# Patient Record
Sex: FEMALE | Race: WHITE | Employment: OTHER | URBAN - METROPOLITAN AREA
[De-identification: names, ages, dates, MRNs, and addresses within clinical notes are randomized per-mention and may not be internally consistent; named-entity substitution may affect disease eponyms.]

---

## 2018-03-19 ENCOUNTER — OFFICE VISIT (OUTPATIENT)
Dept: LAB | Facility: SCHOOL | Age: 63
End: 2018-03-19
Payer: COMMERCIAL

## 2018-03-19 VITALS
RESPIRATION RATE: 16 BRPM | BODY MASS INDEX: 41.4 KG/M2 | DIASTOLIC BLOOD PRESSURE: 72 MMHG | HEIGHT: 66 IN | SYSTOLIC BLOOD PRESSURE: 138 MMHG | OXYGEN SATURATION: 96 % | TEMPERATURE: 97.4 F | WEIGHT: 257.6 LBS | HEART RATE: 54 BPM

## 2018-03-19 DIAGNOSIS — E66.01 MORBID OBESITY (H): ICD-10-CM

## 2018-03-19 DIAGNOSIS — Z00.00 WELLNESS EXAMINATION: Primary | ICD-10-CM

## 2018-03-19 DIAGNOSIS — I10 BENIGN ESSENTIAL HYPERTENSION: ICD-10-CM

## 2018-03-19 DIAGNOSIS — G47.33 OSA (OBSTRUCTIVE SLEEP APNEA): ICD-10-CM

## 2018-03-19 DIAGNOSIS — Z72.0 TOBACCO ABUSE DISORDER: ICD-10-CM

## 2018-03-19 PROCEDURE — 99203 OFFICE O/P NEW LOW 30 MIN: CPT | Performed by: NURSE PRACTITIONER

## 2018-03-19 RX ORDER — GABAPENTIN 600 MG/1
TABLET ORAL
COMMUNITY
Start: 2018-03-13

## 2018-03-19 RX ORDER — LISINOPRIL AND HYDROCHLOROTHIAZIDE 20; 25 MG/1; MG/1
1 TABLET ORAL
COMMUNITY
Start: 2017-08-16 | End: 2021-05-26

## 2018-03-19 RX ORDER — HYDROCODONE BITARTRATE AND ACETAMINOPHEN 5; 325 MG/1; MG/1
1 TABLET ORAL
COMMUNITY
Start: 2018-03-13 | End: 2019-09-06

## 2018-03-19 RX ORDER — MULTIVITAMIN
2 CAPSULE ORAL DAILY
Status: ON HOLD | COMMUNITY
End: 2021-05-26

## 2018-03-19 NOTE — PROGRESS NOTES
"  SUBJECTIVE:  CC: Corina Martinez is an 63 year old woman who presents for Wellness Check at [facility].     Review of Healthy Lifestyle:    Do you get at least three servings of calcium containing foods daily (dairy, green leafy vegetables, etc.)? yes     Do you have a high-fiber diet? yes     Amount of exercise or daily activities, outside of work: not much- broke her hip this year    Do you wear sunscreen on a regular basis? No     Are you taking your medications regularly Yes     Have you had an eye exam in the past two years? yes    Do you see a dentist twice per year? yes    Do you have sleep apnea, excessive snoring or excessive daytime drowsiness? Sleep apnea    Do you use tobacco in any form? yes       OBJECTIVE:    Vitals: /72 (BP Location: Right arm, Patient Position: Sitting, Cuff Size: Adult Large)  Pulse 54  Temp 97.4  F (36.3  C) (Tympanic)  Resp 16  Ht 5' 5.5\" (1.664 m)  Wt 257 lb 9.6 oz (116.8 kg)  SpO2 96%  BMI 42.21 kg/m2  BMI= Body mass index is 42.21 kg/(m^2).    HEARING: :  Testing not done; patient declined    VISION: patient declined    Medication Reconciliation: complete    ASSESSMENT/PLAN: Patient just had a physical last week and discussed health issues with primary provider at [facility] and had fasting labs. Offered smoking cessation materials.     COUNSELING:      reports that she has been smoking Cigarettes.  She has never used smokeless tobacco.  Tobacco Cessation Action Plan: Information offered: Patient not interested at this time  Estimated body mass index is 42.21 kg/(m^2) as calculated from the following:    Height as of this encounter: 5' 5.5\" (1.664 m).    Weight as of this encounter: 257 lb 9.6 oz (116.8 kg).   Weight management plan: Patient was referred to their PCP to discuss a diet and exercise plan.    Counseling Resources  [place]'s MyPlate  https://www.quitplan.com/    SUZE Haywood CNP    [employer] PROVID[employer]  [facility]LE " [facility]

## 2018-03-19 NOTE — PATIENT INSTRUCTIONS
"Corina Martinez has completed a Wellness Check at the [facility] on 3/19/2018.      ____________________________________________  [employer] PROVIDER                                                                               Wellness Visit Recommendations:      See your regular primary care health provider every year in order to help stay healthy.    Review health changes.     Review your medicines if your doctor has prescribed any.    Talk to your provider about how often to have your cholesterol checked.    If you are at risk for diabetes, you should have a diabetes test (fasting glucose).    Shots: Get a flu shot each year. Get a tetanus shot every 10 years.     Review with your primary care provider other immunizations that you may need based on your age and/or medical/surgical history    Nutrition:     Eat at least 5 servings of fruits and vegetables each day.    Eat whole-grain bread, whole-wheat pasta and brown rice instead of white grains and rice.    Preventive Care to be reviewed by your primary care provider:    Females:        Cervical Cancer Screening                          Breast Cancer Screening                          Colon Cancer Screening  Males:             Prostrate Cancer Screening                          Colon Cancer Screening      Lifestyle:    Exercise at least 150 minutes a week (30 minutes a day, 5 days of the week). This will help you control your weight and help prevent disease or manage disease.    Limit alcohol to one drink per day or less depending on your past medical history.    No smoking.     Wear sunscreen to prevent skin cancer.    See your dentist every six months for an exam and cleaning.    Today's Vital Signs:  /72 (BP Location: Right arm, Patient Position: Sitting, Cuff Size: Adult Large)  Pulse 54  Temp 97.4  F (36.3  C) (Tympanic)  Resp 16  Ht 5' 5.5\" (1.664 m)  Wt 257 lb 9.6 oz (116.8 kg)  SpO2 96%  BMI 42.21 " kg/m2

## 2018-03-19 NOTE — MR AVS SNAPSHOT
After Visit Summary   3/19/2018    Corina Martinez    MRN: [id number]           Patient Information     Date Of Birth          1955        Visit Information        Provider Department      3/19/2018 9:00 AM Provider, [facility]        Care Instructions                    Corina Martinez has completed a Wellness Check at the [facility] on 3/19/2018.      ____________________________________________  [facility] PROVIDER                                                                               Wellness Visit Recommendations:      See your regular primary care health provider every year in order to help stay healthy.    Review health changes.     Review your medicines if your doctor has prescribed any.    Talk to your provider about how often to have your cholesterol checked.    If you are at risk for diabetes, you should have a diabetes test (fasting glucose).    Shots: Get a flu shot each year. Get a tetanus shot every 10 years.     Review with your primary care provider other immunizations that you may need based on your age and/or medical/surgical history    Nutrition:     Eat at least 5 servings of fruits and vegetables each day.    Eat whole-grain bread, whole-wheat pasta and brown rice instead of white grains and rice.    Preventive Care to be reviewed by your primary care provider:    Females:        Cervical Cancer Screening                          Breast Cancer Screening                          Colon Cancer Screening  Males:             Prostrate Cancer Screening                          Colon Cancer Screening      Lifestyle:    Exercise at least 150 minutes a week (30 minutes a day, 5 days of the week). This will help you control your weight and help prevent disease or manage disease.    Limit alcohol to one drink per day or less depending on your past medical history.    No smoking.     Wear sunscreen to prevent skin  "cancer.    See your dentist every six months for an exam and cleaning.    Today's Vital Signs:  /72 (BP Location: Right arm, Patient Position: Sitting, Cuff Size: Adult Large)  Pulse 54  Temp 97.4  F (36.3  C) (Tympanic)  Resp 16  Ht 5' 5.5\" (1.664 m)  Wt 257 lb 9.6 oz (116.8 kg)  SpO2 96%  BMI 42.21 kg/m2          Follow-ups after your visit        Who to contact     If you have questions or need follow up information about today's clinic visit or your schedule please contact [facility] directly at [phone].  Normal or non-critical lab and imaging results will be communicated to you by MyChart, letter or phone within 4 business days after the clinic has received the results. If you do not hear from us within 7 days, please contact the clinic through [employer] or phone. If you have a critical or abnormal lab result, we will notify you by phone as soon as possible.  Submit refill requests through [employer] or call your pharmacy and they will forward the refill request to us. Please allow 3 business days for your refill to be completed.          Additional Information About Your Visit        [employer] Information     [employer] lets you send messages to your doctor, view your test results, renew your prescriptions, schedule appointments and more. To sign up, go to [email or web address] . Click on \"Log in\" on the left side of the screen, which will take you to the Welcome page. Then click on \"Sign up Now\" on the right side of the page.     You will be asked to enter the access code listed below, as well as some personal information. Please follow the directions to create your username and password.     Your access code is: [email or web address]  Expires: 2018  9:05 AM     Your access code will  in 90 days. If you need help or a new code, please call your [facility] or [phone].        Care EveryWhere ID     This is your Care EveryWhere ID. This could be used by other " "organizations to access your [facility] medical records  [id number]        Your Vitals Were     Pulse Temperature Respirations Height Pulse Oximetry BMI (Body Mass Index)    54 97.4  F (36.3  C) (Tympanic) 16 5' 5.5\" (1.664 m) 96% 42.21 kg/m2       Blood Pressure from Last 3 Encounters:   03/19/18 138/72   03/14/11 130/72   03/01/11 128/72    Weight from Last 3 Encounters:   03/19/18 257 lb 9.6 oz (116.8 kg)   03/14/11 260 lb 1.6 oz (118 kg)   03/01/11 258 lb (117 kg)              Today, you had the following     No orders found for display         Today's Medication Changes          These changes are accurate as of 3/19/18  9:05 AM.  If you have any questions, ask your nurse or doctor.               Stop taking these medicines if you haven't already. Please contact your care team if you have questions.     lisinopril 10 MG tablet   Commonly known as:  PRINIVIL/ZESTRIL   Stopped by:  [place], [facility]                    Primary Care Provider Office Phone # Fax #    Fiona Wylie PA-C [phone]       [facility] [address] [place] [address]        Equal Access to Services     ROBB ANDERSON : Juan coyneo Sojanieali, waaxda luqadaha, qaybta kaalmada adeegyada, vi edwards. So [facility] [phone].    ATENCIÓN: Si habla español, tiene a kilpatrick disposición servicios gratuitos de asistencia lingüística. [place] al [phone].    We comply with applicable federal civil rights laws and [place] laws. We do not discriminate on the basis of race, color, national origin, age, disability, sex, sexual orientation, or gender identity.            Thank you!     Thank you for choosing [place] [address]  for your care. Our goal is always to provide you with excellent care. Hearing back from our patients is one way we can continue to improve our services. Please take a few minutes to complete the written survey that you may receive in " the mail after your visit with us. Thank you!             Your Updated Medication List - Protect others around you: Learn how to safely use, store and throw away your medicines at www.disposemymeds.org.          This list is accurate as of 3/19/18  9:05 AM.  Always use your most recent med list.                   Brand Name Dispense Instructions for use Diagnosis    citalopram 40 MG tablet    celeXA    90 tablet    Take 1 tablet by mouth daily.        cyclobenzaprine 10 MG tablet    FLEXERIL    30 tablet    Take 1 tablet by mouth 3 times daily as needed for muscle spasms.        fluticasone 50 MCG/ACT spray    FLONASE    1 Package    2 sprays by Both Nostrils route daily.        gabapentin 600 MG tablet    NEURONTIN     Take 600 mg orally in the morning, 600 mg in the afternoon and 1200 mg in the evening.        HYDROcodone-acetaminophen 5-325 MG per tablet    NORCO     Take 1 tablet by mouth        lisinopril-hydrochlorothiazide 20-25 MG per tablet    PRINZIDE/ZESTORETIC     Take 1 tablet by mouth        MULTIvitamin  S Caps      Take 2 capsules by mouth        omeprazole 20 MG CR capsule    priLOSEC     Take 20 mg by mouth        oxyCODONE IR 5 MG tablet    ROXICODONE    30 tablet    Take  by mouth. 1 tablet every 6 hours as needed        ursodiol 500 MG Tabs      Take  by mouth. 1 tablet three times daily

## 2018-03-19 NOTE — NURSING NOTE
"Chief Complaint   Patient presents with     Wellness Visit       Initial /72 (BP Location: Right arm, Patient Position: Sitting, Cuff Size: Adult Large)  Pulse 54  Temp 97.4  F (36.3  C) (Tympanic)  Resp 16  Ht 5' 5.5\" (1.664 m)  Wt 257 lb 9.6 oz (116.8 kg)  SpO2 96%  BMI 42.21 kg/m2 Estimated body mass index is 42.21 kg/(m^2) as calculated from the following:    Height as of this encounter: 5' 5.5\" (1.664 m).    Weight as of this encounter: 257 lb 9.6 oz (116.8 kg).  Medication Reconciliation: complete  "

## 2019-05-31 ENCOUNTER — RECORDS - HEALTHEAST (OUTPATIENT)
Dept: ADMINISTRATIVE | Facility: OTHER | Age: 64
End: 2019-05-31

## 2019-06-14 ENCOUNTER — AMBULATORY - HEALTHEAST (OUTPATIENT)
Dept: VASCULAR SURGERY | Facility: CLINIC | Age: 64
End: 2019-06-14

## 2019-06-14 DIAGNOSIS — M25.561 PAIN IN RIGHT KNEE: ICD-10-CM

## 2019-07-05 ENCOUNTER — AMBULATORY - HEALTHEAST (OUTPATIENT)
Dept: VASCULAR SURGERY | Facility: CLINIC | Age: 64
End: 2019-07-05

## 2019-07-08 ENCOUNTER — OFFICE VISIT - HEALTHEAST (OUTPATIENT)
Dept: VASCULAR SURGERY | Facility: CLINIC | Age: 64
End: 2019-07-08

## 2019-07-08 ENCOUNTER — COMMUNICATION - HEALTHEAST (OUTPATIENT)
Dept: TELEHEALTH | Facility: CLINIC | Age: 64
End: 2019-07-08

## 2019-07-08 DIAGNOSIS — E66.01 MORBID OBESITY (H): ICD-10-CM

## 2019-07-08 DIAGNOSIS — I87.2 VENOUS INSUFFICIENCY OF BOTH LOWER EXTREMITIES: ICD-10-CM

## 2019-07-08 DIAGNOSIS — Z96.651 STATUS POST RIGHT KNEE REPLACEMENT: ICD-10-CM

## 2019-07-08 DIAGNOSIS — I87.303 VENOUS HYPERTENSION OF BOTH LOWER EXTREMITIES: ICD-10-CM

## 2019-07-08 DIAGNOSIS — T81.31XA DEHISCENCE OF OPERATIVE WOUND, INITIAL ENCOUNTER: ICD-10-CM

## 2019-07-08 DIAGNOSIS — Z72.0 TOBACCO ABUSE DISORDER: ICD-10-CM

## 2019-07-08 ASSESSMENT — MIFFLIN-ST. JEOR: SCORE: 1668.06

## 2019-07-11 ENCOUNTER — COMMUNICATION - HEALTHEAST (OUTPATIENT)
Dept: VASCULAR SURGERY | Facility: CLINIC | Age: 64
End: 2019-07-11

## 2019-07-12 ENCOUNTER — AMBULATORY - HEALTHEAST (OUTPATIENT)
Dept: VASCULAR SURGERY | Facility: CLINIC | Age: 64
End: 2019-07-12

## 2019-07-12 DIAGNOSIS — L97.309 ULCER OF ANKLE (H): ICD-10-CM

## 2019-07-12 DIAGNOSIS — T81.31XA DEHISCENCE OF OPERATIVE WOUND, INITIAL ENCOUNTER: ICD-10-CM

## 2019-07-29 ENCOUNTER — OFFICE VISIT - HEALTHEAST (OUTPATIENT)
Dept: VASCULAR SURGERY | Facility: CLINIC | Age: 64
End: 2019-07-29

## 2019-07-29 DIAGNOSIS — L97.812 SKIN ULCER OF RIGHT KNEE WITH FAT LAYER EXPOSED (H): ICD-10-CM

## 2019-07-29 DIAGNOSIS — Z72.0 TOBACCO ABUSE DISORDER: ICD-10-CM

## 2019-07-29 DIAGNOSIS — T81.31XA DEHISCENCE OF OPERATIVE WOUND, INITIAL ENCOUNTER: ICD-10-CM

## 2019-07-29 DIAGNOSIS — E66.01 MORBID OBESITY (H): ICD-10-CM

## 2019-07-29 DIAGNOSIS — Z96.651 STATUS POST RIGHT KNEE REPLACEMENT: ICD-10-CM

## 2019-09-06 ENCOUNTER — OFFICE VISIT (OUTPATIENT)
Dept: LAB | Facility: SCHOOL | Age: 64
End: 2019-09-06
Payer: COMMERCIAL

## 2019-09-06 VITALS
OXYGEN SATURATION: 97 % | RESPIRATION RATE: 14 BRPM | DIASTOLIC BLOOD PRESSURE: 78 MMHG | BODY MASS INDEX: 39.37 KG/M2 | HEART RATE: 87 BPM | TEMPERATURE: 97.8 F | SYSTOLIC BLOOD PRESSURE: 122 MMHG | WEIGHT: 245 LBS | HEIGHT: 66 IN

## 2019-09-06 DIAGNOSIS — Z00.00 ENCOUNTER FOR WELLNESS EXAMINATION: Primary | ICD-10-CM

## 2019-09-06 PROCEDURE — 99213 OFFICE O/P EST LOW 20 MIN: CPT

## 2019-09-06 RX ORDER — MULTIVITAMIN WITH IRON
250 TABLET ORAL AT BEDTIME
COMMUNITY
Start: 2019-05-01 | End: 2021-06-24

## 2019-09-06 RX ORDER — HYDROMORPHONE HYDROCHLORIDE 2 MG/1
2 TABLET ORAL
COMMUNITY
End: 2021-05-26

## 2019-09-06 RX ORDER — HYDROXYZINE PAMOATE 25 MG/1
25 CAPSULE ORAL
Status: ON HOLD | COMMUNITY
Start: 2019-03-18 | End: 2021-05-26

## 2019-09-06 RX ORDER — LIDOCAINE HYDROCHLORIDE 20 MG/ML
JELLY TOPICAL
COMMUNITY
Start: 2019-07-08 | End: 2019-10-16

## 2019-09-06 RX ORDER — NAPROXEN 500 MG/1
500 TABLET ORAL
COMMUNITY
Start: 2019-05-28 | End: 2021-05-26

## 2019-09-06 ASSESSMENT — MIFFLIN-ST. JEOR: SCORE: 1670.12

## 2019-09-06 NOTE — PATIENT INSTRUCTIONS
"                Corina Martinez has completed a Wellness Check at the [facility] on 9/6/2019.      ____________________________________________  [employer] PROVIDER                                                                               Wellness Visit Recommendations:      See your regular primary care health provider every year in order to help stay healthy.    Review health changes.     Review your medicines if your doctor has prescribed any.    Talk to your provider about how often to have your cholesterol checked.    If you are at risk for diabetes, you should have a diabetes test (fasting glucose).    Shots: Get a flu shot each year. Get a tetanus shot every 10 years.     Review with your primary care provider other immunizations that you may need based on your age and/or medical/surgical history    Nutrition:     Eat at least 5 servings of fruits and vegetables each day.    Eat whole-grain bread, whole-wheat pasta and brown rice instead of white grains and rice.    Preventive Care to be reviewed by your primary care provider:    Females:        Cervical Cancer Screening                          Breast Cancer Screening                          Colon Cancer Screening  Males:             Prostrate Cancer Screening                          Colon Cancer Screening      Lifestyle:    Exercise at least 150 minutes a week (30 minutes a day, 5 days of the week). This will help you control your weight and help prevent disease or manage disease.    Limit alcohol to one drink per day or less depending on your past medical history.    No smoking.     Wear sunscreen to prevent skin cancer.    See your dentist every six months for an exam and cleaning.    Today's Vital Signs:  /78   Pulse 87   Temp 97.8  F (36.6  C) (Tympanic)   Resp 14   Ht 1.664 m (5' 5.5\")   Wt 111.1 kg (245 lb)   SpO2 97%   BMI 40.15 kg/m         Patient Education     Help for Smokers  What Are Your Reasons for " "Quitting?  Should I think about quitting smoking?  When it comes to lasting life change, readiness is everything. This may be the perfect time to quit using tobacco. If you have concerns about your health, this is your chance to reflect on your habits and make healthy changes.  Perhaps you have tried quitting in the past. It may help to think of quitting as a process that you take one day at a time. Every attempt brings you closer to success. And each time you try, you learn more about what works for you.  What are my reasons for quitting?  The first step in quitting is to decide why quitting is important to you. Here is one good reason:  \"Scientists have identified more than 7,000 chemicals and chemical compounds in tobacco smoke. At least 70 of them are known specifically to cause cancer.\"  --2014 Surgeon General Report  Consider health risks:    Smoking is the leading cause of heart disease, lung disease and stroke.    For the first time, women are as likely to die as men from many diseases caused by smoking.    Smoking can harm a person's health before birth and throughout his or her life.    Smoking causes cancer of the lungs, kidney, stomach, pancreas, cervix and bone marrow.    Smoking leads to impotence (loss of sexual function).    Smoking causes cataracts (clouding of the lens in the eye).    Smoking can cause you to lose teeth.    Smoking can lead to osteoporosis (brittle bone disease). This means a greater risk of broken bones. And if you smoke, your bones will heal more slowly.    Smoking leads to more infections. If you are healing from surgery, your incision has a greater chance of getting infected, which delays healing.    Smoking leads to more trips to the hospital--and longer stays.    Smokers are 30 to 40 percent more likely to develop type 2 diabetes than non-smokers.    About 3 out of 4 teen smokers become adult smokers, even if they plan to quit in a few years.    Secondhand smoke exposure is " "now known to cause strokes in nonsmokers.    More than 33,000 nonsmokers die every year in the [place] from coronary artery disease caused by exposure to secondhand smoke.  Consider personal reasons:    Smoking costs too much money.    My clothes stink.    I miss out on activities with my family because I am away smoking.    My breathing test showed I have the beginnings of emphysema.    A loved one  of cancer.    Smoking seems to control my life.  What other reasons do you have for quitting?  __________________________________________  __________________________________________  Does the way I talk to myself affect my ability to quit smoking?  If you tell yourself you can't quit, it will be very hard to quit. If you tell yourself you are a non-smoker, you will live up to that name.  Quitting happens one day at a time. When you feel a strong urge to smoke, think about your larger goal: to have a free and healthy life.  If I change my behaviors, am I more likely to succeed?  To go to the Super Bowl, a football team must have an action plan. Team members must prepare for the guzman to win. They have to have more than one play. And if they don't make it to the big game, they don't give up--they simply make a new plan for next time.  Like getting to the Super Bowl, quitting smoking calls for more than just willpower. Nicotine is a powerful, addictive drug. Withdrawal symptoms are much like those from cocaine or heroin. To fight them, you need many \"plays\" throughout the day.  Most people do not succeed the first time they try to quit smoking. It may take several tries, and you may need several action plans.  Start your day with a plan. Change how and where you do things. If you always smoke in a certain chair in the living room, don't go there. If you always have a cigarette with a drink, avoid drinking for a while. Other steps you might take:    Instead of smoking in the morning, brush your teeth when you " "first get up, then eat a healthy breakfast.    Instead of smoking in the car, stash some low-fat treats in the glove box. Or buy special music for the ride.    Instead of smoking after meals, clear the table and go for a walk.    Instead of smoking when you're under stress, do some deep-breathing exercises.  When you wake up the next day, start your action plan again. See yourself as a winner. Being free of nicotine puts you in control of your body and health. Don't take your eyes off that goal, even when the going gets rough.  What can I do about nicotine withdrawal?  Nicotine addiction is not about willpower or strength of character. It's about brain chemistry. Nicotine provides a \"kick,\" causing the brain to release chemicals into the body. These chemicals give you a feeling of pleasure. Depending on the dose, they can also make you feel calm. People smoke to keep high levels of these chemicals in the body. The pleasure they feel makes them want to keep smoking.  In the first 48 hours after quitting, nicotine withdrawal can be intense. You may feel irritable and have strong cigarette cravings. You might have a hard time going to sleep or concentrating while awake. You may want to eat more than usual.  The good news is, these symptoms peak within a few days. They should subside within a few weeks.  Do smoking aids help?  Yes. Using a smoking aid--such as a nicotine patch or a medicine like Chantix--can increase your chances for success. Ask your doctor which aid is best for you:    Nicotine patches    Nicotine gum or lozenges    Nicotine nasal spray or a nicotine inhaler (a plastic filter that you puff almost like a cigarette), prescribed by a doctor    Varenicline (Chantix) or bupropion (Zyban, Wellbutrin), prescribed by a doctor  Nicotine replacement helps with the physical addiction. If you combine this with a quit-smoking program, you can double your chances for success.  What is my action plan?    Before you " quit, make two lists: reasons you want to quit and reasons you don't. When your first list is twice as long as the second, you will be more likely to succeed.    Pick a quit date. Perhaps it was the day you came to the hospital or clinic.    Tell friends and family.    Stock up on carrots, sugar-free mints, cinnamon sticks and other items to keep your mouth busy.    Keep your hands busy with drawing, knitting, playing an instrument or other hobbies.    Find support. Will you use nicotine replacement? Attend a class? Join [employer]? Talk with friends who have quit?    Get rid of all cigarettes, lighters, ashtrays and other smoking items.    Keep active. Exercise will release pleasure chemicals in your brain, just like smoking did. Try walking, biking, gardening and other activities.    Drink lots of water.    Reduce or avoid alcohol.    Breathe deeply. When you smoked, you breathed the smoke deep into your lungs. Now picture your lungs filling with fresh, clean air.    Reward yourself with the money you saved by quitting. Go to a movie, take a vacation, buy a car.  Today, there are more former smokers than current smokers. Each year, over half of all daily smokers try to quit.  How quickly will my health improve?  Your body has an amazing way of healing itself over time, if you let it. Here are just a few of the long-term rewards of quitting. But don't forget--all these benefits will end if you light another cigarette.  The moment you stop smoking  The air around you is no longer harmful to children or other adults.  After 20 minutes  Your blood pressure and heart rate drop to normal.  Your hands and feet warm to a normal temperature.  After 8 hours  The carbon monoxide level in your blood drops to normal.  The oxygen level in your blood rises to normal.  After 24 hours  Your chance of heart attack is lower.  After 48 hours  Your sense of smell and taste improve.  After 2 weeks to 3 months  Your blood flow  improves.  It's easier for you to walk.  Your lungs work up to 30 percent better than before.  You catch colds and the flu less often.  After 1 to 9 months  You have less coughing and shortness of breath.  You have fewer sinus problems.  You have more energy.  You feel better about yourself because you've done what you set out to do.  After 1 year  You cut your risk of heart disease in half.  You lower your risk of cancer and lung disease.  You have fewer sick days and health problems.  You reduce the cost of your auto, home and life insurance.  After 10 years  Normal cells replace your pre-cancer cells.  You greatly lower your risk of mouth, larynx, lung and bladder cancer.  Your risk of artery disease is the same as if you had never smoked.  You--and anyone who was exposed to your second-hand smoke--will have a longer life expectancy.  For more information  [place] (6-862-408-PLAN), the [place] Tobacco Quit Line, provides free professional counseling over the phone.  www.quitplan.com  For informational purposes only. Not to replace the advice of your health care provider.  Copyright   2004 [employer]. All rights reserved. Clinically reviewed by Ilene Art. [employer] 259096 - REV 08/18.  For informational purposes only. Not to replace the advice of your health care provider.  Copyright   2018 [employer]. All rights reserved.

## 2019-09-06 NOTE — PROGRESS NOTES
"  SUBJECTIVE:  CC: Corina Martinez is an 64 year old woman who presents for Wellness Check at [facility] [id number].     Review of Healthy Lifestyle:    Do you get at least three servings of calcium containing foods daily (dairy, green leafy vegetables, etc.)? yes     Do you have a high-fiber diet? no     Amount of exercise or daily activities, outside of work: 0 day(s) per week    Do you wear sunscreen on a regular basis? Yes      Are you taking your medications regularly No    Have you had an eye exam in the past two years? yes    Do you see a dentist twice per year? yes    Do you have sleep apnea, excessive snoring or excessive daytime drowsiness? yes    Do you use tobacco in any form? yes       OBJECTIVE:    Vitals: /78   Pulse 87   Temp 97.8  F (36.6  C) (Tympanic)   Resp 14   Ht 1.664 m (5' 5.5\")   Wt 111.1 kg (245 lb)   SpO2 97%   BMI 40.15 kg/m    BMI= Body mass index is 40.15 kg/m .    HEARING: Right Ear:        500Hz:  RESPONSE- on Level: tone not heard   1000 Hz: RESPONSE- on Level: tone not heard   2000 Hz: RESPONSE- on Level: 40 db   4000 Hz: RESPONSE- on Level: 40 db    Left Ear:       500Hz:  RESPONSE- on Level: tone not heard   1000 Hz: RESPONSE- on Level: tone not heard   2000 Hz: RESPONSE- on Level: 40 db   4000 Hz: RESPONSE- on Level: 40 db    VISION: Patient sees an eye dr in [place], [place]   Corrective lenses?  Yes    Medication Reconciliation: complete    ASSESSMENT/PLAN:  (Z00.00) Encounter for wellness examination  (primary encounter diagnosis)  Comment:    Plan: Discussed hearing and follow up with PCP.    Discussed preventative.    COUNSELING:      reports that she has been smoking cigarettes.  She has never used smokeless tobacco.  Tobacco Cessation Action Plan: Self help information given to patient  Estimated body mass index is 40.15 kg/m  as calculated from the following:    Height as of this encounter: 1.664 m (5' 5.5\").    Weight as of this " encounter: 111.1 kg (245 lb).   Weight management plan: Patient was referred to their PCP to discuss a diet and exercise plan.    Counseling Resources  [employer]  [email or web address]    [place] [facility] PROVIDER  [facility]

## 2019-10-16 ENCOUNTER — OFFICE VISIT (OUTPATIENT)
Dept: LAB | Facility: SCHOOL | Age: 64
End: 2019-10-16
Payer: COMMERCIAL

## 2019-10-16 VITALS
HEART RATE: 76 BPM | RESPIRATION RATE: 16 BRPM | HEIGHT: 66 IN | WEIGHT: 248 LBS | OXYGEN SATURATION: 94 % | TEMPERATURE: 98 F | BODY MASS INDEX: 39.86 KG/M2 | DIASTOLIC BLOOD PRESSURE: 64 MMHG | SYSTOLIC BLOOD PRESSURE: 114 MMHG

## 2019-10-16 DIAGNOSIS — R05.9 COUGH: ICD-10-CM

## 2019-10-16 DIAGNOSIS — J01.90 ACUTE SINUSITIS WITH SYMPTOMS > 10 DAYS: Primary | ICD-10-CM

## 2019-10-16 DIAGNOSIS — Z23 NEED FOR PROPHYLACTIC VACCINATION AND INOCULATION AGAINST INFLUENZA: ICD-10-CM

## 2019-10-16 PROCEDURE — 99213 OFFICE O/P EST LOW 20 MIN: CPT

## 2019-10-16 RX ORDER — ALBUTEROL SULFATE 90 UG/1
2 AEROSOL, METERED RESPIRATORY (INHALATION) EVERY 4 HOURS PRN
Qty: 8 G | Refills: 0 | Status: SHIPPED | OUTPATIENT
Start: 2019-10-16 | End: 2019-10-16

## 2019-10-16 RX ORDER — HYDROXYZINE HYDROCHLORIDE 25 MG/1
25 TABLET, FILM COATED ORAL AT BEDTIME
COMMUNITY
Start: 2019-07-28

## 2019-10-16 RX ORDER — ALBUTEROL SULFATE 90 UG/1
2 AEROSOL, METERED RESPIRATORY (INHALATION) EVERY 4 HOURS PRN
Qty: 8 G | Refills: 0 | Status: ON HOLD | OUTPATIENT
Start: 2019-10-16 | End: 2021-05-26

## 2019-10-16 ASSESSMENT — MIFFLIN-ST. JEOR: SCORE: 1695.64

## 2019-10-16 NOTE — PROGRESS NOTES
Corina Martinez is a 64 year old female who presents to clinic today for the following health issues:    HPI   ENT Symptoms             Symptoms: cc Present Absent Comment   Fever/Chills  x  Sweats and chills   Fatigue  x  Due to insomnia with pain and congestion   Muscle Aches  x  Increased in the last three weeks in neck from coughing   Eye Irritation  x  Fluid comes out with blowing nose   Sneezing  x     Nasal Angel/Drg  x  Post nasal, green thick drainage all day long   Sinus Pressure/Pain  x  Frontal and maxillary sinus presssure   Loss of smell  x     Dental pain  x  Upper jaw   Sore Throat  x  Intermittent depending on how much she is coughing   Swollen Glands  x     Ear Pain/Fullness  x  Both ears intermittently   Cough  x  All the time day and night   Wheeze  x  All the time   Chest Pain   x    Shortness of breath  x  With exertion   Rash   x    Other  x  Diarrhea x1 yesterday from med's per patient     Symptom duration:  3 weeks    Symptom severity:  moderate    Treatments tried:  nyquil, dayquil, mucinex and flonase   Contacts:        Hx of chronic sinus infections.  She states that she has been seen by ENT in the past for work-up.  Patient is a smoker and has no COPD diagnosis or hx of inhaler use.    Patient Active Problem List   Diagnosis     SHAILESH (obstructive sleep apnea)     Morbid obesity (H)     Tobacco abuse disorder     Benign essential hypertension     Midline low back pain without sciatica     Neck pain     Biliary cirrhosis (H)     History reviewed. No pertinent surgical history.    Social History     Tobacco Use     Smoking status: Current Some Day Smoker     Types: Cigarettes     Smokeless tobacco: Never Used     Tobacco comment: pt has been working hard on quiting   Substance Use Topics     Alcohol use: Yes     Comment: social     Family History   Problem Relation Age of Onset     Cancer Father      Lymphoma Father      Depression Mother      Respiratory Mother      Emphysema  Mother          Current Outpatient Medications   Medication Sig Dispense Refill     albuterol (PROAIR HFA) 108 (90 Base) MCG/ACT inhaler Inhale 2 puffs into the lungs every 4 hours as needed for shortness of breath / dyspnea or wheezing 8 g 0     amoxicillin-clavulanate (AUGMENTIN) 875-125 MG tablet Take 1 tablet by mouth 2 times daily for 7 days 14 tablet 0     fluticasone (FLONASE) 50 MCG/ACT nasal spray 2 sprays by Both Nostrils route daily. 1 Package 12     gabapentin (NEURONTIN) 600 MG tablet Take 600 mg orally in the morning, 600 mg in the afternoon and 1200 mg in the evening.       HYDROmorphone (DILAUDID) 2 MG tablet Take 2 mg by mouth       hydrOXYzine (VISTARIL) 25 MG capsule Take 25 mg by mouth       lisinopril-hydrochlorothiazide (PRINZIDE/ZESTORETIC) 20-25 MG per tablet Take 1 tablet by mouth       magnesium 250 MG tablet Take 250 mg by mouth       Multiple Vitamin (MULTIVITAMIN  S) CAPS Take 2 capsules by mouth       naproxen (NAPROSYN) 500 MG tablet Take 500 mg by mouth       omeprazole (PRILOSEC) 20 MG CR capsule Take 20 mg by mouth       Ursodiol 500 MG TABS Take  by mouth. 1 tablet three times daily       vitamin (B COMPLEX) tablet Take 1 tablet by mouth       hydrOXYzine (ATARAX) 25 MG tablet Take 25 mg by mouth At Bedtime       Allergies   Allergen Reactions     Acetaminophen Other (See Comments)     Codeine Sulfate Nausea       Reviewed and updated as needed this visit by Provider  Tobacco  Allergies  Meds  Problems  Med Hx  Surg Hx  Fam Hx         Review of Systems   ROS COMP: CONSTITUTIONAL:POSITIVE  for chills, fatigue and sweats  INTEGUMENTARY/SKIN: NEGATIVE for worrisome rashes, moles or lesions  ENT/MOUTH: POSITIVE for ear pain bilateral, nasal congestion, postnasal drainage, sinus pressure, sneezing, sore throat, swollen glands and tooth pain  RESP:POSITIVE for cough-productive, dyspnea on exertion, sputum dark green and thick and wheezing  CV: NEGATIVE for chest pain, palpitations  "or peripheral edema  PSYCHIATRIC: NEGATIVE for changes in mood or affect  ROS otherwise negative      Objective    /64   Pulse 76   Temp 98  F (36.7  C) (Tympanic)   Resp 16   Ht 1.683 m (5' 6.25\")   Wt 112.5 kg (248 lb)   SpO2 94%   BMI 39.73 kg/m    Body mass index is 39.73 kg/m .  Physical Exam   GENERAL: healthy, alert and no distress  HENT: normal cephalic/atraumatic, left ear: vascular redness over TM with no effusion, nose and mouth without ulcers or lesions, oropharynx clear and oral mucous membranes moist  NECK: no adenopathy and no asymmetry, masses, or scars  RESP: decreased breath sounds throughout; otherwise clear with no wheezing on exam today.  CV: regular rate and rhythm, normal S1 S2, no S3 or S4, no murmur, click or rub, no peripheral edema and peripheral pulses strong  PSYCH: mentation appears normal, affect normal/bright    Diagnostic Test Results:  Labs reviewed in Epic        Assessment & Plan     1. Acute sinusitis with symptoms > 10 days  With symptoms that are persistent with some worsening over the last 3 weeks, I will treat with Augmentin twice daily for 7 days.  Information provided on home symptom management.  Recommended use of Mucinex or Robitussin for symptom management.  Follow-up in 1 week if any persistent symptoms or sooner if any worsening symptoms despite treatment.  - amoxicillin-clavulanate (AUGMENTIN) 875-125 MG tablet; Take 1 tablet by mouth 2 times daily for 7 days  Dispense: 14 tablet; Refill: 0    2. Cough  Treating with albuterol for cough and shortness of breath due to upper airway restriction from URI and smoking.  Follow-up in clinic if any worsening breathing symptoms.  - albuterol (PROAIR HFA) 108 (90 Base) MCG/ACT inhaler; Inhale 2 puffs into the lungs every 4 hours as needed for shortness of breath / dyspnea or wheezing  Dispense: 8 g; Refill: 0    3. Need for prophylactic vaccination and inoculation against influenza  - INFLUENZA QUAD, RECOMBINANT, " P-FREE (RIV4) (FLUBLOCK) [78914]  - Vaccine Administration, Initial [92698]     Tobacco Cessation:   reports that she has been smoking cigarettes. She has never used smokeless tobacco.  Tobacco Cessation Action Plan: Information offered: Patient not interested at this time    See Patient Instructions    Return in about 1 week (around 10/23/2019), or if symptoms worsen or fail to improve.    Tracy Saxena NP on 10/16/2019 at 11:15 AM  [facility]

## 2019-10-16 NOTE — PATIENT INSTRUCTIONS
1.  Take antibiotic as directed.  2.  Use albuterol inhaler as directed as needed for shortness of breath, cough and wheezing.  3.  Information provided below on home symptom management.  I recommend mucinex or robitussin for symptoms.  4.  Follow-up in clinic with your primary care provider if any persistent or worsening symptoms.    Patient Education     Sinusitis (Antibiotic Treatment)    The sinuses are air-filled spaces within the bones of the face. They connect to the inside of the nose. Sinusitis is an inflammation of the tissue that lines the sinuses. Sinusitis can occur during a cold. It can also happen due to allergies to pollens and other particles in the air. Sinusitis can cause symptoms of sinus congestion and a feeling of fullness. A sinus infection causes fever, headache, and facial pain. There is often green or yellow fluid draining from the nose or into the back of the throat (post-nasal drip). You have been given antibiotics to treat this condition.  Home care    Take the full course of antibiotics as instructed. Do not stop taking them, even when you feel better.    Drink plenty of water, hot tea, and other liquids. This may help thin nasal mucus. It also may help your sinuses drain fluids.    Heat may help soothe painful areas of your face. Use a towel soaked in hot water. Or,  the shower and direct the warm spray onto your face. Using a vaporizer along with a menthol rub at night may also help soothe symptoms.     An expectorant with guaifenesin may help thin nasal mucus and help your sinuses drain fluids.    You can use an over-the-counter decongestant, unless a similar medicine was prescribed to you. Nasal sprays work the fastest. Use one that contains phenylephrine or oxymetazoline. First blow your nose gently. Then use the spray. Do not use these medicines more often than directed on the label. If you do, your symptoms may get worse. You may also take pills that contain  pseudoephedrine. Don t use products that combine multiple medicines. This is because side effects may be increased. Read labels. You can also ask the pharmacist for help. (People with high blood pressure should not use decongestants. They can raise blood pressure.)    Over-the-counter antihistamines may help if allergies contributed to your sinusitis.      Do not use nasal rinses or irrigation during an acute sinus infection, unless your healthcare provider tells you to. Rinsing may spread the infection to other areas in your sinuses.    Use acetaminophen or ibuprofen to control pain, unless another pain medicine was prescribed to you. If you have chronic liver or kidney disease or ever had a stomach ulcer, talk with your healthcare provider before using these medicines. (Aspirin should never be taken by anyone under age 18 who is ill with a fever. It may cause severe liver damage.)    Don't smoke. This can make symptoms worse.  Follow-up care  Follow up with your healthcare provider or our staff if you are not better in 1 week.  When to seek medical advice  Call your healthcare provider if any of these occur:    Facial pain or headache that gets worse    Stiff neck    Unusual drowsiness or confusion    Swelling of your forehead or eyelids    Vision problems, such as blurred or double vision    Fever of 100.4 F (38 C) or higher, or as directed by your healthcare provider    Seizure    Breathing problems    Symptoms don't go away in 10 days  Prevention  Here are steps you can take to help prevent an infection:    Keep good hand washing habits.    Don t have close contact with people who have sore throats, colds, or other upper respiratory infections.    Don t smoke, and stay away from secondhand smoke.    Stay up to date with of your vaccines.  Date Last Reviewed: 11/1/2017 2000-2018 [employer]. [address] [place] [address], [place] [address]. All rights reserved. This information is not intended as a  substitute for professional medical care. Always follow your healthcare professional's instructions.

## 2020-08-20 ENCOUNTER — HOSPITAL ENCOUNTER (OUTPATIENT)
Dept: PHYSICAL THERAPY | Facility: CLINIC | Age: 65
Setting detail: THERAPIES SERIES
End: 2020-08-20
Attending: PHYSICIAN ASSISTANT
Payer: COMMERCIAL

## 2020-08-20 PROCEDURE — 97162 PT EVAL MOD COMPLEX 30 MIN: CPT | Mod: GP | Performed by: PHYSICAL THERAPIST

## 2020-08-20 PROCEDURE — 97110 THERAPEUTIC EXERCISES: CPT | Mod: GP | Performed by: PHYSICAL THERAPIST

## 2020-08-20 NOTE — PROGRESS NOTES
08/20/20 1000   General Information   Type of Visit Initial OP Ortho PT Evaluation   Start of Care Date 08/20/20   Referring Physician Sven Grande    Patient/Family Goals Statement Keep shoulder mobile,m so it doesn't freeze up.    Orders Evaluate and Treat   Orders Comment ROM, Strengthening.    Date of Order 08/03/20   Certification Required? Yes  ([facility] [employer]. )   Medical Diagnosis Pre L RC Repair, Spring 2021   Surgical/Medical history reviewed   (High BP, Menopause, R Wrist Fx, OA, TKA R, MARIYA L. )   Precautions/Limitations no known precautions/limitations   Special Instructions Using naproxen for pain.    General Information Comments [place] and plans to have surgery when back. Reverse total shoulder.    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 Had intense pain right away. Called MD, had X'Rays and MRI. Was sent to Ortho. Discussed having a Reverse Total Shoulder.  She wants to go south this fall, and will return in the spring.  Plan to have it done  next spring of 2021.     Impairments A. Pain;B. Decreased WB tolerance;D. Decreased ROM;E. Decreased flexibility;F. Decreased strength and endurance;J. Burning;K. Numbness;L. Tingling   Functional Limitations perform activities of daily living;perform required work activities   Symptom Location Pain in R shoulder and down lateral arm/posterior shoulder.  N?T in L Hand mostly when sleeping.    How/Where did it occur With a fall   Onset date of current episode/exacerbation 07/17/20   Chronicity New   Pain rating (0-10 point scale)   (3-10/10)   Pain quality A. Sharp;B. Dull;C. Aching;D. Burning;E. Shooting;F. Stabbing   Frequency of pain/symptoms A. Constant   Pain/symptoms are: Worse  during the night   Pain/symptoms exacerbated by D. Carrying;C. Lifting;E. Rest;G. Certain positions;H. Overhead reach;K. Home tasks   Pain/symptoms eased by D. Nothing;E. Changing positions   Progression of symptoms since onset: Unchanged   Current / Previous Interventions   Diagnostic Tests: MRI   MRI Results Results   MRI results Completely torn RC.    Prior Level of Function   Functional Level Prior Comment Needs help w/ Bra.    Current Level of Function   Current Community Support Family/friend caregiver   Patient role/employment history F. Retired  (Worked as a . )   Living environment [facility]   Fall Risk Screen   Fall screen completed by PT   Have you fallen 2 or more times in the past year? No   Have you fallen and had an injury in the past year? Yes   Timed Up and Go score (seconds) 10   Is patient a fall risk? No   Abuse Screen (yes response referral indicated)   Feels Unsafe at Home or Work/School no   Feels Threatened by Someone no   Does Anyone Try to Keep You From Having Contact with Others or Doing Things Outside Your Home? no   Physical Signs of Abuse Present no   System Outcome Measures   Outcome Measures   (SPADI  87.69)   Functional Scales   Functional Scales OPTIMAL   Optimal (1=able to do without difficulty, 2=able to do with little difficulty, 3=able to do with moderate difficulty, 4=able to do with much difficulty, 5=unable to do, 9=NA) Activity 1;Activity 2;Activity 3   Activity 1 comment Washing hair and back 7/10    Activity 2 comment Getting dressed, pullover shirt 9/10    Activity 3 comment Putting on pants 7/10    Shoulder Objective Findings   Observation No acute distress    Integumentary  Very light bruising that is resolving.     Posture Head forward.    Cervical Screen (ROM, quadrant) WNL's and no problems in shoulder.    Shoulder ROM Comment Standing Flex L 63, R 135; Abd L 60, R 162; Er L 43, R 66; IR L L1, R T9. Pain w/ Flex and Abd on the L.    Scapulothoracic  Rhythm Decreased on L.    Shoulder Special Tests Comments MMT: L Flex 4+ w/ slight Pain, Abd at 0 and 90 degrees 4- w/ Pain; ER 4 w/Pain.    Palpation Not assessed today.    Planned Therapy Interventions   Planned Therapy Interventions Comment ROM, Strengthening, Function.  PTRX# [id number]   Planned Modality Interventions   Planned Modality Interventions Comments US if needed for pain control and promote healing around RC Tear.    Clinical Impression   Criteria for Skilled Therapeutic Interventions Met yes, treatment indicated   PT Diagnosis Decreased function of L shoulder d/t RC Tear. Presurgical intervention.    Influenced by the following impairments Pain, N/T, Decreased ROM, Strength.    Functional limitations due to impairments HH duties, Driving, Sleep, ADL's more difficult.    Clinical Presentation Evolving/Changing   Clinical Presentation Rationale SPADI, Hx of Recent fall, High BP, Previous surgeries for jts. MMT, AROM.    Clinical Decision Making (Complexity) Moderate complexity   Therapy Frequency 1 time/week   Predicted Duration of Therapy Intervention (days/wks) 6 weeks, for 3 visits.    Risk & Benefits of therapy have been explained Yes   Patient, Family & other staff in agreement with plan of care Yes   Clinical Impression Comments Decreased function of L shoulder d/t RC Tear. Presurgical intervention.    Education Assessment   Preferred Learning Style Listening;Demonstration;Pictures/video   Barriers to Learning No barriers   ORTHO GOALS   PT Ortho Eval Goals 1;2;3;4   Ortho Goal 1   Goal Identifier 1   Goal Description STG: Pt will be alin to wash her hair 5/10 on functional scale.    Target Date 09/23/20   Ortho Goal 2   Goal Identifier 2   Goal Description STG: Pt will be able to put on a pullover shirt 5/10 on functional scale.    Target Date 09/23/20   Ortho Goal 3   Goal Identifier 3   Goal Description STG: Pt will be able to put on pants 5/10 on functional scale.    Target Date 09/23/20    Ortho Goal 4   Goal Identifier 4   Goal Description STG: Independent w/ HEP to improve ROM and prepare for surgery.    Target Date 09/30/20   Total Evaluation Time   PT Renaeal, Moderate Complexity Minutes (55303) 25   Therapy Certification   Certification date from 08/20/20   Certification date to 09/30/20   Medical Diagnosis Pre L RC Repair, Spring 2021   Radha Duarte PT, MTC (#[id number])  [facility]           [phone]  Fax          [phone]  Appt #      [phone]

## 2020-08-20 NOTE — PROGRESS NOTES
[facility]    OUTPATIENT PHYSICAL THERAPY ORTHOPEDIC EVALUATION  PLAN OF TREATMENT FOR OUTPATIENT REHABILITATION  (COMPLETE FOR INITIAL CLAIMS ONLY)  Patient's Last Name, First Name, M.I.  YOB: 1955  Corina Martinez    Provider s Name:  [facility]   Medical Record No.  [id number]   Start of Care Date:  08/20/20   Onset Date:  07/17/20   Type:     _X__PT   ___OT   ___SLP Medical Diagnosis:  Pre L RC Repair, Spring 2021     PT Diagnosis:  Decreased function of L shoulder d/t RC Tear. Presurgical intervention.    Visits from SOC:  1      _________________________________________________________________________________  Plan of Treatment/Functional Goals:     ROM, Strengthening, Function.  PTRX# [id number]     US if needed for pain control and promote healing around RC Tear.   Goals  Goal Identifier: 1  Goal Description: STG: Pt will be alin to wash her hair 5/10 on functional scale.   Target Date: 09/23/20    Goal Identifier: 2  Goal Description: STG: Pt will be able to put on a pullover shirt 5/10 on functional scale.   Target Date: 09/23/20    Goal Identifier: 3  Goal Description: STG: Pt will be able to put on pants 5/10 on functional scale.   Target Date: 09/23/20    Goal Identifier: 4  Goal Description: STG: Independent w/ HEP to improve ROM and prepare for surgery.   Target Date: 09/30/20       Therapy Frequency:  1 time/week  Predicted Duration of Therapy Intervention:  6 weeks, for 3 visits.     Radha Duarte, PT                 I CERTIFY THE NEED FOR THESE SERVICES FURNISHED UNDER        THIS PLAN OF TREATMENT AND WHILE UNDER MY CARE .       Physician Signature               Date    X_____________________________________________________           Certification Date From:  08/20/20   Certification Date To:  09/30/20    Referring Provider:  Sven Grande     Initial Assessment        See Epic Evaluation Start of Care Date: 08/20/20

## 2021-01-21 NOTE — PROGRESS NOTES
Outpatient Physical Therapy Discharge Note     Patient: Corina Martinez  : 1955    Beginning/End Dates of Reporting Period:  20 to 20 when initially seen. Discharge written on 2021.  Total # of Rx sessions: 1    Referring Provider: Sven Grande Diagnosis: Pre L RC Repair, Spring 2021      Client Self Report: Pain in R shoulder and down lateral arm/posterior shoulder.  N?T in L Hand mostly when sleeping. Pain Scale: 3-10/10     Objective Measurements:  Objective Measure: SPADI    Details: 87.69     Objective Measure: AROM in standing  Details: Standing Flex L 63, R 135; Abd L 60, R 162; Er L 43, R 66; IR L L1, R T9. Pain w/ Flex and Abd on the L.     Objective Measure: MMT  Details: MMT: L Flex 4+ w/ slight Pain, Abd at 0 and 90 degrees 4- w/ Pain; ER 4 w/Pain.      Goals:  Goal Identifier 1   Goal Description STG: Pt will be alin to wash her hair 5/10 on functional scale.    Target Date 20   Date Met      Progress:     Goal Identifier 2   Goal Description STG: Pt will be able to put on a pullover shirt 5/10 on functional scale.    Target Date 20   Date Met      Progress:     Goal Identifier 3   Goal Description STG: Pt will be able to put on pants 5/10 on functional scale.    Target Date 20   Date Met      Progress:     Goal Identifier 4   Goal Description STG: Independent w/ HEP to improve ROM and prepare for surgery.    Target Date 20   Date Met      Progress:     Progress Toward Goals:   Not assessed this period.  Seen 1x only.     Plan:  Discharge from therapy.    Discharge:    Reason for Discharge: Patient has failed to schedule further appointments.    Equipment Issued:      Discharge Plan: Patient to continue home program.  Pt to follow up w/MD as appropriate.   Radha Duarte, PT, MTC (#[id number])  [facility]           [phone]  Fax          [phone]  Appt #      [phone]

## 2021-05-17 ENCOUNTER — APPOINTMENT (OUTPATIENT)
Dept: ULTRASOUND IMAGING | Facility: OTHER | Age: 66
End: 2021-05-17
Attending: FAMILY MEDICINE
Payer: MEDICARE

## 2021-05-17 ENCOUNTER — HOSPITAL ENCOUNTER (EMERGENCY)
Facility: OTHER | Age: 66
Discharge: ANOTHER HOSPITAL | End: 2021-05-17
Attending: FAMILY MEDICINE | Admitting: FAMILY MEDICINE
Payer: MEDICARE

## 2021-05-17 ENCOUNTER — APPOINTMENT (OUTPATIENT)
Dept: CT IMAGING | Facility: OTHER | Age: 66
End: 2021-05-17
Attending: FAMILY MEDICINE
Payer: MEDICARE

## 2021-05-17 VITALS
BODY MASS INDEX: 37.31 KG/M2 | HEART RATE: 126 BPM | SYSTOLIC BLOOD PRESSURE: 110 MMHG | RESPIRATION RATE: 28 BRPM | OXYGEN SATURATION: 95 % | DIASTOLIC BLOOD PRESSURE: 92 MMHG | TEMPERATURE: 98 F | HEIGHT: 66 IN | WEIGHT: 232.14 LBS

## 2021-05-17 DIAGNOSIS — I48.91 ATRIAL FIBRILLATION WITH RVR (H): ICD-10-CM

## 2021-05-17 LAB
ALBUMIN SERPL-MCNC: 3.9 G/DL (ref 3.5–5.7)
ALP SERPL-CCNC: 222 U/L (ref 34–104)
ALT SERPL W P-5'-P-CCNC: 87 U/L (ref 7–52)
ANION GAP SERPL CALCULATED.3IONS-SCNC: 11 MMOL/L (ref 3–14)
AST SERPL W P-5'-P-CCNC: 68 U/L (ref 13–39)
BASOPHILS # BLD AUTO: 0 10E9/L (ref 0–0.2)
BASOPHILS NFR BLD AUTO: 0.3 %
BILIRUB SERPL-MCNC: 1.3 MG/DL (ref 0.3–1)
BUN SERPL-MCNC: 14 MG/DL (ref 7–25)
CALCIUM SERPL-MCNC: 9.1 MG/DL (ref 8.6–10.3)
CHLORIDE SERPL-SCNC: 100 MMOL/L (ref 98–107)
CO2 SERPL-SCNC: 23 MMOL/L (ref 21–31)
CREAT SERPL-MCNC: 0.69 MG/DL (ref 0.6–1.2)
DIFFERENTIAL METHOD BLD: ABNORMAL
EOSINOPHIL # BLD AUTO: 0 10E9/L (ref 0–0.7)
EOSINOPHIL NFR BLD AUTO: 0.1 %
ERYTHROCYTE [DISTWIDTH] IN BLOOD BY AUTOMATED COUNT: 15.9 % (ref 10–15)
GFR SERPL CREATININE-BSD FRML MDRD: 85 ML/MIN/{1.73_M2}
GLUCOSE SERPL-MCNC: 124 MG/DL (ref 70–105)
HCT VFR BLD AUTO: 38.9 % (ref 35–47)
HGB BLD-MCNC: 12.8 G/DL (ref 11.7–15.7)
IMM GRANULOCYTES # BLD: 0.1 10E9/L (ref 0–0.4)
IMM GRANULOCYTES NFR BLD: 0.5 %
LABORATORY COMMENT REPORT: NORMAL
LACTATE BLD-SCNC: 2 MMOL/L (ref 0.7–2)
LYMPHOCYTES # BLD AUTO: 1.5 10E9/L (ref 0.8–5.3)
LYMPHOCYTES NFR BLD AUTO: 10.9 %
MCH RBC QN AUTO: 28.6 PG (ref 26.5–33)
MCHC RBC AUTO-ENTMCNC: 32.9 G/DL (ref 31.5–36.5)
MCV RBC AUTO: 87 FL (ref 78–100)
MONOCYTES # BLD AUTO: 1.2 10E9/L (ref 0–1.3)
MONOCYTES NFR BLD AUTO: 8.5 %
NEUTROPHILS # BLD AUTO: 11.1 10E9/L (ref 1.6–8.3)
NEUTROPHILS NFR BLD AUTO: 79.7 %
PLATELET # BLD AUTO: 290 10E9/L (ref 150–450)
POTASSIUM SERPL-SCNC: 4.2 MMOL/L (ref 3.5–5.1)
PROT SERPL-MCNC: 7 G/DL (ref 6.4–8.9)
RBC # BLD AUTO: 4.48 10E12/L (ref 3.8–5.2)
SARS-COV-2 RNA RESP QL NAA+PROBE: NEGATIVE
SODIUM SERPL-SCNC: 134 MMOL/L (ref 134–144)
SPECIMEN SOURCE: NORMAL
TROPONIN I SERPL-MCNC: 16.5 PG/ML
TROPONIN I SERPL-MCNC: 7.9 PG/ML
TSH SERPL DL<=0.05 MIU/L-ACNC: 2.44 IU/ML (ref 0.34–5.6)
WBC # BLD AUTO: 13.9 10E9/L (ref 4–11)

## 2021-05-17 PROCEDURE — 250N000013 HC RX MED GY IP 250 OP 250 PS 637: Mod: GY | Performed by: FAMILY MEDICINE

## 2021-05-17 PROCEDURE — 96365 THER/PROPH/DIAG IV INF INIT: CPT | Mod: XU | Performed by: FAMILY MEDICINE

## 2021-05-17 PROCEDURE — 93005 ELECTROCARDIOGRAM TRACING: CPT | Performed by: FAMILY MEDICINE

## 2021-05-17 PROCEDURE — U0005 INFEC AGEN DETEC AMPLI PROBE: HCPCS | Performed by: FAMILY MEDICINE

## 2021-05-17 PROCEDURE — 36415 COLL VENOUS BLD VENIPUNCTURE: CPT | Performed by: FAMILY MEDICINE

## 2021-05-17 PROCEDURE — 80053 COMPREHEN METABOLIC PANEL: CPT | Performed by: FAMILY MEDICINE

## 2021-05-17 PROCEDURE — 84484 ASSAY OF TROPONIN QUANT: CPT | Performed by: FAMILY MEDICINE

## 2021-05-17 PROCEDURE — 99285 EMERGENCY DEPT VISIT HI MDM: CPT | Mod: 25 | Performed by: FAMILY MEDICINE

## 2021-05-17 PROCEDURE — 250N000011 HC RX IP 250 OP 636: Performed by: FAMILY MEDICINE

## 2021-05-17 PROCEDURE — 76705 ECHO EXAM OF ABDOMEN: CPT

## 2021-05-17 PROCEDURE — 96366 THER/PROPH/DIAG IV INF ADDON: CPT | Performed by: FAMILY MEDICINE

## 2021-05-17 PROCEDURE — 93010 ELECTROCARDIOGRAM REPORT: CPT | Performed by: INTERNAL MEDICINE

## 2021-05-17 PROCEDURE — 94640 AIRWAY INHALATION TREATMENT: CPT

## 2021-05-17 PROCEDURE — 71275 CT ANGIOGRAPHY CHEST: CPT

## 2021-05-17 PROCEDURE — 85025 COMPLETE CBC W/AUTO DIFF WBC: CPT | Performed by: FAMILY MEDICINE

## 2021-05-17 PROCEDURE — C9803 HOPD COVID-19 SPEC COLLECT: HCPCS | Performed by: FAMILY MEDICINE

## 2021-05-17 PROCEDURE — 84443 ASSAY THYROID STIM HORMONE: CPT | Performed by: FAMILY MEDICINE

## 2021-05-17 PROCEDURE — 99285 EMERGENCY DEPT VISIT HI MDM: CPT | Performed by: FAMILY MEDICINE

## 2021-05-17 PROCEDURE — 258N000003 HC RX IP 258 OP 636: Performed by: FAMILY MEDICINE

## 2021-05-17 PROCEDURE — 83605 ASSAY OF LACTIC ACID: CPT | Performed by: FAMILY MEDICINE

## 2021-05-17 PROCEDURE — 96368 THER/DIAG CONCURRENT INF: CPT | Performed by: FAMILY MEDICINE

## 2021-05-17 PROCEDURE — U0003 INFECTIOUS AGENT DETECTION BY NUCLEIC ACID (DNA OR RNA); SEVERE ACUTE RESPIRATORY SYNDROME CORONAVIRUS 2 (SARS-COV-2) (CORONAVIRUS DISEASE [COVID-19]), AMPLIFIED PROBE TECHNIQUE, MAKING USE OF HIGH THROUGHPUT TECHNOLOGIES AS DESCRIBED BY CMS-2020-01-R: HCPCS | Performed by: FAMILY MEDICINE

## 2021-05-17 PROCEDURE — 96375 TX/PRO/DX INJ NEW DRUG ADDON: CPT | Mod: XU | Performed by: FAMILY MEDICINE

## 2021-05-17 RX ORDER — ALBUTEROL SULFATE 90 UG/1
6 AEROSOL, METERED RESPIRATORY (INHALATION) ONCE
Status: COMPLETED | OUTPATIENT
Start: 2021-05-17 | End: 2021-05-17

## 2021-05-17 RX ORDER — HEPARIN SODIUM 5000 [USP'U]/.5ML
6000 INJECTION, SOLUTION INTRAVENOUS; SUBCUTANEOUS ONCE
Status: COMPLETED | OUTPATIENT
Start: 2021-05-17 | End: 2021-05-17

## 2021-05-17 RX ORDER — IOPAMIDOL 755 MG/ML
91 INJECTION, SOLUTION INTRAVASCULAR ONCE
Status: COMPLETED | OUTPATIENT
Start: 2021-05-17 | End: 2021-05-17

## 2021-05-17 RX ORDER — HEPARIN SODIUM 10000 [USP'U]/100ML
0-5000 INJECTION, SOLUTION INTRAVENOUS CONTINUOUS
Status: DISCONTINUED | OUTPATIENT
Start: 2021-05-17 | End: 2021-05-17 | Stop reason: HOSPADM

## 2021-05-17 RX ORDER — SODIUM CHLORIDE 9 MG/ML
INJECTION, SOLUTION INTRAVENOUS CONTINUOUS
Status: DISCONTINUED | OUTPATIENT
Start: 2021-05-17 | End: 2021-05-17 | Stop reason: HOSPADM

## 2021-05-17 RX ADMIN — IOPAMIDOL 91 ML: 755 INJECTION, SOLUTION INTRAVENOUS at 15:02

## 2021-05-17 RX ADMIN — HEPARIN SODIUM 1260 UNITS/HR: 10000 INJECTION, SOLUTION INTRAVENOUS at 18:24

## 2021-05-17 RX ADMIN — HEPARIN SODIUM 6000 UNITS: 5000 INJECTION, SOLUTION INTRAVENOUS; SUBCUTANEOUS at 18:21

## 2021-05-17 RX ADMIN — SODIUM CHLORIDE 1 MG/MIN: 0.9 INJECTION, SOLUTION INTRAVENOUS at 16:51

## 2021-05-17 RX ADMIN — SODIUM CHLORIDE: 9 INJECTION, SOLUTION INTRAVENOUS at 16:04

## 2021-05-17 RX ADMIN — ALBUTEROL SULFATE 6 PUFF: 90 AEROSOL, METERED RESPIRATORY (INHALATION) at 14:20

## 2021-05-17 RX ADMIN — SODIUM CHLORIDE 1000 ML: 9 INJECTION, SOLUTION INTRAVENOUS at 14:27

## 2021-05-17 ASSESSMENT — ENCOUNTER SYMPTOMS
ABDOMINAL DISTENTION: 0
PALPITATIONS: 0
CHILLS: 0
LIGHT-HEADEDNESS: 1
SHORTNESS OF BREATH: 1
FEVER: 0
DYSURIA: 0
CHEST TIGHTNESS: 1
HEADACHES: 0

## 2021-05-17 ASSESSMENT — MIFFLIN-ST. JEOR: SCORE: 1609.75

## 2021-05-17 NOTE — ED PROVIDER NOTES
History     Chief Complaint   Patient presents with     Shortness of Breath     HPI  Corina Martinez is a 66 year old female with history of obesity, tobacco abuse, hypertension, obstructive sleep apnea, primary biliary cirrhosis who presents with Chest tightness and SOB for 5-6 days.      Allergies:  Allergies   Allergen Reactions     Acetaminophen Other (See Comments)     Codeine Sulfate Nausea       Problem List:    Patient Active Problem List    Diagnosis Date Noted     Morbid obesity (H) 03/19/2018     Priority: Medium     Tobacco abuse disorder 03/19/2018     Priority: Medium     Benign essential hypertension 03/19/2018     Priority: Medium     SHAILESH (obstructive sleep apnea) 03/14/2011     Priority: Medium     Mild (PSG 3/8/2011: AHI 10.5, kushal desat 83%)       Midline low back pain without sciatica 09/05/2007     Priority: Medium     Neck pain 09/05/2007     Priority: Medium     Biliary cirrhosis (H) 09/05/2007     Priority: Medium     Overview:   Followed by Dr. Martini  LFTs q 6 mos  DEXA q 2 years  Followed by Dr. Martini  LFTs q 6 mos  DEXA q 2 years          Past Medical History:    No past medical history on file.    Past Surgical History:    No past surgical history on file.    Family History:    Family History   Problem Relation Age of Onset     Cancer Father      Lymphoma Father      Depression Mother      Respiratory Mother      Emphysema Mother        Social History:  Marital Status:   [2]  Social History     Tobacco Use     Smoking status: Current Some Day Smoker     Types: Cigarettes     Smokeless tobacco: Never Used     Tobacco comment: pt has been working hard on quiting   Substance Use Topics     Alcohol use: Yes     Comment: social     Drug use: No        Medications:    albuterol (PROAIR HFA) 108 (90 Base) MCG/ACT inhaler  amoxicillin-clavulanate (AUGMENTIN) 875-125 MG tablet  fluticasone (FLONASE) 50 MCG/ACT nasal spray  gabapentin (NEURONTIN) 600 MG tablet  HYDROmorphone (DILAUDID) 2  "MG tablet  hydrOXYzine (ATARAX) 25 MG tablet  hydrOXYzine (VISTARIL) 25 MG capsule  lisinopril-hydrochlorothiazide (PRINZIDE/ZESTORETIC) 20-25 MG per tablet  magnesium 250 MG tablet  Multiple Vitamin (MULTIVITAMIN  S) CAPS  naproxen (NAPROSYN) 500 MG tablet  omeprazole (PRILOSEC) 20 MG CR capsule  Ursodiol 500 MG TABS  vitamin (B COMPLEX) tablet          Review of Systems   Constitutional: Negative for chills and fever.   HENT: Negative for congestion.    Respiratory: Positive for chest tightness and shortness of breath.    Cardiovascular: Positive for chest pain. Negative for palpitations and leg swelling.   Gastrointestinal: Negative for abdominal distention.   Genitourinary: Negative for dysuria.   Neurological: Positive for light-headedness. Negative for headaches.       Physical Exam   BP: (!) 128/101  Pulse: 144  Temp: 98.1  F (36.7  C)  Resp: 24  Height: 167.6 cm (5' 6\")  Weight: 105.3 kg (232 lb 2.3 oz)  SpO2: (!) 88 %      Physical Exam  Vitals signs and nursing note reviewed.   Neck:      Musculoskeletal: Normal range of motion.   Cardiovascular:      Rate and Rhythm: Tachycardia present.   Pulmonary:      Effort: Pulmonary effort is normal.      Breath sounds: No wheezing or rhonchi.         ED Course     ED Course as of May 17 1758   Mon May 17, 2021   1740 We are on divert in our ICU, I called [facility] they are on divert, I called [place] on divert, I called [facility] they are also on divert.    I called [facility] I got no answer at their 1 call connect.        Procedures  Rapid heart rate, rate in the 130s 140s.  Sinus tach versus A. fib.          Results for orders placed or performed during the hospital encounter of 05/17/21 (from the past 24 hour(s))   Symptomatic SARS-CoV-2 COVID-19 Virus (Coronavirus) by PCR    Specimen: Nasopharyngeal   Result Value Ref Range    SARS-CoV-2 Virus Specimen Source Nasopharyngeal     SARS-CoV-2 PCR Result NEGATIVE     SARS-CoV-2 PCR Comment       Testing was " performed using the Xpert Xpress SARS-CoV-2 Assay on the Cepheid Gene-Xpert   Instrument Systems. Additional information about this Emergency Use Authorization (EUA)   assay can be found via the Lab Guide.     CBC with platelets differential   Result Value Ref Range    WBC 13.9 (H) 4.0 - 11.0 10e9/L    RBC Count 4.48 3.8 - 5.2 10e12/L    Hemoglobin 12.8 11.7 - 15.7 g/dL    Hematocrit 38.9 35.0 - 47.0 %    MCV 87 78 - 100 fl    MCH 28.6 26.5 - 33.0 pg    MCHC 32.9 31.5 - 36.5 g/dL    RDW 15.9 (H) 10.0 - 15.0 %    Platelet Count 290 150 - 450 10e9/L    Diff Method Automated Method     % Neutrophils 79.7 %    % Lymphocytes 10.9 %    % Monocytes 8.5 %    % Eosinophils 0.1 %    % Basophils 0.3 %    % Immature Granulocytes 0.5 %    Absolute Neutrophil 11.1 (H) 1.6 - 8.3 10e9/L    Absolute Lymphocytes 1.5 0.8 - 5.3 10e9/L    Absolute Monocytes 1.2 0.0 - 1.3 10e9/L    Absolute Eosinophils 0.0 0.0 - 0.7 10e9/L    Absolute Basophils 0.0 0.0 - 0.2 10e9/L    Abs Immature Granulocytes 0.1 0 - 0.4 10e9/L   Comprehensive metabolic panel   Result Value Ref Range    Sodium 134 134 - 144 mmol/L    Potassium 4.2 3.5 - 5.1 mmol/L    Chloride 100 98 - 107 mmol/L    Carbon Dioxide 23 21 - 31 mmol/L    Anion Gap 11 3 - 14 mmol/L    Glucose 124 (H) 70 - 105 mg/dL    Urea Nitrogen 14 7 - 25 mg/dL    Creatinine 0.69 0.60 - 1.20 mg/dL    GFR Estimate 85 >60 mL/min/[1.73_m2]    GFR Estimate If Black >90 >60 mL/min/[1.73_m2]    Calcium 9.1 8.6 - 10.3 mg/dL    Bilirubin Total 1.3 (H) 0.3 - 1.0 mg/dL    Albumin 3.9 3.5 - 5.7 g/dL    Protein Total 7.0 6.4 - 8.9 g/dL    Alkaline Phosphatase 222 (H) 34 - 104 U/L    ALT 87 (H) 7 - 52 U/L    AST 68 (H) 13 - 39 U/L   Troponin GH   Result Value Ref Range    Troponin 16.5 <34.0 pg/mL   Thyrotropin GH   Result Value Ref Range    Thyrotropin 2.44 0.34 - 5.60 IU/mL   Lactic acid whole blood STAT   Result Value Ref Range    Lactic Acid 2.0 0.7 - 2.0 mmol/L   CT Chest Pulmonary Embolism w Contrast     Narrative    CT CHEST PULMONARY EMBOLISM W CONTRAST  5/17/2021 3:07 PM    CLINICAL HISTORY: Female, age 66 years,  PE suspected, high prob;    Comparison:  None    TECHNIQUE:  CT was performed of the chest  with IV contrast.   Sagittal, coronal, axial and MIP reconstructions were reviewed.     FINDINGS:  Chest CT:    CTA: Good quality examination demonstrates no evidence of pulmonary  embolus. Thoracic aorta is intact.    Lungs demonstrate mosaic attenuation with patchy areas of air trapping  and groundglass opacification. There is been interstitial thickening  are also seen throughout both lungs.    There are number of normal-sized and enlarged lymph nodes throughout  the mediastinum and hilar regions.    Esophagus is normal. Thyroid gland is also unremarkable.    Small effusion is seen in the right hemithorax. Visualized portions of  the upper abdomen are unremarkable.    Bony structures demonstrate no acute abnormality. Moderate  degenerative change throughout the thoracic spine.    6.0 x 2.2 cm intramuscular lipoma in the right subscapularis muscle.      Impression    IMPRESSION:   Good quality CTA demonstrates no evidence of acute vascular  abnormality.     Mediastinal and hilar lymphadenopathy with areas of air trapping and  interstitial nodularity suggesting the possibility of sarcoidosis.    Small right-sided effusion with areas of atelectasis in the dependent  portions of both lungs.    CINDY JACKSON MD   Troponin GH (now)   Result Value Ref Range    Troponin 7.9 <34.0 pg/mL   US Abdomen Limited    Narrative    PROCEDURES: US ABDOMEN LIMITED    HISTORY: Female, age 66 years, pain elevated liver enzymes    TECHNIQUE: Ultrasound of the upper abdomen was performed.    COMPARISON: None.     FINDINGS:    LIVER: Diffusely echogenic.    GALLBLADDER: Surgically absent.    Common bile duct diameter: 7.8 mm.    ABDOMINAL AORTA AND IVC: The visualized portions of the abdominal  aorta are  unremarkable.    PANCREAS:The visualized portions of the pancreas are normal.    RIGHT KIDNEY: The right kidney is normal. The right kidney measures  11.2 centimeters in length.    OTHER: There is no free fluid in the upper abdomen.      Impression    IMPRESSION:   No acute abnormality.    Surgical absence of the gallbladder. There is mild dilatation of the  biliary tree which is an expected finding.    Body habitus and difficulty in holding breath limit evaluation.    CINDY JACKSON MD       Medications   0.9% sodium chloride BOLUS (1,000 mLs Intravenous New Bag 5/17/21 1427)     Followed by   sodium chloride 0.9% infusion ( Intravenous New Bag 5/17/21 1604)   sodium chloride (PF) 0.9% PF flush 3 mL (has no administration in time range)   amiodarone (NEXTERONE) 900 mg in sodium chloride 0.9 % 500 mL infusion (1 mg/min Intravenous New Bag 5/17/21 1651)   amiodarone (NEXTERONE) 900 mg in sodium chloride 0.9 % 500 mL infusion (has no administration in time range)   heparin loading dose for LOW INTENSITY TREATMENT* Give BEFORE starting heparin infusion (has no administration in time range)   heparin 100 unit/mL in D5W ANTICOAGULANT infusion (has no administration in time range)   albuterol (PROAIR HFA/PROVENTIL HFA/VENTOLIN HFA) 108 (90 Base) MCG/ACT inhaler 6 puff (6 puffs Inhalation Given 5/17/21 1420)   iopamidol (ISOVUE-370) solution 91 mL (91 mLs Intravenous Given 5/17/21 1502)       Assessments & Plan (with Medical Decision Making)     I have reviewed the nursing notes.    I have reviewed the findings, diagnosis, plan and need for follow up with the patient.  Patient's heart rate was not amenable to fluid boluses since I had hoped it might be initially so then attention was directed towards a rate control strategy.  A rhythm control strategy was not used to her being presumably symptomatic for the last for 5 days unknowingly.  Her blood pressures were soft here in the ED so I started with an amiodarone infusion  without the bolus.  5:44 PM--on hold with Hitesh Garcia  Discussed with Dr. Juárez at [facility] who graciously excepted the patient in transfer.  We did discuss heparinization for anticoagulation.  That was ordered per protocol.  Patient hemodynamically stable for ground transport.  Patient verbalized understanding plan is in agreement.  New Prescriptions    No medications on file       Final diagnoses:   Atrial fibrillation with RVR (H)       5/17/2021   [facility]  05/17/21 5042

## 2021-05-17 NOTE — ED NOTES
Patient got slightly lightheaded when transferring from bed to bed. BP remains low and in AFIB. Able to turn off oxygen and maintaining 92-93%.

## 2021-05-17 NOTE — ED TRIAGE NOTES
"ED Nursing Triage Note (General)   ________________________________    Corina Martinez is a 66 year old Female that presents to triage private car  With history of  Increased SOB over past week and left anterior chest pain that is reproducible with deep breathing, reported by patient     /78   Pulse 135   Temp 98.1  F (36.7  C) (Tympanic)   Resp 17   Ht 1.676 m (5' 6\")   Wt 105.3 kg (232 lb 2.3 oz)   SpO2 92%   BMI 37.47 kg/m  t  Patient appears alert  and oriented, in moderate distress., and cooperative behavior.    GCS Total = 15  Airway: intact  Breathing noted as Adnormal  Circulation Normal  Skin:  Normal  Action taken:  Triage to critical care immediately      PRE HOSPITAL PRIOR LIVING SITUATION Spouse  "

## 2021-05-18 LAB — INTERPRETATION ECG - MUSE: NORMAL
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
## 2021-05-24 ENCOUNTER — TELEPHONE (OUTPATIENT)
Dept: FAMILY MEDICINE | Facility: OTHER | Age: 66
End: 2021-05-24

## 2021-05-24 ENCOUNTER — ANTICOAGULATION THERAPY VISIT (OUTPATIENT)
Dept: ANTICOAGULATION | Facility: OTHER | Age: 66
DRG: 310 | End: 2021-05-24
Payer: MEDICARE

## 2021-05-24 DIAGNOSIS — I48.91 ATRIAL FIBRILLATION, UNSPECIFIED TYPE (H): Primary | ICD-10-CM

## 2021-05-24 DIAGNOSIS — Z79.01 ANTICOAGULATION MONITORING, INR RANGE 2-3: ICD-10-CM

## 2021-05-24 LAB — INR POINT OF CARE: 1.6 (ref 0.86–1.14)

## 2021-05-24 PROCEDURE — 36416 COLLJ CAPILLARY BLOOD SPEC: CPT | Mod: ZL

## 2021-05-24 NOTE — TELEPHONE ENCOUNTER
Patient has appt to establish care with you on 5/27/21. She was discharged from [facility] on 5/22/21 for DX new Afib with RVR. Patient was started on Lovenox and warfarin. INR goal 2.0-3.0. protime clinic will need orders to manage patients INR. Orders attached please review and sign. Zunilda Jimenez RN    5/24/2021  9:49 AM

## 2021-05-24 NOTE — PROGRESS NOTES
ANTICOAGULATION INITIAL CLINIC VISIT    Patient Name:  Corina Martinez  Date:  2021  Referred by: Dr Kent  Contact Type:  Face to Face    SUBJECTIVE:  Coumadin education was completed today.  Topics covered include:  -Introduction to coumadin  -Proper Administration  -INR Testing  -Sign/Symptoms of Bleeding  -Signs/Symptoms of Clot Formation or Stroke  -Dietary Intake of Vitamin K  -Drug Interactions  -Anticoagulation Identification (bracelet, necklace or wallet card)  -Future Surgery  -Effects of Alcohol, Tobacco, and Exercise on Coumadin    Coumadin Education Booklet and Coumadin Identification Wallet Card were given to the patient.    Patient Findings     Positives:  Change in medications (continues with Lovenox for bridging), Hospital admission (d/c from [facility] on 21 dx new afib)        Clinical Outcomes     Negatives:  Major bleeding event, Thromboembolic event, Anticoagulation-related hospital admission, Anticoagulation-related ED visit, Anticoagulation-related fatality          OBJECTIVE    Recent labs: (last 7 days)     21   INR 1.6*       ASSESSMENT / PLAN  INR assessment SUB    Recheck INR In: 3 DAYS    INR Location Clinic      Anticoagulation Summary  As of 2021    INR goal:  2.0-3.0   TTR:  --   INR used for dosin.6 (2021)   Warfarin maintenance plan:  7.5 mg (5 mg x 1.5) every Mon, Tue, Wed; 5 mg (5 mg x 1) all other days   Full warfarin instructions:  7.5 mg every Mon, Tue, Wed; 5 mg all other days   Weekly warfarin total:  42.5 mg   Plan last modified:  Zunilda Jimenez RN (2021)   Next INR check:  2021   Target end date:  Indefinite    Indications    Atrial fibrillation (H) [I48.91]  Anticoagulation monitoring  INR range 2-3 [Z79.01]  Atrial fibrillation  unspecified type (H) [I48.91]             Anticoagulation Episode Summary     INR check location:      Preferred lab:      Send INR reminders to:  [facility]    Comments:         Anticoagulation Care Providers     Provider Role Specialty Phone number    Lisa Dixon DO Referring Family Medicine [phone]            See the Encounter Report to view Anticoagulation Flowsheet and Dosing Calendar (Go to Encounters tab in chart review, and find the Anticoagulation Therapy Visit)        Zunilda Jimenez RN

## 2021-05-26 ENCOUNTER — APPOINTMENT (OUTPATIENT)
Dept: GENERAL RADIOLOGY | Facility: OTHER | Age: 66
DRG: 310 | End: 2021-05-26
Attending: FAMILY MEDICINE
Payer: MEDICARE

## 2021-05-26 ENCOUNTER — HOSPITAL ENCOUNTER (INPATIENT)
Facility: OTHER | Age: 66
LOS: 3 days | Discharge: HOME | DRG: 310 | End: 2021-05-29
Attending: FAMILY MEDICINE | Admitting: FAMILY MEDICINE
Payer: MEDICARE

## 2021-05-26 DIAGNOSIS — K74.5 BILIARY CIRRHOSIS (H): ICD-10-CM

## 2021-05-26 DIAGNOSIS — Z79.01 ANTICOAGULATION MONITORING, INR RANGE 2-3: ICD-10-CM

## 2021-05-26 DIAGNOSIS — I10 BENIGN ESSENTIAL HYPERTENSION: ICD-10-CM

## 2021-05-26 DIAGNOSIS — I48.91 ATRIAL FIBRILLATION WITH RVR (H): Primary | ICD-10-CM

## 2021-05-26 DIAGNOSIS — F17.210 CIGARETTE SMOKER: ICD-10-CM

## 2021-05-26 DIAGNOSIS — I48.0 PAROXYSMAL ATRIAL FIBRILLATION (H): ICD-10-CM

## 2021-05-26 DIAGNOSIS — Z79.51 LONG TERM CURRENT USE OF INHALED STEROID: ICD-10-CM

## 2021-05-26 LAB
ALBUMIN SERPL-MCNC: 3.7 G/DL (ref 3.5–5.7)
ALP SERPL-CCNC: 207 U/L (ref 34–104)
ALT SERPL W P-5'-P-CCNC: 31 U/L (ref 7–52)
ANION GAP SERPL CALCULATED.3IONS-SCNC: 11 MMOL/L (ref 3–14)
AST SERPL W P-5'-P-CCNC: 22 U/L (ref 13–39)
BASOPHILS # BLD AUTO: 0 10E9/L (ref 0–0.2)
BASOPHILS NFR BLD AUTO: 0.3 %
BILIRUB SERPL-MCNC: 0.6 MG/DL (ref 0.3–1)
BUN SERPL-MCNC: 15 MG/DL (ref 7–25)
CALCIUM SERPL-MCNC: 8.8 MG/DL (ref 8.6–10.3)
CHLORIDE SERPL-SCNC: 102 MMOL/L (ref 98–107)
CO2 SERPL-SCNC: 25 MMOL/L (ref 21–31)
CREAT SERPL-MCNC: 0.81 MG/DL (ref 0.6–1.2)
DIFFERENTIAL METHOD BLD: ABNORMAL
EOSINOPHIL # BLD AUTO: 0.1 10E9/L (ref 0–0.7)
EOSINOPHIL NFR BLD AUTO: 1.1 %
ERYTHROCYTE [DISTWIDTH] IN BLOOD BY AUTOMATED COUNT: 15.8 % (ref 10–15)
GFR SERPL CREATININE-BSD FRML MDRD: 71 ML/MIN/{1.73_M2}
GLUCOSE SERPL-MCNC: 121 MG/DL (ref 70–105)
HCT VFR BLD AUTO: 38.1 % (ref 35–47)
HGB BLD-MCNC: 12.6 G/DL (ref 11.7–15.7)
IMM GRANULOCYTES # BLD: 0.1 10E9/L (ref 0–0.4)
IMM GRANULOCYTES NFR BLD: 0.5 %
INR PPP: 1.85 (ref 0.86–1.14)
INTERPRETATION ECG - MUSE: NORMAL
LABORATORY COMMENT REPORT: NORMAL
LACTATE BLD-SCNC: 1.5 MMOL/L (ref 0.7–2)
LACTATE BLD-SCNC: 1.5 MMOL/L (ref 0.7–2)
LYMPHOCYTES # BLD AUTO: 1.5 10E9/L (ref 0.8–5.3)
LYMPHOCYTES NFR BLD AUTO: 11.9 %
MAGNESIUM SERPL-MCNC: 1.8 MG/DL (ref 1.9–2.7)
MCH RBC QN AUTO: 28.6 PG (ref 26.5–33)
MCHC RBC AUTO-ENTMCNC: 33.1 G/DL (ref 31.5–36.5)
MCV RBC AUTO: 86 FL (ref 78–100)
MONOCYTES # BLD AUTO: 1 10E9/L (ref 0–1.3)
MONOCYTES NFR BLD AUTO: 8 %
NEUTROPHILS # BLD AUTO: 9.5 10E9/L (ref 1.6–8.3)
NEUTROPHILS NFR BLD AUTO: 78.2 %
NT-PROBNP SERPL-MCNC: 366 PG/ML (ref 0–100)
PLATELET # BLD AUTO: 357 10E9/L (ref 150–450)
POTASSIUM SERPL-SCNC: 4.4 MMOL/L (ref 3.5–5.1)
POTASSIUM SERPL-SCNC: 4.4 MMOL/L (ref 3.5–5.1)
PROT SERPL-MCNC: 6.8 G/DL (ref 6.4–8.9)
RBC # BLD AUTO: 4.41 10E12/L (ref 3.8–5.2)
SARS-COV-2 RNA RESP QL NAA+PROBE: NEGATIVE
SODIUM SERPL-SCNC: 138 MMOL/L (ref 134–144)
SPECIMEN SOURCE: NORMAL
TROPONIN I SERPL-MCNC: 9.1 PG/ML
TROPONIN I SERPL-MCNC: 9.2 PG/ML
TROPONIN I SERPL-MCNC: 9.4 PG/ML
WBC # BLD AUTO: 12.1 10E9/L (ref 4–11)

## 2021-05-26 PROCEDURE — 250N000009 HC RX 250: Performed by: FAMILY MEDICINE

## 2021-05-26 PROCEDURE — 99222 1ST HOSP IP/OBS MODERATE 55: CPT | Performed by: FAMILY MEDICINE

## 2021-05-26 PROCEDURE — 36415 COLL VENOUS BLD VENIPUNCTURE: CPT | Performed by: FAMILY MEDICINE

## 2021-05-26 PROCEDURE — 84484 ASSAY OF TROPONIN QUANT: CPT | Performed by: FAMILY MEDICINE

## 2021-05-26 PROCEDURE — 83605 ASSAY OF LACTIC ACID: CPT | Performed by: FAMILY MEDICINE

## 2021-05-26 PROCEDURE — 96365 THER/PROPH/DIAG IV INF INIT: CPT | Performed by: FAMILY MEDICINE

## 2021-05-26 PROCEDURE — 96376 TX/PRO/DX INJ SAME DRUG ADON: CPT | Performed by: FAMILY MEDICINE

## 2021-05-26 PROCEDURE — 80053 COMPREHEN METABOLIC PANEL: CPT | Performed by: FAMILY MEDICINE

## 2021-05-26 PROCEDURE — 250N000011 HC RX IP 250 OP 636: Performed by: FAMILY MEDICINE

## 2021-05-26 PROCEDURE — 99285 EMERGENCY DEPT VISIT HI MDM: CPT | Performed by: FAMILY MEDICINE

## 2021-05-26 PROCEDURE — 200N000001 HC R&B ICU

## 2021-05-26 PROCEDURE — 93005 ELECTROCARDIOGRAM TRACING: CPT | Performed by: FAMILY MEDICINE

## 2021-05-26 PROCEDURE — 96375 TX/PRO/DX INJ NEW DRUG ADDON: CPT | Performed by: FAMILY MEDICINE

## 2021-05-26 PROCEDURE — 99285 EMERGENCY DEPT VISIT HI MDM: CPT | Mod: 25 | Performed by: FAMILY MEDICINE

## 2021-05-26 PROCEDURE — 250N000013 HC RX MED GY IP 250 OP 250 PS 637: Performed by: FAMILY MEDICINE

## 2021-05-26 PROCEDURE — 96366 THER/PROPH/DIAG IV INF ADDON: CPT | Performed by: FAMILY MEDICINE

## 2021-05-26 PROCEDURE — 83880 ASSAY OF NATRIURETIC PEPTIDE: CPT | Performed by: FAMILY MEDICINE

## 2021-05-26 PROCEDURE — 85610 PROTHROMBIN TIME: CPT | Performed by: FAMILY MEDICINE

## 2021-05-26 PROCEDURE — 93010 ELECTROCARDIOGRAM REPORT: CPT | Performed by: INTERNAL MEDICINE

## 2021-05-26 PROCEDURE — 71045 X-RAY EXAM CHEST 1 VIEW: CPT

## 2021-05-26 PROCEDURE — C9803 HOPD COVID-19 SPEC COLLECT: HCPCS | Performed by: FAMILY MEDICINE

## 2021-05-26 PROCEDURE — 258N000003 HC RX IP 258 OP 636: Performed by: FAMILY MEDICINE

## 2021-05-26 PROCEDURE — 85025 COMPLETE CBC W/AUTO DIFF WBC: CPT | Performed by: FAMILY MEDICINE

## 2021-05-26 PROCEDURE — U0003 INFECTIOUS AGENT DETECTION BY NUCLEIC ACID (DNA OR RNA); SEVERE ACUTE RESPIRATORY SYNDROME CORONAVIRUS 2 (SARS-COV-2) (CORONAVIRUS DISEASE [COVID-19]), AMPLIFIED PROBE TECHNIQUE, MAKING USE OF HIGH THROUGHPUT TECHNOLOGIES AS DESCRIBED BY CMS-2020-01-R: HCPCS | Performed by: FAMILY MEDICINE

## 2021-05-26 PROCEDURE — 83735 ASSAY OF MAGNESIUM: CPT | Performed by: FAMILY MEDICINE

## 2021-05-26 PROCEDURE — U0005 INFEC AGEN DETEC AMPLI PROBE: HCPCS | Performed by: FAMILY MEDICINE

## 2021-05-26 PROCEDURE — 84132 ASSAY OF SERUM POTASSIUM: CPT | Performed by: FAMILY MEDICINE

## 2021-05-26 RX ORDER — METOPROLOL TARTRATE 1 MG/ML
2.5 INJECTION, SOLUTION INTRAVENOUS ONCE
Status: COMPLETED | OUTPATIENT
Start: 2021-05-26 | End: 2021-05-26

## 2021-05-26 RX ORDER — WARFARIN SODIUM 5 MG/1
5 TABLET ORAL
Status: COMPLETED | OUTPATIENT
Start: 2021-05-26 | End: 2021-05-26

## 2021-05-26 RX ORDER — METOPROLOL TARTRATE 50 MG
50 TABLET ORAL ONCE
Status: COMPLETED | OUTPATIENT
Start: 2021-05-26 | End: 2021-05-26

## 2021-05-26 RX ORDER — FLUTICASONE PROPIONATE 50 MCG
2 SPRAY, SUSPENSION (ML) NASAL DAILY
Status: DISCONTINUED | OUTPATIENT
Start: 2021-05-26 | End: 2021-05-26 | Stop reason: CLARIF

## 2021-05-26 RX ORDER — ALBUTEROL SULFATE 0.83 MG/ML
3 SOLUTION RESPIRATORY (INHALATION) EVERY 4 HOURS PRN
Status: DISCONTINUED | OUTPATIENT
Start: 2021-05-26 | End: 2021-05-29 | Stop reason: HOSPADM

## 2021-05-26 RX ORDER — GABAPENTIN 600 MG/1
600 TABLET ORAL 2 TIMES DAILY
Status: DISCONTINUED | OUTPATIENT
Start: 2021-05-26 | End: 2021-05-29 | Stop reason: HOSPADM

## 2021-05-26 RX ORDER — SODIUM CHLORIDE 9 MG/ML
INJECTION, SOLUTION INTRAVENOUS CONTINUOUS
Status: DISCONTINUED | OUTPATIENT
Start: 2021-05-26 | End: 2021-05-29 | Stop reason: HOSPADM

## 2021-05-26 RX ORDER — DILTIAZEM HYDROCHLORIDE 5 MG/ML
20 INJECTION INTRAVENOUS ONCE
Status: DISCONTINUED | OUTPATIENT
Start: 2021-05-26 | End: 2021-05-26

## 2021-05-26 RX ORDER — GABAPENTIN 600 MG/1
1200 TABLET ORAL AT BEDTIME
Status: DISCONTINUED | OUTPATIENT
Start: 2021-05-26 | End: 2021-05-29 | Stop reason: HOSPADM

## 2021-05-26 RX ORDER — HYDROMORPHONE HYDROCHLORIDE 1 MG/ML
0.2 INJECTION, SOLUTION INTRAMUSCULAR; INTRAVENOUS; SUBCUTANEOUS ONCE
Status: COMPLETED | OUTPATIENT
Start: 2021-05-26 | End: 2021-05-26

## 2021-05-26 RX ORDER — AMIODARONE HYDROCHLORIDE 200 MG/1
200 TABLET ORAL
Status: ON HOLD | COMMUNITY
Start: 2021-05-23 | End: 2021-09-22

## 2021-05-26 RX ORDER — DIPHENHYDRAMINE HCL 25 MG
50 CAPSULE ORAL AT BEDTIME
Status: DISCONTINUED | OUTPATIENT
Start: 2021-05-26 | End: 2021-05-29 | Stop reason: HOSPADM

## 2021-05-26 RX ORDER — METOPROLOL TARTRATE 1 MG/ML
5 INJECTION, SOLUTION INTRAVENOUS ONCE
Status: COMPLETED | OUTPATIENT
Start: 2021-05-26 | End: 2021-05-26

## 2021-05-26 RX ORDER — ONDANSETRON 2 MG/ML
4 INJECTION INTRAMUSCULAR; INTRAVENOUS EVERY 6 HOURS PRN
Status: DISCONTINUED | OUTPATIENT
Start: 2021-05-26 | End: 2021-05-29 | Stop reason: HOSPADM

## 2021-05-26 RX ORDER — SODIUM CHLORIDE 9 MG/ML
INJECTION, SOLUTION INTRAVENOUS CONTINUOUS
Status: DISCONTINUED | OUTPATIENT
Start: 2021-05-26 | End: 2021-05-26

## 2021-05-26 RX ORDER — CHOLECALCIFEROL (VITAMIN D3) 50 MCG
1 TABLET ORAL EVERY MORNING
COMMUNITY

## 2021-05-26 RX ORDER — ONDANSETRON 4 MG/1
4 TABLET, ORALLY DISINTEGRATING ORAL EVERY 6 HOURS PRN
Status: DISCONTINUED | OUTPATIENT
Start: 2021-05-26 | End: 2021-05-29 | Stop reason: HOSPADM

## 2021-05-26 RX ORDER — DULOXETIN HYDROCHLORIDE 30 MG/1
30 CAPSULE, DELAYED RELEASE ORAL AT BEDTIME
Status: DISCONTINUED | OUTPATIENT
Start: 2021-05-26 | End: 2021-05-29 | Stop reason: HOSPADM

## 2021-05-26 RX ORDER — MAGNESIUM 30 MG
30 TABLET ORAL DAILY
Status: DISCONTINUED | OUTPATIENT
Start: 2021-05-26 | End: 2021-05-26 | Stop reason: CLARIF

## 2021-05-26 RX ORDER — PHENOL 1.4 %
10 AEROSOL, SPRAY (ML) MUCOUS MEMBRANE AT BEDTIME
COMMUNITY

## 2021-05-26 RX ORDER — AMIODARONE HYDROCHLORIDE 200 MG/1
200 TABLET ORAL DAILY
Status: DISCONTINUED | OUTPATIENT
Start: 2021-05-26 | End: 2021-05-29 | Stop reason: HOSPADM

## 2021-05-26 RX ORDER — WARFARIN SODIUM 5 MG/1
TABLET ORAL
COMMUNITY
Start: 2021-05-22 | End: 2021-06-30

## 2021-05-26 RX ORDER — PANTOPRAZOLE SODIUM 40 MG/1
40 TABLET, DELAYED RELEASE ORAL
Status: DISCONTINUED | OUTPATIENT
Start: 2021-05-27 | End: 2021-05-29 | Stop reason: HOSPADM

## 2021-05-26 RX ORDER — DILTIAZEM HCL IN NACL,ISO-OSM 125 MG/125
5-15 PLASTIC BAG, INJECTION (ML) INTRAVENOUS CONTINUOUS
Status: ACTIVE | OUTPATIENT
Start: 2021-05-26 | End: 2021-05-27

## 2021-05-26 RX ORDER — HYDROMORPHONE HYDROCHLORIDE 1 MG/ML
0.5 INJECTION, SOLUTION INTRAMUSCULAR; INTRAVENOUS; SUBCUTANEOUS ONCE
Status: COMPLETED | OUTPATIENT
Start: 2021-05-26 | End: 2021-05-26

## 2021-05-26 RX ORDER — LIDOCAINE 40 MG/G
CREAM TOPICAL
Status: DISCONTINUED | OUTPATIENT
Start: 2021-05-26 | End: 2021-05-29 | Stop reason: HOSPADM

## 2021-05-26 RX ORDER — DILTIAZEM HCL IN NACL,ISO-OSM 125 MG/125
5-15 PLASTIC BAG, INJECTION (ML) INTRAVENOUS CONTINUOUS
Status: DISCONTINUED | OUTPATIENT
Start: 2021-05-26 | End: 2021-05-26

## 2021-05-26 RX ORDER — URSODIOL 500 MG/1
500 TABLET, FILM COATED ORAL 3 TIMES DAILY
Status: DISCONTINUED | OUTPATIENT
Start: 2021-05-26 | End: 2021-05-26 | Stop reason: CLARIF

## 2021-05-26 RX ORDER — METOPROLOL TARTRATE 25 MG/1
50 TABLET, FILM COATED ORAL 2 TIMES DAILY
COMMUNITY
Start: 2021-05-22 | End: 2021-07-30

## 2021-05-26 RX ORDER — DIPHENHYDRAMINE HCL 25 MG
50 TABLET ORAL AT BEDTIME
COMMUNITY

## 2021-05-26 RX ORDER — DULOXETIN HYDROCHLORIDE 30 MG/1
30 CAPSULE, DELAYED RELEASE ORAL AT BEDTIME
COMMUNITY
Start: 2021-05-18

## 2021-05-26 RX ORDER — MAGNESIUM OXIDE 400 MG/1
400 TABLET ORAL DAILY
Status: DISCONTINUED | OUTPATIENT
Start: 2021-05-27 | End: 2021-05-29 | Stop reason: HOSPADM

## 2021-05-26 RX ORDER — FUROSEMIDE 10 MG/ML
20 INJECTION INTRAMUSCULAR; INTRAVENOUS ONCE
Status: COMPLETED | OUTPATIENT
Start: 2021-05-26 | End: 2021-05-26

## 2021-05-26 RX ORDER — PHENOL 1.4 %
1 AEROSOL, SPRAY (ML) MUCOUS MEMBRANE EVERY MORNING
COMMUNITY

## 2021-05-26 RX ADMIN — METOPROLOL TARTRATE 50 MG: 50 TABLET, FILM COATED ORAL at 08:43

## 2021-05-26 RX ADMIN — FUROSEMIDE 20 MG: 10 INJECTION, SOLUTION INTRAMUSCULAR; INTRAVENOUS at 10:04

## 2021-05-26 RX ADMIN — METOPROLOL TARTRATE 5 MG: 5 INJECTION INTRAVENOUS at 07:49

## 2021-05-26 RX ADMIN — WARFARIN SODIUM 5 MG: 5 TABLET ORAL at 18:40

## 2021-05-26 RX ADMIN — HYDROMORPHONE HYDROCHLORIDE 0.2 MG: 1 INJECTION, SOLUTION INTRAMUSCULAR; INTRAVENOUS; SUBCUTANEOUS at 10:31

## 2021-05-26 RX ADMIN — METOPROLOL TARTRATE 2.5 MG: 5 INJECTION INTRAVENOUS at 06:39

## 2021-05-26 RX ADMIN — Medication 5 MG/HR: at 10:31

## 2021-05-26 RX ADMIN — METOPROLOL TARTRATE 5 MG: 5 INJECTION INTRAVENOUS at 08:35

## 2021-05-26 RX ADMIN — GABAPENTIN 1200 MG: 600 TABLET, FILM COATED ORAL at 21:31

## 2021-05-26 RX ADMIN — METOPROLOL TARTRATE 2.5 MG: 5 INJECTION INTRAVENOUS at 06:11

## 2021-05-26 RX ADMIN — Medication 200 MG: at 15:27

## 2021-05-26 RX ADMIN — AMIODARONE HYDROCHLORIDE 200 MG: 200 TABLET ORAL at 15:27

## 2021-05-26 RX ADMIN — DIPHENHYDRAMINE HYDROCHLORIDE 50 MG: 25 CAPSULE ORAL at 21:31

## 2021-05-26 RX ADMIN — GABAPENTIN 600 MG: 600 TABLET, FILM COATED ORAL at 15:28

## 2021-05-26 RX ADMIN — ENOXAPARIN SODIUM 100 MG: 100 INJECTION SUBCUTANEOUS at 15:27

## 2021-05-26 RX ADMIN — DULOXETINE HYDROCHLORIDE 30 MG: 30 CAPSULE, DELAYED RELEASE ORAL at 21:31

## 2021-05-26 RX ADMIN — SODIUM CHLORIDE: 9 INJECTION, SOLUTION INTRAVENOUS at 06:09

## 2021-05-26 RX ADMIN — HYDROMORPHONE HYDROCHLORIDE 0.5 MG: 1 INJECTION, SOLUTION INTRAMUSCULAR; INTRAVENOUS; SUBCUTANEOUS at 06:44

## 2021-05-26 RX ADMIN — SODIUM CHLORIDE: 9 INJECTION, SOLUTION INTRAVENOUS at 18:39

## 2021-05-26 ASSESSMENT — ENCOUNTER SYMPTOMS
PALPITATIONS: 1
DIAPHORESIS: 1
SHORTNESS OF BREATH: 0
ABDOMINAL PAIN: 0
NAUSEA: 0
ACTIVITY CHANGE: 0
FEVER: 0
CHEST TIGHTNESS: 1

## 2021-05-26 ASSESSMENT — ACTIVITIES OF DAILY LIVING (ADL)
ADLS_ACUITY_SCORE: 15
ADLS_ACUITY_SCORE: 15

## 2021-05-26 ASSESSMENT — MIFFLIN-ST. JEOR
SCORE: 1631.32
SCORE: 1602.75
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
## 2021-05-26 NOTE — PROGRESS NOTES
Pt admitted to ICU room 914. VSS see VS flow sheet. Pt A & O x 4. Pt currently has Diltiazem IV @ 5 mg/hr  and NS infusing @ 125 ml/hr on arrival.  Pt on NC 2 liters. A-Fib. HR 90  Pt oriented to room and POC.  Pt ambulated from ER stretcher to BSC then to ICU bed. Pt tolerated well.  ICU monitoring connected. Call light in reach ,and  bed in low locked position. Pt denies pain and or need for further information at this time.  BeatriceRN

## 2021-05-26 NOTE — PLAN OF CARE
Problem: Adult Inpatient Plan of Care  Goal: Readiness for Transition of Care  Outcome: No Change  Flowsheets (Taken 5/26/2021 1329)  Anticipated Changes Related to Illness: none  Transportation Anticipated: (SIGNIFICANT OTHER) family or friend will provide  Concerns to be Addressed: denies needs/concerns at this time  Barriers to Discharge: Pt denies needs at this time .  Intervention: Mutually Develop Transition Plan  Recent Flowsheet Documentation  Taken 5/26/2021 1329 by Atiya Gtz RN  Anticipated Changes Related to Illness: none  Transportation Anticipated: (SIGNIFICANT OTHER) family or friend will provide  Concerns to be Addressed: denies needs/concerns at this time  Taken 5/26/2021 1322 by Atiya Gtz RN  Equipment Currently Used at Home: none     Problem: Adult Inpatient Plan of Care  Goal: Plan of Care Review  Outcome: Improving  Flowsheets (Taken 5/26/2021 1329)  Plan of Care Reviewed With: patient  Outcome Summary: Pt verbalized understanding of currnt POC as discused .  Progress: improving  Goal: Patient-Specific Goal (Individualized)  Outcome: Improving  Flowsheets (Taken 5/26/2021 1329)  Individualized Care Needs: Corina will  be able to ambulate freely  Anxieties, Fears or Concerns: Corina will verbalize decreased anxiety and or concerns  Patient-Specific Goals (Include Timeframe): Pt will verbalized no chest pain or shortness of breath present  Goal: Absence of Hospital-Acquired Illness or Injury  Outcome: Improving  Intervention: Prevent Skin Injury  Recent Flowsheet Documentation  Taken 5/26/2021 1300 by Atiya Gtz RN  Body Position: position changed independently  Intervention: Prevent and Manage VTE (Venous Thromboembolism) Risk  Recent Flowsheet Documentation  Taken 5/26/2021 1329 by Atiya Gtz RN  VTE Prevention/Management:   ambulation promoted   anticoagulant therapy maintained   bleeding risk assessed   bleeding precautions maintained   AROM (active range  of motion) performed  Goal: Optimal Comfort and Wellbeing  Outcome: Improving     Problem: Dysrhythmia  Goal: Normalized Cardiac Rhythm  Outcome: Improving  Intervention: Monitor and Manage Cardiac Rhythm Effect  Flowsheets (Taken 5/26/2021 1324)  Dysrhythmia Management: (medication) other (see comments)  VTE Prevention/Management:   ambulation promoted   anticoagulant therapy maintained   bleeding risk assessed   bleeding precautions maintained   AROM (active range of motion) performed  Stabilization Measures:   fluid resuscitation initiated   legs elevated   respiratory support increased

## 2021-05-26 NOTE — PROGRESS NOTES
Reviewing chart in preparation of Pt arrival to ICU as per orders. Report received from NANCY Pinon

## 2021-05-26 NOTE — H&P
[facility]    History and Physical - Hospitalist Service       Date of Admission:  5/26/2021    Assessment & Plan   Corina Martinez is a 66 year old female admitted on 5/26/2021. She presented to the emergency department with chest pain and was found to be in atrial fibrillation with RVR.  She was initially given Lopressor IV with improvement in her rate and resolution of her pain.  She did continue to have refractory rapid ventricular response but only to the 100-110 range.    She will be admitted to the ICU on diltiazem drip for rate control.    She is initially thought to have slight fluid overload and her BNP is minimally elevated.  She is already given a dose of Lasix.  We will hold off on IV fluid and monitor.  Repeat Lasix if necessary    Principal Problem:    Atrial fibrillation with RVR (H)    Assessment: Initially with rates in the 140s.  Currently on diltiazem drip 5 mg/h and rate normal with resolution of possible anginal equivalents.    Plan: Continue diltiazem drip today, convert to oral tomorrow.  Active Problems:    SHAILESH (obstructive sleep apnea)    Assessment: Chronic.    Plan: Home CPAP    Morbid obesity (H)    Assessment: Chronic.    Plan: Suggest long-term caloric reduction    Tobacco abuse disorder    Assessment: Chronic, trying to quit    Plan: Declines tobacco cessation and nicotine replacement at this time    Anticoagulation monitoring, INR range 2-3    Assessment: Coumadin minimally subtherapeutic.    Plan: Pharmacy to manage INR.       Diet: Combination Diet Low Saturated Fat Na <2400mg Diet, No Caffeine Diet    DVT Prophylaxis: Enoxaparin (Lovenox) SQ and Warfarin  Iniguez Catheter: not present  Code Status: Full Code           Disposition Plan   Expected discharge: 1-2d, recommended to prior living arrangement once ventricular rate controlled adequately on oral medications.  Entered: Abisai Sabillon MD 05/26/2021, 3:02 PM     The patient's care was discussed with the  Patient.    Abisai Sabillon MD  [facility]  Contact information available via [facility] Paging/Directory      ______________________________________________________________________    Chief Complaint   Chest pain, A. fib with RVR    History is obtained from the patient    History of Present Illness   Corina Martinez is a 66 year old female who woke up this morning with pain in her left shoulder.  As she got up and tried to get going she noticed that the pain continually increased and became severe.  It radiated to the left side of her jaw and the base of her tongue.  She tells me that she did not feel short of breath however like she did during her last episode of atrial fibrillation with RVR.  She did not notice any palpitations.    Patient presented to the emergency department was found to be in atrial fibrillation with RVR.  She was given Lopressor IV and her rate came down to around 100 and her symptoms resolved.    Patient was recently hospitalized at [facility] on 5/17 and discharged on 5/22.  During hospitalization she did undergo cardioversion x2 with return of normal sinus rhythm but did not maintain that rhythm for very long.  He was started on amiodarone at that time and discharged with a heart rate around 100.  She was also started on Coumadin as well as Lovenox for thromboembolic prophylaxis.    Patient denies any fevers or chills.  She has chronic cough but no worsening and no sputum production.  She has not had any abdominal pain.  She has been eating and drinking normally.  No dysuria, polyuria or other urinary symptoms.  No change in bowel habits.  She has not had any numbness or weakness of the arms or legs or other symptoms concerning for stroke.    Review of Systems    CONSTITUTIONAL: NEGATIVE for fever, chills, change in weight  INTEGUMENTARY/SKIN: NEGATIVE for worrisome rashes, moles or lesions  EYES: NEGATIVE for vision changes or irritation  ENT/MOUTH: NEGATIVE for  ear, mouth and throat problems  RESP: NEGATIVE for significant cough or SOB  CV: See HPI  GI: NEGATIVE for nausea, abdominal pain, heartburn, or change in bowel habits  : NEGATIVE for frequency, dysuria, or hematuria  MUSCULOSKELETAL: NEGATIVE for significant arthralgias or myalgia  NEURO: NEGATIVE for weakness, dizziness or paresthesias  ENDOCRINE: NEGATIVE for temperature intolerance, skin/hair changes  HEME: NEGATIVE for bleeding problems  PSYCHIATRIC: NEGATIVE for changes in mood or affect    Past Medical History    Biliary cirrhosis  Chronic low back pain  Obstructive sleep apnea  Hypertension  Atrial fibrillation        Past Surgical History   Appendectomy 1966  Tonsillectomy 1976  Lumbar fusion 1981  Tubal ligation 1989  Carpal tunnel release 1995  Liver biopsy 2004  Laparoscopic cholecystectomy 2005  Left hip replacement 2009  Right knee replacement January 2019      Social History   I have reviewed this patient's social history and updated it with pertinent information if needed.  Social History     Tobacco Use     Smoking status: Current Some Day Smoker     Types: Cigarettes     Smokeless tobacco: Never Used     Tobacco comment: pt has been working hard on quiting   Substance Use Topics     Alcohol use: Yes     Comment: social     Drug use: No       Family History   I have reviewed this patient's family history and updated it with pertinent information if needed.  Family History   Problem Relation Age of Onset     Cancer Father      Lymphoma Father      Depression Mother      Respiratory Mother      Emphysema Mother        Prior to Admission Medications   Prior to Admission Medications   Prescriptions Last Dose Informant Patient Reported? Taking?   DULoxetine (CYMBALTA) 30 MG capsule 5/25/2021 at HS  Yes Yes   Sig: Take 30 mg by mouth At Bedtime   Melatonin 10 MG TABS tablet 5/25/2021 at HS  Yes Yes   Sig: Take 10 mg by mouth At Bedtime   Multiple Vitamins-Minerals (MULTIVITAMIN ADULTS 50+) TABS  5/25/2021 at AM  Yes Yes   Sig: Take 1 tablet by mouth every morning   Ursodiol 500 MG TABS More than a month at NONE SINCE JANUARY - CANNOT AFFORD  Yes No   Sig: Take 500 mg by mouth 3 times daily    amiodarone (PACERONE) 200 MG tablet 5/25/2021 at am  Yes Yes   Sig: Take 200 mg by mouth every morning    diclofenac (VOLTAREN) 1 % topical gel Past Week at Unknown time  Yes Yes   Sig: Apply 2 g topically 2 times daily as needed   diphenhydrAMINE (BENADRYL) 25 MG tablet 5/25/2021 at HS  Yes Yes   Sig: Take 50 mg by mouth At Bedtime   enoxaparin ANTICOAGULANT (LOVENOX) 100 MG/ML syringe 5/25/2021 at 1830  No Yes   Sig: Inject 1 mL (100 mg) Subcutaneous 2 times daily   fluticasone (FLONASE) 50 MCG/ACT nasal spray Past Week at Unknown time  Yes Yes   Sig: Spray 2 sprays into both nostrils daily as needed for allergies    gabapentin (NEURONTIN) 600 MG tablet 5/25/2021 at Unknown time  Yes Yes   Sig: Take 600 mg orally in the morning, 600 mg in the afternoon and 1200 mg in the evening.   hydrOXYzine (ATARAX) 25 MG tablet 5/25/2021 at hs  Yes Yes   Sig: Take 25 mg by mouth At Bedtime   magnesium 250 MG tablet 5/25/2021 at HS  Yes Yes   Sig: Take 250 mg by mouth At Bedtime    metoprolol tartrate (LOPRESSOR) 25 MG tablet 5/25/2021 at HS  Yes Yes   Sig: Take 25 mg by mouth 2 times daily    omeprazole (PRILOSEC) 20 MG CR capsule 5/25/2021 at AM  Yes Yes   Sig: Take 20 mg by mouth daily    vitamin D3 (CHOLECALCIFEROL) 50 mcg (2000 units) tablet 5/25/2021 at AM  Yes Yes   Sig: Take 1 tablet by mouth every morning   warfarin ANTICOAGULANT (COUMADIN) 5 MG tablet 5/25/2021 at 7.5 MG PM  Yes Yes   Sig: Take as directed      Facility-Administered Medications: None     Allergies   Allergies   Allergen Reactions     Codeine Sulfate Nausea     Acetaminophen Other (See Comments)     Was told to avoid due to primary biliary cirrhosis       Physical Exam   Vital Signs: Temp: 96.7  F (35.9  C) Temp src: Temporal BP: 105/86 Pulse: 68   Resp:  20 SpO2: 96 % O2 Device: Nasal cannula Oxygen Delivery: 2 LPM  Weight: 230 lbs 9.62 oz    Constitutional: awake, alert, cooperative, no apparent distress, and appears stated age  Eyes: Lids and lashes normal, pupils equal, round and reactive to light, extra ocular muscles intact, sclera clear, conjunctiva normal  ENT: Normocephalic, without obvious abnormality, atraumatic, sinuses nontender on palpation, external ears without lesions, oral pharynx with moist mucous membranes  Hematologic / Lymphatic: No lymphadenopathy  Respiratory: Clear to auscultation bilaterally  Cardiovascular: Regular rate and irregular rhythm.  No murmurs rubs or gallops.  No JVD.  1+ bilateral lower extremity pedal edema  GI: Abdomen soft, nontender.  Bowel sounds heard in all quadrants.  No organomegaly or masses.  Skin: No abnormal bruising or rashes.  Musculoskeletal: No synovitis.  Muscle strength age and body habitus appropriateas well as equal and even.   Neurologic: Awake, alert, oriented to name, place and time.  Cranial nerves II-XII are grossly intact.  Motor is 5 out of 5 bilaterally.  Cerebellar function intact.  Sensory is intact.    Neuropsychiatric: General: normal, calm and normal eye contact  Orientation: oriented to self, place, time and situation  Memory and insight: memory for past and recent events intact and thought process normal    Data   Data reviewed today: I reviewed all medications, new labs and imaging results over the last 24 hours. I personally reviewed no images or EKG's today.    Recent Labs   Lab 05/26/21  1354 05/26/21  1015 05/26/21  0615 05/24/21   WBC  --   --  12.1*  --    HGB  --   --  12.6  --    MCV  --   --  86  --    PLT  --   --  357  --    INR 1.85*  --   --  1.6*   NA  --   --  138  --    POTASSIUM  --  4.4 4.4  --    CHLORIDE  --   --  102  --    CO2  --   --  25  --    BUN  --   --  15  --    CR  --   --  0.81  --    ANIONGAP  --   --  11  --    LOC  --   --  8.8  --    GLC  --   --  121*  --     ALBUMIN  --   --  3.7  --    PROTTOTAL  --   --  6.8  --    BILITOTAL  --   --  0.6  --    ALKPHOS  --   --  207*  --    ALT  --   --  31  --    AST  --   --  22  --      Recent Results (from the past 24 hour(s))   XR Chest Port 1 View    Narrative    PROCEDURE: XR CHEST PORT 1 VIEW 5/26/2021 6:35 AM    HISTORY: palpitations    COMPARISONS: None.    TECHNIQUE: Single view.    FINDINGS: Heart is enlarged. No confluent infiltrate or pleural  effusion is seen although there is some mild interstitial prominence  at the lung bases.         Impression    IMPRESSION: Cardiomegaly with mild interstitial prominence.    LARA CHAU MD

## 2021-05-26 NOTE — PHARMACY-ANTICOAGULATION SERVICE
Pharmacy Consult- Initial Coumadin Assessment    Corina Martinez is a 66 year old female admitted on 5/26/2021 with Atrial fibrillation with RVR    Primary Indication(s) for Anticoagulation: Atrial fibrillation with RVR    Goal INR: 2-3    FYI, patient is followed by the Anticoagulation/Protime Clinic at: [facility]    Patient Active Problem List   Diagnosis     SHAILESH (obstructive sleep apnea)     Morbid obesity (H)     Tobacco abuse disorder     Benign essential hypertension     Midline low back pain without sciatica     Neck pain     Biliary cirrhosis (H)     Atrial fibrillation (H)     Anticoagulation monitoring, INR range 2-3     Atrial fibrillation, unspecified type (H)       Patient previously anticoagulated on Coumadin at a dose of 42.5 mg/week; dosed as: 7.5 mg every Monday, Tuesday, Wednesday, 5 mg rest of week    Factors that may increase patient's sensitivity to warfarin (Coumadin) include: amiodarone, omeprazole    Factors that may decrease patient's sensitivity to warfarin (Coumadin) include: NA    Recent Labs   Lab Test 05/26/21  1354 05/26/21  0615 05/24/21 05/17/21  1420   HGB  --  12.6  --  12.8   INR 1.85*  --  1.6*  --    PLT  --  357  --  290      Lab Results   Component Value Date    ALBUMIN 3.7 05/26/2021       Plan: Give 5 mg warfarin today. Recheck INR in AM    Thank You for the Consult. Will continue to follow.    Yoli Nunez [facility] ....................  5/26/2021   2:34 PM

## 2021-05-26 NOTE — ED TRIAGE NOTES
"ED Nursing Triage Note (General)   ________________________________    Corina Martinez is a 66 year old Female that presents to triage private car. Jaw pain, shoulder blade pain back pain, chest pain since 0200 this am that \"kind of woke her up\". Pt was recently in the hospital may 17-22nd for afib, was transferred to [place]. she has been short of breath, has a cough. Pain 8/10 described as pressure; when she lays down it feels like someone is laying on here. Ache in back. reported by patient   Significant symptoms had onset 0200   /84   Pulse 125   Temp 98.2  F (36.8  C) (Tympanic)   Resp 24   Ht 1.676 m (5' 6\")   Wt 107.5 kg (236 lb 14.4 oz)   SpO2 93%   BMI 38.24 kg/m  t  Patient appears alert  and oriented, in mild distress., and cooperative, pleasant and calm behavior.  Anxiety: observed  GCS Total = 15  Airway: intact  Breathing noted as increased respiratory rate. SOB. Lungs crackles in bases  Circulation Abnormal - edema LE+2   Skin:  Normal  Action taken:  Triage to critical care immediately      PRE HOSPITAL PRIOR LIVING SITUATION home  "

## 2021-05-26 NOTE — ED PROVIDER NOTES
History     Chief Complaint   Patient presents with     Chest Pain     Back Pain     HPI  Corina Martinez is a 66 year old female with a history of obesity, tobacco, hypertension, SHAILESH and primary biliary cirrhosis who presents the emergency room with recurrence of her atrial fibrillation with RVR.  Patient was seen here on 5/17 with started on amiodarone and transferred to [facility] for cardiology evaluation.  According to the patient patient as she arrived there and was anticoagulated and then cardioverted x2 with only minimal improvement to a normal sinus rhythm.  They were able to maintain her heart rate below 100 however and they decided to send her home still in A. fib.  Today she noted that she was having chest pain shortness of breath and was worried that her A. fib was worsening.    Allergies:  Allergies   Allergen Reactions     Acetaminophen Other (See Comments)     Codeine Sulfate Nausea       Problem List:    Patient Active Problem List    Diagnosis Date Noted     Atrial fibrillation (H) 05/24/2021     Priority: Medium     Anticoagulation monitoring, INR range 2-3 05/24/2021     Priority: Medium     Atrial fibrillation, unspecified type (H) 05/24/2021     Priority: Medium     Morbid obesity (H) 03/19/2018     Priority: Medium     Tobacco abuse disorder 03/19/2018     Priority: Medium     Benign essential hypertension 03/19/2018     Priority: Medium     SHAILESH (obstructive sleep apnea) 03/14/2011     Priority: Medium     Mild (PSG 3/8/2011: AHI 10.5, kushal desat 83%)       Midline low back pain without sciatica 09/05/2007     Priority: Medium     Neck pain 09/05/2007     Priority: Medium     Biliary cirrhosis (H) 09/05/2007     Priority: Medium     Overview:   Followed by Dr. Martini  LFTs q 6 mos  DEXA q 2 years  Followed by Dr. Martini  LFTs q 6 mos  DEXA q 2 years          Past Medical History:    No past medical history on file.    Past Surgical History:    No past surgical history on file.    Family  "History:    Family History   Problem Relation Age of Onset     Cancer Father      Lymphoma Father      Depression Mother      Respiratory Mother      Emphysema Mother        Social History:  Marital Status:   [2]  Social History     Tobacco Use     Smoking status: Current Some Day Smoker     Types: Cigarettes     Smokeless tobacco: Never Used     Tobacco comment: pt has been working hard on quiting   Substance Use Topics     Alcohol use: Yes     Comment: social     Drug use: No        Medications:    amiodarone (PACERONE) 200 MG tablet  enoxaparin ANTICOAGULANT (LOVENOX) 100 MG/ML syringe  fluticasone (FLONASE) 50 MCG/ACT nasal spray  gabapentin (NEURONTIN) 600 MG tablet  hydrOXYzine (ATARAX) 25 MG tablet  magnesium 250 MG tablet  metoprolol tartrate (LOPRESSOR) 25 MG tablet  Multiple Vitamin (MULTIVITAMIN  S) CAPS  vitamin (B COMPLEX) tablet  warfarin ANTICOAGULANT (COUMADIN) 5 MG tablet  albuterol (PROAIR HFA) 108 (90 Base) MCG/ACT inhaler  hydrOXYzine (VISTARIL) 25 MG capsule  omeprazole (PRILOSEC) 20 MG CR capsule  Ursodiol 500 MG TABS          Review of Systems   Constitutional: Positive for diaphoresis. Negative for activity change and fever.   Respiratory: Positive for chest tightness. Negative for shortness of breath.    Cardiovascular: Positive for chest pain and palpitations.   Gastrointestinal: Negative for abdominal pain and nausea.   All other systems reviewed and are negative.      Physical Exam   BP: 110/84  Pulse: 125  Temp: 98.2  F (36.8  C)  Resp: 24  Height: 167.6 cm (5' 6\")  Weight: 107.5 kg (236 lb 14.4 oz)  SpO2: 93 %      Physical Exam  Vitals signs and nursing note reviewed.   Constitutional:       Appearance: She is obese.      Comments: Appears very anxious.   HENT:      Head: Normocephalic and atraumatic.   Neck:      Musculoskeletal: Normal range of motion and neck supple.   Cardiovascular:      Rate and Rhythm: Tachycardia present. Rhythm irregular.      Heart sounds: Normal heart " sounds.   Pulmonary:      Effort: Pulmonary effort is normal.      Breath sounds: Decreased breath sounds present. No wheezing, rhonchi or rales.   Musculoskeletal: Normal range of motion.   Skin:     General: Skin is warm and dry.      Capillary Refill: Capillary refill takes less than 2 seconds.   Neurological:      Mental Status: She is alert.       ED Course   Patient seen and examined.  Labs and chest x-ray ordered.  Metoprolol 2.5 mg IV ordered along with Dilaudid 0.5 mg for her pain.  Patient signed out to Dr. Pugh at change of shift.     Procedures    Results for orders placed or performed during the hospital encounter of 05/26/21 (from the past 24 hour(s))   EKG 12 lead   Result Value Ref Range    Interpretation ECG Click View Image link to view waveform and result    Lactic acid whole blood STAT   Result Value Ref Range    Lactic Acid 1.5 0.7 - 2.0 mmol/L   CBC with platelets differential   Result Value Ref Range    WBC 12.1 (H) 4.0 - 11.0 10e9/L    RBC Count 4.41 3.8 - 5.2 10e12/L    Hemoglobin 12.6 11.7 - 15.7 g/dL    Hematocrit 38.1 35.0 - 47.0 %    MCV 86 78 - 100 fl    MCH 28.6 26.5 - 33.0 pg    MCHC 33.1 31.5 - 36.5 g/dL    RDW 15.8 (H) 10.0 - 15.0 %    Platelet Count 357 150 - 450 10e9/L    Diff Method Automated Method     % Neutrophils 78.2 %    % Lymphocytes 11.9 %    % Monocytes 8.0 %    % Eosinophils 1.1 %    % Basophils 0.3 %    % Immature Granulocytes 0.5 %    Absolute Neutrophil 9.5 (H) 1.6 - 8.3 10e9/L    Absolute Lymphocytes 1.5 0.8 - 5.3 10e9/L    Absolute Monocytes 1.0 0.0 - 1.3 10e9/L    Absolute Eosinophils 0.1 0.0 - 0.7 10e9/L    Absolute Basophils 0.0 0.0 - 0.2 10e9/L    Abs Immature Granulocytes 0.1 0 - 0.4 10e9/L   Comprehensive metabolic panel   Result Value Ref Range    Sodium 138 134 - 144 mmol/L    Potassium 4.4 3.5 - 5.1 mmol/L    Chloride 102 98 - 107 mmol/L    Carbon Dioxide 25 21 - 31 mmol/L    Anion Gap 11 3 - 14 mmol/L    Glucose 121 (H) 70 - 105 mg/dL    Urea Nitrogen  15 7 - 25 mg/dL    Creatinine 0.81 0.60 - 1.20 mg/dL    GFR Estimate 71 >60 mL/min/[1.73_m2]    GFR Estimate If Black 86 >60 mL/min/[1.73_m2]    Calcium 8.8 8.6 - 10.3 mg/dL    Bilirubin Total 0.6 0.3 - 1.0 mg/dL    Albumin 3.7 3.5 - 5.7 g/dL    Protein Total 6.8 6.4 - 8.9 g/dL    Alkaline Phosphatase 207 (H) 34 - 104 U/L    ALT 31 7 - 52 U/L    AST 22 13 - 39 U/L   NT pro BNP   Result Value Ref Range    N-Terminal Pro BNP Inpatient 366 (H) 0 - 100 pg/mL   Troponin GH (now)   Result Value Ref Range    Troponin 9.1 <34.0 pg/mL   XR Chest Port 1 View    Narrative    PROCEDURE: XR CHEST PORT 1 VIEW 5/26/2021 6:35 AM    HISTORY: palpitations    COMPARISONS: None.    TECHNIQUE: Single view.    FINDINGS: Heart is enlarged. No confluent infiltrate or pleural  effusion is seen although there is some mild interstitial prominence  at the lung bases.         Impression    IMPRESSION: Cardiomegaly with mild interstitial prominence.    LARA CHAU MD       Medications   sodium chloride 0.9% infusion ( Intravenous New Bag 5/26/21 0609)   metoprolol (LOPRESSOR) injection 2.5 mg (2.5 mg Intravenous Given 5/26/21 0611)   HYDROmorphone (PF) (DILAUDID) injection 0.5 mg (0.5 mg Intravenous Given 5/26/21 0644)   metoprolol (LOPRESSOR) injection 2.5 mg (2.5 mg Intravenous Given 5/26/21 0639)   metoprolol (LOPRESSOR) injection 5 mg (5 mg Intravenous Given 5/26/21 0749)   metoprolol tartrate (LOPRESSOR) tablet 50 mg (50 mg Oral Given 5/26/21 0843)   metoprolol (LOPRESSOR) injection 5 mg (5 mg Intravenous Given 5/26/21 0835)       Assessments & Plan (with Medical Decision Making)     I have reviewed the nursing notes.    I have reviewed the findings, diagnosis, plan and need for follow up with the patient.    Pradip Marks MD  05/26/21 0876      CONTINUATION OF CARE    Patient doing better here course of her ED stay.  Upon serial assessments she reports her pain is resolved.  Her cardiac enzymes are negative.  Chest x-ray shows  cardiomegaly and possible some pulmonary edema with a slightly elevated BNP.  Lasix 20 mg IV x1 ordered in the ED as well.  Rate certainly is improved with boluses of Lopressor here in the ED however continues to be high enough that I think a discharge strategy will not be successful.  Moreover I discussed with the patient and she feels uncomfortable going home at this time.  We will start her on a diltiazem drip and admit to the ICU for improved rate control.  She does have a slight oxygen requirement which may be due to the Dilaudid she received here in the emergency department or may be related to pulmonary edema.  She may have underlying CHF however she did have a normal ejection fraction on recent echocardiogram in [place].  Those imaging notes were reviewed.  Patient did convert while she was in [place] but it was not sustained thus I do not think a rhythm control strategy is going to be adequate for her.  She is on amiodarone and Toprol here after recent hospitalization in [place].  Patient also has mild transaminitis consistent with her diagnosis of primary biliary cirrhosis.  She is status post cholecystectomy.  Patient verbalized understanding plan is in agreement she left the ED in improving condition.  New Prescriptions    No medications on file       Final diagnoses:   Paroxysmal atrial fibrillation (H)       5/26/2021   [facility]   Dangelo Ervin MD  05/26/21 1012       Dangelo Ervin MD  05/26/21 1251

## 2021-05-26 NOTE — PHARMACY-ADMISSION MEDICATION HISTORY
Pharmacy -- Admission Medication Reconciliation    Prior to admission (PTA) medications were reviewed and the patient's PTA medication list was updated.    Sources Consulted: patient, sure scripts, chart review    The reliability of this Medication Reconciliation is: Reliability: Reliable    The following significant changes were made:  Amiodarone directions ADDED  Fluticasone CHANGED to PRN  Vitamin D ADDED  Melatonin ADDED  Duloxetine ADDED  Magnesium directions ADDED - patient takes OTC for leg cramps  Albuterol REMOVED - does not have/use  Diclofenac cream ADDED - patient has started using this since being taken off naproxen - states it is somewhat helpful for her pain  Diphenhydramine ADDED - takes 2 every hs  B12 ADDED    Note: Patient confirms she has stopped taking lisinopril/hctz and naproxen. (These were discontinued at 5/22 discharge from [facility])  Note: patient cannot afford her ursodiol (over $150/month after $400 deductible) - patient states she has not taken this since January  Note: Patient started on warfarin for her a fib and has taken 5 mg on 5/22 and 5/23 and 7.5 mg on 5/24 and 5/25 since discharge.    In addition, the patient's allergies were reviewed with the patient and updated as follows:   Allergies: Codeine sulfate and Acetaminophen    The pharmacist has reviewed with the patient that all personal medications should be removed from the building or locked in the belongings safe.  Patient shall only take medications ordered by the physician and administered by the nursing staff.       Medication barriers identified: lives in a [facility]; travels   Medication adherence concerns: seems knowledgeable on her meds, including new medications from last hospitalization   Understanding of emergency medications: n/a    Mariama Leiva [facility], 5/26/2021,  2:52 PM

## 2021-05-27 LAB
ANION GAP SERPL CALCULATED.3IONS-SCNC: 9 MMOL/L (ref 3–14)
BUN SERPL-MCNC: 15 MG/DL (ref 7–25)
CALCIUM SERPL-MCNC: 8.3 MG/DL (ref 8.6–10.3)
CHLORIDE SERPL-SCNC: 104 MMOL/L (ref 98–107)
CO2 SERPL-SCNC: 26 MMOL/L (ref 21–31)
CREAT SERPL-MCNC: 0.76 MG/DL (ref 0.6–1.2)
ERYTHROCYTE [DISTWIDTH] IN BLOOD BY AUTOMATED COUNT: 15.7 % (ref 10–15)
GFR SERPL CREATININE-BSD FRML MDRD: 76 ML/MIN/{1.73_M2}
GLUCOSE SERPL-MCNC: 92 MG/DL (ref 70–105)
HCT VFR BLD AUTO: 34.7 % (ref 35–47)
HGB BLD-MCNC: 11.1 G/DL (ref 11.7–15.7)
INR PPP: 2.2 (ref 0.86–1.14)
MAGNESIUM SERPL-MCNC: 1.8 MG/DL (ref 1.9–2.7)
MCH RBC QN AUTO: 28 PG (ref 26.5–33)
MCHC RBC AUTO-ENTMCNC: 32 G/DL (ref 31.5–36.5)
MCV RBC AUTO: 88 FL (ref 78–100)
PLATELET # BLD AUTO: 317 10E9/L (ref 150–450)
POTASSIUM SERPL-SCNC: 4 MMOL/L (ref 3.5–5.1)
RBC # BLD AUTO: 3.96 10E12/L (ref 3.8–5.2)
SODIUM SERPL-SCNC: 139 MMOL/L (ref 134–144)
TROPONIN I SERPL-MCNC: 8 PG/ML
TROPONIN I SERPL-MCNC: 9.5 PG/ML
WBC # BLD AUTO: 10.3 10E9/L (ref 4–11)

## 2021-05-27 PROCEDURE — 36415 COLL VENOUS BLD VENIPUNCTURE: CPT | Performed by: INTERNAL MEDICINE

## 2021-05-27 PROCEDURE — 250N000013 HC RX MED GY IP 250 OP 250 PS 637: Performed by: INTERNAL MEDICINE

## 2021-05-27 PROCEDURE — 85610 PROTHROMBIN TIME: CPT | Performed by: FAMILY MEDICINE

## 2021-05-27 PROCEDURE — 36415 COLL VENOUS BLD VENIPUNCTURE: CPT | Performed by: FAMILY MEDICINE

## 2021-05-27 PROCEDURE — 99232 SBSQ HOSP IP/OBS MODERATE 35: CPT | Performed by: FAMILY MEDICINE

## 2021-05-27 PROCEDURE — 85027 COMPLETE CBC AUTOMATED: CPT | Performed by: FAMILY MEDICINE

## 2021-05-27 PROCEDURE — 93010 ELECTROCARDIOGRAM REPORT: CPT | Performed by: INTERNAL MEDICINE

## 2021-05-27 PROCEDURE — 250N000011 HC RX IP 250 OP 636: Performed by: FAMILY MEDICINE

## 2021-05-27 PROCEDURE — 83735 ASSAY OF MAGNESIUM: CPT | Performed by: FAMILY MEDICINE

## 2021-05-27 PROCEDURE — 84484 ASSAY OF TROPONIN QUANT: CPT | Performed by: FAMILY MEDICINE

## 2021-05-27 PROCEDURE — 250N000013 HC RX MED GY IP 250 OP 250 PS 637: Performed by: FAMILY MEDICINE

## 2021-05-27 PROCEDURE — 200N000001 HC R&B ICU

## 2021-05-27 PROCEDURE — 84484 ASSAY OF TROPONIN QUANT: CPT | Performed by: INTERNAL MEDICINE

## 2021-05-27 PROCEDURE — 80048 BASIC METABOLIC PNL TOTAL CA: CPT | Performed by: FAMILY MEDICINE

## 2021-05-27 RX ORDER — WARFARIN SODIUM 2 MG/1
4 TABLET ORAL
Status: COMPLETED | OUTPATIENT
Start: 2021-05-27 | End: 2021-05-27

## 2021-05-27 RX ORDER — DILTIAZEM HYDROCHLORIDE 120 MG/1
120 CAPSULE, COATED, EXTENDED RELEASE ORAL DAILY
Status: DISCONTINUED | OUTPATIENT
Start: 2021-05-27 | End: 2021-05-28

## 2021-05-27 RX ORDER — DILTIAZEM HYDROCHLORIDE 30 MG/1
30 TABLET, FILM COATED ORAL ONCE
Status: COMPLETED | OUTPATIENT
Start: 2021-05-27 | End: 2021-05-27

## 2021-05-27 RX ORDER — DILTIAZEM HYDROCHLORIDE 30 MG/1
30 TABLET, FILM COATED ORAL EVERY 6 HOURS SCHEDULED
Status: DISCONTINUED | OUTPATIENT
Start: 2021-05-27 | End: 2021-05-28

## 2021-05-27 RX ADMIN — DILTIAZEM HYDROCHLORIDE 30 MG: 30 TABLET, FILM COATED ORAL at 13:58

## 2021-05-27 RX ADMIN — WARFARIN SODIUM 4 MG: 2 TABLET ORAL at 17:57

## 2021-05-27 RX ADMIN — GABAPENTIN 600 MG: 600 TABLET, FILM COATED ORAL at 06:15

## 2021-05-27 RX ADMIN — PANTOPRAZOLE SODIUM 40 MG: 40 TABLET, DELAYED RELEASE ORAL at 07:53

## 2021-05-27 RX ADMIN — MAGNESIUM OXIDE TAB 400 MG (241.3 MG ELEMENTAL MG) 400 MG: 400 (241.3 MG) TAB at 09:12

## 2021-05-27 RX ADMIN — DULOXETINE HYDROCHLORIDE 30 MG: 30 CAPSULE, DELAYED RELEASE ORAL at 21:26

## 2021-05-27 RX ADMIN — GABAPENTIN 600 MG: 600 TABLET, FILM COATED ORAL at 13:58

## 2021-05-27 RX ADMIN — AMIODARONE HYDROCHLORIDE 200 MG: 200 TABLET ORAL at 09:12

## 2021-05-27 RX ADMIN — GABAPENTIN 1200 MG: 600 TABLET, FILM COATED ORAL at 21:27

## 2021-05-27 RX ADMIN — DIPHENHYDRAMINE HYDROCHLORIDE 50 MG: 25 CAPSULE ORAL at 21:26

## 2021-05-27 RX ADMIN — DILTIAZEM HYDROCHLORIDE 120 MG: 120 CAPSULE, COATED, EXTENDED RELEASE ORAL at 09:12

## 2021-05-27 RX ADMIN — ENOXAPARIN SODIUM 100 MG: 100 INJECTION SUBCUTANEOUS at 07:53

## 2021-05-27 RX ADMIN — DILTIAZEM HYDROCHLORIDE 30 MG: 30 TABLET, FILM COATED ORAL at 19:46

## 2021-05-27 ASSESSMENT — ACTIVITIES OF DAILY LIVING (ADL)
ADLS_ACUITY_SCORE: 18
ADLS_ACUITY_SCORE: 18
ADLS_ACUITY_SCORE: 15
ADLS_ACUITY_SCORE: 16
ADLS_ACUITY_SCORE: 15
ADLS_ACUITY_SCORE: 15

## 2021-05-27 ASSESSMENT — MIFFLIN-ST. JEOR: SCORE: 1607.75

## 2021-05-27 NOTE — PROGRESS NOTES
SAFETY CHECKLIST  ID Bands and Risk clasps correct and in place (DNR, Fall risk, Allergy, Latex, Limb):  Yes  All Lines Reconciled and labeled correctly: Yes  Whiteboard updated:Yes  Environmental interventions (bed/chair alarm on, call light, side rails, restraints, sitter....): Yes     Omar London RN on 5/26/2021 at 8:24 PM

## 2021-05-27 NOTE — PROGRESS NOTES
Sitting up in bed eating lunch.  Minimal position changes causes increase heart rates in the 130's.  Does not sustain but will settle in the 110's-120's vs 80's-90's when on drip overnight.  Will notify MD. Caputo to monitor.

## 2021-05-27 NOTE — PLAN OF CARE
"VSS, denies pain, continued on diltiazem drip throughout shift at 5mg/hr, lowest BP - 86/63 at 0300 - resolved without intervention, up to bedside commode with SBA, on 3LPM O2 via nasal cannula - sats in mid-upper 90s, crackles in lung bases, this AM patient states, \"I can breathe better than yesterday\". Omar London RN on 5/27/2021 at 4:29 AM    "

## 2021-05-27 NOTE — PHARMACY - DISCHARGE MEDICATION RECONCILIATION AND EDUCATION
Pharmacy Consult- Warfarin Follow-Up    Corina Martinez is a 66 year old female admitted on 5/26/2021 with Atrial fibrillation with RVR (H)    Primary Indication(s) for Anticoagulation: a fib     Goal INR: 2 to 3    FYI, patient is followed by the Anticoagulation/Protime Clinic at: [facility]    Patient Active Problem List   Diagnosis     SHAILESH (obstructive sleep apnea)     Morbid obesity (H)     Tobacco abuse disorder     Benign essential hypertension     Midline low back pain without sciatica     Neck pain     Biliary cirrhosis (H)     Atrial fibrillation (H)     Anticoagulation monitoring, INR range 2-3     Atrial fibrillation with RVR (H)       New factors that may increase patient's sensitivity to warfarin (Coumadin) include: amiodarone    New factors that may decrease patient's sensitivity to warfarin (Coumadin) include: none noted    Dietary Intake: 100 %    Recent Labs   Lab Test 05/27/21  0517 05/26/21  1354 05/26/21  0615 05/24/21 05/17/21  1420   HGB 11.1*  --  12.6  --  12.8   INR 2.20* 1.85*  --  1.6*  --      --  357  --  290        Recent Dosing:  Anticoagulation Dose History     Recent Dosing and Labs Latest Ref Rng & Units 5/24/2021 5/26/2021 5/27/2021    Warfarin 5 mg - - 5 mg -    INR 0.86 - 1.14 - 1.85(H) 2.20(H)    INR 0.86 - 1.14 1.6(A) - -          Plan: Give warfarin 4 mg po today x1. INR in AM.    Thank You for the Consult. Will continue to follow.    Umm Echeverria [facility] ....................  5/27/2021   2:50 PM

## 2021-05-27 NOTE — PROGRESS NOTES
Resting in bed.  States feels better than yesterday.  Occasional productive cough.  HR controled on drip.  Will transition to oral when ordered.  No c/o, requests.  Cont to monitor.

## 2021-05-28 LAB
INR PPP: 2.15 (ref 0.86–1.14)
LACTATE BLD-SCNC: 0.8 MMOL/L (ref 0.7–2)
MAGNESIUM SERPL-MCNC: 1.7 MG/DL (ref 1.9–2.7)
POTASSIUM SERPL-SCNC: 4 MMOL/L (ref 3.5–5.1)

## 2021-05-28 PROCEDURE — 250N000013 HC RX MED GY IP 250 OP 250 PS 637: Performed by: FAMILY MEDICINE

## 2021-05-28 PROCEDURE — 36415 COLL VENOUS BLD VENIPUNCTURE: CPT | Performed by: FAMILY MEDICINE

## 2021-05-28 PROCEDURE — 200N000001 HC R&B ICU

## 2021-05-28 PROCEDURE — 83735 ASSAY OF MAGNESIUM: CPT | Performed by: FAMILY MEDICINE

## 2021-05-28 PROCEDURE — 84132 ASSAY OF SERUM POTASSIUM: CPT | Performed by: FAMILY MEDICINE

## 2021-05-28 PROCEDURE — 250N000013 HC RX MED GY IP 250 OP 250 PS 637: Performed by: INTERNAL MEDICINE

## 2021-05-28 PROCEDURE — 85610 PROTHROMBIN TIME: CPT | Performed by: FAMILY MEDICINE

## 2021-05-28 PROCEDURE — 83605 ASSAY OF LACTIC ACID: CPT | Performed by: FAMILY MEDICINE

## 2021-05-28 PROCEDURE — 99232 SBSQ HOSP IP/OBS MODERATE 35: CPT | Performed by: FAMILY MEDICINE

## 2021-05-28 RX ORDER — DILTIAZEM HYDROCHLORIDE 120 MG/1
120 CAPSULE, COATED, EXTENDED RELEASE ORAL ONCE
Status: COMPLETED | OUTPATIENT
Start: 2021-05-28 | End: 2021-05-28

## 2021-05-28 RX ORDER — WARFARIN SODIUM 5 MG/1
5 TABLET ORAL
Status: COMPLETED | OUTPATIENT
Start: 2021-05-28 | End: 2021-05-28

## 2021-05-28 RX ORDER — DILTIAZEM HYDROCHLORIDE 120 MG/1
240 CAPSULE, COATED, EXTENDED RELEASE ORAL DAILY
Status: DISCONTINUED | OUTPATIENT
Start: 2021-05-29 | End: 2021-05-29 | Stop reason: HOSPADM

## 2021-05-28 RX ADMIN — PANTOPRAZOLE SODIUM 40 MG: 40 TABLET, DELAYED RELEASE ORAL at 07:20

## 2021-05-28 RX ADMIN — DILTIAZEM HYDROCHLORIDE 120 MG: 120 CAPSULE, COATED, EXTENDED RELEASE ORAL at 08:05

## 2021-05-28 RX ADMIN — AMIODARONE HYDROCHLORIDE 200 MG: 200 TABLET ORAL at 09:35

## 2021-05-28 RX ADMIN — GABAPENTIN 600 MG: 600 TABLET, FILM COATED ORAL at 13:41

## 2021-05-28 RX ADMIN — MAGNESIUM OXIDE TAB 400 MG (241.3 MG ELEMENTAL MG) 400 MG: 400 (241.3 MG) TAB at 09:35

## 2021-05-28 RX ADMIN — GABAPENTIN 600 MG: 600 TABLET, FILM COATED ORAL at 07:20

## 2021-05-28 RX ADMIN — DIPHENHYDRAMINE HYDROCHLORIDE 50 MG: 25 CAPSULE ORAL at 22:12

## 2021-05-28 RX ADMIN — DILTIAZEM HYDROCHLORIDE 30 MG: 30 TABLET, FILM COATED ORAL at 02:54

## 2021-05-28 RX ADMIN — DILTIAZEM HYDROCHLORIDE 120 MG: 120 CAPSULE, COATED, EXTENDED RELEASE ORAL at 13:40

## 2021-05-28 RX ADMIN — WARFARIN SODIUM 5 MG: 5 TABLET ORAL at 17:38

## 2021-05-28 RX ADMIN — DULOXETINE HYDROCHLORIDE 30 MG: 30 CAPSULE, DELAYED RELEASE ORAL at 22:14

## 2021-05-28 RX ADMIN — DILTIAZEM HYDROCHLORIDE 30 MG: 30 TABLET, FILM COATED ORAL at 08:05

## 2021-05-28 RX ADMIN — GABAPENTIN 1200 MG: 600 TABLET, FILM COATED ORAL at 22:13

## 2021-05-28 ASSESSMENT — ACTIVITIES OF DAILY LIVING (ADL)
ADLS_ACUITY_SCORE: 16

## 2021-05-28 ASSESSMENT — MIFFLIN-ST. JEOR: SCORE: 1597.75

## 2021-05-28 NOTE — ASSESSMENT & PLAN NOTE
Therapeutic on Coumadin, Lovenox has been stopped  At discharge continue coumadin at 5 mg daily  OP coumadin clinic early next week

## 2021-05-28 NOTE — PHARMACY-ANTICOAGULATION SERVICE
Pharmacy Consult- Warfarin Follow-Up    Corina Martinez is a 66 year old female admitted on 5/26/2021 with Atrial fibrillation with RVR (H)    Primary Indication(s) for Anticoagulation:  A fib    Goal INR: 2 to 3    FYI, patient is followed by the Anticoagulation/Protime Clinic at: [facility]    Patient Active Problem List   Diagnosis     SHAILESH (obstructive sleep apnea)     Morbid obesity (H)     Tobacco abuse disorder     Benign essential hypertension     Midline low back pain without sciatica     Neck pain     Biliary cirrhosis (H)     Atrial fibrillation (H)     Anticoagulation monitoring, INR range 2-3     Atrial fibrillation with RVR (H)       New factors that may increase patient's sensitivity to warfarin (Coumadin) include: amidaron    New factors that may decrease patient's sensitivity to warfarin (Coumadin) include: stopped therapeutic enoxaparin    Dietary Intake: 50 to 100 %    Recent Labs   Lab Test 05/28/21  0410 05/27/21  0517 05/26/21  1354 05/26/21  0615 05/24/21 05/17/21  1420   HGB  --  11.1*  --  12.6  --  12.8   INR 2.15* 2.20* 1.85*  --  1.6*  --    PLT  --  317  --  357  --  290      Lab Results   Component Value Date    ALBUMIN 3.7 05/26/2021       Recent Dosing:    Anticoagulation Dose History     Recent Dosing and Labs Latest Ref Rng & Units 5/24/2021 5/26/2021 5/27/2021 5/28/2021    Warfarin 2 mg - - - 4 mg -    Warfarin 5 mg - - 5 mg - -    INR 0.86 - 1.14 - 1.85(H) 2.20(H) 2.15(H)    INR 0.86 - 1.14 1.6(A) - - -          Plan: Give warfarin 5 mg po today x1. INR in AM.    Thank You for the Consult. Will continue to follow.    Umm Echeverria [facility] ....................  5/28/2021   9:26 AM

## 2021-05-28 NOTE — ASSESSMENT & PLAN NOTE
Your blood pressure is now, previously on lisinopril which is being held  At discharge hold lisinopril

## 2021-05-28 NOTE — ASSESSMENT & PLAN NOTE
Admitted 2 days ago with recurrence of atrial fibrillation with RVR  Relatively new diagnosis, treated with cardioversion at outside hospital x2, started on amiodarone  In hospital here has been rate controlled with IV diltiazem, switch to oral, continues on amiodarone  Pulse controlled now with oral diltiazem with addition of metoprolol  Will discharge home on this combo  Continue warfarin  Plan is to follow-up with cardiology, may need cardioversion  Monitor pulse and blood pressures at home, follow-up if low pressures and or higher pulse with increased symptoms

## 2021-05-28 NOTE — PROGRESS NOTES
[facility]    Medicine Progress Note - Hospitalist Service       Date of Admission:  5/26/2021  Assessment & Plan        66-year-old female admitted 2 days ago with atrial fibrillation with RVR.  Currently on amiodarone and is been transitioned to oral diltiazem.  Continues in atrial fibrillation but rate control is better although still rebounds with activity.  Asymptomatic otherwise, denying chest pain, shortness of breath.  Plan today is to continue current formulations, hopefully can transition to longer acting diltiazem.  Encouraged to be up in the room to see how she tolerates, possibly home tomorrow  Atrial fibrillation with RVR (H)  Admitted 2 days ago with recurrence of atrial fibrillation with RVR  Relatively new diagnosis, treated with cardioversion at outside hospital x2, started on amiodarone  In hospital here has been rate controlled with IV diltiazem, switch to oral, continues on amiodarone  Continues in atrial fibrillation with heart rates at rest controlled but with any activity will rebound  Continue current treatment with diltiazem, hopefully can transition to longer acting formulation over the next 24 hours  Recheck electrolytes, replace as necessary  Continue Coumadin for stroke prophylaxis    Benign essential hypertension  Your blood pressure is now, previously on lisinopril which is being held  Continue to monitor    SHAILESH (obstructive sleep apnea)  Controlled, using home CPAP  No change    Anticoagulation monitoring, INR range 2-3  Therapeutic on Coumadin, Lovenox has been stopped    Morbid obesity (H)  Contributing factor, no signs of diabetes    Tobacco abuse disorder  Attempting to stop  Encourage           Diet: Combination Diet Low Saturated Fat Na <2400mg Diet, No Caffeine Diet    DVT Prophylaxis: Warfarin  Iniguez Catheter: not present  Code Status: Full Code           Disposition Plan   Expected discharge: Tomorrow, recommended to prior living arrangement once Rate  controlled.  Entered: Kishan Mathews MD 05/28/2021, 8:37 AM       The patient's care was discussed with the Bedside Nurse and Patient.    Kishan Mathews MD  Hospitalist Service  [facility]  Contact information available via [facility] Paging/Directory    ______________________________________________________________________    Interval History   Feeling better, denies chest pain, shortness of breath.  Comfortable with resting, mildly short of air with activity.  Denies nausea or vomiting, appetites been good.    Data reviewed today: I reviewed all medications, new labs and imaging results over the last 24 hours. I personally reviewed no images or EKG's today.    Physical Exam   Vital Signs: Temp: 97.7  F (36.5  C) Temp src: Temporal BP: (!) 86/59 Pulse: 107   Resp: 15 SpO2: 95 % O2 Device: Nasal cannula Oxygen Delivery: 2 LPM  Weight: 229 lbs 7.98 oz  Constitutional: awake, alert, cooperative, no apparent distress, and appears stated age  Respiratory: No increased work of breathing, good air exchange, clear to auscultation bilaterally, no crackles or wheezing  Cardiovascular: regular rate and rhythm  GI: Soft, nontender, nondistended    Data   Recent Labs   Lab 05/28/21  0410 05/27/21  0517 05/26/21  1354 05/26/21  1015 05/26/21  0615   WBC  --  10.3  --   --  12.1*   HGB  --  11.1*  --   --  12.6   MCV  --  88  --   --  86   PLT  --  317  --   --  357   INR 2.15* 2.20* 1.85*  --   --    NA  --  139  --   --  138   POTASSIUM 4.0 4.0  --  4.4 4.4   CHLORIDE  --  104  --   --  102   CO2  --  26  --   --  25   BUN  --  15  --   --  15   CR  --  0.76  --   --  0.81   ANIONGAP  --  9  --   --  11   LOC  --  8.3*  --   --  8.8   GLC  --  92  --   --  121*   ALBUMIN  --   --   --   --  3.7   PROTTOTAL  --   --   --   --  6.8   BILITOTAL  --   --   --   --  0.6   ALKPHOS  --   --   --   --  207*   ALT  --   --   --   --  31   AST  --   --   --   --  22

## 2021-05-28 NOTE — PROGRESS NOTES
Writer called Tele ICU for patient's complaints of right sided jaw pain, pain to her neck and her right shoulder.  The pain to her right shoulder is chronic.  The pain to her jaw and neck is new.  She has pain with palpation to lower mandible portion of the jaw and to the right shoulder.  She also has complaints of shortness of breath.  Pain is not reproducible with exertion or alleviated with rest.  EKG completed and troponin drawn.

## 2021-05-29 VITALS
BODY MASS INDEX: 36.88 KG/M2 | HEART RATE: 113 BPM | WEIGHT: 229.5 LBS | HEIGHT: 66 IN | DIASTOLIC BLOOD PRESSURE: 70 MMHG | RESPIRATION RATE: 14 BRPM | OXYGEN SATURATION: 91 % | TEMPERATURE: 97.2 F | SYSTOLIC BLOOD PRESSURE: 87 MMHG

## 2021-05-29 LAB
INR PPP: 1.99 (ref 0.86–1.14)
LACTATE BLD-SCNC: 1 MMOL/L (ref 0.7–2)
POTASSIUM SERPL-SCNC: 3.9 MMOL/L (ref 3.5–5.1)

## 2021-05-29 PROCEDURE — 85610 PROTHROMBIN TIME: CPT | Performed by: FAMILY MEDICINE

## 2021-05-29 PROCEDURE — 84132 ASSAY OF SERUM POTASSIUM: CPT | Performed by: FAMILY MEDICINE

## 2021-05-29 PROCEDURE — 250N000013 HC RX MED GY IP 250 OP 250 PS 637: Performed by: FAMILY MEDICINE

## 2021-05-29 PROCEDURE — 36415 COLL VENOUS BLD VENIPUNCTURE: CPT | Performed by: FAMILY MEDICINE

## 2021-05-29 PROCEDURE — 99239 HOSP IP/OBS DSCHRG MGMT >30: CPT | Performed by: FAMILY MEDICINE

## 2021-05-29 PROCEDURE — 250N000011 HC RX IP 250 OP 636: Performed by: FAMILY MEDICINE

## 2021-05-29 PROCEDURE — 83605 ASSAY OF LACTIC ACID: CPT | Performed by: FAMILY MEDICINE

## 2021-05-29 RX ORDER — FUROSEMIDE 10 MG/ML
20 INJECTION INTRAMUSCULAR; INTRAVENOUS ONCE
Status: COMPLETED | OUTPATIENT
Start: 2021-05-29 | End: 2021-05-29

## 2021-05-29 RX ORDER — DILTIAZEM HYDROCHLORIDE 240 MG/1
240 CAPSULE, COATED, EXTENDED RELEASE ORAL DAILY
Qty: 30 CAPSULE | Refills: 0 | Status: SHIPPED | OUTPATIENT
Start: 2021-05-30 | End: 2021-07-01

## 2021-05-29 RX ORDER — METOPROLOL TARTRATE 25 MG/1
25 TABLET, FILM COATED ORAL 2 TIMES DAILY
Status: DISCONTINUED | OUTPATIENT
Start: 2021-05-29 | End: 2021-05-29 | Stop reason: HOSPADM

## 2021-05-29 RX ORDER — WARFARIN SODIUM 5 MG/1
5 TABLET ORAL
Status: DISCONTINUED | OUTPATIENT
Start: 2021-05-29 | End: 2021-05-29 | Stop reason: HOSPADM

## 2021-05-29 RX ADMIN — PANTOPRAZOLE SODIUM 40 MG: 40 TABLET, DELAYED RELEASE ORAL at 07:23

## 2021-05-29 RX ADMIN — FUROSEMIDE 20 MG: 10 INJECTION, SOLUTION INTRAMUSCULAR; INTRAVENOUS at 09:22

## 2021-05-29 RX ADMIN — GABAPENTIN 600 MG: 600 TABLET, FILM COATED ORAL at 06:33

## 2021-05-29 RX ADMIN — AMIODARONE HYDROCHLORIDE 200 MG: 200 TABLET ORAL at 09:22

## 2021-05-29 RX ADMIN — DILTIAZEM HYDROCHLORIDE 240 MG: 120 CAPSULE, COATED, EXTENDED RELEASE ORAL at 09:22

## 2021-05-29 RX ADMIN — METOPROLOL TARTRATE 25 MG: 25 TABLET, FILM COATED ORAL at 09:22

## 2021-05-29 RX ADMIN — MAGNESIUM OXIDE TAB 400 MG (241.3 MG ELEMENTAL MG) 400 MG: 400 (241.3 MG) TAB at 09:23

## 2021-05-29 ASSESSMENT — ACTIVITIES OF DAILY LIVING (ADL)
ADLS_ACUITY_SCORE: 15

## 2021-05-29 NOTE — PLAN OF CARE
Problem: Adult Inpatient Plan of Care  Goal: Plan of Care Review  Outcome: Improving  Flowsheets  Taken 5/29/2021 0429  Plan of Care Reviewed With: patient  Progress: improving  Taken 5/26/2021 1329  Outcome Summary: Pt verbalized understanding of currnt POC as discused .  Goal: Patient-Specific Goal (Individualized)  Outcome: Improving  Flowsheets (Taken 5/26/2021 1329)  Individualized Care Needs: Corina will  be able to ambulate freely  Anxieties, Fears or Concerns: Corina will verbalize decreased anxiety and or concerns  Patient-Specific Goals (Include Timeframe): Pt will verbalized no chest pain or shortness of breath present  Goal: Absence of Hospital-Acquired Illness or Injury  Outcome: Improving  Intervention: Identify and Manage Fall Risk  Recent Flowsheet Documentation  Taken 5/29/2021 0100 by Atiya Gtz RN  Safety Promotion/Fall Prevention:   clutter free environment maintained   fall prevention program maintained   lighting adjusted   nonskid shoes/slippers when out of bed   patient and family education   room near nurse's station   room organization consistent   safety round/check completed   treat reversible contributory factors   treat underlying cause  Intervention: Prevent Skin Injury  Recent Flowsheet Documentation  Taken 5/29/2021 0400 by Atiya Gtz RN  Body Position: position changed independently  Taken 5/29/2021 0345 by Atiya Gtz RN  Body Position: position changed independently  Taken 5/29/2021 0106 by Atiya Gtz RN  Body Position: position changed independently  Taken 5/29/2021 0100 by Atiya Gtz RN  Body Position: position changed independently  Taken 5/29/2021 0000 by Atiya Gtz RN  Body Position: position changed independently  Taken 5/28/2021 2330 by Atiya Gtz RN  Body Position: position changed independently  Intervention: Prevent and Manage VTE (Venous Thromboembolism) Risk  Recent Flowsheet Documentation  Taken 5/29/2021  0100 by Atiya Gtz RN  VTE Prevention/Management:   ambulation promoted   anticoagulant therapy maintained   fluids promoted   AROM (active range of motion) performed  Intervention: Prevent Infection  Recent Flowsheet Documentation  Taken 5/29/2021 0100 by Atiya Gtz RN  Infection Prevention:   hand hygiene promoted   personal protective equipment utilized   rest/sleep promoted   single patient room provided  Goal: Optimal Comfort and Wellbeing  Outcome: Improving  Goal: Readiness for Transition of Care  Outcome: Improving  Flowsheets (Taken 5/26/2021 1329)  Anticipated Changes Related to Illness: none  Transportation Anticipated: (SIGNIFICANT OTHER) family or friend will provide  Concerns to be Addressed: denies needs/concerns at this time  Barriers to Discharge: Pt denies needs at this time .     Problem: Dysrhythmia  Goal: Normalized Cardiac Rhythm  Outcome: Improving  Intervention: Monitor and Manage Cardiac Rhythm Effect  Recent Flowsheet Documentation  Taken 5/29/2021 0100 by Atiya Gtz RN  VTE Prevention/Management:   ambulation promoted   anticoagulant therapy maintained   fluids promoted   AROM (active range of motion) performed

## 2021-05-29 NOTE — DISCHARGE SUMMARY
[facility]  Hospitalist Discharge Summary      Date of Admission:  5/26/2021  Date of Discharge:  5/29/2021  Discharging Provider: Kishan Mathews MD      Discharge Diagnoses   Principal Problem:    Atrial fibrillation with RVR (H)  Active Problems:    SHAILESH (obstructive sleep apnea)    Benign essential hypertension    Morbid obesity (H)    Tobacco abuse disorder    Anticoagulation monitoring, INR range 2-3        Follow-ups Needed After Discharge   Follow-up Appointments     Follow-up and recommended labs and tests       Follow up with primary care provider, Kika Tariq, within 7 days   for hospital follow- up.  The following labs/tests are recommended: INR   clinic early next week.             Unresulted Labs Ordered in the Past 30 Days of this Admission     No orders found from 4/26/2021 to 5/27/2021.      These results will be followed up by Kika Tariq    Discharge Disposition   Discharged to home  Condition at discharge: Satisfactory      Hospital Course   Atrial fibrillation with RVR (H)  Admitted 2 days ago with recurrence of atrial fibrillation with RVR  Relatively new diagnosis, treated with cardioversion at outside hospital x2, started on amiodarone  In hospital here has been rate controlled with IV diltiazem, switch to oral, continues on amiodarone  Pulse controlled now with oral diltiazem with addition of metoprolol  Will discharge home on this combo  Continue warfarin  Plan is to follow-up with cardiology, may need cardioversion  Monitor pulse and blood pressures at home, follow-up if low pressures and or higher pulse with increased symptoms    Benign essential hypertension  Your blood pressure is now, previously on lisinopril which is being held  At discharge hold lisinopril    SHAILESH (obstructive sleep apnea)  Controlled, using home CPAP  No change    Anticoagulation monitoring, INR range 2-3  Therapeutic on Coumadin, Lovenox has been stopped  At discharge continue  coumadin at 5 mg daily  OP coumadin clinic early next week    Morbid obesity (H)  Contributing factor, no signs of diabetes    Tobacco abuse disorder  Attempting to stop  Encourage        Consultations This Hospital Stay   PHARMACY TO DOSE WARFARIN    Code Status   Full Code    Time Spent on this Encounter   I, Kishan Mathews MD, personally saw the patient today and spent greater than 30 minutes discharging this patient.       Kishan Mathews MD  [facility]  [address]  [place] [address]  Phone: [phone]  Fax: [phone]  ______________________________________________________________________    Physical Exam   Vital Signs: Temp: 97.2  F (36.2  C) Temp src: Temporal BP: (!) 87/70 Pulse: 113   Resp: 14 SpO2: 91 % O2 Device: Nasal cannula Oxygen Delivery: 2 LPM  Weight: 229 lbs 7.98 oz  Constitutional: awake, alert, cooperative, no apparent distress, and appears stated age  Respiratory: No increased work of breathing, good air exchange, clear to auscultation bilaterally, no crackles or wheezing  Cardiovascular: irregularly irregular rhythm  GI: normal bowel sounds, soft and non-distended       Primary Care Physician   Kika Tariq    Discharge Orders      Reason for your hospital stay    Presenting with high heart rates secondary to atrial fibrillation associated with some chest pain     Follow-up and recommended labs and tests     Follow up with primary care provider, Kika Tariq, within 7 days for hospital follow- up.  The following labs/tests are recommended: INR clinic early next week.     Activity    Your activity upon discharge: activity as tolerated     Full Code     Diet    Follow this diet upon discharge: Orders Placed This Encounter      Combination Diet Low Saturated Fat Na <2400mg Diet, No Caffeine Diet       Significant Results and Procedures   Most Recent 3 CBC's:  Recent Labs   Lab Test 05/27/21  0517 05/26/21  0615 05/17/21  1420    WBC 10.3 12.1* 13.9*   HGB 11.1* 12.6 12.8   MCV 88 86 87    357 290     Most Recent 3 BMP's:  Recent Labs   Lab Test 05/29/21 0405 05/28/21 0410 05/27/21  0517 05/26/21  0615 05/26/21  0615 05/17/21  1420   NA  --   --  139  --  138 134   POTASSIUM 3.9 4.0 4.0   < > 4.4 4.2   CHLORIDE  --   --  104  --  102 100   CO2  --   --  26  --  25 23   BUN  --   --  15  --  15 14   CR  --   --  0.76  --  0.81 0.69   ANIONGAP  --   --  9  --  11 11   LOC  --   --  8.3*  --  8.8 9.1   GLC  --   --  92  --  121* 124*    < > = values in this interval not displayed.     Most Recent 3 INR's:  Recent Labs   Lab Test 05/29/21 0405 05/28/21 0410 05/27/21 0517   INR 1.99* 2.15* 2.20*     Most Recent 3 Troponin's:No lab results found.,   Results for orders placed or performed during the hospital encounter of 05/26/21   XR Chest Port 1 View    Narrative    PROCEDURE: XR CHEST PORT 1 VIEW 5/26/2021 6:35 AM    HISTORY: palpitations    COMPARISONS: None.    TECHNIQUE: Single view.    FINDINGS: Heart is enlarged. No confluent infiltrate or pleural  effusion is seen although there is some mild interstitial prominence  at the lung bases.         Impression    IMPRESSION: Cardiomegaly with mild interstitial prominence.    LARA CHAU MD       Discharge Medications   Current Discharge Medication List      START taking these medications    Details   diltiazem ER COATED BEADS (CARDIZEM CD/CARTIA XT) 240 MG 24 hr capsule Take 1 capsule (240 mg) by mouth daily  Qty: 30 capsule, Refills: 0    Associated Diagnoses: Atrial fibrillation with RVR (H)         CONTINUE these medications which have NOT CHANGED    Details   amiodarone (PACERONE) 200 MG tablet Take 200 mg by mouth every morning       diclofenac (VOLTAREN) 1 % topical gel Apply 2 g topically 2 times daily as needed      diphenhydrAMINE (BENADRYL) 25 MG tablet Take 50 mg by mouth At Bedtime      DULoxetine (CYMBALTA) 30 MG capsule Take 30 mg by mouth At Bedtime       enoxaparin ANTICOAGULANT (LOVENOX) 100 MG/ML syringe Inject 1 mL (100 mg) Subcutaneous 2 times daily  Qty: 4 mL, Refills: 3    Associated Diagnoses: Atrial fibrillation, unspecified type (H); Anticoagulation monitoring, INR range 2-3      fluticasone (FLONASE) 50 MCG/ACT nasal spray Spray 2 sprays into both nostrils daily as needed for allergies   Qty: 1 Package, Refills: 12      gabapentin (NEURONTIN) 600 MG tablet Take 600 mg orally in the morning, 600 mg in the afternoon and 1200 mg in the evening.      hydrOXYzine (ATARAX) 25 MG tablet Take 25 mg by mouth At Bedtime      magnesium 250 MG tablet Take 250 mg by mouth At Bedtime       Melatonin 10 MG TABS tablet Take 10 mg by mouth At Bedtime      metoprolol tartrate (LOPRESSOR) 25 MG tablet Take 25 mg by mouth 2 times daily       Multiple Vitamins-Minerals (MULTIVITAMIN ADULTS 50+) TABS Take 1 tablet by mouth every morning      omeprazole (PRILOSEC) 20 MG CR capsule Take 20 mg by mouth daily       vitamin B-12 (CYANOCOBALAMIN) 1000 MCG tablet Take 1,000 mcg by mouth daily      vitamin D3 (CHOLECALCIFEROL) 50 mcg (2000 units) tablet Take 1 tablet by mouth every morning      warfarin ANTICOAGULANT (COUMADIN) 5 MG tablet Take as directed, take 5 mg daily on 5/29, 5/30, 5/31 and recheck INR on 6/1.      Ursodiol 500 MG TABS Take 500 mg by mouth 3 times daily          STOP taking these medications       albuterol (PROAIR HFA) 108 (90 Base) MCG/ACT inhaler Comments:   Reason for Stopping:             Allergies   Allergies   Allergen Reactions     Codeine Sulfate Nausea     Acetaminophen Other (See Comments)     Was told to avoid due to primary biliary cirrhosis

## 2021-05-29 NOTE — PHARMACY-ANTICOAGULATION SERVICE
Pharmacy Consult- Coumadin Follow-Up    Corina Martinez is a 66 year old female admitted on 5/26/2021 with Atrial fibrillation with RVR (H)    Primary Indication(s) for Anticoagulation: A fib    Goal INR: 2.5 (2-3)    FYI, patient is followed by the Anticoagulation/Protime Clinic at: [facility]    Patient Active Problem List   Diagnosis     SHAILESH (obstructive sleep apnea)     Morbid obesity (H)     Tobacco abuse disorder     Benign essential hypertension     Midline low back pain without sciatica     Neck pain     Biliary cirrhosis (H)     Atrial fibrillation (H)     Anticoagulation monitoring, INR range 2-3     Atrial fibrillation with RVR (H)       New factors that may increase patient's sensitivity to warfarin (Coumadin) include: amiodarone    New factors that may decrease patient's sensitivity to warfarin (Coumadin) include: stopped therapeutic lovenox    Dietary Intake: %    Recent Labs   Lab Test 05/29/21  0405 05/28/21  0410 05/27/21  0517 05/26/21  1354 05/26/21  0615 05/17/21  1420 05/17/21  1420   HGB  --   --  11.1*  --  12.6  --  12.8   INR 1.99* 2.15* 2.20* 1.85*  --    < >  --    PLT  --   --  317  --  357  --  290    < > = values in this interval not displayed.        Recent Dosing:    Date INR Dose Given   05/26 1.85 5 mg   05/27 2.2 4 mg   05/28 2.15 5 mg       Plan: INR just about therapeutic, will give warfarin 5 mg x 1. INR with am labs.    Thank You for the Consult. Will continue to follow.    Taylor Rivera RPH ....................  5/29/2021   8:41 AM

## 2021-05-29 NOTE — PROGRESS NOTES
[facility]    Medicine Progress Note - Hospitalist Service       Date of Admission:  5/26/2021  Assessment & Plan       Atrial fibrillation with RVR (H)  Admitted 2 days ago with recurrence of atrial fibrillation with RVR  Relatively new diagnosis, treated with cardioversion at outside hospital x2, started on amiodarone  In hospital here has been rate controlled with IV diltiazem, switch to oral, continues on amiodarone  Continues in atrial fibrillation with heart rates at rest controlled but with any activity will rebound  Rate control is not ideal, currently on oral diltiazem  Clinically seems to be mildly fluid overloaded with crackles in lungs and requiring supplemental oxygen  We will add low-dose metoprolol, IV dose of Lasix ordered  Continue to monitor, patient is pushing to go home later today, reevaluate later    Benign essential hypertension  Your blood pressure is now, previously on lisinopril which is being held  Continue to monitor    SHAILESH (obstructive sleep apnea)  Controlled, using home CPAP  No change    Anticoagulation monitoring, INR range 2-3  Therapeutic on Coumadin, Lovenox has been stopped    Morbid obesity (H)  Contributing factor, no signs of diabetes    Tobacco abuse disorder  Attempting to stop  Encourage           Diet: Combination Diet Low Saturated Fat Na <2400mg Diet, No Caffeine Diet    DVT Prophylaxis: Warfarin  Iniguez Catheter: not present  Code Status: Full Code           Disposition Plan   Expected discharge: Today, recommended to prior living arrangement once Better rate control, not requiring oxygen.  Entered: Kishan Mathews MD 05/29/2021, 9:00 AM       The patient's care was discussed with the Bedside Nurse and Patient.    Kishan Mathews MD  Hospitalist Service  [facility]  Contact information available via [facility] Paging/Directory    ______________________________________________________________________    Interval History    Feeling better, states she is able to get up and move around the room without feeling short of air.  Telemetry is showing heart rates bouncing into the 120s.  Requiring some oxygen at 1 to 2 L on room air.  She denies feeling short of air.  Denies cough.    Data reviewed today: I reviewed all medications, new labs and imaging results over the last 24 hours. I personally reviewed no images or EKG's today.    Physical Exam   Vital Signs: Temp: 97.2  F (36.2  C) Temp src: Temporal BP: (!) 87/70 Pulse: 113   Resp: 14 SpO2: 91 % O2 Device: Nasal cannula Oxygen Delivery: 2 LPM  Weight: 229 lbs 7.98 oz  Constitutional: awake, alert, cooperative, no apparent distress, and appears stated age  Respiratory: Crackles at both bases  Cardiovascular: regularly irregular rhythm, trace edema in the legs    Data   Recent Labs   Lab 05/29/21  0405 05/28/21  0410 05/27/21  0517 05/26/21  0615 05/26/21  0615   WBC  --   --  10.3  --  12.1*   HGB  --   --  11.1*  --  12.6   MCV  --   --  88  --  86   PLT  --   --  317  --  357   INR 1.99* 2.15* 2.20*   < >  --    NA  --   --  139  --  138   POTASSIUM 3.9 4.0 4.0   < > 4.4   CHLORIDE  --   --  104  --  102   CO2  --   --  26  --  25   BUN  --   --  15  --  15   CR  --   --  0.76  --  0.81   ANIONGAP  --   --  9  --  11   LOC  --   --  8.3*  --  8.8   GLC  --   --  92  --  121*   ALBUMIN  --   --   --   --  3.7   PROTTOTAL  --   --   --   --  6.8   BILITOTAL  --   --   --   --  0.6   ALKPHOS  --   --   --   --  207*   ALT  --   --   --   --  31   AST  --   --   --   --  22    < > = values in this interval not displayed.

## 2021-05-29 NOTE — PROGRESS NOTES
Pt rested quietly throughout the night. Pt denied pain.  Pt remain A-Fib HR 90's  To 120. BP appropriate with some SBP  Readings decreased but usually due to Pt position when sleeping.  Pt remains on 2 liters NC 2 liters spo2 92%.  Call light in reach bed in low locked position and ICU monitoring continues as per orders. NANCY Barron

## 2021-05-29 NOTE — PHARMACY - DISCHARGE MEDICATION RECONCILIATION AND EDUCATION
[facility]  Part of [address] [address]    May 29, 2021    Dear Pharmacist,    Your customer, Corina Martinez, born on 1955, was recently discharged from [facility].  We have updated her medication list and want to alert you to the following:       Review of your medicines      START taking      Dose / Directions   diltiazem ER COATED BEADS 240 MG 24 hr capsule  Commonly known as: CARDIZEM CD/CARTIA XT  Used for: Atrial fibrillation with RVR (H)      Dose: 240 mg  Start taking on: May 30, 2021  Take 1 capsule (240 mg) by mouth daily  Quantity: 30 capsule  Refills: 0        CONTINUE these medicines which have NOT CHANGED      Dose / Directions   amiodarone 200 MG tablet  Commonly known as: PACERONE      Dose: 200 mg  Take 200 mg by mouth every morning  Refills: 0     diclofenac 1 % topical gel  Commonly known as: VOLTAREN      Dose: 2 g  Apply 2 g topically 2 times daily as needed  Refills: 0     diphenhydrAMINE 25 MG tablet  Commonly known as: BENADRYL      Dose: 50 mg  Take 50 mg by mouth At Bedtime  Refills: 0     DULoxetine 30 MG capsule  Commonly known as: CYMBALTA      Dose: 30 mg  Take 30 mg by mouth At Bedtime  Refills: 0     fluticasone 50 MCG/ACT nasal spray  Commonly known as: FLONASE      Dose: 2 spray  Spray 2 sprays into both nostrils daily as needed for allergies  Quantity: 1 Package  Refills: 12     gabapentin 600 MG tablet  Commonly known as: NEURONTIN      Take 600 mg orally in the morning, 600 mg in the afternoon and 1200 mg in the evening.  Refills: 0     hydrOXYzine 25 MG tablet  Commonly known as: ATARAX      Dose: 25 mg  Take 25 mg by mouth At Bedtime  Refills: 0     magnesium 250 MG tablet      Dose: 250 mg  Take 250 mg by mouth At Bedtime  Refills: 0     Melatonin 10 MG Tabs tablet      Dose: 10 mg  Take 10 mg by mouth At Bedtime  Refills: 0     metoprolol tartrate 25 MG tablet  Commonly known as:  LOPRESSOR      Dose: 25 mg  Take 25 mg by mouth 2 times daily  Refills: 0     Multivitamin Adults 50+ Tabs      Dose: 1 tablet  Take 1 tablet by mouth every morning  Refills: 0     omeprazole 20 MG DR capsule  Commonly known as: priLOSEC      Dose: 20 mg  Take 20 mg by mouth daily  Refills: 0     ursodiol 500 MG tablet  Commonly known as: ACTIGALL      Dose: 500 mg  Take 500 mg by mouth 3 times daily  Refills: 0     vitamin B-12 1000 MCG tablet  Commonly known as: CYANOCOBALAMIN      Dose: 1,000 mcg  Take 1,000 mcg by mouth daily  Refills: 0     vitamin D3 50 mcg (2000 units) tablet  Commonly known as: CHOLECALCIFEROL      Dose: 1 tablet  Take 1 tablet by mouth every morning  Refills: 0     warfarin ANTICOAGULANT 5 MG tablet  Commonly known as: COUMADIN  Indication: Atrial Fibrillation      Take as directed. If you are unsure how to take this medication, talk to your nurse or doctor.  Original instructions: Take as directed, take 5 mg daily on 5/29, 5/30, 5/31 and recheck INR on 6/1.  Refills: 0        STOP taking    enoxaparin ANTICOAGULANT 100 MG/ML syringe  Commonly known as: LOVENOX              Where to get your medicines      These medications were sent to [facility] [address] [place] [id number]    Phone: [phone]     diltiazem ER COATED BEADS 240 MG 24 hr capsule         We also reviewed Corina Martinez's allergy list and updated it as needed:  Allergies: Codeine sulfate and Acetaminophen    Thank you for continuing to care for Corina Martinez.  We look forward to working together with you in the future.    Sincerely,  Taylor Rivera, [facility]

## 2021-05-29 NOTE — PHARMACY - DISCHARGE MEDICATION RECONCILIATION AND EDUCATION
Pharmacy:  Discharge Counseling and Medication Reconciliation    Corina JOHN Juan  [address] [place] [address]  [phone] (home)   66 year old female  PCP: Kika Tariq    Allergies: Codeine sulfate and Acetaminophen    Discharge Counseling:    Pharmacist met with patient (and/or family) today to review the medication portion of the After Visit Summary (with an emphasis on NEW medications) and to address patient's questions/concerns.    Summary of Education: met with patient and discussed administration, duration and side effects of new diltiazem.  Recommended checking and monitoring blood pressure at home. Patient was told to STOP lovenox at home.  Patient was told to take warfarin 5 mg daily for the next three nights and have her INR checked on Tuesday 6/1.  Patient wondering why her warfarin had been decreased, explained amiodarone and warfarin interaction, no one had talked to her yet about this. All questions answered.       Materials Provided:  MedCounselor sheets printed from Clinical Pharmacology on: diltiazem    Discharge Medication Reconciliation:    It has been determined that the patient has an adequate supply of medications available or which can be obtained from the patient's preferred pharmacy, which HE/SHE has confirmed as: [facility] [An updated medication list will be faxed to the patient's pharmacy.]    Thank you for the consult.    Taylor Rivera RPH........May 29, 2021 12:59 PM

## 2021-05-29 NOTE — PLAN OF CARE
Problem: Adult Inpatient Plan of Care  Goal: Absence of Hospital-Acquired Illness or Injury  Intervention: Identify and Manage Fall Risk  Recent Flowsheet Documentation  Taken 5/29/2021 1304 by Bijal Moore RN  Safety Promotion/Fall Prevention: activity supervised  Taken 5/29/2021 0700 by Bijal Moore RN  Safety Promotion/Fall Prevention: activity supervised  Intervention: Prevent Skin Injury  Recent Flowsheet Documentation  Taken 5/29/2021 1304 by Bijal Moore RN  Body Position: position changed independently  Taken 5/29/2021 0700 by Bijal Moore RN  Body Position: position changed independently  Intervention: Prevent and Manage VTE (Venous Thromboembolism) Risk  Recent Flowsheet Documentation  Taken 5/29/2021 1304 by Bijal Moore RN  VTE Prevention/Management: anticoagulant therapy maintained  Taken 5/29/2021 0700 by Bijal Moore RN  VTE Prevention/Management: anticoagulant therapy maintained  Intervention: Prevent Infection  Recent Flowsheet Documentation  Taken 5/29/2021 1304 by Bijal Moore RN  Infection Prevention:   environmental surveillance performed   hand hygiene promoted  Taken 5/29/2021 0700 by Bijal Moore RN  Infection Prevention:   environmental surveillance performed   hand hygiene promoted     Problem: Dysrhythmia  Goal: Normalized Cardiac Rhythm  Intervention: Monitor and Manage Cardiac Rhythm Effect  Recent Flowsheet Documentation  Taken 5/29/2021 1304 by Bijal Moore RN  VTE Prevention/Management: anticoagulant therapy maintained  Taken 5/29/2021 0700 by Bijal Moore RN  VTE Prevention/Management: anticoagulant therapy maintained

## 2021-05-31 ENCOUNTER — RECORDS - HEALTHEAST (OUTPATIENT)
Dept: ADMINISTRATIVE | Facility: CLINIC | Age: 66
End: 2021-05-31

## 2021-06-01 ENCOUNTER — PATIENT OUTREACH (OUTPATIENT)
Dept: CARE COORDINATION | Facility: CLINIC | Age: 66
End: 2021-06-01

## 2021-06-01 ENCOUNTER — RECORDS - HEALTHEAST (OUTPATIENT)
Dept: ADMINISTRATIVE | Facility: CLINIC | Age: 66
End: 2021-06-01

## 2021-06-01 NOTE — PROGRESS NOTES
Clinic Care Coordination     Brief chart review due to recent hospitalization, patient has PCP elsewhere, no TCM Call required.     VENU Helm on 6/1/2021 at 8:15 AM

## 2021-06-07 LAB — INTERPRETATION ECG - MUSE: NORMAL

## 2021-06-08 ENCOUNTER — APPOINTMENT (OUTPATIENT)
Dept: CT IMAGING | Facility: CLINIC | Age: 66
End: 2021-06-08
Attending: EMERGENCY MEDICINE
Payer: MEDICARE

## 2021-06-08 ENCOUNTER — HOSPITAL ENCOUNTER (EMERGENCY)
Facility: CLINIC | Age: 66
Discharge: OTHER INSTITUTION | End: 2021-06-08
Attending: EMERGENCY MEDICINE | Admitting: EMERGENCY MEDICINE
Payer: MEDICARE

## 2021-06-08 ENCOUNTER — TRANSFERRED RECORDS (OUTPATIENT)
Dept: HEALTH INFORMATION MANAGEMENT | Facility: OTHER | Age: 66
End: 2021-06-08

## 2021-06-08 VITALS
RESPIRATION RATE: 26 BRPM | SYSTOLIC BLOOD PRESSURE: 134 MMHG | HEART RATE: 112 BPM | WEIGHT: 229 LBS | TEMPERATURE: 96.4 F | DIASTOLIC BLOOD PRESSURE: 99 MMHG | OXYGEN SATURATION: 97 % | HEIGHT: 66 IN | BODY MASS INDEX: 36.8 KG/M2

## 2021-06-08 DIAGNOSIS — I31.39 PERICARDIAL EFFUSION: ICD-10-CM

## 2021-06-08 DIAGNOSIS — J96.01 ACUTE RESPIRATORY FAILURE WITH HYPOXIA (H): ICD-10-CM

## 2021-06-08 LAB
ALBUMIN SERPL-MCNC: 3.3 G/DL (ref 3.4–5)
ALP SERPL-CCNC: 293 U/L (ref 40–150)
ALT SERPL W P-5'-P-CCNC: 32 U/L (ref 0–50)
ANION GAP SERPL CALCULATED.3IONS-SCNC: 6 MMOL/L (ref 3–14)
AST SERPL W P-5'-P-CCNC: 26 U/L (ref 0–45)
BASE EXCESS BLDV CALC-SCNC: 0.8 MMOL/L
BASOPHILS # BLD AUTO: 0.1 10E9/L (ref 0–0.2)
BASOPHILS NFR BLD AUTO: 0.5 %
BILIRUB SERPL-MCNC: 0.5 MG/DL (ref 0.2–1.3)
BUN SERPL-MCNC: 13 MG/DL (ref 7–30)
CALCIUM SERPL-MCNC: 8.6 MG/DL (ref 8.5–10.1)
CHLORIDE SERPL-SCNC: 105 MMOL/L (ref 94–109)
CO2 SERPL-SCNC: 26 MMOL/L (ref 20–32)
CREAT SERPL-MCNC: 0.88 MG/DL (ref 0.52–1.04)
DIFFERENTIAL METHOD BLD: ABNORMAL
EOSINOPHIL # BLD AUTO: 0.2 10E9/L (ref 0–0.7)
EOSINOPHIL NFR BLD AUTO: 1.4 %
ERYTHROCYTE [DISTWIDTH] IN BLOOD BY AUTOMATED COUNT: 14.8 % (ref 10–15)
GFR SERPL CREATININE-BSD FRML MDRD: 69 ML/MIN/{1.73_M2}
GLUCOSE SERPL-MCNC: 110 MG/DL (ref 70–99)
HCO3 BLDV-SCNC: 25 MMOL/L (ref 21–28)
HCT VFR BLD AUTO: 39.9 % (ref 35–47)
HGB BLD-MCNC: 12.9 G/DL (ref 11.7–15.7)
IMM GRANULOCYTES # BLD: 0.1 10E9/L (ref 0–0.4)
IMM GRANULOCYTES NFR BLD: 0.5 %
INR PPP: 3.65 (ref 0.86–1.14)
LABORATORY COMMENT REPORT: NORMAL
LYMPHOCYTES # BLD AUTO: 2.4 10E9/L (ref 0.8–5.3)
LYMPHOCYTES NFR BLD AUTO: 18.3 %
MCH RBC QN AUTO: 27 PG (ref 26.5–33)
MCHC RBC AUTO-ENTMCNC: 32.3 G/DL (ref 31.5–36.5)
MCV RBC AUTO: 84 FL (ref 78–100)
MONOCYTES # BLD AUTO: 1.2 10E9/L (ref 0–1.3)
MONOCYTES NFR BLD AUTO: 9 %
NEUTROPHILS # BLD AUTO: 9.2 10E9/L (ref 1.6–8.3)
NEUTROPHILS NFR BLD AUTO: 70.3 %
NRBC # BLD AUTO: 0 10*3/UL
NRBC BLD AUTO-RTO: 0 /100
NT-PROBNP SERPL-MCNC: 641 PG/ML (ref 0–900)
O2/TOTAL GAS SETTING VFR VENT: ABNORMAL %
PCO2 BLDV: 37 MM HG (ref 40–50)
PH BLDV: 7.43 PH (ref 7.32–7.43)
PLATELET # BLD AUTO: 591 10E9/L (ref 150–450)
PO2 BLDV: 32 MM HG (ref 25–47)
POTASSIUM SERPL-SCNC: 4 MMOL/L (ref 3.4–5.3)
PROT SERPL-MCNC: 7.7 G/DL (ref 6.8–8.8)
RBC # BLD AUTO: 4.77 10E12/L (ref 3.8–5.2)
SARS-COV-2 RNA RESP QL NAA+PROBE: NEGATIVE
SODIUM SERPL-SCNC: 137 MMOL/L (ref 133–144)
SPECIMEN SOURCE: NORMAL
TROPONIN I SERPL-MCNC: <0.015 UG/L (ref 0–0.04)
WBC # BLD AUTO: 13 10E9/L (ref 4–11)

## 2021-06-08 PROCEDURE — 93010 ELECTROCARDIOGRAM REPORT: CPT | Performed by: EMERGENCY MEDICINE

## 2021-06-08 PROCEDURE — 93005 ELECTROCARDIOGRAM TRACING: CPT | Performed by: EMERGENCY MEDICINE

## 2021-06-08 PROCEDURE — 93308 TTE F-UP OR LMTD: CPT | Performed by: EMERGENCY MEDICINE

## 2021-06-08 PROCEDURE — 96375 TX/PRO/DX INJ NEW DRUG ADDON: CPT | Performed by: EMERGENCY MEDICINE

## 2021-06-08 PROCEDURE — 82803 BLOOD GASES ANY COMBINATION: CPT | Performed by: FAMILY MEDICINE

## 2021-06-08 PROCEDURE — 85025 COMPLETE CBC W/AUTO DIFF WBC: CPT | Performed by: FAMILY MEDICINE

## 2021-06-08 PROCEDURE — 83880 ASSAY OF NATRIURETIC PEPTIDE: CPT | Performed by: EMERGENCY MEDICINE

## 2021-06-08 PROCEDURE — C9803 HOPD COVID-19 SPEC COLLECT: HCPCS | Performed by: EMERGENCY MEDICINE

## 2021-06-08 PROCEDURE — 85610 PROTHROMBIN TIME: CPT | Performed by: FAMILY MEDICINE

## 2021-06-08 PROCEDURE — 96374 THER/PROPH/DIAG INJ IV PUSH: CPT | Mod: 59 | Performed by: EMERGENCY MEDICINE

## 2021-06-08 PROCEDURE — 99285 EMERGENCY DEPT VISIT HI MDM: CPT | Mod: 25 | Performed by: EMERGENCY MEDICINE

## 2021-06-08 PROCEDURE — 84484 ASSAY OF TROPONIN QUANT: CPT | Performed by: FAMILY MEDICINE

## 2021-06-08 PROCEDURE — 71275 CT ANGIOGRAPHY CHEST: CPT

## 2021-06-08 PROCEDURE — 80053 COMPREHEN METABOLIC PANEL: CPT | Performed by: FAMILY MEDICINE

## 2021-06-08 PROCEDURE — 250N000009 HC RX 250: Performed by: EMERGENCY MEDICINE

## 2021-06-08 PROCEDURE — 250N000011 HC RX IP 250 OP 636: Performed by: EMERGENCY MEDICINE

## 2021-06-08 PROCEDURE — 87635 SARS-COV-2 COVID-19 AMP PRB: CPT | Performed by: EMERGENCY MEDICINE

## 2021-06-08 RX ORDER — FUROSEMIDE 10 MG/ML
60 INJECTION INTRAMUSCULAR; INTRAVENOUS ONCE
Status: COMPLETED | OUTPATIENT
Start: 2021-06-08 | End: 2021-06-08

## 2021-06-08 RX ORDER — GABAPENTIN 600 MG/1
1200 TABLET ORAL EVERY EVENING
COMMUNITY
Start: 2021-05-05 | End: 2021-08-24

## 2021-06-08 RX ORDER — ACETAMINOPHEN 500 MG
1000 TABLET ORAL
COMMUNITY
End: 2021-06-23

## 2021-06-08 RX ORDER — IOPAMIDOL 755 MG/ML
91 INJECTION, SOLUTION INTRAVASCULAR ONCE
Status: COMPLETED | OUTPATIENT
Start: 2021-06-08 | End: 2021-06-08

## 2021-06-08 RX ORDER — FUROSEMIDE 20 MG
1 TABLET ORAL 2 TIMES DAILY
COMMUNITY
Start: 2021-06-03 | End: 2021-06-25 | Stop reason: ALTCHOICE

## 2021-06-08 RX ORDER — METOPROLOL TARTRATE 1 MG/ML
5 INJECTION, SOLUTION INTRAVENOUS EVERY 5 MIN PRN
Status: DISCONTINUED | OUTPATIENT
Start: 2021-06-08 | End: 2021-06-09 | Stop reason: HOSPADM

## 2021-06-08 RX ADMIN — FUROSEMIDE 60 MG: 10 INJECTION, SOLUTION INTRAVENOUS at 16:02

## 2021-06-08 RX ADMIN — IOPAMIDOL 91 ML: 755 INJECTION, SOLUTION INTRAVENOUS at 17:02

## 2021-06-08 RX ADMIN — METOPROLOL TARTRATE 5 MG: 5 INJECTION, SOLUTION INTRAVENOUS at 15:32

## 2021-06-08 RX ADMIN — SODIUM CHLORIDE 100 ML: 9 INJECTION, SOLUTION INTRAVENOUS at 17:02

## 2021-06-08 ASSESSMENT — MIFFLIN-ST. JEOR: SCORE: 1595.49

## 2021-06-08 NOTE — ED NOTES
Pt arrives tachypnic and states was getting her INR drawn when she was advised to come down to ED for eval. Patient states she is in and out of a-fib and was started on cardizem during her last hospitalization. Patient does have slight cough although denies any n/v/d  No fever  No bodyaches

## 2021-06-08 NOTE — ED PROVIDER NOTES
History     Chief Complaint   Patient presents with     Palpitations     pt was at coumadin clinic and HR up to 130's.   pt was told to come to ER for check.  Hx of a-fib.      HPI  Corina Martinez is a 66 year old female who presents secondary to shortness of air and tachycardia.  She was in Coumadin clinic having her INR checked and was found to have a heart rate in the 130 range.  She has known history of paroxysmal atrial fibrillation, although she does not necessarily feel when she has palpitations.  She has had cardioversion attempted twice, with short duration transient conversion.  She reports compliance with meds including warfarin, metoprolol.  She denies recent febrile illness.  She describes a minimally productive cough.  She is sleeping with 2-3 pillows and describes orthopnea.  She describes her shortness of air beginning about a month ago, no preceding febrile illness, although reports chronic upper respiratory congestion and rhinorrhea.  No vomiting no diarrhea.  No abdominal pain.  She is vaccinated for Covid.    Allergies:  Allergies   Allergen Reactions     Codeine Sulfate Nausea and Vomiting     Acetaminophen Other (See Comments)     Was told to avoid due to primary biliary cirrhosis       Problem List:    Patient Active Problem List    Diagnosis Date Noted     Atrial fibrillation (H) 05/24/2021     Priority: Medium     Anticoagulation monitoring, INR range 2-3 05/24/2021     Priority: Medium     Atrial fibrillation with RVR (H) 05/24/2021     Priority: Medium     Morbid obesity (H) 03/19/2018     Priority: Medium     Tobacco abuse disorder 03/19/2018     Priority: Medium     Benign essential hypertension 03/19/2018     Priority: Medium     SHAILESH (obstructive sleep apnea) 03/14/2011     Priority: Medium     Mild (PSG 3/8/2011: AHI 10.5, kushal desat 83%)       Midline low back pain without sciatica 09/05/2007     Priority: Medium     Neck pain 09/05/2007     Priority: Medium     Biliary cirrhosis  "(H) 09/05/2007     Priority: Medium     Overview:   Followed by Dr. Martini  LFTs q 6 mos  DEXA q 2 years  Followed by Dr. Martini  LFTs q 6 mos  DEXA q 2 years          Past Medical History:    No past medical history on file.    Past Surgical History:    No past surgical history on file.    Family History:    Family History   Problem Relation Age of Onset     Cancer Father      Lymphoma Father      Depression Mother      Respiratory Mother      Emphysema Mother        Social History:  Marital Status:   [2]  Social History     Tobacco Use     Smoking status: Current Some Day Smoker     Types: Cigarettes     Smokeless tobacco: Never Used     Tobacco comment: pt has been working hard on quiting   Substance Use Topics     Alcohol use: Yes     Comment: social     Drug use: No        Medications:    acetaminophen (TYLENOL) 500 MG tablet  amiodarone (PACERONE) 200 MG tablet  diclofenac (VOLTAREN) 1 % topical gel  diltiazem ER COATED BEADS (CARDIZEM CD/CARTIA XT) 240 MG 24 hr capsule  diphenhydrAMINE (BENADRYL) 25 MG tablet  DULoxetine (CYMBALTA) 30 MG capsule  fluticasone (FLONASE) 50 MCG/ACT nasal spray  furosemide (LASIX) 20 MG tablet  gabapentin (NEURONTIN) 600 MG tablet  gabapentin (NEURONTIN) 600 MG tablet  hydrOXYzine (ATARAX) 25 MG tablet  magnesium 250 MG tablet  Melatonin 10 MG TABS tablet  metoprolol tartrate (LOPRESSOR) 25 MG tablet  Multiple Vitamins-Minerals (MULTIVITAMIN ADULTS 50+) TABS  omeprazole (PRILOSEC) 20 MG CR capsule  vitamin B complex with vitamin C (VITAMIN  B COMPLEX) tablet  vitamin D3 (CHOLECALCIFEROL) 50 mcg (2000 units) tablet  warfarin ANTICOAGULANT (COUMADIN) 5 MG tablet  Ursodiol 500 MG TABS          Review of Systems  All other systems reviewed and are negative.    Physical Exam   BP: 138/84  Pulse: 108  Temp: 96.4  F (35.8  C)  Resp: 18  Height: 167.6 cm (5' 6\")  Weight: 103.9 kg (229 lb)  SpO2: 93 %      Physical Exam  Nontoxic appearing no respiratory distress alert and oriented " ×3  Head atraumatic normocephalic  No cervical adenopathy no thyromegaly  Neck supple full active painless range of motion  Lungs bilateral rales at the bases  Heart tachycardic irregularly irregular no murmur  Abdomen soft nontender bowel sounds positive   Strength and sensation grossly intact throughout the extremities, gait and station normal  Speech is fluent, good eye contact, thought processes are rational  Lower extremities with mild bilateral pretibial edema  Pedal pulses intact    ED Course        Procedures  EKG time 1511, symptoms short of air, atrial fibrillation rate 119, diffuse nonspecific T wave changes, diffuse low voltage, no acute ST or T wave changes, read by Dr. Juan Manzano       Critical Care time:  none            Results for orders placed or performed during the hospital encounter of 06/08/21 (from the past 24 hour(s))   INR   Result Value Ref Range    INR 3.65 (H) 0.86 - 1.14   Comprehensive metabolic panel   Result Value Ref Range    Sodium 137 133 - 144 mmol/L    Potassium 4.0 3.4 - 5.3 mmol/L    Chloride 105 94 - 109 mmol/L    Carbon Dioxide 26 20 - 32 mmol/L    Anion Gap 6 3 - 14 mmol/L    Glucose 110 (H) 70 - 99 mg/dL    Urea Nitrogen 13 7 - 30 mg/dL    Creatinine 0.88 0.52 - 1.04 mg/dL    GFR Estimate 69 >60 mL/min/[1.73_m2]    GFR Estimate If Black 80 >60 mL/min/[1.73_m2]    Calcium 8.6 8.5 - 10.1 mg/dL    Bilirubin Total 0.5 0.2 - 1.3 mg/dL    Albumin 3.3 (L) 3.4 - 5.0 g/dL    Protein Total 7.7 6.8 - 8.8 g/dL    Alkaline Phosphatase 293 (H) 40 - 150 U/L    ALT 32 0 - 50 U/L    AST 26 0 - 45 U/L   Troponin I   Result Value Ref Range    Troponin I ES <0.015 0.000 - 0.045 ug/L   CBC with platelets differential   Result Value Ref Range    WBC 13.0 (H) 4.0 - 11.0 10e9/L    RBC Count 4.77 3.8 - 5.2 10e12/L    Hemoglobin 12.9 11.7 - 15.7 g/dL    Hematocrit 39.9 35.0 - 47.0 %    MCV 84 78 - 100 fl    MCH 27.0 26.5 - 33.0 pg    MCHC 32.3 31.5 - 36.5 g/dL    RDW 14.8 10.0 - 15.0 %     Platelet Count 591 (H) 150 - 450 10e9/L    Diff Method Automated Method     % Neutrophils 70.3 %    % Lymphocytes 18.3 %    % Monocytes 9.0 %    % Eosinophils 1.4 %    % Basophils 0.5 %    % Immature Granulocytes 0.5 %    Nucleated RBCs 0 0 /100    Absolute Neutrophil 9.2 (H) 1.6 - 8.3 10e9/L    Absolute Lymphocytes 2.4 0.8 - 5.3 10e9/L    Absolute Monocytes 1.2 0.0 - 1.3 10e9/L    Absolute Eosinophils 0.2 0.0 - 0.7 10e9/L    Absolute Basophils 0.1 0.0 - 0.2 10e9/L    Abs Immature Granulocytes 0.1 0 - 0.4 10e9/L    Absolute Nucleated RBC 0.0    Nt probnp inpatient   Result Value Ref Range    N-Terminal Pro BNP Inpatient 641 0 - 900 pg/mL   Blood gas venous   Result Value Ref Range    Ph Venous 7.43 7.32 - 7.43 pH    PCO2 Venous 37 (L) 40 - 50 mm Hg    PO2 Venous 32 25 - 47 mm Hg    Bicarbonate Venous 25 21 - 28 mmol/L    Base Excess Venous 0.8 mmol/L    FIO2 2L    Symptomatic SARS-CoV-2 COVID-19 Virus (Coronavirus) by PCR    Specimen: Nasopharyngeal   Result Value Ref Range    SARS-CoV-2 Virus Specimen Source Nasopharyngeal     SARS-CoV-2 PCR Result NEGATIVE     SARS-CoV-2 PCR Comment (Note)    CT Chest Pulmonary Embolism w Contrast    Narrative    EXAM: CT CHEST PULMONARY EMBOLISM W CONTRAST  LOCATION: [facility]  DATE/TIME: 6/8/2021 5:00 PM    INDICATION: Tachypnea  COMPARISON: 05/17/2021  TECHNIQUE: CT chest pulmonary angiogram during arterial phase injection of IV contrast. Multiplanar reformats and MIP reconstructions were performed. Dose reduction techniques were used.   CONTRAST: 91 ml [id number]    FINDINGS:  ANGIOGRAM CHEST: No pulmonary embolism. Thoracic aorta is negative for aneurysm.    LUNGS AND PLEURA: Mosaic attenuation can be seen with air trapping. Lingular left basilar atelectasis. Small pleural effusions have increased.    MEDIASTINUM/AXILLAE: New large pericardial effusion.    CORONARY ARTERY CALCIFICATION: Moderate.    UPPER ABDOMEN: Cholecystectomy. Trace  ascites.    MUSCULOSKELETAL: Normal.      Impression    IMPRESSION:  1.  No pulmonary embolism.    2.  New large pericardial effusion. Small pleural effusions have increased.       Medications   metoprolol (LOPRESSOR) injection 5 mg (5 mg Intravenous Given 6/8/21 1532)   furosemide (LASIX) injection 60 mg (60 mg Intravenous Given 6/8/21 1602)   iopamidol (ISOVUE-370) solution 91 mL (91 mLs Intravenous Given 6/8/21 1702)   sodium chloride 0.9 % bag 500mL for CT scan flush use (100 mLs As instructed Given 6/8/21 1702)       Assessments & Plan (with Medical Decision Making)  66-year-old female presents with dyspnea on exertion hypoxic respiratory failure, history of paroxysmal atrial fibrillation chronic anticoagulation.  Was found to have heart rate in the 130 range in Coumadin clinic sent here for further evaluation.  Approximately 1 month worth of progressive shortness of air/dyspnea on exertion/orthopnea.  Work-up significant for white count of 13,000, platelets up as well at 591.  INR supratherapeutic 3.65.  Patient initially treated for heart failure with 60 mg furosemide IV and diuresed well.  CT scan as above shows large pleural pericardial effusion, bedside ultrasound shows same.  Pericardial effusion likely etiology for dyspnea on exertion.  Patient requires transfer to Center with interventional radiology/cardiology available.  Multiple different hospitals were contacted in the [facility], there are no beds capable of taking care of this patient in the [facility] at this time.  I ultimately spoke with Dr. Carias emergency medicine physician at [facility] in [place] who accepts patient in transfer.     I have reviewed the nursing notes.    I have reviewed the findings, diagnosis, plan and need for follow up with the patient.       New Prescriptions    No medications on file       Final diagnoses:   Acute respiratory failure with hypoxia (H)   Pericardial effusion       6/8/2021   [facility]  [place] [facility]     Juan Manzano MD  06/08/21 1946

## 2021-06-09 ENCOUNTER — TRANSFERRED RECORDS (OUTPATIENT)
Dept: HEALTH INFORMATION MANAGEMENT | Facility: CLINIC | Age: 66
End: 2021-06-09

## 2021-06-09 LAB — EJECTION FRACTION: NORMAL %

## 2021-06-09 NOTE — ED NOTES
Information faxed to [facility] [phone].   Patient updated and awaiting ambulance transfer

## 2021-06-09 NOTE — ED NOTES
Pt has been up x3 to restroom independently - did not measure - patient updated regarding need for accurate I & O - will continue to monitor closely

## 2021-06-15 ENCOUNTER — ANTICOAGULATION THERAPY VISIT (OUTPATIENT)
Dept: ANTICOAGULATION | Facility: OTHER | Age: 66
End: 2021-06-15
Payer: MEDICARE

## 2021-06-15 DIAGNOSIS — Z79.01 ANTICOAGULATION MONITORING, INR RANGE 2-3: ICD-10-CM

## 2021-06-15 DIAGNOSIS — I48.91 ATRIAL FIBRILLATION, UNSPECIFIED TYPE (H): ICD-10-CM

## 2021-06-15 DIAGNOSIS — I48.91 ATRIAL FIBRILLATION WITH RVR (H): ICD-10-CM

## 2021-06-15 LAB — INR POINT OF CARE: 2 (ref 0.86–1.14)

## 2021-06-15 PROCEDURE — 36416 COLLJ CAPILLARY BLOOD SPEC: CPT | Mod: ZL

## 2021-06-15 NOTE — PROGRESS NOTES
ANTICOAGULATION FOLLOW-UP CLINIC VISIT    Patient Name:  Corina Martinez  Date:  6/15/2021  Contact Type:  Face to Face    SUBJECTIVE:  Patient Findings     Positives:  Change in health (Now is using oxygen ), Hospital admission (-21 and -21 for A-fib, Pericardial effusion, Acute respiratory failure.  )        Clinical Outcomes     Negatives:  Major bleeding event, Thromboembolic event, Anticoagulation-related hospital admission, Anticoagulation-related ED visit, Anticoagulation-related fatality           OBJECTIVE    Recent labs: (last 7 days)     06/15/21   INR 2.0*       ASSESSMENT / PLAN  INR assessment THER    Recheck INR In: 3 DAYS    INR Location Clinic      Anticoagulation Summary  As of 6/15/2021    INR goal:  2.0-3.0   TTR:  60.6 % (6 d)   INR used for dosin.0 (6/15/2021)   Warfarin maintenance plan:  2.5 mg (5 mg x 0.5) every day   Full warfarin instructions:  2.5 mg every day   Weekly warfarin total:  17.5 mg   Plan last modified:  Janeth Patel RN (6/15/2021)   Next INR check:  2021   Target end date:  Indefinite    Indications    Atrial fibrillation (H) [I48.91]  Anticoagulation monitoring  INR range 2-3 [Z79.01]  Atrial fibrillation with RVR (H) [I48.91]             Anticoagulation Episode Summary     INR check location:      Preferred lab:      Send INR reminders to:  [facility]    Comments:        Anticoagulation Care Providers     Provider Role Specialty Phone number    Eduardomelonicola DO Lisa Referring Family Medicine [phone]            See the Encounter Report to view Anticoagulation Flowsheet and Dosing Calendar (Go to Encounters tab in chart review, and find the Anticoagulation Therapy Visit)        Janeth Patel RN

## 2021-06-18 ENCOUNTER — ANTICOAGULATION THERAPY VISIT (OUTPATIENT)
Dept: ANTICOAGULATION | Facility: OTHER | Age: 66
End: 2021-06-18
Attending: FAMILY MEDICINE
Payer: MEDICARE

## 2021-06-18 DIAGNOSIS — I48.91 ATRIAL FIBRILLATION, UNSPECIFIED TYPE (H): ICD-10-CM

## 2021-06-18 DIAGNOSIS — Z79.01 ANTICOAGULATION MONITORING, INR RANGE 2-3: ICD-10-CM

## 2021-06-18 DIAGNOSIS — I48.91 ATRIAL FIBRILLATION WITH RVR (H): ICD-10-CM

## 2021-06-18 LAB — INR POINT OF CARE: 1.6 (ref 0.86–1.14)

## 2021-06-18 PROCEDURE — 36416 COLLJ CAPILLARY BLOOD SPEC: CPT | Mod: ZL

## 2021-06-18 NOTE — PROGRESS NOTES
ANTICOAGULATION FOLLOW-UP CLINIC VISIT    Patient Name:  Corina Martinez  Date:  2021  Contact Type:  Face to Face    SUBJECTIVE:  Patient Findings     Comments:  New start        Clinical Outcomes     Negatives:  Major bleeding event, Thromboembolic event, Anticoagulation-related hospital admission, Anticoagulation-related ED visit, Anticoagulation-related fatality    Comments:  New start           OBJECTIVE    Recent labs: (last 7 days)     21   INR 1.6*       ASSESSMENT / PLAN  INR assessment SUB    Recheck INR In: 5 DAYS    INR Location Clinic      Anticoagulation Summary  As of 2021    INR goal:  2.0-3.0   TTR:  41.2 % (1.3 wk)   INR used for dosin.6 (2021)   Warfarin maintenance plan:  5 mg (5 mg x 1) every Mon, Fri; 2.5 mg (5 mg x 0.5) all other days   Full warfarin instructions:  : 2.5 mg; Otherwise 5 mg every Mon, Fri; 2.5 mg all other days   Weekly warfarin total:  22.5 mg   Plan last modified:  Zunilda Jimenez RN (2021)   Next INR check:  2021   Target end date:  Indefinite    Indications    Atrial fibrillation (H) [I48.91]  Anticoagulation monitoring  INR range 2-3 [Z79.01]  Atrial fibrillation with RVR (H) [I48.91]             Anticoagulation Episode Summary     INR check location:      Preferred lab:      Send INR reminders to:  [facility]    Comments:        Anticoagulation Care Providers     Provider Role Specialty Phone number    Kasandra Dixonn  [facility] [phone]            See the Encounter Report to view Anticoagulation Flowsheet and Dosing Calendar (Go to Encounters tab in chart review, and find the Anticoagulation Therapy Visit)        Zunilda Jimenez, RN

## 2021-06-23 ENCOUNTER — ANTICOAGULATION THERAPY VISIT (OUTPATIENT)
Dept: ANTICOAGULATION | Facility: OTHER | Age: 66
End: 2021-06-23
Attending: FAMILY MEDICINE
Payer: COMMERCIAL

## 2021-06-23 ENCOUNTER — OFFICE VISIT (OUTPATIENT)
Dept: CARDIOLOGY | Facility: OTHER | Age: 66
End: 2021-06-23
Attending: NURSE PRACTITIONER
Payer: MEDICARE

## 2021-06-23 ENCOUNTER — TELEPHONE (OUTPATIENT)
Dept: CARDIOLOGY | Facility: OTHER | Age: 66
End: 2021-06-23

## 2021-06-23 ENCOUNTER — HOSPITAL ENCOUNTER (OUTPATIENT)
Dept: GENERAL RADIOLOGY | Facility: OTHER | Age: 66
End: 2021-06-23
Attending: NURSE PRACTITIONER
Payer: MEDICARE

## 2021-06-23 VITALS
SYSTOLIC BLOOD PRESSURE: 118 MMHG | RESPIRATION RATE: 24 BRPM | OXYGEN SATURATION: 94 % | HEART RATE: 107 BPM | WEIGHT: 229.8 LBS | TEMPERATURE: 98.6 F | DIASTOLIC BLOOD PRESSURE: 60 MMHG | BODY MASS INDEX: 36.93 KG/M2 | HEIGHT: 66 IN

## 2021-06-23 DIAGNOSIS — I48.91 NEW ONSET ATRIAL FIBRILLATION (H): Primary | ICD-10-CM

## 2021-06-23 DIAGNOSIS — Z92.89 HISTORY OF CARDIOVERSION: ICD-10-CM

## 2021-06-23 DIAGNOSIS — I48.91 ATRIAL FIBRILLATION WITH RVR (H): ICD-10-CM

## 2021-06-23 DIAGNOSIS — I10 ESSENTIAL HYPERTENSION: ICD-10-CM

## 2021-06-23 DIAGNOSIS — I31.39 PERICARDIAL EFFUSION: ICD-10-CM

## 2021-06-23 DIAGNOSIS — Z72.0 TOBACCO ABUSE DISORDER: ICD-10-CM

## 2021-06-23 DIAGNOSIS — J90 PLEURAL EFFUSION: ICD-10-CM

## 2021-06-23 DIAGNOSIS — I48.91 ATRIAL FIBRILLATION, UNSPECIFIED TYPE (H): ICD-10-CM

## 2021-06-23 DIAGNOSIS — I34.2 NONRHEUMATIC MITRAL VALVE STENOSIS: ICD-10-CM

## 2021-06-23 DIAGNOSIS — R06.09 DOE (DYSPNEA ON EXERTION): ICD-10-CM

## 2021-06-23 DIAGNOSIS — Z79.01 ANTICOAGULATED ON COUMADIN: ICD-10-CM

## 2021-06-23 DIAGNOSIS — Z79.01 ANTICOAGULATION MONITORING, INR RANGE 2-3: ICD-10-CM

## 2021-06-23 DIAGNOSIS — K74.5 BILIARY CIRRHOSIS (H): ICD-10-CM

## 2021-06-23 DIAGNOSIS — G47.33 OSA (OBSTRUCTIVE SLEEP APNEA): ICD-10-CM

## 2021-06-23 LAB
ALBUMIN SERPL-MCNC: 3.1 G/DL (ref 3.5–5.7)
ALP SERPL-CCNC: 212 U/L (ref 34–104)
ALT SERPL W P-5'-P-CCNC: 30 U/L (ref 7–52)
ANION GAP SERPL CALCULATED.3IONS-SCNC: 9 MMOL/L (ref 3–14)
AST SERPL W P-5'-P-CCNC: 32 U/L (ref 13–39)
BILIRUB SERPL-MCNC: 0.4 MG/DL (ref 0.3–1)
BUN SERPL-MCNC: 12 MG/DL (ref 7–25)
CALCIUM SERPL-MCNC: 8.4 MG/DL (ref 8.6–10.3)
CHLORIDE SERPL-SCNC: 99 MMOL/L (ref 98–107)
CO2 SERPL-SCNC: 31 MMOL/L (ref 21–31)
CREAT SERPL-MCNC: 0.73 MG/DL (ref 0.6–1.2)
CRP SERPL-MCNC: 117 MG/L
ERYTHROCYTE [DISTWIDTH] IN BLOOD BY AUTOMATED COUNT: 15.6 % (ref 10–15)
ERYTHROCYTE [SEDIMENTATION RATE] IN BLOOD BY WESTERGREN METHOD: 57 MM/H (ref 1–15)
GFR SERPL CREATININE-BSD FRML MDRD: 80 ML/MIN/{1.73_M2}
GLUCOSE SERPL-MCNC: 116 MG/DL (ref 70–105)
HCT VFR BLD AUTO: 38.8 % (ref 35–47)
HGB BLD-MCNC: 12.2 G/DL (ref 11.7–15.7)
INR POINT OF CARE: 2.6 (ref 0.86–1.14)
MAGNESIUM SERPL-MCNC: 1.6 MG/DL (ref 1.9–2.7)
MCH RBC QN AUTO: 26.3 PG (ref 26.5–33)
MCHC RBC AUTO-ENTMCNC: 31.4 G/DL (ref 31.5–36.5)
MCV RBC AUTO: 84 FL (ref 78–100)
NT-PROBNP SERPL-MCNC: 338 PG/ML (ref 0–100)
PLATELET # BLD AUTO: 379 10E9/L (ref 150–450)
POTASSIUM SERPL-SCNC: 3.7 MMOL/L (ref 3.5–5.1)
PROT SERPL-MCNC: 6.3 G/DL (ref 6.4–8.9)
RBC # BLD AUTO: 4.63 10E12/L (ref 3.8–5.2)
SODIUM SERPL-SCNC: 139 MMOL/L (ref 134–144)
T4 FREE SERPL-MCNC: 1 NG/DL (ref 0.6–1.6)
TROPONIN I SERPL-MCNC: 4 PG/ML
TSH SERPL DL<=0.05 MIU/L-ACNC: 6.4 IU/ML (ref 0.34–5.6)
WBC # BLD AUTO: 9.7 10E9/L (ref 4–11)

## 2021-06-23 PROCEDURE — 86140 C-REACTIVE PROTEIN: CPT | Mod: ZL | Performed by: NURSE PRACTITIONER

## 2021-06-23 PROCEDURE — 36416 COLLJ CAPILLARY BLOOD SPEC: CPT | Mod: ZL

## 2021-06-23 PROCEDURE — 80053 COMPREHEN METABOLIC PANEL: CPT | Mod: ZL | Performed by: NURSE PRACTITIONER

## 2021-06-23 PROCEDURE — 93010 ELECTROCARDIOGRAM REPORT: CPT | Performed by: INTERNAL MEDICINE

## 2021-06-23 PROCEDURE — 84439 ASSAY OF FREE THYROXINE: CPT | Mod: ZL | Performed by: NURSE PRACTITIONER

## 2021-06-23 PROCEDURE — 84484 ASSAY OF TROPONIN QUANT: CPT | Mod: ZL | Performed by: NURSE PRACTITIONER

## 2021-06-23 PROCEDURE — 93005 ELECTROCARDIOGRAM TRACING: CPT | Performed by: NURSE PRACTITIONER

## 2021-06-23 PROCEDURE — 99205 OFFICE O/P NEW HI 60 MIN: CPT | Performed by: NURSE PRACTITIONER

## 2021-06-23 PROCEDURE — 85652 RBC SED RATE AUTOMATED: CPT | Mod: ZL | Performed by: NURSE PRACTITIONER

## 2021-06-23 PROCEDURE — 36415 COLL VENOUS BLD VENIPUNCTURE: CPT | Mod: ZL | Performed by: NURSE PRACTITIONER

## 2021-06-23 PROCEDURE — 83735 ASSAY OF MAGNESIUM: CPT | Mod: ZL | Performed by: NURSE PRACTITIONER

## 2021-06-23 PROCEDURE — 83880 ASSAY OF NATRIURETIC PEPTIDE: CPT | Mod: ZL | Performed by: NURSE PRACTITIONER

## 2021-06-23 PROCEDURE — G0463 HOSPITAL OUTPT CLINIC VISIT: HCPCS | Mod: 25

## 2021-06-23 PROCEDURE — G0463 HOSPITAL OUTPT CLINIC VISIT: HCPCS

## 2021-06-23 PROCEDURE — 71046 X-RAY EXAM CHEST 2 VIEWS: CPT

## 2021-06-23 PROCEDURE — 99417 PROLNG OP E/M EACH 15 MIN: CPT | Performed by: NURSE PRACTITIONER

## 2021-06-23 PROCEDURE — 84443 ASSAY THYROID STIM HORMONE: CPT | Mod: ZL | Performed by: NURSE PRACTITIONER

## 2021-06-23 PROCEDURE — 85027 COMPLETE CBC AUTOMATED: CPT | Mod: ZL | Performed by: NURSE PRACTITIONER

## 2021-06-23 ASSESSMENT — MIFFLIN-ST. JEOR: SCORE: 1599.12

## 2021-06-23 NOTE — PROGRESS NOTES
ANTICOAGULATION FOLLOW-UP CLINIC VISIT    Patient Name:  Corina Martinez  Date:  2021  Contact Type:  Face to Face    SUBJECTIVE:  Patient Findings     Comments:  Seeing Bijal Torres NP today        Clinical Outcomes     Negatives:  Major bleeding event, Thromboembolic event, Anticoagulation-related hospital admission, Anticoagulation-related ED visit, Anticoagulation-related fatality    Comments:  Seeing Bijal Torres NP today           OBJECTIVE    Recent labs: (last 7 days)     21   INR 2.6*       ASSESSMENT / PLAN  INR assessment THER    Recheck INR In: 5 DAYS    INR Location Clinic      Anticoagulation Summary  As of 2021    INR goal:  2.0-3.0   TTR:  47.7 % (2 wk)   INR used for dosin.6 (2021)   Warfarin maintenance plan:  5 mg (5 mg x 1) every Mon, Fri; 2.5 mg (5 mg x 0.5) all other days   Full warfarin instructions:  5 mg every Mon, Fri; 2.5 mg all other days   Weekly warfarin total:  22.5 mg   No change documented:  Zunilda Jimenez RN   Plan last modified:  Zunilda Jimenez RN (2021)   Next INR check:  2021   Target end date:  Indefinite    Indications    Atrial fibrillation (H) [I48.91]  Anticoagulation monitoring  INR range 2-3 [Z79.01]  Atrial fibrillation with RVR (H) [I48.91]             Anticoagulation Episode Summary     INR check location:      Preferred lab:      Send INR reminders to:  [facility]    Comments:        Anticoagulation Care Providers     Provider Role Specialty Phone number    Lisa Dixon DO [facility] [phone]            See the Encounter Report to view Anticoagulation Flowsheet and Dosing Calendar (Go to Encounters tab in chart review, and find the Anticoagulation Therapy Visit)        Zunilda Jimenez RN

## 2021-06-23 NOTE — TELEPHONE ENCOUNTER
Called and spoke to the radiology scheduler to notify her that once SUZE Najera CNP places the orders for a chest x-ray, this will be done on Wednesday 7/30/21 when she comes to see the cardiology nurse.  Also when SUZE Najera CNP places the Echocardiogram order, this should be scheduled for either Friday, Monday or Tuesday and can be placed in an emergent add-on slot per Brittany.  Ila Toro RN......June 23, 2021...4:17 PM
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
## 2021-06-23 NOTE — PATIENT INSTRUCTIONS
You were seen by  SUZE Najera CNP      1. Stop your lasix.     2. Start the following med:  Potassium chloride 20 mEq, take one by mouth twice daily    3. Start the following med:  demadex 40mg once daily in the morning for 5 days.  Then decrease to 20mg once daily in the morning    4. Echocardiogram has been ordered. You will be called to schedule this.  You will receive instructions for testing at that time.  You will be contacted with results.    5. A chest x-ray has been ordered to be done next Wednesday when you see the Cardiology Nurse.    6. Laboratory blood work has been ordered for next Wednesday when you see the Cardiology Nurse.    You will follow up with [facility] in 1 week with the Cardiology Nurse for labs, chest x-ray and vitals, sooner if needed.     Please call the cardiology office with problems, questions, or concerns at [phone].    If you experience chest pain, chest pressure, chest tightness, shortness of breath, fainting, lightheadedness, nausea, vomiting, or other concerning symptoms, please report to the Emergency Department or call 911. These symptoms may be emergent, and best treated in the Emergency Department.     Cardiology Nurses  NANCY Wheeler, NANCY Briceño, AVERY Jaimes LPN  [facility] ([facility])  [phone]

## 2021-06-24 ENCOUNTER — TELEPHONE (OUTPATIENT)
Dept: CARDIOLOGY | Facility: OTHER | Age: 66
End: 2021-06-24

## 2021-06-24 DIAGNOSIS — R60.0 BILATERAL LOWER EXTREMITY EDEMA: ICD-10-CM

## 2021-06-24 DIAGNOSIS — I48.91 NEW ONSET ATRIAL FIBRILLATION (H): ICD-10-CM

## 2021-06-24 DIAGNOSIS — I10 ESSENTIAL HYPERTENSION: ICD-10-CM

## 2021-06-24 DIAGNOSIS — I31.39 PERICARDIAL EFFUSION: ICD-10-CM

## 2021-06-24 DIAGNOSIS — R06.09 DOE (DYSPNEA ON EXERTION): Primary | ICD-10-CM

## 2021-06-24 LAB — INTERPRETATION ECG - MUSE: NORMAL

## 2021-06-24 RX ORDER — MULTIVITAMIN WITH IRON
250 TABLET ORAL 2 TIMES DAILY
COMMUNITY
Start: 2021-06-24

## 2021-06-24 NOTE — PROGRESS NOTES
"Per note from SUZE Najera CNP today: \"Please notify patient of lab results.  No elevation in troponin.  Inflammatory markers are elevated with her pericardial effusion.  Thyroid function stable based on free T4.  Magnesium is low at 1.6.  Stable renal function, liver function and electrolytes.  Hemoglobin stable and no elevation of white count.  Please have patient increase her current magnesium supplement to twice daily dosing. Echocardiogram to be scheduled.   Thanks,   SUZE Barbosa CNP\"    Med list updated.  Ila Toro RN......June 24, 2021...4:48 PM   "

## 2021-06-24 NOTE — TELEPHONE ENCOUNTER
States she had an appt yesterday and there were prescriptions that were supposed to be sent to [facility] pharmacy. They never received those.

## 2021-06-24 NOTE — TELEPHONE ENCOUNTER
"Patient never got her new meds that she was supposed to start.  AVS from yesterday states the following:    \"2. Start the following med:  Potassium chloride 20 mEq, take one by mouth twice daily     3. Start the following med:  demadex 40mg once daily in the morning for 5 days.  Then decrease to 20mg once daily in the morning\"    To SUZE Najera CNP to address.  Ila Toro RN......June 24, 2021...4:39 PM     "

## 2021-06-25 ENCOUNTER — HOSPITAL ENCOUNTER (OUTPATIENT)
Dept: CARDIOLOGY | Facility: OTHER | Age: 66
Discharge: HOME | End: 2021-06-25
Attending: NURSE PRACTITIONER | Admitting: NURSE PRACTITIONER
Payer: MEDICARE

## 2021-06-25 DIAGNOSIS — I48.91 NEW ONSET ATRIAL FIBRILLATION (H): ICD-10-CM

## 2021-06-25 DIAGNOSIS — I31.39 PERICARDIAL EFFUSION: ICD-10-CM

## 2021-06-25 DIAGNOSIS — I34.2 NONRHEUMATIC MITRAL VALVE STENOSIS: ICD-10-CM

## 2021-06-25 PROCEDURE — 93306 TTE W/DOPPLER COMPLETE: CPT | Mod: 26 | Performed by: INTERNAL MEDICINE

## 2021-06-25 PROCEDURE — 93306 TTE W/DOPPLER COMPLETE: CPT

## 2021-06-25 RX ORDER — POTASSIUM CHLORIDE 1500 MG/1
20 TABLET, EXTENDED RELEASE ORAL 2 TIMES DAILY
Qty: 180 TABLET | Refills: 1 | Status: SHIPPED | OUTPATIENT
Start: 2021-06-25 | End: 2021-07-01

## 2021-06-25 RX ORDER — TORSEMIDE 20 MG/1
40 TABLET ORAL DAILY
Qty: 100 TABLET | Refills: 1 | Status: SHIPPED | OUTPATIENT
Start: 2021-06-25 | End: 2021-07-15

## 2021-06-25 NOTE — TELEPHONE ENCOUNTER
No PCP and Bijal Torres out of clinic. Spoke with Summer, CHF coordinator and she requested writer contact Pt to let her know that this will be addressed today.     Called and spoke to Patient after verifying last name and date of birth. Pt states she is scheduled for an echocardiogram at 3:30 PM, and is requesting it be addressed before then, so she only has to make 1 trip to town.     FYI to Sridhar.    Atiya Mario RN .............. 6/25/2021  9:53 AM

## 2021-06-25 NOTE — TELEPHONE ENCOUNTER
Left message on machine to call back.  Summer Hobson RN on 6/25/2021 at 10:50 AM    Medication sent to pharmacy,Left message on machine to call back.  Summer Hobson RN on 6/25/2021 at 10:37 AM

## 2021-06-25 NOTE — TELEPHONE ENCOUNTER
Patient is aware medications sent to [facility].  Summer Hobson RN on 6/25/2021 at 11:39 AM

## 2021-06-25 NOTE — TELEPHONE ENCOUNTER
Medications were placed for SUZE Najera CNP to sign and send to pharmacy talked with SUZE Najera CNP via telephone this AM she is aware.  Will call patient when medications are sent.  Summer Hobson RN on 6/25/2021 at 10:03 AM

## 2021-06-30 ENCOUNTER — HOSPITAL ENCOUNTER (OUTPATIENT)
Dept: GENERAL RADIOLOGY | Facility: OTHER | Age: 66
End: 2021-06-30
Attending: NURSE PRACTITIONER
Payer: MEDICARE

## 2021-06-30 ENCOUNTER — ANTICOAGULATION THERAPY VISIT (OUTPATIENT)
Dept: ANTICOAGULATION | Facility: OTHER | Age: 66
End: 2021-06-30
Attending: NURSE PRACTITIONER
Payer: MEDICARE

## 2021-06-30 ENCOUNTER — ALLIED HEALTH/NURSE VISIT (OUTPATIENT)
Dept: CARDIOLOGY | Facility: OTHER | Age: 66
End: 2021-06-30
Attending: NURSE PRACTITIONER
Payer: MEDICARE

## 2021-06-30 VITALS
HEART RATE: 64 BPM | TEMPERATURE: 96.6 F | BODY MASS INDEX: 34.99 KG/M2 | OXYGEN SATURATION: 100 % | WEIGHT: 216.8 LBS | SYSTOLIC BLOOD PRESSURE: 106 MMHG | DIASTOLIC BLOOD PRESSURE: 64 MMHG | RESPIRATION RATE: 16 BRPM

## 2021-06-30 DIAGNOSIS — I31.39 PERICARDIAL EFFUSION: ICD-10-CM

## 2021-06-30 DIAGNOSIS — I48.91 ATRIAL FIBRILLATION WITH RVR (H): ICD-10-CM

## 2021-06-30 DIAGNOSIS — I10 BENIGN ESSENTIAL HYPERTENSION: ICD-10-CM

## 2021-06-30 DIAGNOSIS — I48.91 ATRIAL FIBRILLATION WITH RVR (H): Primary | ICD-10-CM

## 2021-06-30 DIAGNOSIS — Z79.01 ANTICOAGULATION MONITORING, INR RANGE 2-3: ICD-10-CM

## 2021-06-30 DIAGNOSIS — R06.09 DYSPNEA ON EXERTION: ICD-10-CM

## 2021-06-30 DIAGNOSIS — G47.33 OSA (OBSTRUCTIVE SLEEP APNEA): ICD-10-CM

## 2021-06-30 DIAGNOSIS — I48.91 ATRIAL FIBRILLATION, UNSPECIFIED TYPE (H): Primary | ICD-10-CM

## 2021-06-30 LAB
ANION GAP SERPL CALCULATED.3IONS-SCNC: 11 MMOL/L (ref 3–14)
BUN SERPL-MCNC: 14 MG/DL (ref 7–25)
CALCIUM SERPL-MCNC: 9.4 MG/DL (ref 8.6–10.3)
CHLORIDE SERPL-SCNC: 95 MMOL/L (ref 98–107)
CO2 SERPL-SCNC: 36 MMOL/L (ref 21–31)
CREAT SERPL-MCNC: 0.94 MG/DL (ref 0.6–1.2)
GFR SERPL CREATININE-BSD FRML MDRD: 60 ML/MIN/{1.73_M2}
GLUCOSE SERPL-MCNC: 108 MG/DL (ref 70–105)
INR POINT OF CARE: 2 (ref 0.86–1.14)
INTERPRETATION ECG - MUSE: NORMAL
NT-PROBNP SERPL-MCNC: 336 PG/ML (ref 0–100)
POTASSIUM SERPL-SCNC: 4 MMOL/L (ref 3.5–5.1)
SODIUM SERPL-SCNC: 142 MMOL/L (ref 134–144)

## 2021-06-30 PROCEDURE — 80048 BASIC METABOLIC PNL TOTAL CA: CPT | Mod: ZL | Performed by: NURSE PRACTITIONER

## 2021-06-30 PROCEDURE — 36415 COLL VENOUS BLD VENIPUNCTURE: CPT | Mod: ZL | Performed by: NURSE PRACTITIONER

## 2021-06-30 PROCEDURE — 93005 ELECTROCARDIOGRAM TRACING: CPT

## 2021-06-30 PROCEDURE — 71046 X-RAY EXAM CHEST 2 VIEWS: CPT

## 2021-06-30 PROCEDURE — 93010 ELECTROCARDIOGRAM REPORT: CPT | Performed by: INTERNAL MEDICINE

## 2021-06-30 PROCEDURE — 36416 COLLJ CAPILLARY BLOOD SPEC: CPT | Mod: ZL

## 2021-06-30 PROCEDURE — G0463 HOSPITAL OUTPT CLINIC VISIT: HCPCS

## 2021-06-30 PROCEDURE — 83880 ASSAY OF NATRIURETIC PEPTIDE: CPT | Mod: ZL | Performed by: NURSE PRACTITIONER

## 2021-06-30 RX ORDER — WARFARIN SODIUM 5 MG/1
TABLET ORAL
Qty: 90 TABLET | Refills: 0 | Status: ON HOLD | OUTPATIENT
Start: 2021-06-30 | End: 2021-09-22

## 2021-06-30 ASSESSMENT — PAIN SCALES - GENERAL: PAINLEVEL: MODERATE PAIN (4)

## 2021-06-30 NOTE — PROGRESS NOTES
ANTICOAGULATION FOLLOW-UP CLINIC VISIT    Patient Name:  Corina Martinez  Date:  2021  Contact Type:  Face to Face    SUBJECTIVE:  Patient Findings     Positives:  Change in medications (started on torsemide)        Clinical Outcomes     Negatives:  Major bleeding event, Thromboembolic event, Anticoagulation-related hospital admission, Anticoagulation-related ED visit, Anticoagulation-related fatality           OBJECTIVE    Recent labs: (last 7 days)     21   INR 2.0*       ASSESSMENT / PLAN  INR assessment THER    Recheck INR In: 1 WEEK    INR Location Clinic      Anticoagulation Summary  As of 2021    INR goal:  2.0-3.0   TTR:  64.9 % (3 wk)   INR used for dosin.0 (2021)   Warfarin maintenance plan:  5 mg (5 mg x 1) every Mon, Wed, Fri; 2.5 mg (5 mg x 0.5) all other days   Full warfarin instructions:  5 mg every Mon, Wed, Fri; 2.5 mg all other days   Weekly warfarin total:  25 mg   Plan last modified:  Zunilda Jimenez RN (2021)   Next INR check:  2021   Priority:  High   Target end date:  Indefinite    Indications    Atrial fibrillation (H) [I48.91]  Anticoagulation monitoring  INR range 2-3 [Z79.01]  Atrial fibrillation with RVR (H) [I48.91]             Anticoagulation Episode Summary     INR check location:      Preferred lab:      Send INR reminders to:  [facility]    Comments:        Anticoagulation Care Providers     Provider Role Specialty Phone number    Zhao DixonDO [facility] [phone]            See the Encounter Report to view Anticoagulation Flowsheet and Dosing Calendar (Go to Encounters tab in chart review, and find the Anticoagulation Therapy Visit)        Zunilda Jimenez, RN

## 2021-06-30 NOTE — NURSING NOTE
Labs, EKG, chest x-ray and vitals done.  SUZE Najera CNP to address follow up instructions.  Patient also requesting echocardiogram results.  Ila Toro RN......June 30, 2021...12:18 PM

## 2021-07-01 ENCOUNTER — TELEPHONE (OUTPATIENT)
Dept: CARDIOLOGY | Facility: OTHER | Age: 66
End: 2021-07-01

## 2021-07-01 DIAGNOSIS — I48.91 ATRIAL FIBRILLATION WITH RVR (H): ICD-10-CM

## 2021-07-01 RX ORDER — DILTIAZEM HYDROCHLORIDE 240 MG/1
240 CAPSULE, COATED, EXTENDED RELEASE ORAL DAILY
Qty: 90 CAPSULE | Refills: 1 | Status: ON HOLD | OUTPATIENT
Start: 2021-07-01 | End: 2021-09-22

## 2021-07-01 NOTE — TELEPHONE ENCOUNTER
Please notify patient that I have sent in a new order for her diltiazem. Labs and ECG from nurse only visit this week are stable. Chest XR without interval changes. Her echocardiogram reveals normal heart function. Moderate to severe mitral stenosis and mild aortic stenosis. Large pericardial effusion unchanged without chamber compromise. I am currently waiting to hear back from Dr. Acuna to review her mitral valve stenosis (possible surgical) and furthter treatment of pericardial effusion.  Thanks,   SUZE Barbosa CNP

## 2021-07-01 NOTE — PROGRESS NOTES
Patient was recently adjusted to Demadex 40 mg daily for 5 days with reduction to 20 mg daily maintenance.  She does report improved dyspnea today, overall feeling better.  Repeat echocardiogram on 6/25/2021 revealed preserved LVEF of 55 to 60%.  Mitral stenosis secondary to MAC with a mean gradient of 10.7 mmHg, mild aortic stenosis, large pericardial effusion without chamber collapse or tamponade. Labs and ECG stable. Continue on metoprolol and diltiazem as previous, continue on Demadex 20 mg in the morning.  She will remain on oral anticoagulation with newly identified atrial fibrillation.  SUZE Barbosa CNP

## 2021-07-01 NOTE — TELEPHONE ENCOUNTER
Patient states that she is going to run out of her diltiazem on Saturday.  To SUZE Najera CNP to address if patient should continue on this medication since her visit with the Cardiology Nurse yesterday.  Patient's pharmacy is [facility] Tor[facility] RN......July 1, 2021...11:46 AM

## 2021-07-02 ENCOUNTER — TELEPHONE (OUTPATIENT)
Dept: CARDIOLOGY | Facility: OTHER | Age: 66
End: 2021-07-02

## 2021-07-02 NOTE — TELEPHONE ENCOUNTER
Patient notified of results from echocardiogram, stable heart function. Moderate to severe mitral stenosis based on gradient. Large pericardial effusion without chamber compression or hemodynamic compromise.   Patient admits that she is feeling much better, breathing has improved and lower extremity edema has improved significantly. She will take Demadex 40 mg daily next three days and return to 20 mg daily on Tuesday. No chest pain or pressure, no palpitations, no lightheadedness or syncope. No hypotension.   Return for nurse only next Wednesday with lab, BMP.   Reviewed TTE with CT surgery at [facility], planning for further evaluation of mitral valve with likely need for MVR with mitral stenosis secondary to MAC. Plan for heart cath, ANTONIO and likely pericardiocentesis at [facility].  Patient understands need to return to ED for evaluation over the holiday weekend if decline in current state or worsening symptoms. She is agreeable to this.   SUZE Barbosa CNP

## 2021-07-06 ENCOUNTER — TELEPHONE (OUTPATIENT)
Dept: CARDIOLOGY | Facility: OTHER | Age: 66
End: 2021-07-06

## 2021-07-06 NOTE — TELEPHONE ENCOUNTER
Corina will see Cardiology Nurse tomorrow at 2:15 after she sees INR.  Appointment scheduled.  Ila Toro RN......July 6, 2021...12:19 PM

## 2021-07-06 NOTE — TELEPHONE ENCOUNTER
Per SUZE Najera CNP she discussed with patient in regards to phone notes from 7/1 and 7/2.  Patient just needs to be set up for a nurse only appointment tomorrow for BMP and vitals.  Patient has an INR appointment at 2 so she can see the cardiology nurse at 2:15.  Ila Toro RN......July 6, 2021...9:19 AM

## 2021-07-07 ENCOUNTER — TELEPHONE (OUTPATIENT)
Dept: CARDIOLOGY | Facility: OTHER | Age: 66
End: 2021-07-07

## 2021-07-07 ENCOUNTER — ALLIED HEALTH/NURSE VISIT (OUTPATIENT)
Dept: CARDIOLOGY | Facility: OTHER | Age: 66
End: 2021-07-07
Attending: INTERNAL MEDICINE
Payer: MEDICARE

## 2021-07-07 ENCOUNTER — ANTICOAGULATION THERAPY VISIT (OUTPATIENT)
Dept: ANTICOAGULATION | Facility: OTHER | Age: 66
End: 2021-07-07
Attending: FAMILY MEDICINE
Payer: MEDICARE

## 2021-07-07 VITALS
WEIGHT: 211.6 LBS | HEIGHT: 67 IN | DIASTOLIC BLOOD PRESSURE: 76 MMHG | BODY MASS INDEX: 33.21 KG/M2 | HEART RATE: 96 BPM | OXYGEN SATURATION: 95 % | RESPIRATION RATE: 24 BRPM | SYSTOLIC BLOOD PRESSURE: 122 MMHG

## 2021-07-07 DIAGNOSIS — I48.91 ATRIAL FIBRILLATION, UNSPECIFIED TYPE (H): ICD-10-CM

## 2021-07-07 DIAGNOSIS — Z79.01 ANTICOAGULATION MONITORING, INR RANGE 2-3: ICD-10-CM

## 2021-07-07 DIAGNOSIS — I48.91 ATRIAL FIBRILLATION WITH RVR (H): Primary | ICD-10-CM

## 2021-07-07 DIAGNOSIS — I48.91 ATRIAL FIBRILLATION WITH RVR (H): ICD-10-CM

## 2021-07-07 LAB
ANION GAP SERPL CALCULATED.3IONS-SCNC: 7 MMOL/L (ref 3–14)
BUN SERPL-MCNC: 14 MG/DL (ref 7–25)
CALCIUM SERPL-MCNC: 9.6 MG/DL (ref 8.6–10.3)
CHLORIDE SERPL-SCNC: 95 MMOL/L (ref 98–107)
CO2 SERPL-SCNC: 35 MMOL/L (ref 21–31)
CREAT SERPL-MCNC: 1.03 MG/DL (ref 0.6–1.2)
GFR SERPL CREATININE-BSD FRML MDRD: 54 ML/MIN/{1.73_M2}
GLUCOSE SERPL-MCNC: 121 MG/DL (ref 70–105)
INR POINT OF CARE: 2.9 (ref 0.86–1.14)
POTASSIUM SERPL-SCNC: 4.5 MMOL/L (ref 3.5–5.1)
SODIUM SERPL-SCNC: 137 MMOL/L (ref 134–144)

## 2021-07-07 PROCEDURE — 80048 BASIC METABOLIC PNL TOTAL CA: CPT | Mod: ZL | Performed by: NURSE PRACTITIONER

## 2021-07-07 PROCEDURE — G0463 HOSPITAL OUTPT CLINIC VISIT: HCPCS

## 2021-07-07 PROCEDURE — 36415 COLL VENOUS BLD VENIPUNCTURE: CPT | Mod: ZL | Performed by: NURSE PRACTITIONER

## 2021-07-07 PROCEDURE — 36416 COLLJ CAPILLARY BLOOD SPEC: CPT | Mod: ZL

## 2021-07-07 ASSESSMENT — MIFFLIN-ST. JEOR: SCORE: 1524.5

## 2021-07-07 ASSESSMENT — PAIN SCALES - GENERAL: PAINLEVEL: MILD PAIN (2)

## 2021-07-07 NOTE — PROGRESS NOTES
Left message to call back to let patient know about SUZE Najera CNP's note.  SUZE Najera CNP would like patient to see Dr. Acuna via telemed within a week.  The soonest available is a 2:00 appointment with a 1:30 check in.  Ila Toro RN......July 7, 2021...4:02 PM

## 2021-07-07 NOTE — PROGRESS NOTES
ANTICOAGULATION FOLLOW-UP CLINIC VISIT    Patient Name:  Corina Martinez  Date:  2021  Contact Type:  Face to Face    SUBJECTIVE:  Patient Findings         Clinical Outcomes     Negatives:  Major bleeding event, Thromboembolic event, Anticoagulation-related hospital admission, Anticoagulation-related ED visit, Anticoagulation-related fatality           OBJECTIVE    Recent labs: (last 7 days)     21   INR 2.9*       ASSESSMENT / PLAN  INR assessment THER    Recheck INR In: 1 WEEK    INR Location Clinic      Anticoagulation Summary  As of 2021    INR goal:  2.0-3.0   TTR:  73.5 % (4 wk)   INR used for dosin.9 (2021)   Warfarin maintenance plan:  5 mg (5 mg x 1) every Mon, Wed, Fri; 2.5 mg (5 mg x 0.5) all other days   Full warfarin instructions:  5 mg every Mon, Wed, Fri; 2.5 mg all other days   Weekly warfarin total:  25 mg   No change documented:  Zunilda Jimenez RN   Plan last modified:  Zunilda Jimenez RN (2021)   Next INR check:  2021   Priority:  High   Target end date:  Indefinite    Indications    Atrial fibrillation (H) [I48.91]  Anticoagulation monitoring  INR range 2-3 [Z79.01]  Atrial fibrillation with RVR (H) [I48.91]             Anticoagulation Episode Summary     INR check location:      Preferred lab:      Send INR reminders to:  [facility]    Comments:        Anticoagulation Care Providers     Provider Role Specialty Phone number    Bijal Torres APRN CNP Responsible Cardiovascular Disease [phone]            See the Encounter Report to view Anticoagulation Flowsheet and Dosing Calendar (Go to Encounters tab in chart review, and find the Anticoagulation Therapy Visit)        Zunilda Jimenez RN

## 2021-07-07 NOTE — PROGRESS NOTES
Continue Demadex back down to 20 mg at this time. Labs stable and vitals stable. I am working on getting arranged ANTONIO, heart cath and likely pericardiocentesis at [facility]. Can we get her in for telemedicine consult with Dr. Acuna in the very near future to consult on possible MVR with mitral stenosis.  Thanks,   SUZE Barbosa CNP

## 2021-07-08 NOTE — TELEPHONE ENCOUNTER
"Writer advised to share the following information with the patient per Ila Toro RN with patient.    \"SUZE Najera CNP would like patient to see Dr. Acuna via telemed within a week.  The soonest available is a 2:00 appointment with a 1:30 check in on 7/15/21.  Ila Toro RN......July 7, 2021...4:02 PM \"      \"Continue Demadex back down to 20 mg at this time. Labs stable and vitals stable. I am working on getting arranged ANTONIO, heart cath and likely pericardiocentesis at [facility]. Can we get her in for telemedicine consult with Dr. Acuna in the very near future to consult on possible MVR with mitral stenosis.  Thanks,   SUZE Barbosa CNP\"      After verification of name and date of birth, patient notified of results per provider. Patient verbalizes understanding and has no further questions or concerns. Patient reported the appointment time works for her. Verbalized time and had no further questions at this time. Faith Rosenthal RN  ....................  7/8/2021   8:27 AM        "

## 2021-07-15 ENCOUNTER — ALLIED HEALTH/NURSE VISIT (OUTPATIENT)
Dept: CARDIOLOGY | Facility: OTHER | Age: 66
End: 2021-07-15
Attending: THORACIC SURGERY (CARDIOTHORACIC VASCULAR SURGERY)
Payer: MEDICARE

## 2021-07-15 ENCOUNTER — ANTICOAGULATION THERAPY VISIT (OUTPATIENT)
Dept: ANTICOAGULATION | Facility: OTHER | Age: 66
End: 2021-07-15
Attending: FAMILY MEDICINE
Payer: MEDICARE

## 2021-07-15 ENCOUNTER — OFFICE VISIT (OUTPATIENT)
Dept: CARDIOLOGY | Facility: CLINIC | Age: 66
End: 2021-07-15
Attending: INTERNAL MEDICINE
Payer: MEDICARE

## 2021-07-15 VITALS
SYSTOLIC BLOOD PRESSURE: 94 MMHG | RESPIRATION RATE: 18 BRPM | HEART RATE: 110 BPM | BODY MASS INDEX: 33.01 KG/M2 | TEMPERATURE: 96.6 F | DIASTOLIC BLOOD PRESSURE: 62 MMHG | TEMPERATURE: 96.6 F | BODY MASS INDEX: 33.01 KG/M2 | OXYGEN SATURATION: 98 % | OXYGEN SATURATION: 98 % | RESPIRATION RATE: 18 BRPM | DIASTOLIC BLOOD PRESSURE: 62 MMHG | SYSTOLIC BLOOD PRESSURE: 94 MMHG | HEART RATE: 110 BPM | WEIGHT: 207.6 LBS | WEIGHT: 207.6 LBS

## 2021-07-15 DIAGNOSIS — I48.91 ATRIAL FIBRILLATION WITH RVR (H): ICD-10-CM

## 2021-07-15 DIAGNOSIS — I34.0 MITRAL VALVE INSUFFICIENCY: Primary | ICD-10-CM

## 2021-07-15 DIAGNOSIS — Z79.01 LONG TERM CURRENT USE OF ANTICOAGULANT THERAPY: ICD-10-CM

## 2021-07-15 DIAGNOSIS — I50.9 CHF (CONGESTIVE HEART FAILURE) (H): ICD-10-CM

## 2021-07-15 DIAGNOSIS — I51.89 OTHER ILL-DEFINED HEART DISEASES: ICD-10-CM

## 2021-07-15 DIAGNOSIS — I34.2 NONRHEUMATIC MITRAL VALVE STENOSIS: Primary | ICD-10-CM

## 2021-07-15 DIAGNOSIS — I48.91 ATRIAL FIBRILLATION (H): Primary | ICD-10-CM

## 2021-07-15 DIAGNOSIS — I34.81 MITRAL VALVE ANNULAR CALCIFICATION: ICD-10-CM

## 2021-07-15 DIAGNOSIS — I31.39 PERICARDIAL EFFUSION: ICD-10-CM

## 2021-07-15 DIAGNOSIS — Z79.01 ANTICOAGULATION MONITORING, INR RANGE 2-3: ICD-10-CM

## 2021-07-15 LAB
ANION GAP SERPL CALCULATED.3IONS-SCNC: 8 MMOL/L (ref 3–14)
BUN SERPL-MCNC: 13 MG/DL (ref 7–25)
CALCIUM SERPL-MCNC: 9.4 MG/DL (ref 8.6–10.3)
CHLORIDE BLD-SCNC: 96 MMOL/L (ref 98–107)
CO2 SERPL-SCNC: 33 MMOL/L (ref 21–31)
CREAT SERPL-MCNC: 0.83 MG/DL (ref 0.6–1.2)
GFR SERPL CREATININE-BSD FRML MDRD: 74 ML/MIN/1.73M2
GLUCOSE BLD-MCNC: 104 MG/DL (ref 70–105)
HOLD SPECIMEN: NORMAL
HOLD SPECIMEN: NORMAL
INR POINT OF CARE: 2.8 (ref 0.9–1.1)
POTASSIUM BLD-SCNC: 3.9 MMOL/L (ref 3.5–5.1)
SODIUM SERPL-SCNC: 137 MMOL/L (ref 134–144)

## 2021-07-15 PROCEDURE — 99204 OFFICE O/P NEW MOD 45 MIN: CPT | Mod: TEL | Performed by: THORACIC SURGERY (CARDIOTHORACIC VASCULAR SURGERY)

## 2021-07-15 PROCEDURE — 85610 PROTHROMBIN TIME: CPT | Mod: ZL,QW

## 2021-07-15 PROCEDURE — 80048 BASIC METABOLIC PNL TOTAL CA: CPT | Mod: ZL,GT

## 2021-07-15 PROCEDURE — 83880 ASSAY OF NATRIURETIC PEPTIDE: CPT | Mod: ZL,GT

## 2021-07-15 PROCEDURE — 83735 ASSAY OF MAGNESIUM: CPT | Mod: ZL,GT

## 2021-07-15 PROCEDURE — 36416 COLLJ CAPILLARY BLOOD SPEC: CPT | Mod: ZL

## 2021-07-15 PROCEDURE — 36415 COLL VENOUS BLD VENIPUNCTURE: CPT | Mod: ZL,GT

## 2021-07-15 PROCEDURE — G0463 HOSPITAL OUTPT CLINIC VISIT: HCPCS | Mod: GT

## 2021-07-15 PROCEDURE — 85027 COMPLETE CBC AUTOMATED: CPT | Mod: ZL,GT

## 2021-07-15 RX ORDER — TORSEMIDE 10 MG/1
10 TABLET ORAL DAILY
Qty: 90 TABLET | Refills: 0 | Status: ON HOLD | OUTPATIENT
Start: 2021-07-15 | End: 2021-09-22

## 2021-07-15 RX ORDER — POTASSIUM CHLORIDE 1500 MG/1
20 TABLET, EXTENDED RELEASE ORAL
Status: CANCELLED | OUTPATIENT
Start: 2021-07-15

## 2021-07-15 RX ORDER — LIDOCAINE 40 MG/G
CREAM TOPICAL
Status: CANCELLED | OUTPATIENT
Start: 2021-07-15

## 2021-07-15 RX ORDER — POTASSIUM CHLORIDE 1500 MG/1
40 TABLET, EXTENDED RELEASE ORAL
Status: CANCELLED | OUTPATIENT
Start: 2021-07-15

## 2021-07-15 RX ORDER — SODIUM CHLORIDE 9 MG/ML
INJECTION, SOLUTION INTRAVENOUS CONTINUOUS
Status: CANCELLED | OUTPATIENT
Start: 2021-07-15

## 2021-07-15 ASSESSMENT — PAIN SCALES - GENERAL
PAINLEVEL: NO PAIN (0)
PAINLEVEL: NO PAIN (0)

## 2021-07-15 NOTE — NURSING NOTE
"Chief Complaint   Patient presents with     Telemedicine consult     mitral valve stenosis, pericardial effusion       Initial LMP  (LMP Unknown)  Estimated body mass index is 33.01 kg/m  as calculated from the following:    Height as of 7/7/21: 1.689 m (5' 6.5\").    Weight as of an earlier encounter on 7/15/21: 94.2 kg (207 lb 9.6 oz).  Medication Reconciliation: complete    Ila Toro RN  "

## 2021-07-15 NOTE — LETTER
7/15/2021      RE: Corina Martinez  [address] [place] [address]       Dear Colleague,    Thank you for the opportunity to participate in the care of your patient, Corina Martinez, at the [facility]. Please see a copy of my visit note below.    [facility] CARDIOTHORACIC SURGERY CONSULT  Patient Name: Corina Martinez  Medical Record Number: [id number]  YOB: 1955  Room Number: Room/bed info not found  Referring Physician: Bijal Torres NP    CC: Severe mitral stenosis    History of Present Illness: Corina Martinez is a 66 year old female with history of HTN, obesity, SHAILESH with CPAP, primary biliary cirrhosis, cholecystectomy, cervicalgia, chronic back pain, osteoarthritis.  She presents with new onset afib with RVR and moderate-severe mitral stenosis.  Her course recently has been complicated with multiple office visits and hospital visits for Afib / heart failure symptoms.  Her afib is better controlled with medical therapy.  She states her symptoms are better as well.  Her workup has identified moderate pericardial effusion and worsening mitral stenosis.    Assessment and Plan:  Corina Martinez is a 66 year old female with severe mitral stenosis  1) Will refer to Dr. Wing Real - re HF evaluation / medical optimization  2) Check ANTONIO, coronary angiogram and pericardiocentesis  3) Will reassess after HF visit and diagnostics are complete   4) Please call with questions or concerns.     Thank you for the opportunity to participate in the care of this patient.    Sven Acuna MD  Cardiothoracic Surgery  [phone]    Past Medical History:  No past medical history on file.    Past Surgical History:  Past Surgical History:   Procedure Laterality Date     APPENDECTOMY       KNEE DEBRIDEMENT  05/07/2019     LAPAROSCOPIC CHOLECYSTECTOMY       LUMBAR FUSION       OTHER SURGICAL HISTORY      hip replacement     RELEASE  CARPAL TUNNEL       TONSILLECTOMY       TOTAL KNEE ARTHROPLASTY Right 01/30/2019     TUBAL LIGATION          Family History:   Family History   Problem Relation Age of Onset     Cancer Father      Lymphoma Father      Depression Mother      Respiratory Mother      Emphysema Mother        Social History:  Social History     Socioeconomic History     Marital status:      Spouse name: Not on file     Number of children: Not on file     Years of education: Not on file     Highest education level: Not on file   Occupational History     Not on file   Tobacco Use     Smoking status: Current Some Day Smoker     Types: Cigarettes     Smokeless tobacco: Never Used     Tobacco comment: pt has been working hard on quiting   Substance and Sexual Activity     Alcohol use: Yes     Comment: social     Drug use: No     Sexual activity: Not on file   Other Topics Concern     Parent/sibling w/ CABG, MI or angioplasty before 65F 55M? Not Asked   Social History Narrative     Not on file     Social Determinants of Health     Financial Resource Strain:      Difficulty of Paying Living Expenses:    Food Insecurity:      Worried About Running Out of Food in the Last Year:      Ran Out of Food in the Last Year:    Transportation Needs:      Lack of Transportation (Medical):      Lack of Transportation (Non-Medical):    Physical Activity:      Days of Exercise per Week:      Minutes of Exercise per Session:    Stress:      Feeling of Stress :    Social Connections:      Frequency of Communication with Friends and Family:      Frequency of Social Gatherings with Friends and Family:      Attends Synagogue Services:      Active Member of Clubs or Organizations:      Attends Club or Organization Meetings:      Marital Status:    Intimate Partner Violence:      Fear of Current or Ex-Partner:      Emotionally Abused:      Physically Abused:      Sexually Abused:        Allergies:   Allergies   Allergen Reactions     Codeine Sulfate Nausea  and Vomiting     Acetaminophen Other (See Comments)     Was told to avoid due to primary biliary cirrhosis       Medications:  Current Outpatient Medications   Medication     amiodarone (PACERONE) 200 MG tablet     diclofenac (VOLTAREN) 1 % topical gel     diltiazem ER COATED BEADS (CARDIZEM CD/CARTIA XT) 240 MG 24 hr capsule     diphenhydrAMINE (BENADRYL) 25 MG tablet     DULoxetine (CYMBALTA) 30 MG capsule     fluticasone (FLONASE) 50 MCG/ACT nasal spray     gabapentin (NEURONTIN) 600 MG tablet     gabapentin (NEURONTIN) 600 MG tablet     hydrOXYzine (ATARAX) 25 MG tablet     magnesium 250 MG tablet     Melatonin 10 MG TABS tablet     metoprolol tartrate (LOPRESSOR) 25 MG tablet     Multiple Vitamins-Minerals (MULTIVITAMIN ADULTS 50+) TABS     omeprazole (PRILOSEC) 20 MG CR capsule     Ursodiol 500 MG TABS     vitamin B complex with vitamin C (VITAMIN  B COMPLEX) tablet     vitamin D3 (CHOLECALCIFEROL) 50 mcg (2000 units) tablet     warfarin ANTICOAGULANT (COUMADIN) 5 MG tablet     torsemide (DEMADEX) 10 MG tablet     No current facility-administered medications for this visit.       Review of Systems:   A 10 point ROS was performed and is negative other than HPI.    Physical Exam:       Gen: NAD, resting comfortably in chair   Lungs: CTAB, non-labored breathing   CV: regular rhythm, normal rate   Abd: Soft, not tender, not distended   Ext: Motor, sensation, pulses intact   Neuro: AOx3    Labs:  Lab Results   Component Value Date    WBC 10.5 07/15/2021    WBC 9.7 06/23/2021     Lab Results   Component Value Date    RBC 5.25 07/15/2021    RBC 4.63 06/23/2021     Lab Results   Component Value Date    HGB 13.6 07/15/2021    HGB 12.2 06/23/2021     Lab Results   Component Value Date    HCT 43.8 07/15/2021    HCT 38.8 06/23/2021     No components found for: MCT  Lab Results   Component Value Date    MCV 83 07/15/2021    MCV 84 06/23/2021     Lab Results   Component Value Date    MCH 25.9 07/15/2021    MCH 26.3  06/23/2021     Lab Results   Component Value Date    MCHC 31.1 07/15/2021    MCHC 31.4 06/23/2021     Lab Results   Component Value Date    RDW 17.2 07/15/2021    RDW 15.6 06/23/2021     Lab Results   Component Value Date     07/15/2021     06/23/2021     Last Comprehensive Metabolic Panel:  Sodium   Date Value Ref Range Status   07/15/2021 137 134 - 144 mmol/L Final   07/07/2021 137 134 - 144 mmol/L Final     Potassium   Date Value Ref Range Status   07/15/2021 3.9 3.5 - 5.1 mmol/L Final   07/07/2021 4.5 3.5 - 5.1 mmol/L Final     Chloride   Date Value Ref Range Status   07/15/2021 96 (L) 98 - 107 mmol/L Final   07/07/2021 95 (L) 98 - 107 mmol/L Final     Carbon Dioxide   Date Value Ref Range Status   07/07/2021 35 (H) 21 - 31 mmol/L Final     Carbon Dioxide (CO2)   Date Value Ref Range Status   07/15/2021 33 (H) 21 - 31 mmol/L Final     Anion Gap   Date Value Ref Range Status   07/15/2021 8 3 - 14 mmol/L Final   07/07/2021 7 3 - 14 mmol/L Final     Glucose   Date Value Ref Range Status   07/15/2021 104 70 - 105 mg/dL Final   07/07/2021 121 (H) 70 - 105 mg/dL Final     Urea Nitrogen   Date Value Ref Range Status   07/15/2021 13 7 - 25 mg/dL Final   07/07/2021 14 7 - 25 mg/dL Final     Creatinine   Date Value Ref Range Status   07/15/2021 0.83 0.60 - 1.20 mg/dL Final   07/07/2021 1.03 0.60 - 1.20 mg/dL Final     GFR Estimate   Date Value Ref Range Status   07/15/2021 74 >60 mL/min/1.73m2 Final     Comment:     As of July 11, 2021, eGFR is calculated by the CKD-EPI creatinine equation, without race adjustment. eGFR can be influenced by muscle mass, exercise, and diet. The reported eGFR is an estimation only and is only applicable if the renal function is stable.   07/07/2021 54 (L) >60 mL/min/[1.73_m2] Final     Calcium   Date Value Ref Range Status   07/15/2021 9.4 8.6 - 10.3 mg/dL Final   07/07/2021 9.6 8.6 - 10.3 mg/dL Final       Imaging:  Transthoracic echocardiogram (6/25/21):  Interpretation  Summary  Global and regional left ventricular function is normal with an EF of 55-60%.  Global right ventricular function is normal.  Mitral stenosis secondary to mitral annular calcification. Mean gradient is  elevated at 10 mmHg however the patient is tachycardic which elevates mean  gradient even further.  Mild aortic stenosis is present.  Large pericardial effsuion posterior to the LV 23 mm, lateral 16 mm, anterior  to RV 4 m). No chamber collapse.  Dilation of the inferior vena cava is present with abnormal respiratory  variation in diameter.  IVC diameter >2.1 cm collapsing <50% with sniff suggests a high RA pressure  estimated at 15 mmHg or greater.     Previous study not available for comparison.     The patient's rhythm is atrial fibrillation.  ______________________________________________________________________________  Left Ventricle  Left ventricular size is normal. Global and regional left ventricular function  is normal with an EF of 55-60%. Relative wall thickness is increased  consistent with concentric remodeling. Diastolic function not assessed due to  significant mitral annular calcification.     Right Ventricle  The right ventricle is normal size. Global right ventricular function is  normal.     Atria  The right atria appears normal. Severe left atrial enlargement is present. A  small patent foramen ovale is present. Lipomatous infiltration of the  interatrial septm is present.     Mitral Valve  Moderate mitral annular calcification is present. The mean mitral valve  gradient is 10.7 mmHg. The etiology of the mitral stenosis is mitral annular  calcification .     Aortic Valve  Trileaflet aortic sclerosis without stenosis. Mild aortic stenosis is present.  The peak AoV pressure gradient is 16.0 mmHg.     Tricuspid Valve  Trace tricuspid insufficiency is present. The right ventricular systolic  pressure is approximated at 33.7 mmHg plus the right atrial pressure.     Pulmonic Valve  The pulmonic  valve is normal.     Vessels  The aorta root is normal. The thoracic aorta is normal. Dilation of the  inferior vena cava is present with abnormal respiratory variation in diameter.  IVC diameter >2.1 cm collapsing <50% with sniff suggests a high RA pressure  estimated at 15 mmHg or greater.     Pericardium  Large pericardial effsuion posterior to the LV 23 mm, lateral 16 mm, anterior  to RV 4 m). No chamber collapse.     Compared to Previous Study  Previous study not available for comparison.     ______________________________________________________________________________  MMode/2D Measurements & Calculations  IVSd: 0.85 cm  LVIDd: 4.0 cm  LVIDs: 2.6 cm  LVPWd: 0.90 cm  FS: 34.2 %  LV mass(C)d: 102.8 grams  LV mass(C)dI: 48.5 grams/m2     asc Aorta Diam: 3.1 cm  LVOT diam: 2.0 cm  LVOT area: 3.1 cm2  LA Volume (BP): 147.0 ml  LA Volume Index (BP): 69.3 ml/m2  RWT: 0.45     Doppler Measurements & Calculations  MV max P.0 mmHg  MV mean PG: 10.7 mmHg  MV V2 VTI: 56.7 cm  MVA(VTI): 1.4 cm2  Ao V2 max: 198.0 cm/sec  Ao max P.0 mmHg  Ao V2 mean: 136.5 cm/sec  Ao mean P.5 mmHg  Ao V2 VTI: 33.9 cm  NANDA(I,D): 2.3 cm2  NANDA(V,D): 2.3 cm2  LV V1 max P.5 mmHg  LV V1 max: 145.7 cm/sec  LV V1 VTI: 24.4 cm     SV(LVOT): 76.8 ml  SI(LVOT): 36.2 ml/m2  TR max alhaji: 289.8 cm/sec  TR max P.7 mmHg  AV Alhaji Ratio (DI): 0.74  NANDA Index (cm2/m2): 1.1     ______________________________________________________________________________  Report approved by: Petra PENALOZA 2021 04:26 PM    Coronary angiogram - pending        Please do not hesitate to contact me if you have any questions/concerns.     Sincerely,     Sven Acuna MD

## 2021-07-15 NOTE — NURSING NOTE
Patient seen today for consultation for mitral valve replacement.     Surgery procedure explained to patient  and all questions and concerns were answered and addressed.     Valve choices were discussed with the patient, mechanical and bioprosthetic. Risks and benefits of both explained.     Risks and benefits of surgery were discussed with patient (and all present) including risk of death, stroke, bleeding, cardiac ischemia, wound infection, renal failure, arrhythmias and possible pacemaker implantation. Patient accepts these risks and is willing to proceed with surgery.     Reviewed pre surgery tests and procedures needed and will call patient to schedule.      Dr Acuna would like patient to have ANTONIO, angiogram and pericardiocentesis and be scheduled to see Dr Real in clinic.    Was able to schedule with Dr Real on August 3rd at 10:30 am, orders entered for ANTONIO, heart cath and pericardiocentesis and will schedule day before or day after appointment with Dr Real.    Pre surgery preparation folder with instructions for surgery preparation and recovery will be mailed to the patient.     Will instructed patient on preparation for upcoming appointment and procedures.     Patient verbalized understanding of all instructions and will call with any questions or concerns.

## 2021-07-15 NOTE — NURSING NOTE
"Chief Complaint   Patient presents with     Telemedicine consult     mitral valve stenosis, pericardial effusion       Initial Pulse 110   Temp (!) 96.6  F (35.9  C) (Temporal)   Resp 18   Wt 94.2 kg (207 lb 9.6 oz)   LMP  (LMP Unknown)   SpO2 98%   BMI 33.01 kg/m   Estimated body mass index is 33.01 kg/m  as calculated from the following:    Height as of 7/7/21: 1.689 m (5' 6.5\").    Weight as of this encounter: 94.2 kg (207 lb 9.6 oz).  Medication Reconciliation: complete    Ila Toro RN  "

## 2021-07-15 NOTE — PATIENT INSTRUCTIONS
You were seen by Dr. Acuna     1. Per SUZE Najera CNP you should decrease your torsemide to the following:  torsemide (DEMADEX) 10 MG tablet 90 tablet 0      Sig - Route: Take 1 tablet (10 mg) by mouth daily       2. Laboratory blood work has been ordered by SUZE Najera CNP.  You will be notified by phone call or AgentPairhart message when the results are available.      3. Start checking your blood pressures twice daily and notify SUZE Najera CNP's office if they are abnormal.    4. You will be called to set up appointments for at the [facility] to meet with the cardiologist Dr. Reese and have procedures discussed during visit done.      Please call the cardiology office with problems, questions, or concerns at [phone].    If you experience chest pain, chest pressure, chest tightness, shortness of breath, fainting, lightheadedness, nausea, vomiting, or other concerning symptoms, please report to the Emergency Department or call 911. These symptoms may be emergent, and best treated in the Emergency Department.     Cardiology Nurses  NANCY Wheeler, NANCY Briceño, LPN  [facility] Jaimes  [facility] Cardiology ([address])  [phone]

## 2021-07-15 NOTE — LETTER
2021    Dear JOEL Arriaga [facility]  CARDIOTHORACIC SURGERY DEPARTMENT    You are scheduled to undergo a heart valve repair or replacement.  Please have your dentist sign form below and fax or e-mail it to the Cardiothoracic Surgery office prior to your surgery date.    PATIENT:  Corina Martinez    :   1955    DATE OF DENTAL VISIT: ________________________                                           ______________________________   was seen by me today for dental clearance prior to heart valve surgery.     ______  No additional treatment is needed prior to surgery.  ______  Further treatment is needed and will be completed prior to surgery date.  ______  Surgery date may need to be rescheduled due to this condition     __________________________________________________________________________    Dr. _________________________________________   Phone _______________________    Clinic name ________________________________________________________________    Thank you,    Dawn Siegel RN  Care Coordinator  Cardiothoracic Surgery  [phone]    PRIMARY FAX NUMBER: [phone]   Cardiothoracic Surgery Division, [facility]: ATTN: Dawn    E-mail: Cardiothoracic Surgery Division, [facility]: [email or web address]

## 2021-07-15 NOTE — PROGRESS NOTES
ANTICOAGULATION FOLLOW-UP CLINIC VISIT    Patient Name:  Corina Martinez  Date:  7/15/2021  Contact Type:  Face to Face    SUBJECTIVE:  Patient Findings         Clinical Outcomes     Negatives:  Major bleeding event, Thromboembolic event, Anticoagulation-related hospital admission, Anticoagulation-related ED visit, Anticoagulation-related fatality           OBJECTIVE    Recent labs: (last 7 days)     07/15/21  0000   INR 2.8*       ASSESSMENT / PLAN  INR assessment THER    Recheck INR In: 2 WEEKS    INR Location Clinic      Anticoagulation Summary  As of 7/15/2021    INR goal:  2.0-3.0   TTR:  79.4 % (1.2 mo)   INR used for dosin.8 (7/15/2021)   Warfarin maintenance plan:  5 mg (5 mg x 1) every Mon, Wed, Fri; 2.5 mg (5 mg x 0.5) all other days   Full warfarin instructions:  5 mg every Mon, Wed, Fri; 2.5 mg all other days   Weekly warfarin total:  25 mg   No change documented:  Janeth Patel RN   Plan last modified:  Zunilda Jimenez RN (2021)   Next INR check:  2021   Priority:  High   Target end date:  Indefinite    Indications    Atrial fibrillation (H) [I48.91]  Anticoagulation monitoring  INR range 2-3 [Z79.01]  Atrial fibrillation with RVR (H) [I48.91]             Anticoagulation Episode Summary     INR check location:      Preferred lab:      Send INR reminders to:  [facility]    Comments:        Anticoagulation Care Providers     Provider Role Specialty Phone number    Bijal Torres, APRN CNP Responsible Cardiovascular Disease [phone]            See the Encounter Report to view Anticoagulation Flowsheet and Dosing Calendar (Go to Encounters tab in chart review, and find the Anticoagulation Therapy Visit)        Janeth Patel RN

## 2021-07-16 DIAGNOSIS — Z11.59 ENCOUNTER FOR SCREENING FOR OTHER VIRAL DISEASES: ICD-10-CM

## 2021-07-16 NOTE — PROGRESS NOTES
Called patient and left a voicemail message to discuss appointments scheduled with Dr Real, the ANTONIO, the angiogram and the pericardiocentesis.  Waiting for return call.     Patient called back and agreed to date and time of visit and procedures scheduled on Aug 3rd and Aug 5th.  Will email patient schedule, map and preparation instructions today.  Gave patient contact information for this nurse and patient verbalized understanding.      Discussed with patient possible surgery date and PAC visit and patient agreed to return to [facility] at later date for rest of pre op and surgery.     Mailed letter with appointments, preparation and maps with hotel information to patient today.

## 2021-07-17 LAB
ERYTHROCYTE [DISTWIDTH] IN BLOOD BY AUTOMATED COUNT: 17.2 % (ref 10–15)
HCT VFR BLD AUTO: 43.8 % (ref 35–47)
HGB BLD-MCNC: 13.6 G/DL (ref 11.7–15.7)
MAGNESIUM SERPL-MCNC: 1.9 MG/DL (ref 1.9–2.7)
MCH RBC QN AUTO: 25.9 PG (ref 26.5–33)
MCHC RBC AUTO-ENTMCNC: 31.1 G/DL (ref 31.5–36.5)
MCV RBC AUTO: 83 FL (ref 78–100)
NT-PROBNP SERPL-MCNC: 374 PG/ML (ref 0–100)
PLATELET # BLD AUTO: 468 10E3/UL (ref 150–450)
RBC # BLD AUTO: 5.25 10E6/UL (ref 3.8–5.2)
WBC # BLD AUTO: 10.5 10E3/UL (ref 4–11)

## 2021-07-19 NOTE — PROGRESS NOTES
[facility] CARDIOTHORACIC SURGERY CONSULT  Patient Name: Corina Martinez  Medical Record Number: [id number]  YOB: 1955  Room Number: Room/bed info not found  Referring Physician: Bijal Torres NP    CC: Severe mitral stenosis    History of Present Illness: Corina Martinez is a 66 year old female with history of HTN, obesity, SHAILESH with CPAP, primary biliary cirrhosis, cholecystectomy, cervicalgia, chronic back pain, osteoarthritis.  She presents with new onset afib with RVR and moderate-severe mitral stenosis.  Her course recently has been complicated with multiple office visits and hospital visits for Afib / heart failure symptoms.  Her afib is better controlled with medical therapy.  She states her symptoms are better as well.  Her workup has identified moderate pericardial effusion and worsening mitral stenosis.    Assessment and Plan:  Corina Martinez is a 66 year old female with severe mitral stenosis  1) Will refer to Dr. Wing Real - re HF evaluation / medical optimization  2) Check ANTONIO, coronary angiogram and pericardiocentesis  3) Will reassess after HF visit and diagnostics are complete   4) Please call with questions or concerns.     Thank you for the opportunity to participate in the care of this patient.    Sven Acuna MD  Cardiothoracic Surgery  [phone]    Past Medical History:  No past medical history on file.    Past Surgical History:  Past Surgical History:   Procedure Laterality Date     APPENDECTOMY       KNEE DEBRIDEMENT  05/07/2019     LAPAROSCOPIC CHOLECYSTECTOMY       LUMBAR FUSION       OTHER SURGICAL HISTORY      hip replacement     RELEASE CARPAL TUNNEL       TONSILLECTOMY       TOTAL KNEE ARTHROPLASTY Right 01/30/2019     TUBAL LIGATION          Family History:   Family History   Problem Relation Age of Onset     Cancer Father      Lymphoma Father      Depression Mother      Respiratory Mother      Emphysema Mother        Social History:  Social History     Socioeconomic  History     Marital status:      Spouse name: Not on file     Number of children: Not on file     Years of education: Not on file     Highest education level: Not on file   Occupational History     Not on file   Tobacco Use     Smoking status: Current Some Day Smoker     Types: Cigarettes     Smokeless tobacco: Never Used     Tobacco comment: pt has been working hard on quiting   Substance and Sexual Activity     Alcohol use: Yes     Comment: social     Drug use: No     Sexual activity: Not on file   Other Topics Concern     Parent/sibling w/ CABG, MI or angioplasty before 65F 55M? Not Asked   Social History Narrative     Not on file     Social Determinants of Health     Financial Resource Strain:      Difficulty of Paying Living Expenses:    Food Insecurity:      Worried About Running Out of Food in the Last Year:      Ran Out of Food in the Last Year:    Transportation Needs:      Lack of Transportation (Medical):      Lack of Transportation (Non-Medical):    Physical Activity:      Days of Exercise per Week:      Minutes of Exercise per Session:    Stress:      Feeling of Stress :    Social Connections:      Frequency of Communication with Friends and Family:      Frequency of Social Gatherings with Friends and Family:      Attends [employer] Services:      Active Member of Clubs or Organizations:      Attends Club or Organization Meetings:      Marital Status:    Intimate Partner Violence:      Fear of Current or Ex-Partner:      Emotionally Abused:      Physically Abused:      Sexually Abused:        Allergies:   Allergies   Allergen Reactions     Codeine Sulfate Nausea and Vomiting     Acetaminophen Other (See Comments)     Was told to avoid due to primary biliary cirrhosis       Medications:  Current Outpatient Medications   Medication     amiodarone (PACERONE) 200 MG tablet     diclofenac (VOLTAREN) 1 % topical gel     diltiazem ER COATED BEADS (CARDIZEM CD/CARTIA XT) 240 MG 24 hr capsule      diphenhydrAMINE (BENADRYL) 25 MG tablet     DULoxetine (CYMBALTA) 30 MG capsule     fluticasone (FLONASE) 50 MCG/ACT nasal spray     gabapentin (NEURONTIN) 600 MG tablet     gabapentin (NEURONTIN) 600 MG tablet     hydrOXYzine (ATARAX) 25 MG tablet     magnesium 250 MG tablet     Melatonin 10 MG TABS tablet     metoprolol tartrate (LOPRESSOR) 25 MG tablet     Multiple Vitamins-Minerals (MULTIVITAMIN ADULTS 50+) TABS     omeprazole (PRILOSEC) 20 MG CR capsule     Ursodiol 500 MG TABS     vitamin B complex with vitamin C (VITAMIN  B COMPLEX) tablet     vitamin D3 (CHOLECALCIFEROL) 50 mcg (2000 units) tablet     warfarin ANTICOAGULANT (COUMADIN) 5 MG tablet     torsemide (DEMADEX) 10 MG tablet     No current facility-administered medications for this visit.       Review of Systems:   A 10 point ROS was performed and is negative other than HPI.    Physical Exam:       Gen: NAD, resting comfortably in chair   Lungs: CTAB, non-labored breathing   CV: regular rhythm, normal rate   Abd: Soft, not tender, not distended   Ext: Motor, sensation, pulses intact   Neuro: AOx3    Labs:  Lab Results   Component Value Date    WBC 10.5 07/15/2021    WBC 9.7 06/23/2021     Lab Results   Component Value Date    RBC 5.25 07/15/2021    RBC 4.63 06/23/2021     Lab Results   Component Value Date    HGB 13.6 07/15/2021    HGB 12.2 06/23/2021     Lab Results   Component Value Date    HCT 43.8 07/15/2021    HCT 38.8 06/23/2021     No components found for: MCT  Lab Results   Component Value Date    MCV 83 07/15/2021    MCV 84 06/23/2021     Lab Results   Component Value Date    MCH 25.9 07/15/2021    MCH 26.3 06/23/2021     Lab Results   Component Value Date    MCHC 31.1 07/15/2021    MCHC 31.4 06/23/2021     Lab Results   Component Value Date    RDW 17.2 07/15/2021    RDW 15.6 06/23/2021     Lab Results   Component Value Date     07/15/2021     06/23/2021     Last Comprehensive Metabolic Panel:  Sodium   Date Value Ref Range  Status   07/15/2021 137 134 - 144 mmol/L Final   07/07/2021 137 134 - 144 mmol/L Final     Potassium   Date Value Ref Range Status   07/15/2021 3.9 3.5 - 5.1 mmol/L Final   07/07/2021 4.5 3.5 - 5.1 mmol/L Final     Chloride   Date Value Ref Range Status   07/15/2021 96 (L) 98 - 107 mmol/L Final   07/07/2021 95 (L) 98 - 107 mmol/L Final     Carbon Dioxide   Date Value Ref Range Status   07/07/2021 35 (H) 21 - 31 mmol/L Final     Carbon Dioxide (CO2)   Date Value Ref Range Status   07/15/2021 33 (H) 21 - 31 mmol/L Final     Anion Gap   Date Value Ref Range Status   07/15/2021 8 3 - 14 mmol/L Final   07/07/2021 7 3 - 14 mmol/L Final     Glucose   Date Value Ref Range Status   07/15/2021 104 70 - 105 mg/dL Final   07/07/2021 121 (H) 70 - 105 mg/dL Final     Urea Nitrogen   Date Value Ref Range Status   07/15/2021 13 7 - 25 mg/dL Final   07/07/2021 14 7 - 25 mg/dL Final     Creatinine   Date Value Ref Range Status   07/15/2021 0.83 0.60 - 1.20 mg/dL Final   07/07/2021 1.03 0.60 - 1.20 mg/dL Final     GFR Estimate   Date Value Ref Range Status   07/15/2021 74 >60 mL/min/1.73m2 Final     Comment:     As of July 11, 2021, eGFR is calculated by the CKD-EPI creatinine equation, without race adjustment. eGFR can be influenced by muscle mass, exercise, and diet. The reported eGFR is an estimation only and is only applicable if the renal function is stable.   07/07/2021 54 (L) >60 mL/min/[1.73_m2] Final     Calcium   Date Value Ref Range Status   07/15/2021 9.4 8.6 - 10.3 mg/dL Final   07/07/2021 9.6 8.6 - 10.3 mg/dL Final       Imaging:  Transthoracic echocardiogram (6/25/21):  Interpretation Summary  Global and regional left ventricular function is normal with an EF of 55-60%.  Global right ventricular function is normal.  Mitral stenosis secondary to mitral annular calcification. Mean gradient is  elevated at 10 mmHg however the patient is tachycardic which elevates mean  gradient even further.  Mild aortic stenosis is  present.  Large pericardial effsuion posterior to the LV 23 mm, lateral 16 mm, anterior  to RV 4 m). No chamber collapse.  Dilation of the inferior vena cava is present with abnormal respiratory  variation in diameter.  IVC diameter >2.1 cm collapsing <50% with sniff suggests a high RA pressure  estimated at 15 mmHg or greater.     Previous study not available for comparison.     The patient's rhythm is atrial fibrillation.  ______________________________________________________________________________  Left Ventricle  Left ventricular size is normal. Global and regional left ventricular function  is normal with an EF of 55-60%. Relative wall thickness is increased  consistent with concentric remodeling. Diastolic function not assessed due to  significant mitral annular calcification.     Right Ventricle  The right ventricle is normal size. Global right ventricular function is  normal.     Atria  The right atria appears normal. Severe left atrial enlargement is present. A  small patent foramen ovale is present. Lipomatous infiltration of the  interatrial septm is present.     Mitral Valve  Moderate mitral annular calcification is present. The mean mitral valve  gradient is 10.7 mmHg. The etiology of the mitral stenosis is mitral annular  calcification .     Aortic Valve  Trileaflet aortic sclerosis without stenosis. Mild aortic stenosis is present.  The peak AoV pressure gradient is 16.0 mmHg.     Tricuspid Valve  Trace tricuspid insufficiency is present. The right ventricular systolic  pressure is approximated at 33.7 mmHg plus the right atrial pressure.     Pulmonic Valve  The pulmonic valve is normal.     Vessels  The aorta root is normal. The thoracic aorta is normal. Dilation of the  inferior vena cava is present with abnormal respiratory variation in diameter.  IVC diameter >2.1 cm collapsing <50% with sniff suggests a high RA pressure  estimated at 15 mmHg or greater.     Pericardium  Large pericardial  effsuion posterior to the LV 23 mm, lateral 16 mm, anterior  to RV 4 m). No chamber collapse.     Compared to Previous Study  Previous study not available for comparison.     ______________________________________________________________________________  MMode/2D Measurements & Calculations  IVSd: 0.85 cm  LVIDd: 4.0 cm  LVIDs: 2.6 cm  LVPWd: 0.90 cm  FS: 34.2 %  LV mass(C)d: 102.8 grams  LV mass(C)dI: 48.5 grams/m2     asc Aorta Diam: 3.1 cm  LVOT diam: 2.0 cm  LVOT area: 3.1 cm2  LA Volume (BP): 147.0 ml  LA Volume Index (BP): 69.3 ml/m2  RWT: 0.45     Doppler Measurements & Calculations  MV max P.0 mmHg  MV mean PG: 10.7 mmHg  MV V2 VTI: 56.7 cm  MVA(VTI): 1.4 cm2  Ao V2 max: 198.0 cm/sec  Ao max P.0 mmHg  Ao V2 mean: 136.5 cm/sec  Ao mean P.5 mmHg  Ao V2 VTI: 33.9 cm  NANDA(I,D): 2.3 cm2  NANDA(V,D): 2.3 cm2  LV V1 max P.5 mmHg  LV V1 max: 145.7 cm/sec  LV V1 VTI: 24.4 cm     SV(LVOT): 76.8 ml  SI(LVOT): 36.2 ml/m2  TR max alhaji: 289.8 cm/sec  TR max P.7 mmHg  AV Alhaji Ratio (DI): 0.74  NANDA Index (cm2/m2): 1.1     ______________________________________________________________________________  Report approved by: Petra PENALOZA 2021 04:26 PM    Coronary angiogram - pending

## 2021-07-20 VITALS — BODY MASS INDEX: 39.37 KG/M2 | HEIGHT: 66 IN | WEIGHT: 245 LBS

## 2021-07-20 NOTE — PROGRESS NOTES
Progress Notes by April Sheth NP at 7/8/2019  2:00 PM     Author: April Sheth NP Service: -- Author Type: Nurse Practitioner    Filed: 7/8/2019  2:50 PM Encounter Date: 7/8/2019 Status: Signed    : April Sheth NP (Nurse Practitioner)       [facility] Consult Note    Date of Service:  7/8/2019    Requesting Provider: Dr. Aguilar, [facility]    Chief Complaint: surgical disruption right knee    History of Present Illness: Corina Martinez is being seen at the [facility] today regarding surgical disruption of the right knee incision. They arrive to the clinic today alone. The patient reports that she had her right knee replacement 1/2019; the incision was noted to be opening 4 weeks post op. She required I&D 14 weeks post op this was done 5/2019. Was currently/previously using telfa; she has been getting this wet in the shower; using bactroban intermittently; has also tried duoderm. Reports pain of 4/10 feels electric; currently using neurontin for pain. Has used nothing as compression in the past, is currently using nothing for compression; she states that she tried tubular compression and this made the pain worse. Denies any fevers, chills, or generalized ill feeling. No recent cultures.  Denies history of cancer. Sleeps in a bed/recliner with legs elevated. Denies history of DVT, cellulitis and vein procedures. Positive history of joint replacement has had right knee replacement and left hip replacement.I personally reviewed outside imaging, lab work, and progress noted through [facility] Everywhere and outside records. She currently works as  and flower delivery. Smokes 1 ppd has smoked for 50 years.         Review of Systems:   Constitutional:  negative  for fever, chills or night sweats  EENTM: positive for glasses;  negative [place]  GI:  negative for nausea/vomiting;  negative for constipation  negative diarrhea;   negative for fecal incontinence negative weight loss  :  negative dysuria, negative incontinence  MS: patient is ambulatory;  does not use assistive devices  Cardiac:  negative   Respiratory:  negative shortness of breath; mild phylicia she does not use cpap  Endocrine:  negative diabetes  Psych:  negative depression/anxiety    Past Medical History:    Past Medical History:   Diagnosis Date   ? Allergic conjunctivitis of both eyes 7/28/2015   ? Allergic rhinitis due to allergen 7/28/2015   ? Arthralgia of left hip 10/1/2015   ? Benign essential hypertension 9/5/2007   ? Biliary cirrhosis (H) 9/5/2007    Followed by Dr. Martini LFTs q 6 mos DEXA q 2 years   ? Dysthymic disorder 6/13/2017   ? Midline low back pain without sciatica 9/5/2007   ? Morbid obesity (H) 3/19/2018   ? Neck pain 9/5/2007   ? PHYLICIA (obstructive sleep apnea) 3/14/2011    Mild (PSG 3/8/2011: AHI 10.5, kushal desat 83%)   ? Routine general medical examination at a [facility] care facility 9/5/2007    DEXA 3/06 normal, due 3/08 colonoscopy 10/07, due 2012 Pap 8-21-08   ? S/P total hip arthroplasty 10/1/2015   ? Tobacco abuse disorder 3/19/2018        Surgical History:   Past Surgical History:   Procedure Laterality Date   ? APPENDECTOMY     ? CARPAL TUNNEL RELEASE     ? hip replacement     ? KNEE DEBRIDEMENT  05/07/2019   ? LAPAROSCOPIC CHOLECYSTECTOMY     ? LUMBAR FUSION     ? TONSILLECTOMY     ? TOTAL KNEE ARTHROPLASTY Right 01/30/2019   ? TUBAL LIGATION          Medications:    Current Outpatient Medications:   ?  clotrimazole (LOTRIMIN) 1 % cream, Apply topically as needed.    , Disp: , Rfl:   ?  docosahexanoic acid/epa (FISH OIL ORAL), Take by mouth., Disp: , Rfl:   ?  fluticasone propionate (FLONASE) 50 mcg/actuation nasal spray, 1 spray into each nostril as needed.    , Disp: , Rfl:   ?  gabapentin (NEURONTIN) 600 MG tablet, TAKE 1 TABLET BY MOUTH IN THE MORNING AND 1 TABLET IN THE AFTERNOON AND 2 TABLETS IN THE EVENING, Disp: , Rfl:   ?  ginkgo biloba  40 mg Tab, Take by mouth., Disp: , Rfl:   ?  HYDROmorphone (DILAUDID) 2 MG tablet, Take 2 mg by mouth once as needed for pain., Disp: , Rfl:   ?  hydrOXYzine pamoate (VISTARIL) 25 MG capsule, Take 25 mg by mouth 4 (four) times a day as needed for itching., Disp: , Rfl:   ?  lisinopril-hydrochlorothiazide (PRINZIDE,ZESTORETIC) 20-25 mg per tablet, Take 1 tablet by mouth., Disp: , Rfl:   ?  magnesium 250 mg Tab, Take 250 mg by mouth., Disp: , Rfl:   ?  multivitamin capsule, Take 2 capsules by mouth., Disp: , Rfl:   ?  mupirocin (BACTROBAN) 2 % ointment, , Disp: , Rfl: 0  ?  naproxen (NAPROSYN) 500 MG tablet, Take 500 mg by mouth., Disp: , Rfl:   ?  omeprazole (PRILOSEC) 20 MG capsule, Take 20 mg by mouth., Disp: , Rfl:   ?  POTASSIUM ORAL, Take by mouth., Disp: , Rfl:   ?  ursodiol (ACTIGALL) 500 MG tablet, Take 500 mg by mouth., Disp: , Rfl:   ?  VITAMIN B COMPLEX ORAL, Take 1 tablet by mouth., Disp: , Rfl:     Current Facility-Administered Medications:   ?  lidocaine 2 % jelly (XYLOCAINE), , Topical, PRN, April Sheth NP, 1 application at 07/08/19 3225    Allergies:   Allergies   Allergen Reactions   ? Acetaminophen Other (See Comments)   ? Codeine Nausea Only and Nausea And Vomiting       Family history:   Family History   Problem Relation Age of Onset   ? Emphysema Mother    ? Depression Mother    ? Lymphoma Father         Social History:   Social History     Socioeconomic History   ? Marital status:      Spouse name: Not on file   ? Number of children: Not on file   ? Years of education: Not on file   ? Highest education level: Not on file   Occupational History   ? Not on file   Social Needs   ? Financial resource strain: Not on file   ? Food insecurity:     Worry: Not on file     Inability: Not on file   ? Transportation needs:     Medical: Not on file     Non-medical: Not on file   Tobacco Use   ? Smoking status: Current Every Day Smoker     Packs/day: 1.00     Years: 50.00     Pack years: 50.00  "  ? Smokeless tobacco: Never Used   Substance and Sexual Activity   ? Alcohol use: Yes     Frequency: 4 or more times a week     Drinks per session: 1 or 2   ? Drug use: Never   ? Sexual activity: Not on file   Lifestyle   ? Physical activity:     Days per week: Not on file     Minutes per session: Not on file   ? Stress: Not on file   Relationships   ? Social connections:     Talks on phone: Not on file     Gets together: Not on file     Attends Rastafarian service: Not on file     Active member of club or organization: Not on file     Attends meetings of clubs or organizations: Not on file     Relationship status: Not on file   ? Intimate partner violence:     Fear of current or ex partner: Not on file     Emotionally abused: Not on file     Physically abused: Not on file     Forced sexual activity: Not on file   Other Topics Concern   ? Not on file   Social History Narrative   ? Not on file        Physical Exam  Vitals: Blood pressure 100/64, pulse 80, temperature 98.2  F (36.8  C), temperature source Oral, resp. rate 16, height 5' 6\" (1.676 m), weight (!) 245 lb (111.1 kg).  General: This is a 64 y.o. female who appears their reported age, not in acute distress  Head: normocephalic, Atraumatic; wearing glasses; non-icteric sclera; no exudate; mild hearing loss   Respiratory: fine rhonchi and inspiratory wheezes appreciated in the LLL and KEVON otherwise Clear throughout all lung fields; unlabored breathing; no cough   Cardiac: Regular, Rate and Rhythm, no murmurs appreciated   Skin: Uniformly warm and dry  Psych: Alert and oriented x4; calm and cooperative throughout exam  Extremities: bilateral legs with +2 pitting edema; tissues feel firm and fibrotic; significant varicosities and spider veins about the legs; nails well trimmed; diminished hair growth on the legs; right anterior knee with several full thickness ulcers which were covered with slough; these did not communicate; no surrounding erythema; strength " testing revealed 4/4 to BLEs.    Sensation: Intact to pinprick and light touch throughout lower extremities bilaterally       Peripheral Vascular: normal dorsalis pedis, posterior tibial pulses to right foot , using a handheld doppler these were strong; easily found and biphasic in nature.  Good capillary refill. No unusual venous distention. Positive for spider veins, telangiectasias, hemosiderin deposition or hyperpigmentation and fibrosis or scarring       Circumferential volume measures:    [facility] Edema 7/8/2019   Right just above MTP 22.5   Right Ankle 25.8   Right Widest Calf 42.5   Left - just above MTP 23.5   Left Ankle 25.2   Left Widest Calf 41.4       Ulceration(s)/Wound(s):     [facility] Wound 07/08/19 right knee superior (Active)   Pre Size Length 0.4 7/8/2019  2:00 PM   Pre Size Width 0.3 7/8/2019  2:00 PM   Pre Size Depth 0.1 7/8/2019  2:00 PM   Pre Total Sq cm 0.12 7/8/2019  2:00 PM       [facility] Wound 07/08/19 right knee middle proximal (below superior) (Active)   Pre Size Length 1.3 7/8/2019  2:00 PM   Pre Size Width 0.8 7/8/2019  2:00 PM   Pre Size Depth 0.1 7/8/2019  2:00 PM   Pre Total Sq cm 1.04 7/8/2019  2:00 PM       [facility] Wound 07/08/19 right knee middle distal (above inferior) (Active)   Pre Size Length 0.5 7/8/2019  2:00 PM   Pre Size Width 0.4 7/8/2019  2:00 PM   Pre Size Depth 0.1 7/8/2019  2:00 PM   Pre Total Sq cm 0.2 7/8/2019  2:00 PM       [facility] Wound 07/08/19 right knee inferior (Active)   Pre Size Length 0.4 7/8/2019  2:00 PM   Pre Size Width 0.2 7/8/2019  2:00 PM   Pre Size Depth 0.1 7/8/2019  2:00 PM   Pre Total Sq cm 0.08 7/8/2019  2:00 PM       Laboratory studies:   No results found for: SEDRATE  Lab Results   Component Value Date    CREATININE 0.73 12/22/2012     No results found for: HGBA1C  Lab Results   Component Value Date    BUN 9 12/22/2012     No results found for: LABPROT, LABALUM, ALBUMIN  No results found for: [id number]          Impression:   1. Tobacco abuse disorder     2.  Morbid obesity (H)     3. Status post right knee replacement     4. Dehiscence of operative wound, initial encounter     5. Venous insufficiency of both lower extremities     6. Venous hypertension of both lower extremities       7/8/19 right anterior knee          Assessment/Plan:  1. Debridement: After discussion of risk factors and verbal consent was obtained 2% Lidocaine HCL jelly was applied, under clean conditions, the right anterior knee ulceration(s) were debrided using currette. Devitalized and nonviable tissue, along with any fibrin and slough, was removed to improve granulation tissue formation, stimulate wound healing, decrease overall bacteria load, disrupt biofilm formation and decrease edge senescence.  Total excisional debridement was 1.44 sq cm from the epidermis/dermis area and into the subcutaneous tissue with a depth of 0.3 cm.   Ulcers were improved afterwards and .  Measures were unchanged after debridement.    3. Treatment: wound treatment will include irrigation and dressings to promote autolytic debridement which will include: medihoney algiante to the wounds; cover with allevyn border    3. Edema. Wound benefit from venous work up she declined today; she would benefit from compression on the legs; she was very resistive to this; will trial her in tubular compression with short stretch from the base of the toes to the mid thigh    4. Offloading: keep direct pressure off the wound    5. Nutrition: focus on protein    6. Tobacco: we spoke about the detrimental effects smoking has on circulation and wound healing; she is not willing to cut back at this time. Time spent in education and counseling was between 3-7 minutes    Patient to return to clinic in 3 week(s) for re-evaluation. They were instructed to call the clinic sooner with any further questions or concerns. Answered all questions.    April Sheth DNP, RN, CNP, [facility]  [facility]  [phone]      This note  was electronically signed by April Sheth

## 2021-07-20 NOTE — PROGRESS NOTES
Progress Notes by April Sheth NP at 7/29/2019  9:40 AM     Author: April Sheth NP Service: -- Author Type: Nurse Practitioner    Filed: 7/29/2019 10:43 AM Encounter Date: 7/29/2019 Status: Signed    : April Sheth NP (Nurse Practitioner)       Follow up Vascular Visit       Date of Service:7/29/2019    Date Last Seen: 7/8/2019; 7/8/2019    Chief Complaint: right knee disruption of surgical incision        Pt returns to the [facility] with regards to their disruption of right knee surgical incision. She arrives alone. Was last seen 4 weeks ago; we started her on medihoney and bordered foam; pt reports that she could not afford the dressings as she has not met her deductible and has to pay oop for these. So we opted to use santyl which was a $50 copay and using medipore + pad; changing daily; this is going well. Using teds, or tubular compression with short stretch for compression. Denies fevers, chills. 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     Allergies: Acetaminophen and Codeine    Medications:   Current Outpatient Medications:   ?  clotrimazole (LOTRIMIN) 1 % cream, Apply topically as needed.    , Disp: , Rfl:   ?  collagenase ointment, Apply Santyl to wound daily; apply thick layer (luciano thickness), Disp: 30 g, Rfl: 3  ?  docosahexanoic acid/epa (FISH OIL ORAL), Take by mouth., Disp: , Rfl:   ?  fluticasone propionate (FLONASE) 50 mcg/actuation nasal spray, 1 spray into each nostril as needed.    , Disp: , Rfl:   ?  gabapentin (NEURONTIN) 600 MG tablet, TAKE 1 TABLET BY MOUTH IN THE MORNING AND 1 TABLET IN THE AFTERNOON AND 2 TABLETS IN THE EVENING, Disp: , Rfl:   ?  ginkgo biloba 40 mg Tab, Take by mouth., Disp: , Rfl:   ?  HYDROmorphone (DILAUDID) 2 MG tablet, Take 2 mg by mouth once as needed for pain., Disp: , Rfl:   ?  hydrOXYzine pamoate (VISTARIL) 25 MG capsule, Take 25 mg by mouth 4 (four) times a day as  needed for itching., Disp: , Rfl:   ?  lisinopril-hydrochlorothiazide (PRINZIDE,ZESTORETIC) 20-25 mg per tablet, Take 1 tablet by mouth., Disp: , Rfl:   ?  magnesium 250 mg Tab, Take 250 mg by mouth., Disp: , Rfl:   ?  multivitamin capsule, Take 2 capsules by mouth., Disp: , Rfl:   ?  mupirocin (BACTROBAN) 2 % ointment, , Disp: , Rfl: 0  ?  naproxen (NAPROSYN) 500 MG tablet, Take 500 mg by mouth., Disp: , Rfl:   ?  omeprazole (PRILOSEC) 20 MG capsule, Take 20 mg by mouth., Disp: , Rfl:   ?  POTASSIUM ORAL, Take by mouth., Disp: , Rfl:   ?  ursodiol (ACTIGALL) 500 MG tablet, Take 500 mg by mouth., Disp: , Rfl:   ?  VITAMIN B COMPLEX ORAL, Take 1 tablet by mouth., Disp: , Rfl:     Current Facility-Administered Medications:   ?  lidocaine 2 % jelly (XYLOCAINE), , Topical, PRN, April Sheth NP, 1 application at 07/08/19 6487    History:   Past Medical History:   Diagnosis Date   ? Allergic conjunctivitis of both eyes 7/28/2015   ? Allergic rhinitis due to allergen 7/28/2015   ? Arthralgia of left hip 10/1/2015   ? Benign essential hypertension 9/5/2007   ? Biliary cirrhosis (H) 9/5/2007    Followed by Dr. Martini LFTs q 6 mos DEXA q 2 years   ? Dysthymic disorder 6/13/2017   ? Midline low back pain without sciatica 9/5/2007   ? Morbid obesity (H) 3/19/2018   ? Neck pain 9/5/2007   ? SHAILESH (obstructive sleep apnea) 3/14/2011    Mild (PSG 3/8/2011: AHI 10.5, kushal desat 83%)   ? Routine general medical examination at a health care facility 9/5/2007    DEXA 3/06 normal, due 3/08 colonoscopy 10/07, due 2012 Pap 8-21-08   ? S/P total hip arthroplasty 10/1/2015   ? Tobacco abuse disorder 3/19/2018       Physical Exam:    /78   Pulse 80   Resp 16     General:  Patient presents to clinic in no apparent distress.  Head: normocephalic atraumatic  Psychiatric:  Alert and oriented x3.   Respiratory: unlabored breathing; no cough  Integumentary:  Skin is uniformly warm, dry and pink.    Right anterior knee incision with x3  areas of full thickness tissue loss; these are all smaller; slough covered; no surrounding rash or erythema; no pain with palpation or debridement; no odor; no crepitus; no pus.    Circumferential volume measures:    [facility] Edema 7/8/2019 7/29/2019   Right just above MTP 22.5 23   Right Ankle 25.8 24.5   Right Widest Calf 42.5 41   Left - just above MTP 23.5 23   Left Ankle 25.2 24.5   Left Widest Calf 41.4 39.8       Ulceration(s)/Wound(s):     [facility] Wound 07/08/19 right knee superior (Active)   Pre Size Length 0.3 7/29/2019  9:00 AM   Pre Size Width 0.2 7/29/2019  9:00 AM   Pre Size Depth 0.1 7/29/2019  9:00 AM   Pre Total Sq cm 0.06 7/29/2019  9:00 AM       [facility] Wound 07/08/19 right knee middle proximal (below superior) (Active)   Pre Size Length 0.6 7/29/2019  9:00 AM   Pre Size Width 0.4 7/29/2019  9:00 AM   Pre Size Depth 0.1 7/29/2019  9:00 AM   Pre Total Sq cm 0.24 7/29/2019  9:00 AM       [facility] Wound 07/08/19 right knee middle distal (above inferior) (Active)   Pre Size Length 0.3 7/29/2019  9:00 AM   Pre Size Width 0.2 7/29/2019  9:00 AM   Pre Size Depth 0.1 7/29/2019  9:00 AM   Pre Total Sq cm 0.06 7/29/2019  9:00 AM       [facility] Wound 07/08/19 right knee inferior (Active)   Pre Size Length 0.4 7/8/2019  2:00 PM   Pre Size Width 0.2 7/8/2019  2:00 PM   Pre Size Depth 0.1 7/8/2019  2:00 PM   Pre Total Sq cm 0.08 7/8/2019  2:00 PM   Description scab 7/29/2019  9:00 AM        Lab Values    No results found for: SEDRATE  Lab Results   Component Value Date    CREATININE 0.73 12/22/2012     No results found for: HGBA1C  Lab Results   Component Value Date    BUN 9 12/22/2012     No results found for: LABPROT, LABALUM, ALBUMIN  No results found for: [id number]          Impression:  1. Dehiscence of operative wound, initial encounter     2. Tobacco abuse disorder     3. Morbid obesity (H)     4. Status post right knee replacement     5. Skin ulcer of right knee with fat layer exposed (H)         7/29/19 right anterior  knee       7/8/19 right anterior knee            Are any of these wounds new today: No; Location: na    Assessment/Plan:          1. Debridement: After discussion of risk factors and verbal consent was obtained 2% Lidocaine HCL jelly was applied, under clean conditions, the right knee ulceration(s) were debrided using currette. Devitalized and nonviable tissue, along with any fibrin and slough, was removed to improve granulation tissue formation, stimulate wound healing, decrease overall bacteria load, disrupt biofilm formation and decrease edge senescence.  Total excisional debridement was 0.36 sq cm from the epidermis/dermis area and into the subcutaneous tissue with a depth of 0.2 cm.   Ulcers were improved afterwards and .  Measures were unchanged after debridement.       2.  Wound treatment: wound treatment will include irrigation and dressings to promote autolytic debridement which will include:has not met deductible for insurance; wound care supplies not covered at this time; will continue the santyl ointment; cover with medipore +pad change daily           3. Edema: continue tubular compression with short stretch; elevation. The compression wraps were applied today in clinic.              Compression Applied: Tubigrip           4. Nutrition: focus on protein           5. Offloading: na     Patient will follow up with me PRN for reevaluation pt states that her 3rd job is restarting September 3 and she is going to [place] at the end of August; so it will be very difficult for her to make follow up appt. They were instructed to call the clinic sooner with any signs or symptoms of infection or any further questions/concerns. Answered all questions.    April Sheth DNP, RN, CNP, CWN  [facility]  [phone]        This note was electronically signed by April Sheth

## 2021-07-20 NOTE — PATIENT INSTRUCTIONS - HE
Patient Instructions by Aprli Sheth NP at 7/8/2019  2:00 PM     Author: April Sheth NP Service: -- Author Type: Nurse Practitioner    Filed: 7/8/2019  2:36 PM Encounter Date: 7/8/2019 Status: Addendum    : April Sheth NP (Nurse Practitioner)    Related Notes: Original Note by April Sheth NP (Nurse Practitioner) filed at 7/8/2019  2:36 PM       Wound Care Instructions    every 3 days Cleanse your right knee wound(s) with Normal Saline     Pat Dry    Apply Cavilon no sting barrier wipe to the skin surrounding the wound    Apply medihoney alginate into/onto the wounds; try to avoid getting on intact skin    Cover with Allevyn bordered foam adhesive; apply in a bent position    Compression: tubular compression from base of toes to mid thigh; then short stretch    It is not ok to get your wound wet in the bath or shower    SEEK MEDICAL CARE IF:    You have an increase in swelling, pain, or redness around the wound.    You have an increase in the amount of pus coming from the wound.    There is a bad smell coming from the wound.    The wound appears to be worsening/enlarging    You have a fever greater than 101.5 F      April Sheth DNP, RN, CNP, [facility]  [phone]      It is recommended that you do not get your ulcer wet when showering.  Listed below are several ways of keeping it dry when you shower.     1. Wrap it with Press [employer] Seal plastic wrap.  It can be found in the stores where the plastic wraps or tin foil is kept.    2.  Some people take a bath and hang their leg/foot out of the tub.    3  Put your leg in a plastic bag and tape it on.         4. You can purchase a shower cover for casts at some pharmacies and through the Internet.

## 2021-07-20 NOTE — LETTER
Letter by April Sheth NP at      Author: April Sheth NP Service: -- Author Type: --    Filed:  Encounter Date: 2019 Status: (Other)       2019    [facility]  Fax: [phone] Wound Dressing Rx and Order Form  Customer Service: [phone] Order Status: New Order   Verbal: jewel            Patient Info:  Name: Corina Martinez  : 1955  Address:   [address] [place] [address]  Phone: [phone]      Insurance Info:  Primary: Payor: PREFERRED ONE / Plan: PREFERED ONE INDIVIDUAL PLANS / Product Type: ACO /    Secondary: N/A N/A  [id number] - (Preferred One)      Physician Info:   Name:  April Sheth NP   Dept Address/Phones:   [address] [place] [address], [address] [address] [phone]  Fax: [phone]    Lymphedema circumferential measurements (in cm):  No data recorded  No data recorded  No data recorded  No data recorded  No data recorded  No data recorded  No data recorded  No data recorded  Right just above MTP: 22.5    Right Ankle: 25.8    Right Widest Calf: 42.5    No data recorded  Left - just above MTP: 23.5    Left Ankle: 25.2    Left Widest Calf: 41.4    No data recorded    Wound info:  Encounter Diagnoses   Name Primary?   ? Tobacco abuse disorder Yes   ? Morbid obesity (H)    ? Status post right knee replacement    ? Dehiscence of operative wound, initial encounter    ? Venous insufficiency of both lower extremities    ? Venous hypertension of both lower extremities      VASC Wound 19 right knee superior (Active)   Pre Size Length 0.4 2019  2:00 PM   Pre Size Width 0.3 2019  2:00 PM   Pre Size Depth 0.1 2019  2:00 PM   Pre Total Sq cm 0.12 2019  2:00 PM       VASC Wound 19 right knee middle proximal (below superior) (Active)   Pre Size Length 1.3 2019  2:00 PM   Pre Size Width 0.8 2019  2:00 PM   Pre Size Depth 0.1 2019  2:00 PM   Pre Total Sq cm 1.04 2019   2:00 PM       VASC Wound 07/08/19 right knee middle distal (above inferior) (Active)   Pre Size Length 0.5 7/8/2019  2:00 PM   Pre Size Width 0.4 7/8/2019  2:00 PM   Pre Size Depth 0.1 7/8/2019  2:00 PM   Pre Total Sq cm 0.2 7/8/2019  2:00 PM       VASC Wound 07/08/19 right knee inferior (Active)   Pre Size Length 0.4 7/8/2019  2:00 PM   Pre Size Width 0.2 7/8/2019  2:00 PM   Pre Size Depth 0.1 7/8/2019  2:00 PM   Pre Total Sq cm 0.08 7/8/2019  2:00 PM     Drainage: Moderate  Thickness: full thickness  Duration of Need: 30  Days Supply: 30  Start Date: 7/12/19  Starter Kit: Ancillary Kit (saline, gloves, gauze)  Qualifying wound/Debridement Yes      Dressing Type Brand Size Number of pieces Frequency of change   Primary medipore + pad  2 x 2 1/4 in 30 Daily                                           Secondary                                        Tape                          Note: If total out of pocket is more than $50.00 please contact the patient before processing order.     OK to forward to covered supplier.     Electronically Signed Physician: April Sheth NP Date: 7/12/2019

## 2021-07-20 NOTE — PATIENT INSTRUCTIONS - HE
Wound Care Instructions    daily Cleanse your right knee wound(s) with Normal Saline     Pat Dry    Apply santyl into/onto the wounds    Cover with medipore +pad     Secure with roll gauze as needed    Compression: tubular compression with short stretch or teds whichever is most comfortable    It is not ok to get your wound wet in the bath or shower    SEEK MEDICAL CARE IF:    You have an increase in swelling, pain, or redness around the wound.    You have an increase in the amount of pus coming from the wound.    There is a bad smell coming from the wound.    The wound appears to be worsening/enlarging    You have a fever greater than 101.5 F      April Sheth DNP, RN, CNP, [facility]  [facility]  [phone]

## 2021-07-20 NOTE — LETTER
Letter by April Sheth NP at      Author: April Sheth NP Service: -- Author Type: --    Filed:  Encounter Date: 2019 Status: (Other)       2019    [facility]  Fax: [phone] Wound Dressing Rx and Order Form  Customer Service: [phone] Order Status: New Order   Verbal: Amanda     Patient Info:  Name: Corina Martinez  : 1955  Address:   [address] [place] [address]  Phone: [phone]    Insurance Info:  Primary: Payor: PREFERRED ONE / Plan: PREFERED ONE INDIVIDUAL PLANS / Product Type: ACO /    Secondary: N/A N/A  [id number] - (Preferred One)    Physician Info:   Name:  April Sheth NP   Dept Address/Phones:   [facility], [address] [address] [phone]  Fax: [phone]    Lymphedema circumferential measurements (in cm):    Right just above MTP: 22.5    Right Ankle: 25.8    Right Widest Calf: 42.5    No data recorded  Left - just above MTP: 23.5    Left Ankle: 25.2    Left Widest Calf: 41.4    No data recorded    Wound info:  Encounter Diagnoses   Name Primary?   ? Tobacco abuse disorder Yes   ? Morbid obesity (H)    ? Status post right knee replacement    ? Dehiscence of operative wound, initial encounter    ? Venous insufficiency of both lower extremities    ? Venous hypertension of both lower extremities      VASC Wound 19 right knee superior (Active)   Pre Size Length 0.4 2019  2:00 PM   Pre Size Width 0.3 2019  2:00 PM   Pre Size Depth 0.1 2019  2:00 PM   Pre Total Sq cm 0.12 2019  2:00 PM       VASC Wound 19 right knee middle proximal (below superior) (Active)   Pre Size Length 1.3 2019  2:00 PM   Pre Size Width 0.8 2019  2:00 PM   Pre Size Depth 0.1 2019  2:00 PM   Pre Total Sq cm 1.04 2019  2:00 PM       VASC Wound 19 right knee middle distal (above inferior) (Active)   Pre Size Length 0.5 2019  2:00 PM   Pre Size Width 0.4 2019   2:00 PM   Pre Size Depth 0.1 7/8/2019  2:00 PM   Pre Total Sq cm 0.2 7/8/2019  2:00 PM       VASC Wound 07/08/19 right knee inferior (Active)   Pre Size Length 0.4 7/8/2019  2:00 PM   Pre Size Width 0.2 7/8/2019  2:00 PM   Pre Size Depth 0.1 7/8/2019  2:00 PM   Pre Total Sq cm 0.08 7/8/2019  2:00 PM     Drainage: Moderate  Thickness:  Full  Duration of Need: 30  Days Supply: 30  Start Date: 7/8/19  Starter Kit: Ancillary Kit (saline, gloves, gauze)  Qualifying wound/Debridement Yes ( All wounds)     Dressing Type Brand Size Number of pieces Frequency of change   Primary Manuka honey hd supra lite hd  4x5 Send 2 3 times per week (use on all wounds)   Secondary Allevyn Foam adhesive  5x5 Send 12 3 times a week (Will cover all wounds)     Note: If total out of pocket is more than $50.00 please contact the patient before processing order.     OK to forward to covered supplier.     Electronically Signed Physician: pAril Sheth NP Date: 7/8/2019

## 2021-07-25 ENCOUNTER — HEALTH MAINTENANCE LETTER (OUTPATIENT)
Age: 66
End: 2021-07-25

## 2021-07-27 ENCOUNTER — CARE COORDINATION (OUTPATIENT)
Dept: CARDIOLOGY | Facility: CLINIC | Age: 66
End: 2021-07-27

## 2021-07-27 DIAGNOSIS — I50.9 CHF (CONGESTIVE HEART FAILURE) (H): Primary | ICD-10-CM

## 2021-07-28 ENCOUNTER — ANTICOAGULATION THERAPY VISIT (OUTPATIENT)
Dept: ANTICOAGULATION | Facility: OTHER | Age: 66
End: 2021-07-28
Attending: FAMILY MEDICINE
Payer: MEDICARE

## 2021-07-28 DIAGNOSIS — Z79.01 ANTICOAGULATION MONITORING, INR RANGE 2-3: ICD-10-CM

## 2021-07-28 DIAGNOSIS — I48.91 ATRIAL FIBRILLATION (H): Primary | ICD-10-CM

## 2021-07-28 DIAGNOSIS — I48.91 ATRIAL FIBRILLATION WITH RVR (H): ICD-10-CM

## 2021-07-28 LAB — INR POINT OF CARE: 2.6 (ref 0.9–1.1)

## 2021-07-28 PROCEDURE — 36416 COLLJ CAPILLARY BLOOD SPEC: CPT | Mod: ZL

## 2021-07-28 NOTE — PROGRESS NOTES
ANTICOAGULATION FOLLOW-UP CLINIC VISIT    Patient Name:  Corina Martinez  Date:  2021  Contact Type:  Face to Face    SUBJECTIVE:  Patient Findings     Positives:  Upcoming invasive procedure (having ANTONIO and angiogram next week already directed to hold warfarin x 4 days)        Clinical Outcomes     Negatives:  Major bleeding event, Thromboembolic event, Anticoagulation-related hospital admission, Anticoagulation-related ED visit, Anticoagulation-related fatality           OBJECTIVE    Recent labs: (last 7 days)     21  1527   INR 2.6*       ASSESSMENT / PLAN  INR assessment THER    Recheck INR In: 2 WEEKS    INR Location Clinic      Anticoagulation Summary  As of 2021    INR goal:  2.0-3.0   TTR:  85.0 % (1.7 mo)   INR used for dosin.6 (2021)   Warfarin maintenance plan:  5 mg (5 mg x 1) every Mon, Wed, Fri; 2.5 mg (5 mg x 0.5) all other days   Full warfarin instructions:  8/1: Hold; 8/2: Hold; 8/3: Hold; 8/4: Hold; Otherwise 5 mg every Mon, Wed, Fri; 2.5 mg all other days   Weekly warfarin total:  25 mg   Plan last modified:  Zunilda Jimenez, RN (2021)   Next INR check:  2021   Priority:  High   Target end date:  Indefinite    Indications    Atrial fibrillation (H) [I48.91]  Anticoagulation monitoring  INR range 2-3 [Z79.01]  Atrial fibrillation with RVR (H) [I48.91]             Anticoagulation Episode Summary     INR check location:      Preferred lab:      Send INR reminders to:  [facility]    Comments:        Anticoagulation Care Providers     Provider Role Specialty Phone number    Bijal Torres, APRN CNP Responsible Cardiovascular Disease [phone]            See the Encounter Report to view Anticoagulation Flowsheet and Dosing Calendar (Go to Encounters tab in chart review, and find the Anticoagulation Therapy Visit)        Zunilda Jimenez, RN

## 2021-07-30 ENCOUNTER — TELEPHONE (OUTPATIENT)
Dept: CARDIOLOGY | Facility: OTHER | Age: 66
End: 2021-07-30

## 2021-07-30 DIAGNOSIS — I50.9 CHF (CONGESTIVE HEART FAILURE) (H): ICD-10-CM

## 2021-07-30 DIAGNOSIS — I10 BENIGN ESSENTIAL HYPERTENSION: Primary | ICD-10-CM

## 2021-07-30 RX ORDER — METOPROLOL TARTRATE 50 MG
50 TABLET ORAL 2 TIMES DAILY
Qty: 180 TABLET | Refills: 0 | Status: ON HOLD | OUTPATIENT
Start: 2021-07-30 | End: 2021-09-22

## 2021-07-30 NOTE — TELEPHONE ENCOUNTER
Called and verified with patient patient did have prescription for Metoprolol tartrate 25mg taking 2 tablets (50 mg) by mouth 2 times daily.  Patient's last refill was done at [facility] for Metoprolol tartrate 50mg taking 1 tablet by mouth twice daily.  Will send the the most recent prescription same dosages daily just different tablet size.  Summer Hobson RN on 7/30/2021 at 3:52 PM    Needing to get a refill on metoprolol, please call into [facility], call if questions, thanks!    Shiela Pandya on 7/30/2021 at 2:58 PM

## 2021-08-03 ENCOUNTER — OFFICE VISIT (OUTPATIENT)
Dept: CARDIOLOGY | Facility: CLINIC | Age: 66
End: 2021-08-03
Attending: INTERNAL MEDICINE
Payer: MEDICARE

## 2021-08-03 ENCOUNTER — LAB (OUTPATIENT)
Dept: LAB | Facility: CLINIC | Age: 66
End: 2021-08-03
Attending: INTERNAL MEDICINE
Payer: COMMERCIAL

## 2021-08-03 VITALS
BODY MASS INDEX: 32.33 KG/M2 | WEIGHT: 206 LBS | DIASTOLIC BLOOD PRESSURE: 75 MMHG | SYSTOLIC BLOOD PRESSURE: 113 MMHG | OXYGEN SATURATION: 97 % | HEART RATE: 110 BPM | HEIGHT: 67 IN

## 2021-08-03 DIAGNOSIS — I50.9 ACUTE ON CHRONIC CONGESTIVE HEART FAILURE, UNSPECIFIED HEART FAILURE TYPE (H): ICD-10-CM

## 2021-08-03 DIAGNOSIS — Z11.59 ENCOUNTER FOR SCREENING FOR OTHER VIRAL DISEASES: ICD-10-CM

## 2021-08-03 DIAGNOSIS — I50.9 CHF (CONGESTIVE HEART FAILURE) (H): ICD-10-CM

## 2021-08-03 DIAGNOSIS — Z79.01 LONG TERM CURRENT USE OF ANTICOAGULANT THERAPY: ICD-10-CM

## 2021-08-03 DIAGNOSIS — I10 BENIGN ESSENTIAL HYPERTENSION: ICD-10-CM

## 2021-08-03 DIAGNOSIS — I51.89 OTHER ILL-DEFINED HEART DISEASES: Primary | ICD-10-CM

## 2021-08-03 DIAGNOSIS — I34.2 NONRHEUMATIC MITRAL VALVE STENOSIS: ICD-10-CM

## 2021-08-03 LAB
ANION GAP SERPL CALCULATED.3IONS-SCNC: 3 MMOL/L (ref 3–14)
APTT PPP: 37 SECONDS (ref 22–38)
BUN SERPL-MCNC: 12 MG/DL (ref 7–30)
CALCIUM SERPL-MCNC: 9.3 MG/DL (ref 8.5–10.1)
CHLORIDE BLD-SCNC: 107 MMOL/L (ref 94–109)
CO2 SERPL-SCNC: 31 MMOL/L (ref 20–32)
CREAT SERPL-MCNC: 0.8 MG/DL (ref 0.52–1.04)
ERYTHROCYTE [DISTWIDTH] IN BLOOD BY AUTOMATED COUNT: 20.5 % (ref 10–15)
GFR SERPL CREATININE-BSD FRML MDRD: 77 ML/MIN/1.73M2
GLUCOSE BLD-MCNC: 99 MG/DL (ref 70–99)
HCT VFR BLD AUTO: 42.7 % (ref 35–47)
HGB BLD-MCNC: 13 G/DL (ref 11.7–15.7)
INR PPP: 2.11 (ref 0.85–1.15)
MCH RBC QN AUTO: 25.7 PG (ref 26.5–33)
MCHC RBC AUTO-ENTMCNC: 30.4 G/DL (ref 31.5–36.5)
MCV RBC AUTO: 84 FL (ref 78–100)
NT-PROBNP SERPL-MCNC: 1665 PG/ML (ref 0–125)
PLATELET # BLD AUTO: 289 10E3/UL (ref 150–450)
POTASSIUM BLD-SCNC: 4.5 MMOL/L (ref 3.4–5.3)
RBC # BLD AUTO: 5.06 10E6/UL (ref 3.8–5.2)
SODIUM SERPL-SCNC: 141 MMOL/L (ref 133–144)
WBC # BLD AUTO: 7.8 10E3/UL (ref 4–11)

## 2021-08-03 PROCEDURE — 85730 THROMBOPLASTIN TIME PARTIAL: CPT | Performed by: PATHOLOGY

## 2021-08-03 PROCEDURE — 85610 PROTHROMBIN TIME: CPT | Performed by: PATHOLOGY

## 2021-08-03 PROCEDURE — 85027 COMPLETE CBC AUTOMATED: CPT | Performed by: PATHOLOGY

## 2021-08-03 PROCEDURE — 80048 BASIC METABOLIC PNL TOTAL CA: CPT | Performed by: PATHOLOGY

## 2021-08-03 PROCEDURE — G0463 HOSPITAL OUTPT CLINIC VISIT: HCPCS

## 2021-08-03 PROCEDURE — 83880 ASSAY OF NATRIURETIC PEPTIDE: CPT | Performed by: PATHOLOGY

## 2021-08-03 PROCEDURE — 99204 OFFICE O/P NEW MOD 45 MIN: CPT | Mod: GC | Performed by: INTERNAL MEDICINE

## 2021-08-03 PROCEDURE — 36415 COLL VENOUS BLD VENIPUNCTURE: CPT | Performed by: PATHOLOGY

## 2021-08-03 RX ORDER — LIDOCAINE 40 MG/G
CREAM TOPICAL
Status: CANCELLED | OUTPATIENT
Start: 2021-08-03

## 2021-08-03 ASSESSMENT — PAIN SCALES - GENERAL: PAINLEVEL: MILD PAIN (2)

## 2021-08-03 ASSESSMENT — MIFFLIN-ST. JEOR: SCORE: 1499.1

## 2021-08-03 NOTE — NURSING NOTE
Dr. Real is recommending adding a right heart cath to CORS procedure scheduled for this Thursday. Right heart cath case request entered in to [facility] and cath lab called and notified.

## 2021-08-03 NOTE — PATIENT INSTRUCTIONS
Dr. Real recommends:    Follow up clinic visit with labs same day on 8/13/21, labs at 12:00, clinic visit at 12:30.      Thank you for your visit today.  Please call me with any questions or concerns.   Benton Manzo RN  Cardiology Care Coordinator  [phone]

## 2021-08-03 NOTE — LETTER
8/3/2021      RE: Corina Martinez  [address] [place] [address]       Dear Colleague,    Thank you for the opportunity to participate in the care of your patient, Corina Martinez, at the [facility] [place] [facility]. Please see a copy of my visit note below.    August 3, 2021     Ms. Martinez is a 66 year old female with PMHX of afib w/RVR, MR, SHAILESH, PBC, cholescystecomty, TKA in 2008, CBP s/p back surgeryeis. and perciardial effusion who presents today for HF optmization.     Ms. Martinez presented to the ED on 5/17/2021 for chest pain, SOB, palpitations. EKG revealed afib w/RVR. CTA was negative for PE, however there was noted pleural effusion. She was then transferred to [place] where she underwent DCCVx1 on 5/20/2021, she remained in NSR for 2 hours. Underwent subsequent DCCV on 5/21/2021 she remained in NSR and then converted back into afib after 24hrs. TTE revealed an EF: 60%, with progressive MS with mean gradient of 6mmHG and an RVSP of 48mmHg. She was discharged on oral metop, coumadin, dilt and amio for her afib. She was admitted again on 6/8/2021 for afib w/RVR. EKG revealed afib w/RVR. TTE revealed large pericardial effusion with no tamponade physiology. She was discharged on both rate and rhythm control, sans pericardiocentesis and followed up withoutpatient Cardiology.    She followed up with [facility] in [place]. Patient remained on rate and rhythm control for afib. She was referred to CVTS due to progressive MR+afib TTE on 6/26 revealed degenarative MS with significant calcification and mean gradient of 10mmHg(with HR of 100BPM suggestive of high flow state). During this visit, it was thought that her  afib needed additional optimzation and as such she was referred to ur clinic.    On presentation today, she states that she is still having SOB. She states that she is using home oxygen when she exerts herself. She  states that her baseline weight now is 210lbs. Her weight today is 207lbs. She denies PND, but endorses 3 pillow orthopnea. She denies lower extremity swelling.     PAST MEDICAL HISTORY:  As detailed above    FAMILY HISTORY:  Family History   Problem Relation Age of Onset     Cancer Father      Lymphoma Father      Depression Mother      Respiratory Mother      Emphysema Mother      SOCIAL HISTORY:  Social History     Socioeconomic History     Marital status:      Spouse name: Not on file     Number of children: Not on file     Years of education: Not on file     Highest education level: Not on file   Occupational History     Not on file   Tobacco Use     Smoking status: Current Some Day Smoker     Types: Cigarettes     Smokeless tobacco: Never Used     Tobacco comment: pt has been working hard on quiting   Substance and Sexual Activity     Alcohol use: Yes     Comment: social     Drug use: No     Sexual activity: Not on file   Other Topics Concern     Parent/sibling w/ CABG, MI or angioplasty before 65F 55M? Not Asked   Social History Narrative     Not on file     Social Determinants of Health     Financial Resource Strain:      Difficulty of Paying Living Expenses:    Food Insecurity:      Worried About Running Out of Food in the Last Year:      Ran Out of Food in the Last Year:    Transportation Needs:      Lack of Transportation (Medical):      Lack of Transportation (Non-Medical):    Physical Activity:      Days of Exercise per Week:      Minutes of Exercise per Session:    Stress:      Feeling of Stress :    Social Connections:      Frequency of Communication with Friends and Family:      Frequency of Social Gatherings with Friends and Family:      Attends Taoist Services:      Active Member of Clubs or Organizations:      Attends Club or Organization Meetings:      Marital Status:    Intimate Partner Violence:      Fear of Current or Ex-Partner:      Emotionally Abused:      Physically Abused:       "Sexually Abused:      CURRENT MEDICATIONS:  Current Outpatient Medications   Medication     amiodarone (PACERONE) 200 MG tablet     diclofenac (VOLTAREN) 1 % topical gel     diltiazem ER COATED BEADS (CARDIZEM CD/CARTIA XT) 240 MG 24 hr capsule     diphenhydrAMINE (BENADRYL) 25 MG tablet     DULoxetine (CYMBALTA) 30 MG capsule     fluticasone (FLONASE) 50 MCG/ACT nasal spray     gabapentin (NEURONTIN) 600 MG tablet     gabapentin (NEURONTIN) 600 MG tablet     hydrOXYzine (ATARAX) 25 MG tablet     magnesium 250 MG tablet     Melatonin 10 MG TABS tablet     metoprolol tartrate (LOPRESSOR) 50 MG tablet     Multiple Vitamins-Minerals (MULTIVITAMIN ADULTS 50+) TABS     omeprazole (PRILOSEC) 20 MG CR capsule     torsemide (DEMADEX) 10 MG tablet     Ursodiol 500 MG TABS     vitamin B complex with vitamin C (VITAMIN  B COMPLEX) tablet     vitamin D3 (CHOLECALCIFEROL) 50 mcg (2000 units) tablet     warfarin ANTICOAGULANT (COUMADIN) 5 MG tablet     No current facility-administered medications for this visit.     ROS:   Constitutional: No fever, chills, or sweats. Weight is 206 lbs 0 oz  ENT: No visual disturbance, ear ache, epistaxis, sore throat.   Allergies/Immunologic: Negative.   Respiratory: No cough, hemoptysis.   Cardiovascular: As per HPI.   GI: No nausea, vomiting, hematemesis, melena, or hematochezia.   : No urinary frequency, dysuria, or hematuria.   Integument: Negative.   Psychiatric: Pleasant, no major depression noted  Neuro: No focal neurological deficits noted  Endocrinology: Negative.   Musculoskeletal: As per HPI.      EXAM:  /75 (BP Location: Right arm, Patient Position: Chair, Cuff Size: Adult Regular)   Pulse 110   Ht 1.689 m (5' 6.5\")   Wt 93.4 kg (206 lb)   LMP  (LMP Unknown)   SpO2 97%   BMI 32.75 kg/m    General: appears comfortable, alert and oriented  Head: normocephalic, atraumatic  Eyes: anicteric sclera, EOMI , PERRL  Neck: no adenopathy  Orophyarynx: moist mucosa, no lesions " noted  Heart: regular, S1/S2, no murmurs, rubs or gallop. Estimated JVP at 5 cmH2O  Lungs: CTAB, No wheezing.   Abdomen: soft, non-tender, bowel sounds present, no hepatosplenomegaly  Extremities: No LE edema today  Skin: no open lesions noted  Neuro: grossly non-focal     Labs:  Lab Results   Component Value Date    WBC 7.8 08/03/2021    HGB 13.0 08/03/2021    HCT 42.7 08/03/2021     08/03/2021     07/15/2021    POTASSIUM 3.9 07/15/2021    CHLORIDE 96 (L) 07/15/2021    CO2 33 (H) 07/15/2021    BUN 13 07/15/2021    CR 0.83 07/15/2021     07/15/2021    SED 57 (H) 06/23/2021    NTBNPI 641 06/08/2021    NTBNP 374 (H) 07/15/2021    TROPI <0.015 06/08/2021    AST 32 06/23/2021    ALT 30 06/23/2021    ALKPHOS 212 (H) 06/23/2021    BILITOTAL 0.4 06/23/2021    INR 2.11 (H) 08/03/2021       Echocardiogram reviewed:     6/25/2021  Interpretation Summary  Global and regional left ventricular function is normal with an EF of 55-60%.  Global right ventricular function is normal.  Mitral stenosis secondary to mitral annular calcification. Mean gradient is  elevated at 10 mmHg however the patient is tachycardic which elevates mean  gradient even further.  Mild aortic stenosis is present.  Large pericardial effsuion posterior to the LV 23 mm, lateral 16 mm, anterior  to RV 4 m). No chamber collapse.  Dilation of the inferior vena cava is present with abnormal respiratory  variation in diameter.  IVC diameter >2.1 cm collapsing <50% with sniff suggests a high RA pressure  estimated at 15 mmHg or greater       ASSESSMENT AND PLAN:  Ms. Martinez is a 66 year old female with PMHX of afib w/RVR, MR, SHAILESH, PBC, cholescystecomty, TKA in 2008, CBP s/p back surgeryeis. and perciardial effusion who presents today for optimization.     #Valvular afib  HR still in the 100s. Per Race II she is technically rate controlled, suspicion is that HR will improve once patient undergoes pericardiocentesis. Will maintain her on her rate  and rhythm control for now, but once her HR improves we can move to wean her PO Dilt.   - Rhythm: PO amio 200mg  - Rate control: PO Metop+Dilt  - Anticoag: Coumadin   - Will likely improve after pericardiocentesis, if persistent after pericardiocentesis, will consider ANTONIO/DCCV now that loaded with Amio.     Calcified Degenerative, non rheumatic progressive MS  2/2 to degnerative MAC. Olsen is likely greater than 8 with high amt of calcification. Has concomittant LA dilation suggesting progression of disease over time. Last mean gradient was 10mmHg at 100BPM. In terms of pre surgical work up, on schedule for pericardiocentesis and coronary angiogram. Will also add on RHC to assess hemodynamics after pericardiocentesis.   - Volume: Continue PO Torsemide 20mg  - BB: PO Metop 50mg BID  - Coronary angiogram   - RHC to assess hemodynamic status after pericardiocentesis.     #Pericardiocenteisis  Idiopathic vs possibily viral. No evidence of uremia. Persistent on most recent echo. Scheduled for pericardiocentesis    Staffed with Dr. Farhan López Md  Cardiology Fellow  [id number]    I have seen and evaluated the patient and agree with assessment and plan as documented above.  We will see her back next Friday.  Wing Real MD    I have seen and evaluated the patient and agree with the assessment and plans documented above.  Patient with progressive mitral stenosis plan for possible surgery later in August.  I do feel that she is on many ten blocking agents and we should consolidate this however her heart rate remains at the 100 bpm.  She also has a significant pericardial effusion and I do believe before making any significant adjustments this needs to be drained.  This is planned for Thursday.  We will add right heart catheterization for the same time to ensure and check hemodynamics.  We will see her back next Friday as an add-on patient to reevaluate for hemodynamics and maybe make some medication adjustment in  preparation for mitral valve replacement.  Wing Real MD        Please do not hesitate to contact me if you have any questions/concerns.     Sincerely,     Wing Real MD

## 2021-08-03 NOTE — NURSING NOTE
Chief Complaint   Patient presents with     Follow Up     Patient is refer by Dr. Acuna for HF evaluation / medical optimization.      Vitals were taken and medications were reconciled.     Anitha SALGUEROA  10:31 AM

## 2021-08-03 NOTE — PROGRESS NOTES
August 3, 2021     Ms. Martinez is a 66 year old female with PMHX of afib w/RVR, MR, SHAILESH, PBC, cholescystecomty, TKA in 2008, CBP s/p back surgeryeis. and perciardial effusion who presents today for HF optmization.     Ms. Martinez presented to the ED on 5/17/2021 for chest pain, SOB, palpitations. EKG revealed afib w/RVR. CTA was negative for PE, however there was noted pleural effusion. She was then transferred to [facility] where she underwent DCCVx1 on 5/20/2021, she remained in NSR for 2 hours. Underwent subsequent DCCV on 5/21/2021 she remained in NSR and then converted back into afib after 24hrs. TTE revealed an EF: 60%, with progressive MS with mean gradient of 6mmHG and an RVSP of 48mmHg. She was discharged on oral metop, coumadin, dilt and amio for her afib. She was admitted again on 6/8/2021 for afib w/RVR. EKG revealed afib w/RVR. TTE revealed large pericardial effusion with no tamponade physiology. She was discharged on both rate and rhythm control, sans pericardiocentesis and followed up withoutpatient Cardiology.    She followed up with [facility] in [place]. Patient remained on rate and rhythm control for afib. She was referred to CVTS due to progressive MR+afib TTE on 6/26 revealed degenarative MS with significant calcification and mean gradient of 10mmHg(with HR of 100BPM suggestive of high flow state). During this visit, it was thought that her  afib needed additional optimzation and as such she was referred to ur clinic.    On presentation today, she states that she is still having SOB. She states that she is using home oxygen when she exerts herself. She states that her baseline weight now is 210lbs. Her weight today is 207lbs. She denies PND, but endorses 3 pillow orthopnea. She denies lower extremity swelling.     PAST MEDICAL HISTORY:  As detailed above    FAMILY HISTORY:  Family History   Problem Relation Age of Onset     Cancer Father      Lymphoma Father      Depression Mother       Respiratory Mother      Emphysema Mother      SOCIAL HISTORY:  Social History     Socioeconomic History     Marital status:      Spouse name: Not on file     Number of children: Not on file     Years of education: Not on file     Highest education level: Not on file   Occupational History     Not on file   Tobacco Use     Smoking status: Current Some Day Smoker     Types: Cigarettes     Smokeless tobacco: Never Used     Tobacco comment: pt has been working hard on quiting   Substance and Sexual Activity     Alcohol use: Yes     Comment: social     Drug use: No     Sexual activity: Not on file   Other Topics Concern     Parent/sibling w/ CABG, MI or angioplasty before 65F 55M? Not Asked   Social History Narrative     Not on file     Social Determinants of Health     Financial Resource Strain:      Difficulty of Paying Living Expenses:    Food Insecurity:      Worried About Running Out of Food in the Last Year:      Ran Out of Food in the Last Year:    Transportation Needs:      Lack of Transportation (Medical):      Lack of Transportation (Non-Medical):    Physical Activity:      Days of Exercise per Week:      Minutes of Exercise per Session:    Stress:      Feeling of Stress :    Social Connections:      Frequency of Communication with Friends and Family:      Frequency of Social Gatherings with Friends and Family:      Attends Congregation Services:      Active Member of Clubs or Organizations:      Attends Club or Organization Meetings:      Marital Status:    Intimate Partner Violence:      Fear of Current or Ex-Partner:      Emotionally Abused:      Physically Abused:      Sexually Abused:      CURRENT MEDICATIONS:  Current Outpatient Medications   Medication     amiodarone (PACERONE) 200 MG tablet     diclofenac (VOLTAREN) 1 % topical gel     diltiazem ER COATED BEADS (CARDIZEM CD/CARTIA XT) 240 MG 24 hr capsule     diphenhydrAMINE (BENADRYL) 25 MG tablet     DULoxetine (CYMBALTA) 30 MG capsule      "fluticasone (FLONASE) 50 MCG/ACT nasal spray     gabapentin (NEURONTIN) 600 MG tablet     gabapentin (NEURONTIN) 600 MG tablet     hydrOXYzine (ATARAX) 25 MG tablet     magnesium 250 MG tablet     Melatonin 10 MG TABS tablet     metoprolol tartrate (LOPRESSOR) 50 MG tablet     Multiple Vitamins-Minerals (MULTIVITAMIN ADULTS 50+) TABS     omeprazole (PRILOSEC) 20 MG CR capsule     torsemide (DEMADEX) 10 MG tablet     Ursodiol 500 MG TABS     vitamin B complex with vitamin C (VITAMIN  B COMPLEX) tablet     vitamin D3 (CHOLECALCIFEROL) 50 mcg (2000 units) tablet     warfarin ANTICOAGULANT (COUMADIN) 5 MG tablet     No current facility-administered medications for this visit.     ROS:   Constitutional: No fever, chills, or sweats. Weight is 206 lbs 0 oz  ENT: No visual disturbance, ear ache, epistaxis, sore throat.   Allergies/Immunologic: Negative.   Respiratory: No cough, hemoptysis.   Cardiovascular: As per HPI.   GI: No nausea, vomiting, hematemesis, melena, or hematochezia.   : No urinary frequency, dysuria, or hematuria.   Integument: Negative.   Psychiatric: Pleasant, no major depression noted  Neuro: No focal neurological deficits noted  Endocrinology: Negative.   Musculoskeletal: As per HPI.      EXAM:  /75 (BP Location: Right arm, Patient Position: Chair, Cuff Size: Adult Regular)   Pulse 110   Ht 1.689 m (5' 6.5\")   Wt 93.4 kg (206 lb)   LMP  (LMP Unknown)   SpO2 97%   BMI 32.75 kg/m    General: appears comfortable, alert and oriented  Head: normocephalic, atraumatic  Eyes: anicteric sclera, EOMI , PERRL  Neck: no adenopathy  Orophyarynx: moist mucosa, no lesions noted  Heart: regular, S1/S2, no murmurs, rubs or gallop. Estimated JVP at 5 cmH2O  Lungs: CTAB, No wheezing.   Abdomen: soft, non-tender, bowel sounds present, no hepatosplenomegaly  Extremities: No LE edema today  Skin: no open lesions noted  Neuro: grossly non-focal     Labs:  Lab Results   Component Value Date    WBC 7.8 08/03/2021 "    HGB 13.0 08/03/2021    HCT 42.7 08/03/2021     08/03/2021     07/15/2021    POTASSIUM 3.9 07/15/2021    CHLORIDE 96 (L) 07/15/2021    CO2 33 (H) 07/15/2021    BUN 13 07/15/2021    CR 0.83 07/15/2021     07/15/2021    SED 57 (H) 06/23/2021    NTBNPI 641 06/08/2021    NTBNP 374 (H) 07/15/2021    TROPI <0.015 06/08/2021    AST 32 06/23/2021    ALT 30 06/23/2021    ALKPHOS 212 (H) 06/23/2021    BILITOTAL 0.4 06/23/2021    INR 2.11 (H) 08/03/2021       Echocardiogram reviewed:     6/25/2021  Interpretation Summary  Global and regional left ventricular function is normal with an EF of 55-60%.  Global right ventricular function is normal.  Mitral stenosis secondary to mitral annular calcification. Mean gradient is  elevated at 10 mmHg however the patient is tachycardic which elevates mean  gradient even further.  Mild aortic stenosis is present.  Large pericardial effsuion posterior to the LV 23 mm, lateral 16 mm, anterior  to RV 4 m). No chamber collapse.  Dilation of the inferior vena cava is present with abnormal respiratory  variation in diameter.  IVC diameter >2.1 cm collapsing <50% with sniff suggests a high RA pressure  estimated at 15 mmHg or greater       ASSESSMENT AND PLAN:  Ms. Martinez is a 66 year old female with PMHX of afib w/RVR, MR, SHAILESH, PBC, cholescystecomty, TKA in 2008, CBP s/p back surgeryeis. and perciardial effusion who presents today for optimization.     #Valvular afib  HR still in the 100s. Per Race II she is technically rate controlled, suspicion is that HR will improve once patient undergoes pericardiocentesis. Will maintain her on her rate and rhythm control for now, but once her HR improves we can move to wean her PO Dilt.   - Rhythm: PO amio 200mg  - Rate control: PO Metop+Dilt  - Anticoag: Coumadin   - Will likely improve after pericardiocentesis, if persistent after pericardiocentesis, will consider ANTONIO/DCCV now that loaded with Amio.     Calcified Degenerative, non  rheumatic progressive MS  2/2 to degnerative MAC. Olsen is likely greater than 8 with high amt of calcification. Has concomittant LA dilation suggesting progression of disease over time. Last mean gradient was 10mmHg at 100BPM. In terms of pre surgical work up, on schedule for pericardiocentesis and coronary angiogram. Will also add on RHC to assess hemodynamics after pericardiocentesis.   - Volume: Continue PO Torsemide 20mg  - BB: PO Metop 50mg BID  - Coronary angiogram   - RHC to assess hemodynamic status after pericardiocentesis.     #Pericardiocenteisis  Idiopathic vs possibily viral. No evidence of uremia. Persistent on most recent echo. Scheduled for pericardiocentesis    Staffed with Dr. Farhan López Md  Cardiology Fellow  [id number]    I have seen and evaluated the patient and agree with assessment and plan as documented above.  We will see her back next Friday.  Wing Real MD    I have seen and evaluated the patient and agree with the assessment and plans documented above.  Patient with progressive mitral stenosis plan for possible surgery later in August.  I do feel that she is on many ten blocking agents and we should consolidate this however her heart rate remains at the 100 bpm.  She also has a significant pericardial effusion and I do believe before making any significant adjustments this needs to be drained.  This is planned for Thursday.  We will add right heart catheterization for the same time to ensure and check hemodynamics.  We will see her back next Friday as an add-on patient to reevaluate for hemodynamics and maybe make some medication adjustment in preparation for mitral valve replacement.  Wing Real MD

## 2021-08-04 ENCOUNTER — TELEPHONE (OUTPATIENT)
Dept: CARDIOLOGY | Facility: CLINIC | Age: 66
End: 2021-08-04

## 2021-08-04 ENCOUNTER — LAB (OUTPATIENT)
Dept: LAB | Facility: CLINIC | Age: 66
End: 2021-08-04
Attending: THORACIC SURGERY (CARDIOTHORACIC VASCULAR SURGERY)
Payer: COMMERCIAL

## 2021-08-04 DIAGNOSIS — Z01.810 PRE-OPERATIVE CARDIOVASCULAR EXAMINATION: ICD-10-CM

## 2021-08-04 DIAGNOSIS — Z01.810 PRE-OPERATIVE CARDIOVASCULAR EXAMINATION: Primary | ICD-10-CM

## 2021-08-04 LAB — SARS-COV-2 RNA RESP QL NAA+PROBE: NEGATIVE

## 2021-08-04 PROCEDURE — U0005 INFEC AGEN DETEC AMPLI PROBE: HCPCS

## 2021-08-04 PROCEDURE — U0003 INFECTIOUS AGENT DETECTION BY NUCLEIC ACID (DNA OR RNA); SEVERE ACUTE RESPIRATORY SYNDROME CORONAVIRUS 2 (SARS-COV-2) (CORONAVIRUS DISEASE [COVID-19]), AMPLIFIED PROBE TECHNIQUE, MAKING USE OF HIGH THROUGHPUT TECHNOLOGIES AS DESCRIBED BY CMS-2020-01-R: HCPCS

## 2021-08-05 ENCOUNTER — APPOINTMENT (OUTPATIENT)
Dept: MEDSURG UNIT | Facility: CLINIC | Age: 66
End: 2021-08-05
Attending: THORACIC SURGERY (CARDIOTHORACIC VASCULAR SURGERY)
Payer: MEDICARE

## 2021-08-05 ENCOUNTER — HOSPITAL ENCOUNTER (OUTPATIENT)
Facility: CLINIC | Age: 66
Discharge: HOME | End: 2021-08-05
Attending: INTERNAL MEDICINE | Admitting: INTERNAL MEDICINE
Payer: MEDICARE

## 2021-08-05 ENCOUNTER — HOSPITAL ENCOUNTER (OUTPATIENT)
Dept: CARDIOLOGY | Facility: CLINIC | Age: 66
End: 2021-08-05
Attending: THORACIC SURGERY (CARDIOTHORACIC VASCULAR SURGERY)
Payer: MEDICARE

## 2021-08-05 VITALS
RESPIRATION RATE: 16 BRPM | SYSTOLIC BLOOD PRESSURE: 99 MMHG | OXYGEN SATURATION: 96 % | DIASTOLIC BLOOD PRESSURE: 80 MMHG | HEART RATE: 75 BPM

## 2021-08-05 VITALS
WEIGHT: 206 LBS | DIASTOLIC BLOOD PRESSURE: 81 MMHG | SYSTOLIC BLOOD PRESSURE: 103 MMHG | HEART RATE: 93 BPM | HEIGHT: 67 IN | OXYGEN SATURATION: 93 % | RESPIRATION RATE: 16 BRPM | BODY MASS INDEX: 32.33 KG/M2

## 2021-08-05 DIAGNOSIS — I51.89 OTHER ILL-DEFINED HEART DISEASES: ICD-10-CM

## 2021-08-05 DIAGNOSIS — I34.2 NONRHEUMATIC MITRAL VALVE STENOSIS: ICD-10-CM

## 2021-08-05 DIAGNOSIS — I34.0 MITRAL VALVE INSUFFICIENCY: ICD-10-CM

## 2021-08-05 DIAGNOSIS — I50.9 ACUTE ON CHRONIC CONGESTIVE HEART FAILURE, UNSPECIFIED HEART FAILURE TYPE (H): ICD-10-CM

## 2021-08-05 DIAGNOSIS — I31.39 PERICARDIAL EFFUSION: ICD-10-CM

## 2021-08-05 DIAGNOSIS — I34.81 MITRAL VALVE ANNULAR CALCIFICATION: ICD-10-CM

## 2021-08-05 PROBLEM — Z98.890 STATUS POST CORONARY ANGIOGRAM: Status: ACTIVE | Noted: 2021-08-05

## 2021-08-05 LAB
HGB BLD-MCNC: 12.4 G/DL (ref 11.7–15.7)
HGB BLD-MCNC: 12.6 G/DL (ref 11.7–15.7)
INR BLD: 1.3 (ref 2–3)
LVEF ECHO: NORMAL

## 2021-08-05 PROCEDURE — 93456 R HRT CORONARY ARTERY ANGIO: CPT | Performed by: INTERNAL MEDICINE

## 2021-08-05 PROCEDURE — C1894 INTRO/SHEATH, NON-LASER: HCPCS | Performed by: INTERNAL MEDICINE

## 2021-08-05 PROCEDURE — 76376 3D RENDER W/INTRP POSTPROCES: CPT | Performed by: INTERNAL MEDICINE

## 2021-08-05 PROCEDURE — 250N000011 HC RX IP 250 OP 636: Performed by: INTERNAL MEDICINE

## 2021-08-05 PROCEDURE — 93320 DOPPLER ECHO COMPLETE: CPT

## 2021-08-05 PROCEDURE — 250N000009 HC RX 250: Performed by: INTERNAL MEDICINE

## 2021-08-05 PROCEDURE — 250N000013 HC RX MED GY IP 250 OP 250 PS 637: Performed by: THORACIC SURGERY (CARDIOTHORACIC VASCULAR SURGERY)

## 2021-08-05 PROCEDURE — 272N000001 HC OR GENERAL SUPPLY STERILE: Performed by: INTERNAL MEDICINE

## 2021-08-05 PROCEDURE — 99153 MOD SED SAME PHYS/QHP EA: CPT | Performed by: INTERNAL MEDICINE

## 2021-08-05 PROCEDURE — 999N000142 HC STATISTIC PROCEDURE PREP ONLY

## 2021-08-05 PROCEDURE — 250N000009 HC RX 250: Performed by: THORACIC SURGERY (CARDIOTHORACIC VASCULAR SURGERY)

## 2021-08-05 PROCEDURE — 93320 DOPPLER ECHO COMPLETE: CPT | Mod: 26 | Performed by: INTERNAL MEDICINE

## 2021-08-05 PROCEDURE — 272N000002 HC OR SUPPLY OTHER OPNP: Performed by: INTERNAL MEDICINE

## 2021-08-05 PROCEDURE — 85610 PROTHROMBIN TIME: CPT

## 2021-08-05 PROCEDURE — 85018 HEMOGLOBIN: CPT | Mod: 91

## 2021-08-05 PROCEDURE — 93312 ECHO TRANSESOPHAGEAL: CPT | Mod: 26 | Performed by: INTERNAL MEDICINE

## 2021-08-05 PROCEDURE — 93325 DOPPLER ECHO COLOR FLOW MAPG: CPT | Mod: 26 | Performed by: INTERNAL MEDICINE

## 2021-08-05 PROCEDURE — 99152 MOD SED SAME PHYS/QHP 5/>YRS: CPT | Performed by: INTERNAL MEDICINE

## 2021-08-05 PROCEDURE — 999N000054 HC STATISTIC EKG NON-CHARGEABLE

## 2021-08-05 PROCEDURE — C1887 CATHETER, GUIDING: HCPCS | Performed by: INTERNAL MEDICINE

## 2021-08-05 PROCEDURE — 93010 ELECTROCARDIOGRAM REPORT: CPT | Performed by: INTERNAL MEDICINE

## 2021-08-05 PROCEDURE — 93325 DOPPLER ECHO COLOR FLOW MAPG: CPT

## 2021-08-05 PROCEDURE — 76376 3D RENDER W/INTRP POSTPROCES: CPT

## 2021-08-05 RX ORDER — NALOXONE HYDROCHLORIDE 0.4 MG/ML
0.4 INJECTION, SOLUTION INTRAMUSCULAR; INTRAVENOUS; SUBCUTANEOUS
Status: DISCONTINUED | OUTPATIENT
Start: 2021-08-05 | End: 2021-08-05 | Stop reason: HOSPADM

## 2021-08-05 RX ORDER — ONDANSETRON 2 MG/ML
4 INJECTION INTRAMUSCULAR; INTRAVENOUS EVERY 6 HOURS PRN
Status: DISCONTINUED | OUTPATIENT
Start: 2021-08-05 | End: 2021-08-05 | Stop reason: HOSPADM

## 2021-08-05 RX ORDER — NICARDIPINE HYDROCHLORIDE 2.5 MG/ML
INJECTION INTRAVENOUS
Status: DISCONTINUED | OUTPATIENT
Start: 2021-08-05 | End: 2021-08-05 | Stop reason: HOSPADM

## 2021-08-05 RX ORDER — POTASSIUM CHLORIDE 750 MG/1
20 TABLET, EXTENDED RELEASE ORAL
Status: DISCONTINUED | OUTPATIENT
Start: 2021-08-05 | End: 2021-08-05 | Stop reason: HOSPADM

## 2021-08-05 RX ORDER — LIDOCAINE HYDROCHLORIDE 20 MG/ML
15 SOLUTION OROPHARYNGEAL ONCE
Status: COMPLETED | OUTPATIENT
Start: 2021-08-05 | End: 2021-08-05

## 2021-08-05 RX ORDER — NALOXONE HYDROCHLORIDE 0.4 MG/ML
0.4 INJECTION, SOLUTION INTRAMUSCULAR; INTRAVENOUS; SUBCUTANEOUS
Status: DISCONTINUED | OUTPATIENT
Start: 2021-08-05 | End: 2021-08-06 | Stop reason: HOSPADM

## 2021-08-05 RX ORDER — NALOXONE HYDROCHLORIDE 0.4 MG/ML
0.2 INJECTION, SOLUTION INTRAMUSCULAR; INTRAVENOUS; SUBCUTANEOUS
Status: DISCONTINUED | OUTPATIENT
Start: 2021-08-05 | End: 2021-08-05 | Stop reason: HOSPADM

## 2021-08-05 RX ORDER — ACYCLOVIR 200 MG/1
9.5 CAPSULE ORAL
Status: DISCONTINUED | OUTPATIENT
Start: 2021-08-05 | End: 2021-08-06 | Stop reason: HOSPADM

## 2021-08-05 RX ORDER — LIDOCAINE 40 MG/G
CREAM TOPICAL
Status: DISCONTINUED | OUTPATIENT
Start: 2021-08-05 | End: 2021-08-06 | Stop reason: HOSPADM

## 2021-08-05 RX ORDER — FENTANYL CITRATE 50 UG/ML
25 INJECTION, SOLUTION INTRAMUSCULAR; INTRAVENOUS
Status: DISCONTINUED | OUTPATIENT
Start: 2021-08-05 | End: 2021-08-05 | Stop reason: HOSPADM

## 2021-08-05 RX ORDER — SODIUM CHLORIDE 9 MG/ML
INJECTION, SOLUTION INTRAVENOUS CONTINUOUS PRN
Status: DISCONTINUED | OUTPATIENT
Start: 2021-08-05 | End: 2021-08-06 | Stop reason: HOSPADM

## 2021-08-05 RX ORDER — FENTANYL CITRATE 50 UG/ML
INJECTION, SOLUTION INTRAMUSCULAR; INTRAVENOUS
Status: DISCONTINUED | OUTPATIENT
Start: 2021-08-05 | End: 2021-08-05 | Stop reason: HOSPADM

## 2021-08-05 RX ORDER — LIDOCAINE 40 MG/G
CREAM TOPICAL
Status: DISCONTINUED | OUTPATIENT
Start: 2021-08-05 | End: 2021-08-05 | Stop reason: HOSPADM

## 2021-08-05 RX ORDER — FLUMAZENIL 0.1 MG/ML
0.2 INJECTION, SOLUTION INTRAVENOUS
Status: DISCONTINUED | OUTPATIENT
Start: 2021-08-05 | End: 2021-08-05 | Stop reason: HOSPADM

## 2021-08-05 RX ORDER — HEPARIN SODIUM 1000 [USP'U]/ML
INJECTION, SOLUTION INTRAVENOUS; SUBCUTANEOUS
Status: DISCONTINUED | OUTPATIENT
Start: 2021-08-05 | End: 2021-08-05 | Stop reason: HOSPADM

## 2021-08-05 RX ORDER — OXYCODONE HYDROCHLORIDE 10 MG/1
10 TABLET ORAL EVERY 4 HOURS PRN
Status: DISCONTINUED | OUTPATIENT
Start: 2021-08-05 | End: 2021-08-05 | Stop reason: HOSPADM

## 2021-08-05 RX ORDER — POTASSIUM CHLORIDE 750 MG/1
40 TABLET, EXTENDED RELEASE ORAL
Status: DISCONTINUED | OUTPATIENT
Start: 2021-08-05 | End: 2021-08-05 | Stop reason: HOSPADM

## 2021-08-05 RX ORDER — NALOXONE HYDROCHLORIDE 0.4 MG/ML
0.2 INJECTION, SOLUTION INTRAMUSCULAR; INTRAVENOUS; SUBCUTANEOUS
Status: DISCONTINUED | OUTPATIENT
Start: 2021-08-05 | End: 2021-08-06 | Stop reason: HOSPADM

## 2021-08-05 RX ORDER — FENTANYL CITRATE 50 UG/ML
25 INJECTION, SOLUTION INTRAMUSCULAR; INTRAVENOUS
Status: DISCONTINUED | OUTPATIENT
Start: 2021-08-05 | End: 2021-08-06 | Stop reason: HOSPADM

## 2021-08-05 RX ORDER — SODIUM CHLORIDE 9 MG/ML
INJECTION, SOLUTION INTRAVENOUS CONTINUOUS
Status: DISCONTINUED | OUTPATIENT
Start: 2021-08-05 | End: 2021-08-05 | Stop reason: HOSPADM

## 2021-08-05 RX ORDER — FLUMAZENIL 0.1 MG/ML
0.2 INJECTION, SOLUTION INTRAVENOUS
Status: DISCONTINUED | OUTPATIENT
Start: 2021-08-05 | End: 2021-08-06 | Stop reason: HOSPADM

## 2021-08-05 RX ORDER — OXYCODONE HYDROCHLORIDE 5 MG/1
5 TABLET ORAL EVERY 4 HOURS PRN
Status: DISCONTINUED | OUTPATIENT
Start: 2021-08-05 | End: 2021-08-05 | Stop reason: HOSPADM

## 2021-08-05 RX ORDER — ATROPINE SULFATE 0.1 MG/ML
0.5 INJECTION INTRAVENOUS
Status: DISCONTINUED | OUTPATIENT
Start: 2021-08-05 | End: 2021-08-05 | Stop reason: HOSPADM

## 2021-08-05 RX ORDER — NITROGLYCERIN 5 MG/ML
VIAL (ML) INTRAVENOUS
Status: DISCONTINUED | OUTPATIENT
Start: 2021-08-05 | End: 2021-08-05 | Stop reason: HOSPADM

## 2021-08-05 RX ADMIN — LIDOCAINE HYDROCHLORIDE 15 ML: 20 SOLUTION ORAL; TOPICAL at 11:01

## 2021-08-05 RX ADMIN — FENTANYL CITRATE 50 MCG: 50 INJECTION, SOLUTION INTRAMUSCULAR; INTRAVENOUS at 11:20

## 2021-08-05 RX ADMIN — MIDAZOLAM 1 MG: 1 INJECTION INTRAMUSCULAR; INTRAVENOUS at 11:20

## 2021-08-05 RX ADMIN — TOPICAL ANESTHETIC 0.5 ML: 200 SPRAY DENTAL; PERIODONTAL at 11:09

## 2021-08-05 RX ADMIN — FENTANYL CITRATE 50 MCG: 50 INJECTION, SOLUTION INTRAMUSCULAR; INTRAVENOUS at 11:23

## 2021-08-05 RX ADMIN — ASPIRIN 324 MG: 325 TABLET, DELAYED RELEASE ORAL at 13:28

## 2021-08-05 RX ADMIN — MIDAZOLAM 1 MG: 1 INJECTION INTRAMUSCULAR; INTRAVENOUS at 11:23

## 2021-08-05 ASSESSMENT — MIFFLIN-ST. JEOR: SCORE: 1499.1

## 2021-08-05 NOTE — PROGRESS NOTES
D/I/A:  Patient is tolerating liquids and foods, ambulating, urinating, puncture sites are stable ( no bleeding and no hematoma) and patient has a , spouse.  A+O x4 and making needs known.  CCL access sites C/D/I; no bleeding or hematoma; CMS intact.  A+Ox4; VSSA.  A-fib, baseline on monitor.  IV access removed.  Education completed and outlined in AVS or handout: medications reviewed with patient.  Questions answered prior to discharge.  Belongings returned to patient at discharge.    P: Discharged to self care.  Patient to follow up with appts as per discharge instruction.

## 2021-08-05 NOTE — PROGRESS NOTES
Pt arrived in ECHO department for scheduled ANTONIO.   Procedure explained, questions answered and consent signed. Discharge instructions discussed with patient and given written copy.  Pt's throat sprayed at 11:00, therefore pt will not be able to eat or drink until 2 hours after at 13:00. Informed pt of this time and encouraged to start with warm fluids and soft foods.    Pt tolerated procedure well, and was given a total of 100 mcg IV fentanyl and 2 mg IV versed for conscious sedation.  Pt denied throat or chest pain after ANTONIO complete.   ANTONIO probe 61 used for procedure.  Pt denied chest pain and has only mild throat discomfort; no bleeding and able to swallow. Escorted to 2A for further recovery.  Report given to NANCY Herrera.

## 2021-08-05 NOTE — Clinical Note
dry, intact, no bleeding and no hematoma. 7fr RIJ sheath removed, manual pressure applied, hemostasis achieved, bandage placed.  6fr RRA sheath removed TR band placed with 12ml of air

## 2021-08-05 NOTE — PROGRESS NOTES
Arrived to 2A from echo, post ANTONIO.  A & O x 4.  Prepped for cors/RH procedure.  Denies pain.  available, off site.  H & P current.  Consent needed.  Labs from 08/03/2021.

## 2021-08-05 NOTE — DISCHARGE INSTRUCTIONS
Going Home after an Angioplasty or Stent Placement (Cardiac)  ______________________________________________      After you go home:    Have an adult stay with you for 24 hours.    Drink plenty of fluids.    You may eat your normal diet, unless your doctor tells you otherwise.    For 24 hours:    Relax and take it easy.    Do NOT smoke.    Do NOT make any important or legal decisions.    Do NOT drive or operate machines at home or at work.    Do NOT drink alcohol.    Remove the Band-Aid after 24 hours. If there is minor oozing, apply another Band-aid and remove it after 12 hours.    For 2 days, do NOT have sex or do any heavy exercise.    Do NOT take a bath, or use a hot tub or pool for at least 3 days. You may shower.    Care of wrist or arm site  It is normal to have soreness at the puncture site and mild tingling in your hand for up to 3 days.    For 2 days, do not use your hand or arm to support your weight (such as rising from a chair) or bend your wrist (such as lifting a garage door).    For 2 days, do not lift more than 5 pounds or exercise your arm (tennis, golf or bowling).    If you start bleeding from the site in your arm:    Sit down and press firmly on the site with your fingers for 10 minutes. Call your doctor as soon as you can.    If the bleeding stops, sit still and keep your wrist straight for 2 hours.    Medicines    If you have started taking Plavix or Effient, do not stop taking it until you talk to your heart doctor (cardiologist).    If you are on metformin (Glucophage), do not restart it until you have blood tests (within 2 to 3 days after discharge). When your doctor tells you it is safe, you may restart the metformin.    If you have stopped any other medicines, check with your nurse or provider about when to restart them.    Call 911 right away if you have bleeding that is heavy or does not stop.    Call your doctor if:    You have a large or growing hard lump around the site.    The  site is red, swollen, hot or tender.    Blood or fluid is draining from the site.    You have chills or a fever greater than 101 F (38 C).    Your leg or arm feels numb or cool.    You have hives, a rash or unusual itching.    [facility] [place]:  [phone] (7 days a week)

## 2021-08-05 NOTE — PRE-PROCEDURE
GENERAL PRE-PROCEDURE:   Procedure:  ANTONIO  Date/Time:  8/5/2021 12:16 PM    Written consent obtained?: Yes    Risks and benefits: Risks, benefits and alternatives were discussed    Consent given by:  Patient  Patient states understanding of procedure being performed: Yes    Patient's understanding of procedure matches consent: Yes    Procedure consent matches procedure scheduled: Yes    Expected level of sedation:  Moderate  Appropriately NPO:  Yes  Mallampati  :  Grade 2- soft palate, base of uvula, tonsillar pillars, and portion of posterior pharyngeal wall visible  Lungs:  Lungs clear with good breath sounds bilaterally  Heart:  A-fib  History & Physical reviewed:  History and physical reviewed and no updates needed  Statement of review:  I have reviewed the lab findings, diagnostic data, medications, and the plan for sedation

## 2021-08-05 NOTE — PROGRESS NOTES
D/I/A: Pt roomed on 3C in bay 31.  Arrived via litter and accompanied by RN On/Off: Off monitor.  VSSA.  Rhythm upon arrival A-Fib on monitor.  Denies pain or sob.  Reviewed activity restrictions and when to notify RN, ie-changes to breathing or increased chest pressure or chest pain.  CCL access:  R internal jugular site, covered, C/D/I; R radial TR Band in place and inflated with 12cc air; CMS intact.  P: Continue to monitor status.  Discharge to home once meeting criteria.

## 2021-08-06 LAB
ATRIAL RATE - MUSE: 90 BPM
DIASTOLIC BLOOD PRESSURE - MUSE: NORMAL MMHG
INTERPRETATION ECG - MUSE: NORMAL
P AXIS - MUSE: NORMAL DEGREES
PR INTERVAL - MUSE: NORMAL MS
QRS DURATION - MUSE: 76 MS
QT - MUSE: 430 MS
QTC - MUSE: 495 MS
R AXIS - MUSE: 13 DEGREES
SYSTOLIC BLOOD PRESSURE - MUSE: NORMAL MMHG
T AXIS - MUSE: 69 DEGREES
VENTRICULAR RATE- MUSE: 80 BPM

## 2021-08-09 ENCOUNTER — PREP FOR PROCEDURE (OUTPATIENT)
Dept: CARDIOLOGY | Facility: CLINIC | Age: 66
End: 2021-08-09

## 2021-08-09 DIAGNOSIS — I05.0 MITRAL STENOSIS: Primary | ICD-10-CM

## 2021-08-11 ENCOUNTER — CARE COORDINATION (OUTPATIENT)
Dept: CARDIOLOGY | Facility: CLINIC | Age: 66
End: 2021-08-11

## 2021-08-11 DIAGNOSIS — Z01.810 PRE-OPERATIVE CARDIOVASCULAR EXAMINATION: Primary | ICD-10-CM

## 2021-08-11 DIAGNOSIS — Z72.0 TOBACCO ABUSE: ICD-10-CM

## 2021-08-11 DIAGNOSIS — I34.2 NONRHEUMATIC MITRAL VALVE STENOSIS: ICD-10-CM

## 2021-08-11 DIAGNOSIS — R09.89 OTHER SPECIFIED SYMPTOMS AND SIGNS INVOLVING THE CIRCULATORY AND RESPIRATORY SYSTEMS: ICD-10-CM

## 2021-08-11 DIAGNOSIS — Z79.01 LONG TERM CURRENT USE OF ANTICOAGULANT THERAPY: ICD-10-CM

## 2021-08-11 NOTE — PROGRESS NOTES
Called and spoke to patient regarding scheduled surgery time and pre op.  Patient in agreement with dates and time.  Will email patient surgery letter and packet as she is no longer at her home address but is staying in [place].

## 2021-08-11 NOTE — LETTER
August 11, 2021        Dear JOEL Arriaga [facility]    CARDIOTHORACIC SURGERY PRE-OP INSTRUCTIONS  Your Heart Surgery is scheduled with Dr Sven Acuna  On Tuesday Sept 7th at 8:30 am.  Please arrive at 6:30 am.      Report to the information desk in the front lobby of the hospital at [address], [place] [address]. When you walk in the main entrance of the hospital it is directly in front of you. Ask for an escort or the  can help direct you. You will then be escorted or directed to  on the third floor for your surgery preparation.     Beginning June 7th  [facility] has updated its visitor policy.  At this time due to the Coronavirus, one visitor is allowed for patients staying in a semi private room.  Every 4 days, the visitor can rotate to another person.  The two visitors must be consistent.   Surgical patients can have one visitor wait in the surgical waiting lounge if there is adequate space for social distancing.   All visitors will be screened, each time they visit, and must pass screening before entry.  Individuals who are sick and not seeking care or have known exposure to COVID 19 are not allowed to visit.  Visitors under the age of 18 are not allowed to enter. Hospital visiting hours are 8:00 am to 8:30 pm. Visiting policies will be strictly enforced.  This policy is subject to change as the COVID virus continues to evolve. Masks are required 100% of the time.    COVID 19 TESTING  You will be required to undergo COVID 19 testing prior the angiogram and your surgery.  You will be contacted to schedule this test within 4 days of your surgery.      After the COVID test please protect yourself by following the CDC recommendations for disease prevention.  Wash hands regularly with soap and water and use hand  if soap and water is not available, wear a face mask, separate yourself from others by 6 feet and keep your hands away from your face.       Call your surgery coordinator Dawn Siegel RN or the after\[facility] [place] [facility]\""rs number ([phone]) if you develop any of the following symptoms; fever, cough, shortness of breath, sore throat, runny or stuffy nose, muscle or body aches, headaches, fatigue, vomiting or diarrhea.      You will spend approximately 5 - 7 days in the hospital depending on how quickly you recover.      INSTRUCTIONS PRIOR TO SURGERY    Please review this letter and the enclosed booklet and other information in this surgery folder carefully and call Dawn Siegel RN at [phone] with any questions or concerns.      MEDICATIONS    IF you are taking a blood thinner, (Coumadin, Warfarin, Plavix, Pradaxa, Effient, Brilinta, Pletal, Eliquis, Xarelto or other antiplatelet),  please inform your surgery team as soon as possible as  these medications may need to be stopped for several days (3-7) prior your surgery.    Take your last dose of Warfarin (coumadin) on August 30th.  You will be instructed when to resume the coumadin by hospital staff before your discharge.     STOP TAKING these medications. STOP ALL ANTIINFLAMMATORY MEDICATIONS: (Ibuprofen, Aleve, Advil, Celebrex, Votaren, Ketoprofen, and Naproxen).  Stop ALL SUPPLEMENTS 10 days prior to your surgery. This includes stopping Co-Q 10, vitamin E and all fish oil supplements.   Please check the labels on any OTC eye vitamins you may be taking.  They often contain vitamin E and should be stopped 10 days prior to surgery.      HISTORY AND PHYSICAL:  LABS and IMAGING  All tests and procedures must be within 30 days of your surgery date.     You do NOT need to fast for the blood work.      You have a pre-op clinic visits beginning at 12:00 pm on Wednesday August 25th in the [facility], [address] [place] [address]. A  or Coordinator will assist you. The Pre Assessment/Anesthesia Clinic is on the fifth floor.  The laboratory and radiology is on the first  floor.      Your schedule is as follows:    12:00 pm Pulmonary Function Test    1:30 pm PAC (Pre Assessment and Anesthesia Clinic    2:30 pm Carotid Ultra Sound    3:30 pm Labs      DO NOT EAT ANY SOLID FOODS AFTER MIDNIGHT or the morning of your surgery. NO MILK, MILK PRODUCTS, SMOOTHIES 8 HOURS PRIOR TO SURGERY.            DO NOT EAT ANYTHING, however you may have any clear liquids; water, black coffee (sugar okay), clear tea, sprite, ginger ale, apple juice, Gatorade or any clear liquid up to two hours before your surgery time. (No ethan tea) No milk, or milk products, no smoothies or juice with pulp.     No alcohol for 24 hours prior to surgery.       MEDICATIONS THE MORNING OF SURGERY  Take your metoprolol (Lopressor), omeprazole (Prilosec) and Duloxetine (Cymbalta) the morning of your surgery with a drink of clear liquid.  Please tell your anesthesiologist what medication you took and at what time.     BELONGINGS  Do not bring personal belongings, jewelry, money, valuables, toiletries or medications to the hospital the morning of your surgery. You may pack a bag and give it to a family member or friend to bring the following day or when needed.   Please remove all jewelry, including body piercings. Cautery instruments are used during surgery that may pose a risk of burns if a body piercing is left in during surgery.     Do bring a photo ID and insurance card.  A copy of your health care directives, if you have one.  Glasses and hearing aids (bring cases).  You cannot wear contact lenses during the surgery.  Inhaler(s) and eye drops if you use them, tell the staff about them when you arrive. Bring your CPAP machine or breathing device, if you use them.  If you have a pacemaker or ICD (cardiac defibrillator) bring the ID card.  If you have an implanted stimulator, bring the remote control.  If you are a legal guardian bring a copy of the certified (court-stamped) guardianship papers.      Call Rossana our patient  , with any questions or concerns about scheduling. She can be reached by phone at [phone] between 8 AM and 4:30PM Monday through Friday. Our answering service will  calls outside of those business hours.   If you have questions about your medications, test results or have a change in your health status call Dawn Siegel RN at [phone] during regular business hours.      On weekends or after 4:30 please call [phone] and ask the  to page the Cardiothoracic Fellow, Nurse Practitioner, Physician Assistant or Staff on call that weekend.     PLEASE NOTE, there are times when elective surgery (like yours) needs to be rescheduled due to an unplanned emergency or heart transplant. If this should happen, your surgery will be rescheduled as quickly as possible. Our surgery team appreciates your understanding during these instances.         Please feel free to call me or our office with any questions.     Thank you,         Dawn Siegel RN  Cardiothoracic Surgery Coordinator  [phone]  Direct Phone  [phone]  Fax

## 2021-08-12 NOTE — TELEPHONE ENCOUNTER
FUTURE VISIT INFORMATION      SURGERY INFORMATION:    Date: 21    Location: [facility] OR    Surgeon:  Sven Acuna MD    Anesthesia Type:  general    Procedure: REPLACEMENT, MITRAL VALVE, OPEN and all associated procedures    Consult: ov 7/15/21    RECORDS REQUESTED FROM:       Primary Care Provider: Kika Tariq MD  - [place]    Pertinent Medical History: SHAILESH, hypertension, atrial fibrillation, CHF, nonrheumatic mitral valve stenosis    Most recent EKG+ Tracin21    Most recent ECHO: 21    Most recent Coronary Angiogram: 21

## 2021-08-24 RX ORDER — POTASSIUM CHLORIDE 1500 MG/1
20 TABLET, EXTENDED RELEASE ORAL 2 TIMES DAILY
Status: ON HOLD | COMMUNITY
End: 2021-09-22

## 2021-08-24 NOTE — PHARMACY - PREOPERATIVE ASSESSMENT CENTER
Anticoagulation Note - Preoperative Assessment Center (PAC) Pharmacist     Patient was interviewed on August 24, 2021 as a part of PAC clinic appointment. The purpose of this note is to document the perioperative anticoagulation plan outlined by the providers caring for Corina Martinez.     Current Regimen  Anticoagulation Regimen as of August 24, 2021: warfarin warfarin 2.5 mg on Mon/Wed/Fri and 5 mg on all other days of the week.   Indication: afib (no h/o stroke or VTE).    Prescriber:  Dr. Torres (managed by [facility]).   Current medications that may interact with this include: amiodarone, diltiazem.   INR Goal: 2-3  Creatinine   Date Value Ref Range Status   08/03/2021 0.80 0.52 - 1.04 mg/dL Final   07/07/2021 1.03 0.60 - 1.20 mg/dL Final       Perioperative plan  Corina Martinez is scheduled for REPLACEMENT, MITRAL VALVE, OPEN and all associated procedures on 9/7/21 with Dr. Acuna and the perioperative anticoagulation plan outlined by CVTS team is to take last dose of warfarin 8/30/21 (hold x7 days preop). No plan for bridging.     Resumption of anticoagulation after procedure will be based on surgery team assessment of bleeding risks and complications.  This plan may require re-assessment and modification by her primary team in the perioperative setting depending on patients clinical situation.        Sohail Mcwilliams RPH  August 24, 2021  10:51 AM

## 2021-08-24 NOTE — PROGRESS NOTES
Preoperative Assessment Center Medication History Note    Medication history completed on August 24, 2021 by this writer. See Epic admission navigator for prior to admission medications. Operating room staff will still need to confirm medications and last dose information on day of surgery.     Medication history interview sources  Patient interview: Yes  Care Everywhere records: Yes  Surescripts pharmacy refill records: Yes  Other (if applicable):     Changes made to PTA medication list (reason)  Added: potassium chloride.   Deleted: none  Changed: gabapentin dose/sig, voltaren, warfarin dose/sig.     Additional medication history information (including reliability of information, actions taken by pharmacist):  Review allergies then delete this    -- No recent (within 30 days) course of antibiotics  -- No recent (within 30 days) course of systemic steroids  -- Patient declines being on any other prescription or over-the-counter medications    Prior to Admission medications    Medication Sig Last Dose Taking? Auth Provider   amiodarone (PACERONE) 200 MG tablet Take 200 mg by mouth daily (with lunch)  Taking Yes Reported, Patient   diclofenac (VOLTAREN) 1 % topical gel Apply 2 g topically 2 times daily as needed To right shoulder Taking Yes Unknown, Entered By History   diltiazem ER COATED BEADS (CARDIZEM CD/CARTIA XT) 240 MG 24 hr capsule Take 1 capsule (240 mg) by mouth daily  Patient taking differently: Take 240 mg by mouth daily (with lunch)  Taking Yes Bijal Torres APRN CNP   diphenhydrAMINE (BENADRYL) 25 MG tablet Take 50 mg by mouth At Bedtime Taking Yes Unknown, Entered By History   DULoxetine (CYMBALTA) 30 MG capsule Take 30 mg by mouth At Bedtime Taking Yes Unknown, Entered By History   fluticasone (FLONASE) 50 MCG/ACT nasal spray Spray 2 sprays into both nostrils 2 times daily as needed for allergies  Taking Yes Jun Rhodes MD   gabapentin (NEURONTIN) 600 MG tablet Take 600 mg in the  morning  Take 600 mg in the afternoon   Take 1200 mg (2 tablets) at bedtime. Taking Yes Reported, Patient   hydrOXYzine (ATARAX) 25 MG tablet Take 25 mg by mouth At Bedtime Taking Yes Reported, Patient   magnesium 250 MG tablet Take 1 tablet (250 mg) by mouth 2 times daily Taking Yes Bijal Torres APRN CNP   Melatonin 10 MG TABS tablet Take 10 mg by mouth At Bedtime Taking Yes Unknown, Entered By History   metoprolol tartrate (LOPRESSOR) 50 MG tablet Take 1 tablet (50 mg) by mouth 2 times daily Taking Yes Bijal Torres APRN CNP   Multiple Vitamins-Minerals (MULTIVITAMIN ADULTS 50+) TABS Take 1 tablet by mouth every morning Taking Yes Unknown, Entered By History   omeprazole (PRILOSEC) 20 MG CR capsule Take 20 mg by mouth daily  Taking Yes Reported, Patient   potassium chloride ER (KLOR-CON M) 20 MEQ CR tablet Take 20 mEq by mouth 2 times daily Taking Yes Unknown, Entered By History   torsemide (DEMADEX) 10 MG tablet Take 1 tablet (10 mg) by mouth daily  Patient taking differently: Take 20 mg by mouth daily  Taking Yes Bijal Torres APRN CNP   vitamin B complex with vitamin C (VITAMIN  B COMPLEX) tablet Take 1 tablet by mouth daily [employer] Taking Yes Reported, Patient   vitamin D3 (CHOLECALCIFEROL) 50 mcg (2000 units) tablet Take 1 tablet by mouth every morning Taking Yes Unknown, Entered By History   warfarin ANTICOAGULANT (COUMADIN) 5 MG tablet Take 2.5 mg x 4 days and 5 mg x 3 days/week or as directed by protime clinic  Patient taking differently: Take 2.5 mg on Mon/Wed/Fri and take 5 mg on all other days of the week.  Takes in the evening. Taking Yes Bijal Torres APRN CNP   Ursodiol 500 MG TABS Take 500 mg by mouth 3 times daily   Patient not taking: Reported on 8/24/2021 Not Taking  Jun Rhodes MD          Medication history completed by: Sohail Mcwilliams [facility]

## 2021-08-25 ENCOUNTER — PRE VISIT (OUTPATIENT)
Dept: SURGERY | Facility: CLINIC | Age: 66
End: 2021-08-25

## 2021-08-25 ENCOUNTER — ANCILLARY PROCEDURE (OUTPATIENT)
Dept: ULTRASOUND IMAGING | Facility: CLINIC | Age: 66
End: 2021-08-25
Attending: THORACIC SURGERY (CARDIOTHORACIC VASCULAR SURGERY)
Payer: COMMERCIAL

## 2021-08-25 ENCOUNTER — ANESTHESIA EVENT (OUTPATIENT)
Dept: SURGERY | Facility: CLINIC | Age: 66
DRG: 219 | End: 2021-08-25
Payer: MEDICARE

## 2021-08-25 ENCOUNTER — OFFICE VISIT (OUTPATIENT)
Dept: SURGERY | Facility: CLINIC | Age: 66
End: 2021-08-25
Payer: COMMERCIAL

## 2021-08-25 ENCOUNTER — LAB (OUTPATIENT)
Dept: LAB | Facility: CLINIC | Age: 66
End: 2021-08-25
Payer: COMMERCIAL

## 2021-08-25 VITALS
HEART RATE: 57 BPM | SYSTOLIC BLOOD PRESSURE: 104 MMHG | HEIGHT: 67 IN | OXYGEN SATURATION: 97 % | RESPIRATION RATE: 16 BRPM | WEIGHT: 206 LBS | BODY MASS INDEX: 32.33 KG/M2 | DIASTOLIC BLOOD PRESSURE: 70 MMHG | TEMPERATURE: 97.7 F

## 2021-08-25 DIAGNOSIS — Z72.0 TOBACCO ABUSE: ICD-10-CM

## 2021-08-25 DIAGNOSIS — Z01.810 PRE-OPERATIVE CARDIOVASCULAR EXAMINATION: ICD-10-CM

## 2021-08-25 DIAGNOSIS — I51.89 OTHER ILL-DEFINED HEART DISEASES: ICD-10-CM

## 2021-08-25 DIAGNOSIS — R09.89 OTHER SPECIFIED SYMPTOMS AND SIGNS INVOLVING THE CIRCULATORY AND RESPIRATORY SYSTEMS: ICD-10-CM

## 2021-08-25 DIAGNOSIS — Z79.01 LONG TERM CURRENT USE OF ANTICOAGULANT THERAPY: ICD-10-CM

## 2021-08-25 DIAGNOSIS — I34.2 NONRHEUMATIC MITRAL VALVE STENOSIS: ICD-10-CM

## 2021-08-25 DIAGNOSIS — Z01.818 PREOP EXAMINATION: Primary | ICD-10-CM

## 2021-08-25 DIAGNOSIS — I50.9 ACUTE ON CHRONIC CONGESTIVE HEART FAILURE, UNSPECIFIED HEART FAILURE TYPE (H): ICD-10-CM

## 2021-08-25 LAB
ABO/RH(D): NORMAL
ALBUMIN SERPL-MCNC: 3.1 G/DL (ref 3.4–5)
ALBUMIN UR-MCNC: NEGATIVE MG/DL
ALP SERPL-CCNC: 362 U/L (ref 40–150)
ALT SERPL W P-5'-P-CCNC: 62 U/L (ref 0–50)
ANION GAP SERPL CALCULATED.3IONS-SCNC: 3 MMOL/L (ref 3–14)
ANTIBODY SCREEN: NEGATIVE
APPEARANCE UR: CLEAR
APTT PPP: 36 SECONDS (ref 22–38)
AST SERPL W P-5'-P-CCNC: 53 U/L (ref 0–45)
BILIRUB DIRECT SERPL-MCNC: 0.2 MG/DL (ref 0–0.2)
BILIRUB SERPL-MCNC: 0.6 MG/DL (ref 0.2–1.3)
BILIRUB UR QL STRIP: NEGATIVE
BUN SERPL-MCNC: 16 MG/DL (ref 7–30)
CALCIUM SERPL-MCNC: 9.5 MG/DL (ref 8.5–10.1)
CHLORIDE BLD-SCNC: 106 MMOL/L (ref 94–109)
CO2 SERPL-SCNC: 31 MMOL/L (ref 20–32)
COLOR UR AUTO: YELLOW
CREAT SERPL-MCNC: 1.03 MG/DL (ref 0.52–1.04)
DLCOUNC-%PRED-PRE: 77 %
DLCOUNC-PRE: 16.56 ML/MIN/MMHG
DLCOUNC-PRED: 21.47 ML/MIN/MMHG
ERV-%PRED-PRE: 58 %
ERV-PRE: 0.32 L
ERV-PRED: 0.54 L
ERYTHROCYTE [DISTWIDTH] IN BLOOD BY AUTOMATED COUNT: 22.7 % (ref 10–15)
EXPTIME-PRE: 6.35 SEC
FEF2575-%PRED-PRE: 117 %
FEF2575-PRE: 2.51 L/SEC
FEF2575-PRED: 2.13 L/SEC
FEFMAX-%PRED-PRE: 104 %
FEFMAX-PRE: 6.63 L/SEC
FEFMAX-PRED: 6.32 L/SEC
FEV1-%PRED-PRE: 78 %
FEV1-PRE: 1.99 L
FEV1FEV6-PRE: 85 %
FEV1FEV6-PRED: 80 %
FEV1FVC-PRE: 85 %
FEV1FVC-PRED: 78 %
FEV1SVC-PRE: 81 %
FEV1SVC-PRED: 73 %
FIFMAX-PRE: 4.84 L/SEC
FRCPLETH-%PRED-PRE: 86 %
FRCPLETH-PRE: 2.47 L
FRCPLETH-PRED: 2.85 L
FVC-%PRED-PRE: 71 %
FVC-PRE: 2.33 L
FVC-PRED: 3.25 L
GFR SERPL CREATININE-BSD FRML MDRD: 57 ML/MIN/1.73M2
GLUCOSE BLD-MCNC: 97 MG/DL (ref 70–99)
GLUCOSE UR STRIP-MCNC: NEGATIVE MG/DL
HCT VFR BLD AUTO: 45.4 % (ref 35–47)
HGB BLD-MCNC: 14.4 G/DL (ref 11.7–15.7)
HGB UR QL STRIP: NEGATIVE
HYALINE CASTS: 3 /LPF
IC-%PRED-PRE: 72 %
IC-PRE: 2.12 L
IC-PRED: 2.92 L
INR PPP: 2.04 (ref 0.85–1.15)
KETONES UR STRIP-MCNC: NEGATIVE MG/DL
LEUKOCYTE ESTERASE UR QL STRIP: NEGATIVE
MCH RBC QN AUTO: 26.2 PG (ref 26.5–33)
MCHC RBC AUTO-ENTMCNC: 31.7 G/DL (ref 31.5–36.5)
MCV RBC AUTO: 83 FL (ref 78–100)
MUCOUS THREADS #/AREA URNS LPF: PRESENT /LPF
NITRATE UR QL: NEGATIVE
NT-PROBNP SERPL-MCNC: 1245 PG/ML (ref 0–125)
PH UR STRIP: 7 [PH] (ref 5–7)
PLATELET # BLD AUTO: 349 10E3/UL (ref 150–450)
POTASSIUM BLD-SCNC: 5 MMOL/L (ref 3.4–5.3)
PROT SERPL-MCNC: 7.8 G/DL (ref 6.8–8.8)
RBC # BLD AUTO: 5.49 10E6/UL (ref 3.8–5.2)
RBC URINE: <1 /HPF
RVPLETH-%PRED-PRE: 101 %
RVPLETH-PRE: 2.15 L
RVPLETH-PRED: 2.11 L
SODIUM SERPL-SCNC: 140 MMOL/L (ref 133–144)
SP GR UR STRIP: 1.01 (ref 1–1.03)
SPECIMEN EXPIRATION DATE: NORMAL
SQUAMOUS EPITHELIAL: 1 /HPF
TLCPLETH-%PRED-PRE: 85 %
TLCPLETH-PRE: 4.59 L
TLCPLETH-PRED: 5.36 L
UROBILINOGEN UR STRIP-MCNC: NORMAL MG/DL
VA-%PRED-PRE: 79 %
VA-PRE: 4.18 L
VC-%PRED-PRE: 70 %
VC-PRE: 2.44 L
VC-PRED: 3.46 L
WBC # BLD AUTO: 8.7 10E3/UL (ref 4–11)
WBC URINE: <1 /HPF

## 2021-08-25 PROCEDURE — 85610 PROTHROMBIN TIME: CPT | Performed by: PATHOLOGY

## 2021-08-25 PROCEDURE — 86900 BLOOD TYPING SEROLOGIC ABO: CPT | Performed by: THORACIC SURGERY (CARDIOTHORACIC VASCULAR SURGERY)

## 2021-08-25 PROCEDURE — 36415 COLL VENOUS BLD VENIPUNCTURE: CPT | Performed by: PATHOLOGY

## 2021-08-25 PROCEDURE — 94375 RESPIRATORY FLOW VOLUME LOOP: CPT | Performed by: INTERNAL MEDICINE

## 2021-08-25 PROCEDURE — 94726 PLETHYSMOGRAPHY LUNG VOLUMES: CPT | Performed by: INTERNAL MEDICINE

## 2021-08-25 PROCEDURE — 99204 OFFICE O/P NEW MOD 45 MIN: CPT | Performed by: PHYSICIAN ASSISTANT

## 2021-08-25 PROCEDURE — 85027 COMPLETE CBC AUTOMATED: CPT | Performed by: PATHOLOGY

## 2021-08-25 PROCEDURE — 82248 BILIRUBIN DIRECT: CPT | Performed by: PATHOLOGY

## 2021-08-25 PROCEDURE — 85730 THROMBOPLASTIN TIME PARTIAL: CPT | Performed by: PATHOLOGY

## 2021-08-25 PROCEDURE — 94729 DIFFUSING CAPACITY: CPT | Performed by: INTERNAL MEDICINE

## 2021-08-25 PROCEDURE — 93880 EXTRACRANIAL BILAT STUDY: CPT | Performed by: RADIOLOGY

## 2021-08-25 PROCEDURE — 81001 URINALYSIS AUTO W/SCOPE: CPT | Performed by: PATHOLOGY

## 2021-08-25 PROCEDURE — 80053 COMPREHEN METABOLIC PANEL: CPT | Performed by: PATHOLOGY

## 2021-08-25 PROCEDURE — 83880 ASSAY OF NATRIURETIC PEPTIDE: CPT | Performed by: PATHOLOGY

## 2021-08-25 ASSESSMENT — PAIN SCALES - GENERAL: PAINLEVEL: MILD PAIN (3)

## 2021-08-25 ASSESSMENT — LIFESTYLE VARIABLES: TOBACCO_USE: 1

## 2021-08-25 ASSESSMENT — ENCOUNTER SYMPTOMS
SEIZURES: 0
DYSRHYTHMIAS: 1

## 2021-08-25 ASSESSMENT — MIFFLIN-ST. JEOR: SCORE: 1499.1

## 2021-08-25 NOTE — ANESTHESIA PREPROCEDURE EVALUATION
Anesthesia Pre-Procedure Evaluation    Patient: Corina Martinez   MRN: [id number] : 1955        Preoperative Diagnosis: Mitral stenosis [I05.0]   Procedure : Procedure(s):  REPLACEMENT, MITRAL VALVE, OPEN and all associated procedures     Past Medical History:   Diagnosis Date     Arthritis      Congestive heart failure (H)      Hypertension      Nonrheumatic mitral valve stenosis, severe      Obese      SHAILESH (obstructive sleep apnea)      Sleep apnea       Past Surgical History:   Procedure Laterality Date     APPENDECTOMY       CV CORONARY ANGIOGRAM N/A 2021    Procedure: CV CORONARY ANGIOGRAM;  Surgeon: Juan Vance MD;  [facility]     CV RIGHT HEART CATH MEASUREMENTS RECORDED N/A 2021    Procedure: CV RIGHT HEART CATH;  Surgeon: Juan Vance MD;  [facility]: [facility]     KNEE DEBRIDEMENT  2019     LAPAROSCOPIC CHOLECYSTECTOMY       LUMBAR FUSION       OTHER SURGICAL HISTORY      hip replacement     RELEASE CARPAL TUNNEL       TONSILLECTOMY       TOTAL KNEE ARTHROPLASTY Right 2019     TUBAL LIGATION        Allergies   Allergen Reactions     Codeine Sulfate Nausea and Vomiting     Acetaminophen Other (See Comments)     Was told to avoid due to primary biliary cirrhosis      Social History     Tobacco Use     Smoking status: Current Some Day Smoker     Packs/day: 1.00     Years: 54.00     Pack years: 54.00     Types: Cigarettes     Smokeless tobacco: Never Used     Tobacco comment: pt has been working hard on quiting; smoking 3/4 ppd (21)   Substance Use Topics     Alcohol use: Yes     Comment: social      Wt Readings from Last 1 Encounters:   21 93.4 kg (206 lb)        Anesthesia Evaluation   Pt has had prior anesthetic.     No history of anesthetic complications       ROS/MED HX  ENT/Pulmonary: Comment: 54 pk yr hx, smokes 3/4 ppd      (+) tobacco use, Current use,     Neurologic:  - neg neurologic ROS  "  (+) no peripheral neuropathy  (-) no seizures and no CVA   Cardiovascular:     (+) hypertension-----Taking blood thinners Instructions Given to patient: on warfarin. CHF etiology: valvular disease Last EF: 55-60% date: 8/5/21 dysrhythmias, a-fib, valvular problems/murmurs mitral stenosis. Previous cardiac testing   Echo: Date: 8/5/21 Results:  Interpretation Summary  The patient's rhythm is atrial fibrillation.  Left ventricular size, wall motion and function are normal. The ejection  fraction is 55-60%.  Moderate to severe calcific mitral stenosis is present. Severe mitral annular  calcification is present.  The mean gradient across the mitral valve is 5 mmHg.The mitral valve area is  1.46 cm^2 by the pressure half time method.The mitral valve area is 1.49  cm^2  planimetry.  Global right ventricular function is normal.  Mild to moderate tricuspid insufficiency is present.  Pulmonary artery systolic pressure is normal.  No pericardial effusion is present.  There has been no change, other than posterior yousif effusion has resolved.    Stress Test: Date: Results:    ECG Reviewed: Date: 8/5/21 Results:  Atrial fibrillation   Low voltage QRS   Prolonged QT   Abnormal ECG   When compared with ECG of 30-JUN-2021 12:05,   Nonspecific T wave abnormality no longer evident in Lateral leads  Ventricular rate 80 bpm    Cath:  Date: 8/5/21 Results:  \" Right sided filling pressures are mildly elevated.  \" Left sided filling pressures are moderately elevated.  \" Normal cardiac output level.  \" Mild elevated pulmonary hypertension.  \" Hemodynamic data has been modified in Epic per physician review.  \" Mild non obstructive CAD.      METS/Exercise Tolerance: 3 - Able to walk 1-2 blocks without stopping    Hematologic:  - neg hematologic  ROS  (-) history of blood clots and history of blood transfusion   Musculoskeletal: Comment: S/p right knee arthroplasty    S/p left hip arthroplasty    S/p L4-5 fusion        GI/Hepatic: Comment: " Primary biliary cirrhosis      (+) GERD, Asymptomatic on medication, liver disease,     Renal/Genitourinary:  - neg Renal ROS  (-) renal disease   Endo:     (+) Obesity,  (-) Type II DM   Psychiatric/Substance Use:  - neg psychiatric ROS     Infectious Disease:  - neg infectious disease ROS     Malignancy:  - neg malignancy ROS     Other:  - neg other ROS          Physical Exam    Airway        Mallampati: I   TM distance: > 3 FB   Neck ROM: full   Mouth opening: > 3 cm    Respiratory Devices and Support         Dental  no notable dental history         Cardiovascular   cardiovascular exam normal          Pulmonary   pulmonary exam normal                OUTSIDE LABS:  CBC:   Lab Results   Component Value Date    WBC 7.8 08/03/2021    WBC 10.5 07/15/2021    HGB 12.4 08/05/2021    HGB 12.6 08/05/2021    HCT 42.7 08/03/2021    HCT 43.8 07/15/2021     08/03/2021     (H) 07/15/2021     BMP:   Lab Results   Component Value Date     08/03/2021     07/15/2021    POTASSIUM 4.5 08/03/2021    POTASSIUM 3.9 07/15/2021    CHLORIDE 107 08/03/2021    CHLORIDE 96 (L) 07/15/2021    CO2 31 08/03/2021    CO2 33 (H) 07/15/2021    BUN 12 08/03/2021    BUN 13 07/15/2021    CR 0.80 08/03/2021    CR 0.83 07/15/2021    GLC 99 08/03/2021     07/15/2021     COAGS:   Lab Results   Component Value Date    PTT 37 08/03/2021    INR 1.3 (L) 08/05/2021     POC: No results found for: BGM, HCG, HCGS  HEPATIC:   Lab Results   Component Value Date    ALBUMIN 3.1 (L) 06/23/2021    PROTTOTAL 6.3 (L) 06/23/2021    ALT 30 06/23/2021    AST 32 06/23/2021    ALKPHOS 212 (H) 06/23/2021    BILITOTAL 0.4 06/23/2021     OTHER:   Lab Results   Component Value Date    LACT 1.0 05/29/2021    LOC 9.3 08/03/2021    MAG 1.9 07/15/2021    T4 1.00 06/23/2021    .0 (H) 06/23/2021    SED 57 (H) 06/23/2021       Anesthesia Plan    ASA Status:  4   NPO Status:  NPO Appropriate    Anesthesia Type: General.     - Airway: ETT    Induction: Intravenous.   Maintenance: Inhalation.   Techniques and Equipment:     - Airway: Video-Laryngoscope     - Lines/Monitors: 2nd IV, Central Line, Arterial Line, PAC, CVP, BIS, ANTONIO            ANTONIO Absolute Contra-indication: NONE            ANTONIO Relative Contra-indication: NONE     Consents    Anesthesia Plan(s) and associated risks, benefits, and realistic alternatives discussed. Questions answered and patient/representative(s) expressed understanding.     - Discussed with:  Patient      - Extended Intubation/Ventilatory Support Discussed: Yes.      - Patient is DNR/DNI Status: No    Use of blood products discussed: Yes.     - Discussed with: Patient.     - Consented: consented to blood products            Reason for refusal: other.     Postoperative Care    Pain management: IV analgesics, Peripheral nerve block (Continuous).   PONV prophylaxis: Ondansetron (or other 5HT-3)     Comments:              PAC Discussion and Assessment    ASA Classification: 3  Case is suitable for: [place]  Anesthetic techniques and relevant risks discussed: GA  Invasive monitoring and risk discussed: No    Possibility and Risk of blood transfusion discussed: No            PAC Resident/NP Anesthesia Assessment: Corina Martinez is a 67 y/o female who presents for pre-operative H&P in preparation for REPLACEMENT, MITRAL VALVE, OPEN and all associated procedures with Sven Acuna MD on 9/7/[facility] at [facility] for treatment of Mitral stenosis    Pt has had prior anesthetic.  No history of anesthetic complications.     She has the following specific operative considerations:   # SHAILESH 2/8 = low risk  # VTE risk: 0.5%  # Risk of PONV score = 3.  If > 2, anti-emetic intervention recommended.  # Anesthesia considerations:  Refer to PAC assessment in anesthesia records    # Increased risk of postoperative nausea/vomiting: Recommend use of antiemetic agents in the perioperative period.         CARDIAC: METS 3      # RCRI : High risk surgery, Congestive Heart Failure.  6.6% risk of major adverse cardiac event.     #  A-fib, [id number] score 4. On warfarin.     #  Cath 8/5/21: mild non-obstructive CAD     #  Echo 8/5/21:  EF 55-60%, moderate/severe MS     #  EKG 8/5/21:  A-fib     #  HTN, will hold torsemide and take amiodarone, diltiazem, metoprolol DOS     #  BNP today 1,245 (down from 1,665 on 8/3/21)    PULMONARY:     # Current smoker, 54 pk yr hx, smokes 3/4 ppd    # Denies asthma or inhaler use    GI:     # GERD, asymptomatic on prilosec     # Primary biliary cirrhosis    # Elevated LFTs:  AST 53, ALT 62, AlkPhos 362    /RENAL:     # Creatinine 1.03, GFR 57 today    ENDO: BMI 33    # No DM    HEME:     # On warfarin, last dose 8/30/21    ORTHO:     # S/p right knee arthroplasty    # S/p left hip arthroplasty    # S/p L4-5 fusion      Patient is optimized and is acceptable candidate for the proposed procedure. No further diagnostic evaluation is needed.    Final plan per anesthesiologist on day of surgery.     Reviewed and Signed by PAC Mid-Level Provider/Resident  Mid-Level Provider/Resident: Zohreh Camacho PA-C  Date: 8/25/21  Time: 1643                               Zohreh Camacho PA-C

## 2021-08-25 NOTE — H&P
Pre-Operative H & P     CC:  Preoperative exam to assess for increased cardiopulmonary risk while undergoing surgery and anesthesia.    Date of Encounter: 8/25/2021  Primary Care Physician:  Kika Tariq   Reason for Visit: Mitral stenosis    HPI     Corina Martinez is a 65 y/o female who presents for pre-operative H&P in preparation for REPLACEMENT, MITRAL VALVE, OPEN and all associated procedures with Sven Acuna MD on 9/7/21 at [facility] for treatment of Mitral stenosis. Patient is being evaluated for comorbid conditions of HTN, tobacco dependence, obesity, primary biliary cirrhosis, s/p lumbar fusion, chronic back pain.     Ms. Martinez has new onset a-fib with RVR and moderate-severe mitral stenosis.  Her course recently has been complicated with multiple office visits and hospital visits for Afib / heart failure symptoms.  Her afib is better controlled with medical therapy.  She states her symptoms are better as well.  Her workup has identified moderate pericardial effusion and worsening mitral stenosis. She now presents for the above procedure.    History was obtained from patient & chart review.     Hx of abnormal bleeding or anti-platelet use: on warfarin    Menstrual history: No LMP recorded (lmp unknown). Patient is postmenopausal.:      Prior to Admission Medications  Current Outpatient Medications   Medication Sig Dispense Refill     amiodarone (PACERONE) 200 MG tablet Take 200 mg by mouth daily (with lunch)        diclofenac (VOLTAREN) 1 % topical gel Apply 2 g topically 2 times daily as needed To right shoulder       diltiazem ER COATED BEADS (CARDIZEM CD/CARTIA XT) 240 MG 24 hr capsule Take 1 capsule (240 mg) by mouth daily (Patient taking differently: Take 240 mg by mouth daily (with lunch) ) 90 capsule 1     diphenhydrAMINE (BENADRYL) 25 MG tablet Take 50 mg by mouth At Bedtime       DULoxetine (CYMBALTA) 30 MG capsule Take 30 mg by mouth At  Bedtime       fluticasone (FLONASE) 50 MCG/ACT nasal spray Spray 2 sprays into both nostrils 2 times daily as needed for allergies  1 Package 12     gabapentin (NEURONTIN) 600 MG tablet Take 600 mg in the morning  Take 600 mg in the afternoon   Take 1200 mg (2 tablets) at bedtime.       hydrOXYzine (ATARAX) 25 MG tablet Take 25 mg by mouth At Bedtime       magnesium 250 MG tablet Take 1 tablet (250 mg) by mouth 2 times daily       Melatonin 10 MG TABS tablet Take 10 mg by mouth At Bedtime       metoprolol tartrate (LOPRESSOR) 50 MG tablet Take 1 tablet (50 mg) by mouth 2 times daily 180 tablet 0     Multiple Vitamins-Minerals (MULTIVITAMIN ADULTS 50+) TABS Take 1 tablet by mouth every morning       omeprazole (PRILOSEC) 20 MG CR capsule Take 20 mg by mouth daily        potassium chloride ER (KLOR-CON M) 20 MEQ CR tablet Take 20 mEq by mouth 2 times daily       torsemide (DEMADEX) 10 MG tablet Take 1 tablet (10 mg) by mouth daily (Patient taking differently: Take 20 mg by mouth daily ) 90 tablet 0     vitamin B complex with vitamin C (VITAMIN  B COMPLEX) tablet Take 1 tablet by mouth daily [employer]       vitamin D3 (CHOLECALCIFEROL) 50 mcg (2000 units) tablet Take 1 tablet by mouth every morning       warfarin ANTICOAGULANT (COUMADIN) 5 MG tablet Take 2.5 mg x 4 days and 5 mg x 3 days/week or as directed by protime clinic (Patient taking differently: Take 2.5 mg on Mon/Wed/Fri and take 5 mg on all other days of the week.  Takes in the evening.) 90 tablet 0     Ursodiol 500 MG TABS Take 500 mg by mouth 3 times daily  (Patient not taking: Reported on 8/24/2021)         Family History  Family History   Problem Relation Age of Onset     Depression Mother      Respiratory Mother      Emphysema Mother      Cancer Father      Lymphoma Father      Cardiovascular No family hx of      Anesthesia Reaction No family hx of      Deep Vein Thrombosis (DVT) No family hx of        The complete review of systems is negative  other than noted in the HPI or here.         Anesthesia Pre-Procedure Evaluation    Patient: Corina Martinez   MRN: [id number] : 1955        Preoperative Diagnosis: Mitral stenosis [I05.0]   Procedure : Procedure(s):  REPLACEMENT, MITRAL VALVE, OPEN and all associated procedures     Past Medical History:   Diagnosis Date     Arthritis      Congestive heart failure (H)      Hypertension      Nonrheumatic mitral valve stenosis, severe      Obese      SHAILESH (obstructive sleep apnea)      Sleep apnea       Past Surgical History:   Procedure Laterality Date     APPENDECTOMY       CV CORONARY ANGIOGRAM N/A 2021    Procedure: CV CORONARY ANGIOGRAM;  Surgeon: Juan Vance MD;  [facility]     CV RIGHT HEART CATH MEASUREMENTS RECORDED N/A 2021    Procedure: CV RIGHT HEART CATH;  Surgeon: Juan Vance MD;  [facility]     KNEE DEBRIDEMENT  2019     LAPAROSCOPIC CHOLECYSTECTOMY       LUMBAR FUSION       OTHER SURGICAL HISTORY      hip replacement     RELEASE CARPAL TUNNEL       TONSILLECTOMY       TOTAL KNEE ARTHROPLASTY Right 2019     TUBAL LIGATION        Allergies   Allergen Reactions     Codeine Sulfate Nausea and Vomiting     Acetaminophen Other (See Comments)     Was told to avoid due to primary biliary cirrhosis      Social History     Tobacco Use     Smoking status: Current Some Day Smoker     Packs/day: 1.00     Years: 54.00     Pack years: 54.00     Types: Cigarettes     Smokeless tobacco: Never Used     Tobacco comment: pt has been working hard on quiting; smoking 3/4 ppd (21)   Substance Use Topics     Alcohol use: Yes     Comment: social      Wt Readings from Last 1 Encounters:   21 93.4 kg (206 lb)              ROS/MED HX  The complete review of systems is negative other than noted in the HPI or here.  Patient denies recent illness, fever and respiratory infection during past month.  Pt denies  "steroid use during past year.    ENT/Pulmonary: Comment: 54 pk yr hx, smokes 3/4 ppd      (+) tobacco use, Current use,     Neurologic:  - neg neurologic ROS   (+) no peripheral neuropathy  (-) no seizures and no CVA   Cardiovascular:     (+) hypertension-----Taking blood thinners Instructions Given to patient: on warfarin. CHF etiology: valvular disease Last EF: 55-60% date: 8/5/21 dysrhythmias, a-fib, valvular problems/murmurs mitral stenosis. Previous cardiac testing   Echo: Date: 8/5/21 Results:  Interpretation Summary  The patient's rhythm is atrial fibrillation.  Left ventricular size, wall motion and function are normal. The ejection  fraction is 55-60%.  Moderate to severe calcific mitral stenosis is present. Severe mitral annular  calcification is present.  The mean gradient across the mitral valve is 5 mmHg.The mitral valve area is  1.46 cm^2 by the pressure half time method.The mitral valve area is 1.49  cm^2  planimetry.  Global right ventricular function is normal.  Mild to moderate tricuspid insufficiency is present.  Pulmonary artery systolic pressure is normal.  No pericardial effusion is present.  There has been no change, other than posterior yousif effusion has resolved.    Stress Test: Date: Results:    ECG Reviewed: Date: 8/5/21 Results:  Atrial fibrillation   Low voltage QRS   Prolonged QT   Abnormal ECG   When compared with ECG of 30-JUN-2021 12:05,   Nonspecific T wave abnormality no longer evident in Lateral leads  Ventricular rate 80 bpm    Cath:  Date: 8/5/21 Results:  \" Right sided filling pressures are mildly elevated.  \" Left sided filling pressures are moderately elevated.  \" Normal cardiac output level.  \" Mild elevated pulmonary hypertension.  \" Hemodynamic data has been modified in Epic per physician review.  \" Mild non obstructive CAD.      METS/Exercise Tolerance: 3 - Able to walk 1-2 blocks without stopping    Hematologic:  - neg hematologic  ROS  (-) history of blood clots and " "history of blood transfusion   Musculoskeletal: Comment: S/p right knee arthroplasty    S/p left hip arthroplasty    S/p L4-5 fusion        GI/Hepatic: Comment: Primary biliary cirrhosis      (+) GERD, Asymptomatic on medication, liver disease,     Renal/Genitourinary:  - neg Renal ROS  (-) renal disease   Endo:     (+) Obesity,  (-) Type II DM   Psychiatric/Substance Use:  - neg psychiatric ROS     Infectious Disease:  - neg infectious disease ROS     Malignancy:  - neg malignancy ROS     Other:  - neg other ROS          Physical Exam    Airway        Mallampati: I   TM distance: > 3 FB   Neck ROM: full   Mouth opening: > 3 cm    Respiratory Devices and Support         Dental  no notable dental history         Cardiovascular   cardiovascular exam normal          Pulmonary   pulmonary exam normal                /70 (BP Location: Right arm, Patient Position: Chair, Cuff Size: Adult Large)   Pulse 57   Temp 97.7  F (36.5  C) (Oral)   Resp 16   Ht 1.689 m (5' 6.5\")   Wt 93.4 kg (206 lb)   LMP  (LMP Unknown)   SpO2 97%   Breastfeeding No   BMI 32.75 kg/m           Physical Exam  Constitutional: Awake, alert, cooperative, no apparent distress, and appears stated age.  Eyes: Pupils equal, round and reactive to light, extra ocular muscles intact, sclera clear, conjunctiva normal.  HENT: Normocephalic, oral pharynx with moist mucus membranes, good dentition. No goiter appreciated. No removable dental hardware.  Respiratory: Clear to auscultation bilaterally, no crackles or wheezing. No SOB when supine.  Cardiovascular: Regular rate and rhythm, normal S1 and S2, and no murmur noted.  Carotids +2, no bruits. 1+ pitting edema. Palpable pulses to radial & DP arteries.   GI: Normal bowel sounds, soft, obese, non-tender, no masses palpated.    Lymph/Hematologic: No cervical lymphadenopathy and no supraclavicular lymphadenopathy.  Genitourinary:  deferred  Skin: Warm and dry.  No rashes.   Musculoskeletal: full ROM " of neck. There is no redness, warmth, or swelling of the joints. Gross motor strength is normal.    Neurologic: Awake, alert, oriented to name, place and time. Cranial nerves II-XII are grossly intact. Gait is normal. Ambulates from chair to exam table, seats self, lies supine and sits back up w/o assistance.  Neuropsychiatric: Calm, cooperative. Normal affect. Pleasant.  Answers questions appropriately, follows commands w/o difficulty.        PRIOR LABS/DIAGNOSTIC STUDIES:    All labs and imaging personally reviewed      HEART CATH 8/5/21    Right sided filling pressures are mildly elevated.    Left sided filling pressures are moderately elevated.    Normal cardiac output level.    Mild elevated pulmonary hypertension.    Hemodynamic data has been modified in Epic per physician review.    Mild non obstructive CAD.         EKG 8/5/21  Atrial fibrillation   Low voltage QRS   Prolonged QT   Abnormal ECG   When compared with ECG of 30-JUN-2021 12:05,   Nonspecific T wave abnormality no longer evident in Lateral leads  Ventricular rate 80 bpm      Echocardiogram 8/5/21: Interpretation SummaryGlobal and regional left ventricular function is normal with an EF of 55-60%.Global right ventricular function is normal.Mitral stenosis secondary to mitral annular calcification. Mean gradient iselevated at 10 mmHg however the patient is tachycardic which elevates meangradient even further.Mild aortic stenosis is present.Large pericardial effsuion posterior to the LV 23 mm, lateral 16 mm, anteriorto RV 4 m). No chamber collapse.Dilation of the inferior vena cava is present with abnormal respiratoryvariation in diameter.IVC diameter >2.1 cm collapsing <50% with sniff suggests a high RA pressureestimated at 15 mmHg or greater. Previous study not available for comparison. The patient's rhythm is atrial fibrillation.      Labs/Diagnostic Studies today:  CMP, CBC, hep panel, INR, PTT, T&S, UA    Sodium 133 - 144 mmol/L 140       Potassium 3.4 - 5.3 mmol/L 5.0     Chloride 94 - 109 mmol/L 106     Carbon Dioxide (CO2) 20 - 32 mmol/L 31     Anion Gap 3 - 14 mmol/L 3     Urea Nitrogen 7 - 30 mg/dL 16     Creatinine 0.52 - 1.04 mg/dL 1.03     Calcium 8.5 - 10.1 mg/dL 9.5     Glucose 70 - 99 mg/dL 97     Alkaline Phosphatase 40 - 150 U/L 362High      AST 0 - 45 U/L 53High      ALT 0 - 50 U/L 62High      Protein Total 6.8 - 8.8 g/dL 7.8     Albumin 3.4 - 5.0 g/dL 3.1Low      Bilirubin Total 0.2 - 1.3 mg/dL 0.6     GFR Estimate >60 mL/min/1.73m2 57Low         WBC Count 4.0 - 11.0 10e3/uL 8.7      RBC Count 3.80 - 5.20 10e6/uL 5.49High      Hemoglobin 11.7 - 15.7 g/dL 14.4     Hematocrit 35.0 - 47.0 % 45.4     MCV 78 - 100 fL 83     MCH 26.5 - 33.0 pg 26.2Low      MCHC 31.5 - 36.5 g/dL 31.7     RDW 10.0 - 15.0 % 22.7High      Platelet Count 150 - 450 10e3/uL 349        INR 0.85 - 1.15 2.04High        aPTT 22 - 38 Seconds 36      N Terminal Pro BNP Outpatient 0 - 125 pg/mL 1,245High      Bilirubin Direct 0.0 - 0.2 mg/dL 0.2      Color Urine Colorless, Straw, Light Yellow, Yellow Yellow      Appearance Urine Clear Clear     Glucose Urine Negative mg/dL Negative     Bilirubin Urine Negative Negative     Ketones Urine Negative mg/dL Negative     Specific Gravity Urine 1.003 - 1.035 1.010     Blood Urine Negative Negative     pH Urine 5.0 - 7.0 7.0     Protein Albumin Urine Negative mg/dL Negative     Urobilinogen Urine Normal, 2.0 mg/dL Normal     Nitrite Urine Negative Negative     Leukocyte Esterase Urine Negative Negative     Mucus Urine None Seen /LPF PresentAbnormal      RBC Urine <=2 /HPF <1     WBC Urine <=5 /HPF <1     Squamous Epithelials Urine <=1 /HPF 1     Hyaline Casts Urine <=2 /LPF 3         PFT Latest Ref Rng & Units 8/25/2021   FVC L 2.33   FEV1 L 1.99   FVC% % 71   FEV1% % 78           The patient's records and results personally reviewed by this provider.     Outside records reviewed from: care everywhere    ASSESSMENT and PLAN  Corina  Juan is a 67 y/o female who presents for pre-operative H&P in preparation for REPLACEMENT, MITRAL VALVE, OPEN and all associated procedures with Sven Acuna MD on 9/7/21 at [facility] for treatment of Mitral stenosis    Pt has had prior anesthetic.  No history of anesthetic complications.     She has the following specific operative considerations:   # SHAILESH 2/8 = low risk  # VTE risk: 0.5%  # Risk of PONV score = 3.  If > 2, anti-emetic intervention recommended.  # Anesthesia considerations:  Refer to PAC assessment in anesthesia records    # Increased risk of postoperative nausea/vomiting: Recommend use of antiemetic agents in the perioperative period.        CARDIAC: METS 3      # RCRI : High risk surgery, Congestive Heart Failure.  6.6% risk of major adverse cardiac event.     #  A-fib, [id number] score 4. On warfarin.     #  Cath 8/5/21: mild non-obstructive CAD     #  Echo 8/5/21:  EF 55-60%, moderate/severe MS     #  EKG 8/5/21:  A-fib     #  HTN, will hold torsemide and take amiodarone, diltiazem, metoprolol DOS     #  BNP today 1,245 (down from 1,665 on 8/3/21)    PULMONARY:     # Current smoker, 54 pk yr hx, smokes 3/4 ppd    # Denies asthma or inhaler use    GI:     # GERD, asymptomatic on prilosec     # Primary biliary cirrhosis    # Elevated LFTs:  AST 53, ALT 62, AlkPhos 362    /RENAL:     # Creatinine 1.03, GFR 57 today    ENDO: BMI 33    # No DM    HEME:     # On warfarin, last dose 8/30/21    ORTHO:     # S/p right knee arthroplasty    # S/p left hip arthroplasty    # S/p L4-5 fusion      Patient is optimized and is acceptable candidate for the proposed procedure. No further diagnostic evaluation is needed.    Final plan per anesthesiologist on day of surgery.     Arrival time, NPO, shower and medication instructions provided by nursing staff today.  Preparing For Your Surgery handout given.      On the day of service:     Prep time: 15  minutes  Visit time: 25 minutes  Documentation time: 15 minutes  ------------------------------------------  Total time: 55 minutes      Zohreh Camacho PA-C  Preoperative Assessment Center  [facility]  Clinic and Surgery Center  Phone: [phone]  Fax: [phone]

## 2021-08-25 NOTE — PATIENT INSTRUCTIONS
Preparing for Your Surgery      Name:  Corina Martinez   MRN:  [id number]   :  1955   Today's Date:  2021       Arriving for surgery:  Surgery date:  2021  Arrival time:  6:30 am    Restrictions due to COVID 19:       One visitor is allowed in the Pre Op area. When you go into surgery, one visitor is allowed to wait in the Surgery Waiting Room       (provided there is enough space to social distance).   After surgery- Two visitors are allowed at a time if you have a private room and one visitor is allowed for those in a semi-private room.   Every 4 days the visitor(s) can rotate. During the 4 day period, the visitor(s) must be consistent. No visitors under the age of 18 years old.   Visiting Hours: 8 am - 8:30 pm   No ill visitors.   All visitors must wear face mask.    [facility] parking is available for anyone with mobility limitations or disabilities.  ([place]  24 hours/ 7 days a week; [facility]  7 am- 3:30 pm, Mon- Fri)    Please come to:     [facility] [address]  [address] [place] [address]         -    Please proceed to [address] on the 3rd floor. [phone]?     - ?If you are in need of directions, wheelchair or escort please stop at the Information Desk in the lobby.      What can I eat or drink?  -  You may eat and drink normally for up to 8 hours before your surgery. (Until midnight)  -  You may have clear liquids until 2 hours before surgery. (Until 6:30 am arrival time)    Examples of clear liquids:  Water  Clear broth  Juices (apple, white grape, white cranberry  and cider) without pulp  Noncarbonated, powder based beverages  (lemonade and Jose Cruz-Aid)  Sodas (Sprite, 7-Up, ginger ale and seltzer)  Coffee or tea (without milk or cream)  Gatorade    -  No Alcohol for at least 24 hours before surgery     Which medicines can I take?    Hold Multivitamins for 7 days before surgery.  Hold Supplements for 7 days before  surgery.  Hold Ibuprofen (Advil, Motrin) for 1 day before surgery  Hold Naproxen (Aleve) for 4 days before surgery.    Plan for Warfarin (coumadin):  Hold for 7 days before surgery  Take last dose on 8/30/21    -  DO NOT take these medications the day of surgery:  Magnesium  Potassium  Torsemide (demadex)  Vitamin B  Vitamin D      -  PLEASE TAKE these medications the day of surgery:  Amiodarone   Diltiazem   Fluticasone   Gabapentin  Metoprolol   Omeprazole       How do I prepare myself?  - Please take 2 showers before surgery using Scrubcare or Hibiclens soap.    Use this soap only from the neck to your toes.     Leave the soap on your skin for one minute--then rinse thoroughly.      You may use your own shampoo and conditioner; no other hair products.   - Please remove all jewelry and body piercings.  - No lotions, deodorants or fragrance.  - No makeup or fingernail polish.   - Bring your ID and insurance card.    -If you have a Deep Brain Stimulator, Spinal Cord Stimulator or any neuro stimulator device---you must bring the remote control to the hospital     - All patients are required to have a Covid-19 test within 4 days of surgery/procedure.      -Patients will be contacted by the [facility] scheduling team within 1 week of surgery to make an appointment.      - Patients may call the Scheduling team at [phone] if they have not been scheduled within 4 days of  surgery.        Questions or Concerns:    - For any questions regarding the day of surgery or your hospital stay, please contact the Pre Admission Nursing Office at [phone].       - If you have health changes between today and your surgery please call your surgeon.       For questions after surgery please call your surgeons office.   Dawn Siegel RN  Cardiothoracic Surgery Coordinator  [phone]  Direct Phone

## 2021-08-26 ENCOUNTER — CARE COORDINATION (OUTPATIENT)
Dept: CARDIOLOGY | Facility: CLINIC | Age: 66
End: 2021-08-26

## 2021-08-26 NOTE — PROGRESS NOTES
Called patient and discussed results to yesterdays labs, tests and PAC.  Patient has ongoing elevated liver enzymes due to billiary cirrhosis, patient states she cannot afford the medication), her BNP is slightly elevated but patient states her weight is stable and she is taking her diuretics.  Also asked patient to stop smoking as it will impede her coming off the ventilator and slow her recovery.  Patient states she will quit.  Reviewed last dose of coumadin on the 30th of August. PAC discharge instructions inform patient to take amiodarone, diltiazem, fluticasone, gabapentin, metoprolol and omeprazole.  Per instructions from surgery patient is to take only metoprolol, omeprazole and Cymbalta.  Will call patient and discuss not taking the amiodarone and diltiazem am of surgery per Satinder Celis PA-C.

## 2021-09-02 ENCOUNTER — CARE COORDINATION (OUTPATIENT)
Dept: CARDIOLOGY | Facility: CLINIC | Age: 66
End: 2021-09-02

## 2021-09-02 DIAGNOSIS — I34.2 NONRHEUMATIC MITRAL VALVE STENOSIS: ICD-10-CM

## 2021-09-02 RX ORDER — CEFAZOLIN SODIUM 2 G/50ML
2 SOLUTION INTRAVENOUS
Status: CANCELLED | OUTPATIENT
Start: 2021-09-02

## 2021-09-02 RX ORDER — LIDOCAINE 40 MG/G
CREAM TOPICAL
Status: CANCELLED | OUTPATIENT
Start: 2021-09-02

## 2021-09-02 RX ORDER — CEFAZOLIN SODIUM 2 G/50ML
2 SOLUTION INTRAVENOUS SEE ADMIN INSTRUCTIONS
Status: CANCELLED | OUTPATIENT
Start: 2021-09-02

## 2021-09-02 RX ORDER — CHLORHEXIDINE GLUCONATE ORAL RINSE 1.2 MG/ML
10 SOLUTION DENTAL ONCE
Status: CANCELLED | OUTPATIENT
Start: 2021-09-02 | End: 2021-09-02

## 2021-09-02 RX ORDER — DEXMEDETOMIDINE HYDROCHLORIDE 4 UG/ML
0.2-1.2 INJECTION, SOLUTION INTRAVENOUS CONTINUOUS
Status: CANCELLED | OUTPATIENT
Start: 2021-09-07

## 2021-09-02 RX ORDER — ASPIRIN 81 MG/1
81 TABLET, CHEWABLE ORAL
Status: CANCELLED | OUTPATIENT
Start: 2021-09-02

## 2021-09-02 RX ORDER — ASPIRIN 81 MG/1
162 TABLET, CHEWABLE ORAL
Status: CANCELLED | OUTPATIENT
Start: 2021-09-02

## 2021-09-02 RX ORDER — GABAPENTIN 100 MG/1
100 CAPSULE ORAL
Status: CANCELLED | OUTPATIENT
Start: 2021-09-02

## 2021-09-02 NOTE — PROGRESS NOTES
Patient called and medications to take am of surgery day were reviewed as she had gotten a different list from the PAC clinic then she had received from this nurse.  Reviewed all medications and patient will take omeprazole, gabapentin and lopressor the morning of surgery.  Patient normally takes Cymbalta at bedtime and will not take this the morning of surgery.  Patient will use Flonase if needed but hasn't been using this medication lately.  Patient will not take amiodarone and diltiazem the morning of surgery.

## 2021-09-04 ENCOUNTER — LAB (OUTPATIENT)
Dept: URGENT CARE | Facility: URGENT CARE | Age: 66
End: 2021-09-04
Attending: THORACIC SURGERY (CARDIOTHORACIC VASCULAR SURGERY)
Payer: COMMERCIAL

## 2021-09-04 DIAGNOSIS — Z01.810 PRE-OPERATIVE CARDIOVASCULAR EXAMINATION: ICD-10-CM

## 2021-09-04 PROCEDURE — U0003 INFECTIOUS AGENT DETECTION BY NUCLEIC ACID (DNA OR RNA); SEVERE ACUTE RESPIRATORY SYNDROME CORONAVIRUS 2 (SARS-COV-2) (CORONAVIRUS DISEASE [COVID-19]), AMPLIFIED PROBE TECHNIQUE, MAKING USE OF HIGH THROUGHPUT TECHNOLOGIES AS DESCRIBED BY CMS-2020-01-R: HCPCS

## 2021-09-04 PROCEDURE — U0005 INFEC AGEN DETEC AMPLI PROBE: HCPCS

## 2021-09-05 LAB — SARS-COV-2 RNA RESP QL NAA+PROBE: NEGATIVE

## 2021-09-07 ENCOUNTER — APPOINTMENT (OUTPATIENT)
Dept: GENERAL RADIOLOGY | Facility: CLINIC | Age: 66
DRG: 219 | End: 2021-09-07
Attending: PHYSICIAN ASSISTANT
Payer: MEDICARE

## 2021-09-07 ENCOUNTER — ANESTHESIA (OUTPATIENT)
Dept: SURGERY | Facility: CLINIC | Age: 66
DRG: 219 | End: 2021-09-07
Payer: MEDICARE

## 2021-09-07 ENCOUNTER — APPOINTMENT (OUTPATIENT)
Dept: GENERAL RADIOLOGY | Facility: CLINIC | Age: 66
DRG: 219 | End: 2021-09-07
Attending: STUDENT IN AN ORGANIZED HEALTH CARE EDUCATION/TRAINING PROGRAM
Payer: MEDICARE

## 2021-09-07 ENCOUNTER — HOSPITAL ENCOUNTER (INPATIENT)
Facility: CLINIC | Age: 66
LOS: 15 days | Discharge: HOME | DRG: 219 | End: 2021-09-22
Attending: THORACIC SURGERY (CARDIOTHORACIC VASCULAR SURGERY) | Admitting: THORACIC SURGERY (CARDIOTHORACIC VASCULAR SURGERY)
Payer: MEDICARE

## 2021-09-07 ENCOUNTER — ANCILLARY PROCEDURE (OUTPATIENT)
Dept: ULTRASOUND IMAGING | Facility: CLINIC | Age: 66
End: 2021-09-07
Payer: COMMERCIAL

## 2021-09-07 DIAGNOSIS — Z95.2 S/P MVR (MITRAL VALVE REPLACEMENT): Primary | ICD-10-CM

## 2021-09-07 DIAGNOSIS — Z79.01 ANTICOAGULATION MONITORING, INR RANGE 2-3: ICD-10-CM

## 2021-09-07 DIAGNOSIS — I48.91 ATRIAL FIBRILLATION, UNSPECIFIED TYPE (H): ICD-10-CM

## 2021-09-07 DIAGNOSIS — I34.2 NONRHEUMATIC MITRAL VALVE STENOSIS: ICD-10-CM

## 2021-09-07 DIAGNOSIS — I05.0 MITRAL STENOSIS: ICD-10-CM

## 2021-09-07 LAB
ALBUMIN SERPL-MCNC: 2.4 G/DL (ref 3.4–5)
ALP SERPL-CCNC: 228 U/L (ref 40–150)
ALT SERPL W P-5'-P-CCNC: 70 U/L (ref 0–50)
ANION GAP SERPL CALCULATED.3IONS-SCNC: 8 MMOL/L (ref 3–14)
APTT PPP: 34 SECONDS (ref 22–38)
APTT PPP: 46 SECONDS (ref 22–38)
AST SERPL W P-5'-P-CCNC: ABNORMAL U/L
BASE EXCESS BLDA CALC-SCNC: -0.8 MMOL/L (ref -9.6–2)
BASE EXCESS BLDA CALC-SCNC: -3.9 MMOL/L (ref -9–1.8)
BASE EXCESS BLDA CALC-SCNC: -6 MMOL/L (ref -9–1.8)
BASE EXCESS BLDA CALC-SCNC: -6 MMOL/L (ref -9–1.8)
BASE EXCESS BLDA CALC-SCNC: -6.1 MMOL/L (ref -9–1.8)
BASE EXCESS BLDA CALC-SCNC: -6.1 MMOL/L (ref -9–1.8)
BASE EXCESS BLDA CALC-SCNC: 1.5 MMOL/L (ref -9.6–2)
BASE EXCESS BLDA CALC-SCNC: 3.6 MMOL/L (ref -9.6–2)
BASE EXCESS BLDA CALC-SCNC: 4.2 MMOL/L (ref -9.6–2)
BASE EXCESS BLDA CALC-SCNC: 4.5 MMOL/L (ref -9.6–2)
BASE EXCESS BLDV CALC-SCNC: -3.2 MMOL/L (ref -7.7–1.9)
BASE EXCESS BLDV CALC-SCNC: -4.3 MMOL/L (ref -7.7–1.9)
BASE EXCESS BLDV CALC-SCNC: -4.3 MMOL/L (ref -7.7–1.9)
BASOPHILS # BLD AUTO: 0 10E3/UL (ref 0–0.2)
BASOPHILS NFR BLD AUTO: 0 %
BILIRUB SERPL-MCNC: 0.8 MG/DL (ref 0.2–1.3)
BUN SERPL-MCNC: 19 MG/DL (ref 7–30)
CA-I BLD-MCNC: 4.3 MG/DL (ref 4.4–5.2)
CA-I BLD-MCNC: 4.3 MG/DL (ref 4.4–5.2)
CA-I BLD-MCNC: 4.4 MG/DL (ref 4.4–5.2)
CA-I BLD-MCNC: 4.7 MG/DL (ref 4.4–5.2)
CA-I BLD-MCNC: 4.8 MG/DL (ref 4.4–5.2)
CA-I BLD-MCNC: 4.8 MG/DL (ref 4.4–5.2)
CALCIUM SERPL-MCNC: 8.5 MG/DL (ref 8.5–10.1)
CF REDUC 30M P MA P HEP LENFR BLD TEG: 0.3 % (ref 0–8)
CF REDUC 30M P MA P HEP LENFR BLD TEG: 1 % (ref 0–8)
CF REDUC 60M P MA P HEPASE LENFR BLD TEG: 3.1 % (ref 0–15)
CF REDUC 60M P MA P HEPASE LENFR BLD TEG: 3.9 % (ref 0–15)
CFT P HPASE BLD TEG: 0.9 MINUTE (ref 1–3)
CFT P HPASE BLD TEG: 1.1 MINUTE (ref 1–3)
CHLORIDE BLD-SCNC: 110 MMOL/L (ref 94–109)
CI (COAGULATION INDEX) NATIVE: 2.5 (ref -3–3)
CI (COAGULATION INDEX) NATIVE: 4.5 (ref -3–3)
CLOT ANGLE P HPASE BLD TEG: 74.7 DEGREES (ref 59–74)
CLOT ANGLE P HPASE BLD TEG: 77.2 DEGREES (ref 59–74)
CLOT INIT P HPASE BLD TEG: 4.2 MINUTE (ref 4–9)
CLOT INIT P HPASE BLD TEG: 5.8 MINUTE (ref 4–9)
CLOT STRENGTH P HPASE BLD TEG: 12.7 KD/SC (ref 5.3–13.2)
CLOT STRENGTH P HPASE BLD TEG: 17.5 KD/SC (ref 5.3–13.2)
CO2 SERPL-SCNC: 24 MMOL/L (ref 20–32)
CREAT SERPL-MCNC: 0.88 MG/DL (ref 0.52–1.04)
EOSINOPHIL # BLD AUTO: 0.1 10E3/UL (ref 0–0.7)
EOSINOPHIL NFR BLD AUTO: 1 %
ERYTHROCYTE [DISTWIDTH] IN BLOOD BY AUTOMATED COUNT: 22.2 % (ref 10–15)
ERYTHROCYTE [DISTWIDTH] IN BLOOD BY AUTOMATED COUNT: 22.9 % (ref 10–15)
FIBRINOGEN PPP-MCNC: 271 MG/DL (ref 170–490)
GFR SERPL CREATININE-BSD FRML MDRD: 69 ML/MIN/1.73M2
GLUCOSE BLD-MCNC: 107 MG/DL (ref 70–99)
GLUCOSE BLD-MCNC: 132 MG/DL (ref 70–99)
GLUCOSE BLD-MCNC: 142 MG/DL (ref 70–99)
GLUCOSE BLD-MCNC: 161 MG/DL (ref 70–99)
GLUCOSE BLD-MCNC: 184 MG/DL (ref 70–99)
GLUCOSE BLD-MCNC: 208 MG/DL (ref 70–99)
GLUCOSE BLDC GLUCOMTR-MCNC: 103 MG/DL (ref 70–99)
GLUCOSE BLDC GLUCOMTR-MCNC: 112 MG/DL (ref 70–99)
GLUCOSE BLDC GLUCOMTR-MCNC: 115 MG/DL (ref 70–99)
GLUCOSE BLDC GLUCOMTR-MCNC: 120 MG/DL (ref 70–99)
GLUCOSE BLDC GLUCOMTR-MCNC: 121 MG/DL (ref 70–99)
GLUCOSE BLDC GLUCOMTR-MCNC: 133 MG/DL (ref 70–99)
GLUCOSE BLDC GLUCOMTR-MCNC: 144 MG/DL (ref 70–99)
GLUCOSE BLDC GLUCOMTR-MCNC: 147 MG/DL (ref 70–99)
HCO3 BLD-SCNC: 22 MMOL/L (ref 21–28)
HCO3 BLD-SCNC: 25 MMOL/L (ref 21–28)
HCO3 BLDA-SCNC: 26 MMOL/L (ref 21–28)
HCO3 BLDA-SCNC: 29 MMOL/L (ref 21–28)
HCO3 BLDA-SCNC: 30 MMOL/L (ref 21–28)
HCO3 BLDV-SCNC: 24 MMOL/L (ref 21–28)
HCO3 BLDV-SCNC: 25 MMOL/L (ref 21–28)
HCO3 BLDV-SCNC: 27 MMOL/L (ref 21–28)
HCT VFR BLD AUTO: 31 % (ref 35–47)
HCT VFR BLD AUTO: 40.1 % (ref 35–47)
HGB BLD-MCNC: 10.6 G/DL (ref 11.7–15.7)
HGB BLD-MCNC: 10.7 G/DL (ref 11.7–15.7)
HGB BLD-MCNC: 10.8 G/DL (ref 11.7–15.7)
HGB BLD-MCNC: 12.5 G/DL (ref 11.7–15.7)
HGB BLD-MCNC: 13.4 G/DL (ref 11.7–15.7)
HGB BLD-MCNC: 9.7 G/DL (ref 11.7–15.7)
HGB BLD-MCNC: 9.7 G/DL (ref 11.7–15.7)
IMM GRANULOCYTES # BLD: 0.1 10E3/UL
IMM GRANULOCYTES NFR BLD: 1 %
INR PPP: 1.37 (ref 0.85–1.15)
INR PPP: 1.79 (ref 0.85–1.15)
LACTATE BLD-SCNC: 0.8 MMOL/L
LACTATE BLD-SCNC: 1.1 MMOL/L
LACTATE BLD-SCNC: 2.6 MMOL/L
LACTATE SERPL-SCNC: 2.6 MMOL/L (ref 0.7–2)
LYMPHOCYTES # BLD AUTO: 2 10E3/UL (ref 0.8–5.3)
LYMPHOCYTES NFR BLD AUTO: 12 %
MAGNESIUM SERPL-MCNC: 3 MG/DL (ref 1.6–2.3)
MCF P HPASE BLD TEG: 71.8 MM (ref 55–74)
MCF P HPASE BLD TEG: 77.8 MM (ref 55–74)
MCH RBC QN AUTO: 26.9 PG (ref 26.5–33)
MCH RBC QN AUTO: 27.6 PG (ref 26.5–33)
MCHC RBC AUTO-ENTMCNC: 31.2 G/DL (ref 31.5–36.5)
MCHC RBC AUTO-ENTMCNC: 31.3 G/DL (ref 31.5–36.5)
MCV RBC AUTO: 86 FL (ref 78–100)
MCV RBC AUTO: 88 FL (ref 78–100)
MONOCYTES # BLD AUTO: 0.3 10E3/UL (ref 0–1.3)
MONOCYTES NFR BLD AUTO: 2 %
NEUTROPHILS # BLD AUTO: 14.3 10E3/UL (ref 1.6–8.3)
NEUTROPHILS NFR BLD AUTO: 84 %
NRBC # BLD AUTO: 0 10E3/UL
NRBC BLD AUTO-RTO: 0 /100
O2/TOTAL GAS SETTING VFR VENT: 40 %
O2/TOTAL GAS SETTING VFR VENT: 50 %
OXYHGB MFR BLD: 92 % (ref 92–100)
OXYHGB MFR BLD: 92 % (ref 92–100)
OXYHGB MFR BLD: 93 % (ref 92–100)
OXYHGB MFR BLD: 94 % (ref 92–100)
OXYHGB MFR BLD: 95 % (ref 92–100)
OXYHGB MFR BLDA: 94 % (ref 92–100)
OXYHGB MFR BLDA: 95 % (ref 92–100)
OXYHGB MFR BLDA: 97 % (ref 92–100)
OXYHGB MFR BLDA: 98 % (ref 92–100)
OXYHGB MFR BLDA: 98 % (ref 92–100)
OXYHGB MFR BLDV: 45 % (ref 70–75)
OXYHGB MFR BLDV: 46 % (ref 70–75)
OXYHGB MFR BLDV: 48 % (ref 70–75)
PCO2 BLD: 53 MM HG (ref 35–45)
PCO2 BLD: 54 MM HG (ref 35–45)
PCO2 BLD: 57 MM HG (ref 35–45)
PCO2 BLD: 58 MM HG (ref 35–45)
PCO2 BLD: 59 MM HG (ref 35–45)
PCO2 BLDA: 45 MM HG (ref 35–45)
PCO2 BLDA: 50 MM HG (ref 35–45)
PCO2 BLDA: 51 MM HG (ref 35–45)
PCO2 BLDA: 53 MM HG (ref 35–45)
PCO2 BLDA: 57 MM HG (ref 35–45)
PCO2 BLDV: 62 MM HG (ref 40–50)
PCO2 BLDV: 69 MM HG (ref 40–50)
PCO2 BLDV: 71 MM HG (ref 40–50)
PH BLD: 7.2 [PH] (ref 7.35–7.45)
PH BLD: 7.21 [PH] (ref 7.35–7.45)
PH BLD: 7.22 [PH] (ref 7.35–7.45)
PH BLD: 7.22 [PH] (ref 7.35–7.45)
PH BLD: 7.23 [PH] (ref 7.35–7.45)
PH BLDA: 7.31 [PH] (ref 7.35–7.45)
PH BLDA: 7.31 [PH] (ref 7.35–7.45)
PH BLDA: 7.36 [PH] (ref 7.35–7.45)
PH BLDA: 7.39 [PH] (ref 7.35–7.45)
PH BLDA: 7.43 [PH] (ref 7.35–7.45)
PH BLDV: 7.17 [PH] (ref 7.32–7.43)
PH BLDV: 7.18 [PH] (ref 7.32–7.43)
PH BLDV: 7.2 [PH] (ref 7.32–7.43)
PHOSPHATE SERPL-MCNC: 5.3 MG/DL (ref 2.5–4.5)
PLATELET # BLD AUTO: 159 10E3/UL (ref 150–450)
PLATELET # BLD AUTO: 194 10E3/UL (ref 150–450)
PO2 BLD: 101 MM HG (ref 80–105)
PO2 BLD: 108 MM HG (ref 80–105)
PO2 BLD: 87 MM HG (ref 80–105)
PO2 BLD: 88 MM HG (ref 80–105)
PO2 BLD: 90 MM HG (ref 80–105)
PO2 BLDA: 100 MM HG (ref 80–105)
PO2 BLDA: 381 MM HG (ref 80–105)
PO2 BLDA: 400 MM HG (ref 80–105)
PO2 BLDA: 529 MM HG (ref 80–105)
PO2 BLDA: 95 MM HG (ref 80–105)
PO2 BLDV: 33 MM HG (ref 25–47)
PO2 BLDV: 35 MM HG (ref 25–47)
PO2 BLDV: 37 MM HG (ref 25–47)
POTASSIUM BLD-SCNC: 4.2 MMOL/L (ref 3.4–5.3)
POTASSIUM BLD-SCNC: 4.2 MMOL/L (ref 3.4–5.3)
POTASSIUM BLD-SCNC: 4.2 MMOL/L (ref 3.5–5)
POTASSIUM BLD-SCNC: 4.5 MMOL/L (ref 3.5–5)
POTASSIUM BLD-SCNC: 4.6 MMOL/L (ref 3.5–5)
POTASSIUM BLD-SCNC: 4.7 MMOL/L (ref 3.5–5)
POTASSIUM BLD-SCNC: 4.8 MMOL/L (ref 3.5–5)
PROT SERPL-MCNC: 5.8 G/DL (ref 6.8–8.8)
RBC # BLD AUTO: 3.51 10E6/UL (ref 3.8–5.2)
RBC # BLD AUTO: 4.64 10E6/UL (ref 3.8–5.2)
SODIUM BLD-SCNC: 140 MMOL/L (ref 133–144)
SODIUM BLD-SCNC: 141 MMOL/L (ref 133–144)
SODIUM SERPL-SCNC: 142 MMOL/L (ref 133–144)
WBC # BLD AUTO: 16.8 10E3/UL (ref 4–11)
WBC # BLD AUTO: 34 10E3/UL (ref 4–11)

## 2021-09-07 PROCEDURE — 02B70ZK EXCISION OF LEFT ATRIAL APPENDAGE, OPEN APPROACH: ICD-10-PCS | Performed by: THORACIC SURGERY (CARDIOTHORACIC VASCULAR SURGERY)

## 2021-09-07 PROCEDURE — 999N000065 XR CHEST PORT 1 VIEW

## 2021-09-07 PROCEDURE — 93010 ELECTROCARDIOGRAM REPORT: CPT | Mod: 59 | Performed by: INTERNAL MEDICINE

## 2021-09-07 PROCEDURE — 93005 ELECTROCARDIOGRAM TRACING: CPT

## 2021-09-07 PROCEDURE — 250N000009 HC RX 250: Performed by: STUDENT IN AN ORGANIZED HEALTH CARE EDUCATION/TRAINING PROGRAM

## 2021-09-07 PROCEDURE — 82810 BLOOD GASES O2 SAT ONLY: CPT

## 2021-09-07 PROCEDURE — 410N000004: Performed by: THORACIC SURGERY (CARDIOTHORACIC VASCULAR SURGERY)

## 2021-09-07 PROCEDURE — 84132 ASSAY OF SERUM POTASSIUM: CPT | Performed by: PHYSICIAN ASSISTANT

## 2021-09-07 PROCEDURE — 84155 ASSAY OF PROTEIN SERUM: CPT | Performed by: PHYSICIAN ASSISTANT

## 2021-09-07 PROCEDURE — 71045 X-RAY EXAM CHEST 1 VIEW: CPT | Mod: 76 | Performed by: STUDENT IN AN ORGANIZED HEALTH CARE EDUCATION/TRAINING PROGRAM

## 2021-09-07 PROCEDURE — 85730 THROMBOPLASTIN TIME PARTIAL: CPT | Performed by: THORACIC SURGERY (CARDIOTHORACIC VASCULAR SURGERY)

## 2021-09-07 PROCEDURE — 85025 COMPLETE CBC W/AUTO DIFF WBC: CPT | Performed by: THORACIC SURGERY (CARDIOTHORACIC VASCULAR SURGERY)

## 2021-09-07 PROCEDURE — 82805 BLOOD GASES W/O2 SATURATION: CPT | Performed by: PHYSICIAN ASSISTANT

## 2021-09-07 PROCEDURE — 370N000017 HC ANESTHESIA TECHNICAL FEE, PER MIN: Performed by: THORACIC SURGERY (CARDIOTHORACIC VASCULAR SURGERY)

## 2021-09-07 PROCEDURE — 82330 ASSAY OF CALCIUM: CPT

## 2021-09-07 PROCEDURE — 258N000003 HC RX IP 258 OP 636: Performed by: STUDENT IN AN ORGANIZED HEALTH CARE EDUCATION/TRAINING PROGRAM

## 2021-09-07 PROCEDURE — 85730 THROMBOPLASTIN TIME PARTIAL: CPT | Performed by: PHYSICIAN ASSISTANT

## 2021-09-07 PROCEDURE — C1713 ANCHOR/SCREW BN/BN,TIS/BN: HCPCS | Performed by: THORACIC SURGERY (CARDIOTHORACIC VASCULAR SURGERY)

## 2021-09-07 PROCEDURE — 250N000011 HC RX IP 250 OP 636: Performed by: THORACIC SURGERY (CARDIOTHORACIC VASCULAR SURGERY)

## 2021-09-07 PROCEDURE — 272N000088 HC PUMP APP ADULT PERFUSION: Performed by: THORACIC SURGERY (CARDIOTHORACIC VASCULAR SURGERY)

## 2021-09-07 PROCEDURE — 83605 ASSAY OF LACTIC ACID: CPT | Performed by: PHYSICIAN ASSISTANT

## 2021-09-07 PROCEDURE — 250N000011 HC RX IP 250 OP 636: Performed by: PHYSICIAN ASSISTANT

## 2021-09-07 PROCEDURE — 82803 BLOOD GASES ANY COMBINATION: CPT

## 2021-09-07 PROCEDURE — 250N000009 HC RX 250: Performed by: THORACIC SURGERY (CARDIOTHORACIC VASCULAR SURGERY)

## 2021-09-07 PROCEDURE — 250N000025 HC SEVOFLURANE, PER MIN: Performed by: THORACIC SURGERY (CARDIOTHORACIC VASCULAR SURGERY)

## 2021-09-07 PROCEDURE — 250N000024 HC ISOFLURANE, PER MIN: Performed by: THORACIC SURGERY (CARDIOTHORACIC VASCULAR SURGERY)

## 2021-09-07 PROCEDURE — 85384 FIBRINOGEN ACTIVITY: CPT | Performed by: THORACIC SURGERY (CARDIOTHORACIC VASCULAR SURGERY)

## 2021-09-07 PROCEDURE — C9113 INJ PANTOPRAZOLE SODIUM, VIA: HCPCS | Performed by: SURGERY

## 2021-09-07 PROCEDURE — 200N000002 HC R&B ICU UMMC

## 2021-09-07 PROCEDURE — 272N000001 HC OR GENERAL SUPPLY STERILE: Performed by: THORACIC SURGERY (CARDIOTHORACIC VASCULAR SURGERY)

## 2021-09-07 PROCEDURE — 88304 TISSUE EXAM BY PATHOLOGIST: CPT | Mod: TC | Performed by: THORACIC SURGERY (CARDIOTHORACIC VASCULAR SURGERY)

## 2021-09-07 PROCEDURE — 250N000011 HC RX IP 250 OP 636: Performed by: STUDENT IN AN ORGANIZED HEALTH CARE EDUCATION/TRAINING PROGRAM

## 2021-09-07 PROCEDURE — 999N000054 HC STATISTIC EKG NON-CHARGEABLE

## 2021-09-07 PROCEDURE — 999N000045 HC STATISTIC DAILY SWAN MONITORING

## 2021-09-07 PROCEDURE — C1763 CONN TISS, NON-HUMAN: HCPCS | Performed by: THORACIC SURGERY (CARDIOTHORACIC VASCULAR SURGERY)

## 2021-09-07 PROCEDURE — 272N000085 HC PACK CELL SAVER CSP: Performed by: THORACIC SURGERY (CARDIOTHORACIC VASCULAR SURGERY)

## 2021-09-07 PROCEDURE — 93503 INSERT/PLACE HEART CATHETER: CPT

## 2021-09-07 PROCEDURE — 250N000015 HC RX FACTOR IP MED 636 OP 636: Performed by: THORACIC SURGERY (CARDIOTHORACIC VASCULAR SURGERY)

## 2021-09-07 PROCEDURE — 360N000079 HC SURGERY LEVEL 6, PER MIN: Performed by: THORACIC SURGERY (CARDIOTHORACIC VASCULAR SURGERY)

## 2021-09-07 PROCEDURE — 410N000003 HC PER-PERFUSION 1ST 30 MIN: Performed by: THORACIC SURGERY (CARDIOTHORACIC VASCULAR SURGERY)

## 2021-09-07 PROCEDURE — 94660 CPAP INITIATION&MGMT: CPT

## 2021-09-07 PROCEDURE — 71045 X-RAY EXAM CHEST 1 VIEW: CPT | Mod: 26 | Performed by: STUDENT IN AN ORGANIZED HEALTH CARE EDUCATION/TRAINING PROGRAM

## 2021-09-07 PROCEDURE — 999N000141 HC STATISTIC PRE-PROCEDURE NURSING ASSESSMENT: Performed by: THORACIC SURGERY (CARDIOTHORACIC VASCULAR SURGERY)

## 2021-09-07 PROCEDURE — 258N000003 HC RX IP 258 OP 636: Performed by: THORACIC SURGERY (CARDIOTHORACIC VASCULAR SURGERY)

## 2021-09-07 PROCEDURE — 85396 CLOTTING ASSAY WHOLE BLOOD: CPT | Performed by: THORACIC SURGERY (CARDIOTHORACIC VASCULAR SURGERY)

## 2021-09-07 PROCEDURE — 85610 PROTHROMBIN TIME: CPT | Performed by: PHYSICIAN ASSISTANT

## 2021-09-07 PROCEDURE — 85610 PROTHROMBIN TIME: CPT | Performed by: THORACIC SURGERY (CARDIOTHORACIC VASCULAR SURGERY)

## 2021-09-07 PROCEDURE — 999N000155 HC STATISTIC RAPCV CVP MONITORING

## 2021-09-07 PROCEDURE — 278N000051 HC OR IMPLANT GENERAL: Performed by: THORACIC SURGERY (CARDIOTHORACIC VASCULAR SURGERY)

## 2021-09-07 PROCEDURE — 33257 ABLATE ATRIA LMTD ADD-ON: CPT | Performed by: THORACIC SURGERY (CARDIOTHORACIC VASCULAR SURGERY)

## 2021-09-07 PROCEDURE — 258N000003 HC RX IP 258 OP 636: Performed by: PHYSICIAN ASSISTANT

## 2021-09-07 PROCEDURE — 88311 DECALCIFY TISSUE: CPT | Mod: TC | Performed by: THORACIC SURGERY (CARDIOTHORACIC VASCULAR SURGERY)

## 2021-09-07 PROCEDURE — 84100 ASSAY OF PHOSPHORUS: CPT | Performed by: PHYSICIAN ASSISTANT

## 2021-09-07 PROCEDURE — 82330 ASSAY OF CALCIUM: CPT | Performed by: PHYSICIAN ASSISTANT

## 2021-09-07 PROCEDURE — 33430 REPLACEMENT OF MITRAL VALVE: CPT | Performed by: THORACIC SURGERY (CARDIOTHORACIC VASCULAR SURGERY)

## 2021-09-07 PROCEDURE — 999N000157 HC STATISTIC RCP TIME EA 10 MIN

## 2021-09-07 PROCEDURE — 999N000015 HC STATISTIC ARTERIAL MONITORING DAILY

## 2021-09-07 PROCEDURE — 85027 COMPLETE CBC AUTOMATED: CPT | Performed by: PHYSICIAN ASSISTANT

## 2021-09-07 PROCEDURE — 271N000002 HC RX 271: Performed by: STUDENT IN AN ORGANIZED HEALTH CARE EDUCATION/TRAINING PROGRAM

## 2021-09-07 PROCEDURE — 250N000011 HC RX IP 250 OP 636: Performed by: SURGERY

## 2021-09-07 PROCEDURE — 83735 ASSAY OF MAGNESIUM: CPT | Performed by: PHYSICIAN ASSISTANT

## 2021-09-07 PROCEDURE — 025T3ZZ DESTRUCTION OF LEFT PULMONARY VEIN, PERCUTANEOUS APPROACH: ICD-10-PCS | Performed by: THORACIC SURGERY (CARDIOTHORACIC VASCULAR SURGERY)

## 2021-09-07 PROCEDURE — 5A1221Z PERFORMANCE OF CARDIAC OUTPUT, CONTINUOUS: ICD-10-PCS | Performed by: THORACIC SURGERY (CARDIOTHORACIC VASCULAR SURGERY)

## 2021-09-07 PROCEDURE — 02RG08Z REPLACEMENT OF MITRAL VALVE WITH ZOOPLASTIC TISSUE, OPEN APPROACH: ICD-10-PCS | Performed by: THORACIC SURGERY (CARDIOTHORACIC VASCULAR SURGERY)

## 2021-09-07 PROCEDURE — 025S3ZZ DESTRUCTION OF RIGHT PULMONARY VEIN, PERCUTANEOUS APPROACH: ICD-10-PCS | Performed by: THORACIC SURGERY (CARDIOTHORACIC VASCULAR SURGERY)

## 2021-09-07 DEVICE — SU DEVICE COR-KNOT MINI 4X14MM 031350: Type: IMPLANTABLE DEVICE | Site: CHEST | Status: FUNCTIONAL

## 2021-09-07 DEVICE — VALVE MITRAL EPIC STENTED PORCINE 29MM E100-29M-00: Type: IMPLANTABLE DEVICE | Site: CHEST | Status: FUNCTIONAL

## 2021-09-07 DEVICE — SU DEVICE ENDO COR KNOT QUICK LOAD 030850: Type: IMPLANTABLE DEVICE | Site: CHEST | Status: FUNCTIONAL

## 2021-09-07 DEVICE — GRAFT PERICARDIUM 6X8CM BOVINE E6P8: Type: IMPLANTABLE DEVICE | Site: CHEST | Status: FUNCTIONAL

## 2021-09-07 DEVICE — IMP ATRICLIP FLEX V DEVICE LAA EXLUSION 45MM ACHV45: Type: IMPLANTABLE DEVICE | Site: CHEST | Status: FUNCTIONAL

## 2021-09-07 RX ORDER — SODIUM CHLORIDE, SODIUM LACTATE, POTASSIUM CHLORIDE, CALCIUM CHLORIDE 600; 310; 30; 20 MG/100ML; MG/100ML; MG/100ML; MG/100ML
INJECTION, SOLUTION INTRAVENOUS CONTINUOUS
Status: DISCONTINUED | OUTPATIENT
Start: 2021-09-07 | End: 2021-09-08

## 2021-09-07 RX ORDER — CEFAZOLIN SODIUM 2 G/100ML
2 INJECTION, SOLUTION INTRAVENOUS EVERY 8 HOURS
Status: COMPLETED | OUTPATIENT
Start: 2021-09-07 | End: 2021-09-08

## 2021-09-07 RX ORDER — NOREPINEPHRINE BITARTRATE 0.06 MG/ML
.01-.4 INJECTION, SOLUTION INTRAVENOUS CONTINUOUS PRN
Status: DISCONTINUED | OUTPATIENT
Start: 2021-09-07 | End: 2021-09-10

## 2021-09-07 RX ORDER — SODIUM CHLORIDE, SODIUM GLUCONATE, SODIUM ACETATE, POTASSIUM CHLORIDE AND MAGNESIUM CHLORIDE 526; 502; 368; 37; 30 MG/100ML; MG/100ML; MG/100ML; MG/100ML; MG/100ML
INJECTION, SOLUTION INTRAVENOUS CONTINUOUS PRN
Status: DISCONTINUED | OUTPATIENT
Start: 2021-09-07 | End: 2021-09-07

## 2021-09-07 RX ORDER — NOREPINEPHRINE BITARTRATE 0.06 MG/ML
.01-.6 INJECTION, SOLUTION INTRAVENOUS CONTINUOUS
Status: DISCONTINUED | OUTPATIENT
Start: 2021-09-07 | End: 2021-09-07 | Stop reason: HOSPADM

## 2021-09-07 RX ORDER — SODIUM CHLORIDE, SODIUM GLUCONATE, SODIUM ACETATE, POTASSIUM CHLORIDE AND MAGNESIUM CHLORIDE 526; 502; 368; 37; 30 MG/100ML; MG/100ML; MG/100ML; MG/100ML; MG/100ML
1-3 INJECTION, SOLUTION INTRAVENOUS CONTINUOUS
Status: DISCONTINUED | OUTPATIENT
Start: 2021-09-07 | End: 2021-09-08

## 2021-09-07 RX ORDER — OXYCODONE HYDROCHLORIDE 5 MG/1
5 TABLET ORAL EVERY 4 HOURS PRN
Status: DISCONTINUED | OUTPATIENT
Start: 2021-09-07 | End: 2021-09-22 | Stop reason: HOSPADM

## 2021-09-07 RX ORDER — GABAPENTIN 100 MG/1
100 CAPSULE ORAL
Status: DISCONTINUED | OUTPATIENT
Start: 2021-09-07 | End: 2021-09-07 | Stop reason: HOSPADM

## 2021-09-07 RX ORDER — LIDOCAINE HYDROCHLORIDE 20 MG/ML
INJECTION, SOLUTION INFILTRATION; PERINEURAL PRN
Status: DISCONTINUED | OUTPATIENT
Start: 2021-09-07 | End: 2021-09-07

## 2021-09-07 RX ORDER — ASPIRIN 81 MG/1
162 TABLET, CHEWABLE ORAL
Status: DISCONTINUED | OUTPATIENT
Start: 2021-09-07 | End: 2021-09-07 | Stop reason: HOSPADM

## 2021-09-07 RX ORDER — SODIUM CHLORIDE, SODIUM GLUCONATE, SODIUM ACETATE, POTASSIUM CHLORIDE AND MAGNESIUM CHLORIDE 526; 502; 368; 37; 30 MG/100ML; MG/100ML; MG/100ML; MG/100ML; MG/100ML
250 INJECTION, SOLUTION INTRAVENOUS EVERY 10 MIN PRN
Status: DISCONTINUED | OUTPATIENT
Start: 2021-09-07 | End: 2021-09-08

## 2021-09-07 RX ORDER — HYDROMORPHONE HCL IN WATER/PF 6 MG/30 ML
0.2 PATIENT CONTROLLED ANALGESIA SYRINGE INTRAVENOUS
Status: DISCONTINUED | OUTPATIENT
Start: 2021-09-07 | End: 2021-09-18

## 2021-09-07 RX ORDER — MUPIROCIN 20 MG/G
0.5 OINTMENT TOPICAL 2 TIMES DAILY
Status: DISPENSED | OUTPATIENT
Start: 2021-09-07 | End: 2021-09-12

## 2021-09-07 RX ORDER — HEPARIN SODIUM 1000 [USP'U]/ML
INJECTION, SOLUTION INTRAVENOUS; SUBCUTANEOUS PRN
Status: DISCONTINUED | OUTPATIENT
Start: 2021-09-07 | End: 2021-09-07

## 2021-09-07 RX ORDER — PROTAMINE SULFATE 10 MG/ML
INJECTION, SOLUTION INTRAVENOUS PRN
Status: DISCONTINUED | OUTPATIENT
Start: 2021-09-07 | End: 2021-09-07

## 2021-09-07 RX ORDER — NALOXONE HYDROCHLORIDE 0.4 MG/ML
0.4 INJECTION, SOLUTION INTRAMUSCULAR; INTRAVENOUS; SUBCUTANEOUS
Status: DISCONTINUED | OUTPATIENT
Start: 2021-09-07 | End: 2021-09-07 | Stop reason: HOSPADM

## 2021-09-07 RX ORDER — BUPIVACAINE HYDROCHLORIDE 2.5 MG/ML
INJECTION, SOLUTION EPIDURAL; INFILTRATION; INTRACAUDAL PRN
Status: DISCONTINUED | OUTPATIENT
Start: 2021-09-07 | End: 2021-09-07

## 2021-09-07 RX ORDER — HYDROMORPHONE HCL IN WATER/PF 6 MG/30 ML
0.4 PATIENT CONTROLLED ANALGESIA SYRINGE INTRAVENOUS
Status: DISCONTINUED | OUTPATIENT
Start: 2021-09-07 | End: 2021-09-18

## 2021-09-07 RX ORDER — DEXMEDETOMIDINE HYDROCHLORIDE 4 UG/ML
.2-.7 INJECTION, SOLUTION INTRAVENOUS CONTINUOUS PRN
Status: DISCONTINUED | OUTPATIENT
Start: 2021-09-07 | End: 2021-09-09

## 2021-09-07 RX ORDER — OXYCODONE HYDROCHLORIDE 10 MG/1
10 TABLET ORAL EVERY 4 HOURS PRN
Status: DISCONTINUED | OUTPATIENT
Start: 2021-09-07 | End: 2021-09-22 | Stop reason: HOSPADM

## 2021-09-07 RX ORDER — PROCHLORPERAZINE MALEATE 5 MG
5 TABLET ORAL EVERY 6 HOURS PRN
Status: DISCONTINUED | OUTPATIENT
Start: 2021-09-07 | End: 2021-09-09

## 2021-09-07 RX ORDER — PANTOPRAZOLE SODIUM 40 MG/1
40 TABLET, DELAYED RELEASE ORAL DAILY
Status: DISCONTINUED | OUTPATIENT
Start: 2021-09-07 | End: 2021-09-07

## 2021-09-07 RX ORDER — ONDANSETRON 2 MG/ML
INJECTION INTRAMUSCULAR; INTRAVENOUS PRN
Status: DISCONTINUED | OUTPATIENT
Start: 2021-09-07 | End: 2021-09-07

## 2021-09-07 RX ORDER — BISACODYL 10 MG
10 SUPPOSITORY, RECTAL RECTAL DAILY PRN
Status: DISCONTINUED | OUTPATIENT
Start: 2021-09-07 | End: 2021-09-22 | Stop reason: HOSPADM

## 2021-09-07 RX ORDER — NALOXONE HYDROCHLORIDE 0.4 MG/ML
0.2 INJECTION, SOLUTION INTRAMUSCULAR; INTRAVENOUS; SUBCUTANEOUS
Status: DISCONTINUED | OUTPATIENT
Start: 2021-09-07 | End: 2021-09-07 | Stop reason: HOSPADM

## 2021-09-07 RX ORDER — POLYETHYLENE GLYCOL 3350 17 G/17G
17 POWDER, FOR SOLUTION ORAL DAILY
Status: DISCONTINUED | OUTPATIENT
Start: 2021-09-08 | End: 2021-09-14

## 2021-09-07 RX ORDER — ASPIRIN 81 MG/1
81 TABLET, CHEWABLE ORAL
Status: DISCONTINUED | OUTPATIENT
Start: 2021-09-07 | End: 2021-09-07 | Stop reason: HOSPADM

## 2021-09-07 RX ORDER — HEPARIN SODIUM 5000 [USP'U]/.5ML
5000 INJECTION, SOLUTION INTRAVENOUS; SUBCUTANEOUS EVERY 8 HOURS
Status: DISCONTINUED | OUTPATIENT
Start: 2021-09-08 | End: 2021-09-15

## 2021-09-07 RX ORDER — NALOXONE HYDROCHLORIDE 0.4 MG/ML
0.2 INJECTION, SOLUTION INTRAMUSCULAR; INTRAVENOUS; SUBCUTANEOUS
Status: DISCONTINUED | OUTPATIENT
Start: 2021-09-07 | End: 2021-09-22 | Stop reason: HOSPADM

## 2021-09-07 RX ORDER — METHOCARBAMOL 500 MG/1
500 TABLET, FILM COATED ORAL EVERY 6 HOURS PRN
Status: DISCONTINUED | OUTPATIENT
Start: 2021-09-07 | End: 2021-09-18

## 2021-09-07 RX ORDER — CEFAZOLIN SODIUM 2 G/100ML
2 INJECTION, SOLUTION INTRAVENOUS
Status: COMPLETED | OUTPATIENT
Start: 2021-09-07 | End: 2021-09-07

## 2021-09-07 RX ORDER — ONDANSETRON 2 MG/ML
4 INJECTION INTRAMUSCULAR; INTRAVENOUS EVERY 6 HOURS PRN
Status: DISCONTINUED | OUTPATIENT
Start: 2021-09-07 | End: 2021-09-22 | Stop reason: HOSPADM

## 2021-09-07 RX ORDER — PHENYLEPHRINE HCL IN 0.9% NACL 50MG/250ML
.1-6 PLASTIC BAG, INJECTION (ML) INTRAVENOUS CONTINUOUS
Status: DISCONTINUED | OUTPATIENT
Start: 2021-09-07 | End: 2021-09-07 | Stop reason: HOSPADM

## 2021-09-07 RX ORDER — LIDOCAINE 40 MG/G
CREAM TOPICAL
Status: DISCONTINUED | OUTPATIENT
Start: 2021-09-07 | End: 2021-09-22 | Stop reason: HOSPADM

## 2021-09-07 RX ORDER — CHLORHEXIDINE GLUCONATE ORAL RINSE 1.2 MG/ML
10 SOLUTION DENTAL ONCE
Status: DISCONTINUED | OUTPATIENT
Start: 2021-09-07 | End: 2021-09-07 | Stop reason: HOSPADM

## 2021-09-07 RX ORDER — ONDANSETRON 4 MG/1
4 TABLET, ORALLY DISINTEGRATING ORAL EVERY 6 HOURS PRN
Status: DISCONTINUED | OUTPATIENT
Start: 2021-09-07 | End: 2021-09-22 | Stop reason: HOSPADM

## 2021-09-07 RX ORDER — FENTANYL CITRATE 50 UG/ML
25-50 INJECTION, SOLUTION INTRAMUSCULAR; INTRAVENOUS
Status: DISCONTINUED | OUTPATIENT
Start: 2021-09-07 | End: 2021-09-07 | Stop reason: HOSPADM

## 2021-09-07 RX ORDER — HYDRALAZINE HYDROCHLORIDE 20 MG/ML
10 INJECTION INTRAMUSCULAR; INTRAVENOUS EVERY 30 MIN PRN
Status: DISCONTINUED | OUTPATIENT
Start: 2021-09-07 | End: 2021-09-22 | Stop reason: HOSPADM

## 2021-09-07 RX ORDER — IPRATROPIUM BROMIDE AND ALBUTEROL SULFATE 2.5; .5 MG/3ML; MG/3ML
3 SOLUTION RESPIRATORY (INHALATION)
Status: DISCONTINUED | OUTPATIENT
Start: 2021-09-07 | End: 2021-09-08

## 2021-09-07 RX ORDER — ASPIRIN 81 MG/1
81 TABLET, CHEWABLE ORAL DAILY
Status: DISCONTINUED | OUTPATIENT
Start: 2021-09-08 | End: 2021-09-22 | Stop reason: HOSPADM

## 2021-09-07 RX ORDER — CEFAZOLIN SODIUM 2 G/100ML
2 INJECTION, SOLUTION INTRAVENOUS SEE ADMIN INSTRUCTIONS
Status: DISCONTINUED | OUTPATIENT
Start: 2021-09-07 | End: 2021-09-07 | Stop reason: HOSPADM

## 2021-09-07 RX ORDER — DOBUTAMINE HYDROCHLORIDE 200 MG/100ML
2.5-2 INJECTION INTRAVENOUS CONTINUOUS
Status: DISCONTINUED | OUTPATIENT
Start: 2021-09-07 | End: 2021-09-10

## 2021-09-07 RX ORDER — DEXTROSE MONOHYDRATE 25 G/50ML
25-50 INJECTION, SOLUTION INTRAVENOUS
Status: DISCONTINUED | OUTPATIENT
Start: 2021-09-07 | End: 2021-09-11

## 2021-09-07 RX ORDER — FIBRINOGEN (HUMAN) 700-1300MG
1050 KIT INTRAVENOUS ONCE
Status: COMPLETED | OUTPATIENT
Start: 2021-09-07 | End: 2021-09-07

## 2021-09-07 RX ORDER — NALOXONE HYDROCHLORIDE 0.4 MG/ML
0.4 INJECTION, SOLUTION INTRAMUSCULAR; INTRAVENOUS; SUBCUTANEOUS
Status: DISCONTINUED | OUTPATIENT
Start: 2021-09-07 | End: 2021-09-22 | Stop reason: HOSPADM

## 2021-09-07 RX ORDER — NICOTINE POLACRILEX 4 MG
15-30 LOZENGE BUCCAL
Status: DISCONTINUED | OUTPATIENT
Start: 2021-09-07 | End: 2021-09-11

## 2021-09-07 RX ORDER — AMOXICILLIN 250 MG
1 CAPSULE ORAL 2 TIMES DAILY
Status: DISCONTINUED | OUTPATIENT
Start: 2021-09-07 | End: 2021-09-14

## 2021-09-07 RX ORDER — LIDOCAINE 40 MG/G
CREAM TOPICAL
Status: DISCONTINUED | OUTPATIENT
Start: 2021-09-07 | End: 2021-09-07 | Stop reason: HOSPADM

## 2021-09-07 RX ORDER — DEXMEDETOMIDINE HYDROCHLORIDE 4 UG/ML
0.2-1.2 INJECTION, SOLUTION INTRAVENOUS CONTINUOUS
Status: DISCONTINUED | OUTPATIENT
Start: 2021-09-07 | End: 2021-09-07 | Stop reason: HOSPADM

## 2021-09-07 RX ORDER — FLUMAZENIL 0.1 MG/ML
0.2 INJECTION, SOLUTION INTRAVENOUS
Status: DISCONTINUED | OUTPATIENT
Start: 2021-09-07 | End: 2021-09-07 | Stop reason: HOSPADM

## 2021-09-07 RX ORDER — SODIUM CHLORIDE, SODIUM LACTATE, POTASSIUM CHLORIDE, CALCIUM CHLORIDE 600; 310; 30; 20 MG/100ML; MG/100ML; MG/100ML; MG/100ML
INJECTION, SOLUTION INTRAVENOUS CONTINUOUS PRN
Status: DISCONTINUED | OUTPATIENT
Start: 2021-09-07 | End: 2021-09-07

## 2021-09-07 RX ADMIN — PHENYLEPHRINE HYDROCHLORIDE 100 MCG: 10 INJECTION INTRAVENOUS at 11:22

## 2021-09-07 RX ADMIN — Medication 2 G: at 14:32

## 2021-09-07 RX ADMIN — PHENYLEPHRINE HYDROCHLORIDE 100 MCG: 10 INJECTION INTRAVENOUS at 11:28

## 2021-09-07 RX ADMIN — SODIUM CHLORIDE, SODIUM GLUCONATE, SODIUM ACETATE, POTASSIUM CHLORIDE AND MAGNESIUM CHLORIDE: 526; 502; 368; 37; 30 INJECTION, SOLUTION INTRAVENOUS at 10:25

## 2021-09-07 RX ADMIN — SODIUM CHLORIDE, SODIUM GLUCONATE, SODIUM ACETATE, POTASSIUM CHLORIDE AND MAGNESIUM CHLORIDE: 526; 502; 368; 37; 30 INJECTION, SOLUTION INTRAVENOUS at 10:08

## 2021-09-07 RX ADMIN — NOREPINEPHRINE BITARTRATE 6.4 MCG: 1 INJECTION, SOLUTION, CONCENTRATE INTRAVENOUS at 14:27

## 2021-09-07 RX ADMIN — VANCOMYCIN HYDROCHLORIDE 1500 MG: 100 INJECTION, POWDER, LYOPHILIZED, FOR SOLUTION INTRAVENOUS at 22:24

## 2021-09-07 RX ADMIN — BUPIVACAINE HYDROCHLORIDE 20 ML: 2.5 INJECTION, SOLUTION EPIDURAL; INFILTRATION; INTRACAUDAL; PERINEURAL at 13:44

## 2021-09-07 RX ADMIN — FIBRINOGEN (HUMAN) 1050 MG: KIT INTRAVENOUS at 15:30

## 2021-09-07 RX ADMIN — SODIUM CHLORIDE 7.5 G: 900 INJECTION, SOLUTION INTRAVENOUS at 10:32

## 2021-09-07 RX ADMIN — NOREPINEPHRINE BITARTRATE 6.4 MCG: 1 INJECTION, SOLUTION, CONCENTRATE INTRAVENOUS at 10:21

## 2021-09-07 RX ADMIN — Medication 7 MG/HR: at 13:48

## 2021-09-07 RX ADMIN — FENTANYL CITRATE 50 MCG: 50 INJECTION, SOLUTION INTRAMUSCULAR; INTRAVENOUS at 15:43

## 2021-09-07 RX ADMIN — Medication 0.05 MCG/KG/MIN: at 10:25

## 2021-09-07 RX ADMIN — CEFAZOLIN SODIUM 2 G: 2 INJECTION, SOLUTION INTRAVENOUS at 21:48

## 2021-09-07 RX ADMIN — NOREPINEPHRINE BITARTRATE 6.4 MCG: 1 INJECTION, SOLUTION, CONCENTRATE INTRAVENOUS at 10:20

## 2021-09-07 RX ADMIN — NOREPINEPHRINE BITARTRATE 6.4 MCG: 1 INJECTION, SOLUTION, CONCENTRATE INTRAVENOUS at 11:12

## 2021-09-07 RX ADMIN — DOBUTAMINE IN DEXTROSE 2.5 MCG/KG/MIN: 200 INJECTION, SOLUTION INTRAVENOUS at 22:36

## 2021-09-07 RX ADMIN — PHENYLEPHRINE HYDROCHLORIDE 200 MCG: 10 INJECTION INTRAVENOUS at 15:03

## 2021-09-07 RX ADMIN — PROTHROMBIN, COAGULATION FACTOR VII HUMAN, COAGULATION FACTOR IX HUMAN, COAGULATION FACTOR X HUMAN, PROTEIN C, PROTEIN S HUMAN, AND WATER 539 UNITS: KIT at 15:30

## 2021-09-07 RX ADMIN — NOREPINEPHRINE BITARTRATE 12.8 MCG: 1 INJECTION, SOLUTION, CONCENTRATE INTRAVENOUS at 14:43

## 2021-09-07 RX ADMIN — EPINEPHRINE 0.03 MCG/KG/MIN: 1 INJECTION PARENTERAL at 14:11

## 2021-09-07 RX ADMIN — MIDAZOLAM 1 MG: 1 INJECTION INTRAMUSCULAR; INTRAVENOUS at 14:19

## 2021-09-07 RX ADMIN — NOREPINEPHRINE BITARTRATE 12.8 MCG: 1 INJECTION, SOLUTION, CONCENTRATE INTRAVENOUS at 14:45

## 2021-09-07 RX ADMIN — PROTAMINE SULFATE 80 MG: 10 INJECTION, SOLUTION INTRAVENOUS at 14:40

## 2021-09-07 RX ADMIN — NOREPINEPHRINE BITARTRATE 6.4 MCG: 1 INJECTION, SOLUTION, CONCENTRATE INTRAVENOUS at 14:24

## 2021-09-07 RX ADMIN — PROTAMINE SULFATE 100 MG: 10 INJECTION, SOLUTION INTRAVENOUS at 14:44

## 2021-09-07 RX ADMIN — SODIUM CHLORIDE 1.25 G/HR: 900 INJECTION, SOLUTION INTRAVENOUS at 11:39

## 2021-09-07 RX ADMIN — NOREPINEPHRINE BITARTRATE 6.4 MCG: 1 INJECTION, SOLUTION, CONCENTRATE INTRAVENOUS at 10:18

## 2021-09-07 RX ADMIN — FENTANYL CITRATE 500 MCG: 50 INJECTION, SOLUTION INTRAMUSCULAR; INTRAVENOUS at 10:08

## 2021-09-07 RX ADMIN — EPINEPHRINE 0.02 MCG/KG/MIN: 1 INJECTION PARENTERAL at 21:36

## 2021-09-07 RX ADMIN — PROTAMINE SULFATE 10 MG: 10 INJECTION, SOLUTION INTRAVENOUS at 14:34

## 2021-09-07 RX ADMIN — LIDOCAINE HYDROCHLORIDE 5 ML: 20 INJECTION, SOLUTION INFILTRATION; PERINEURAL at 10:08

## 2021-09-07 RX ADMIN — PANTOPRAZOLE SODIUM 40 MG: 40 INJECTION, POWDER, FOR SOLUTION INTRAVENOUS at 19:30

## 2021-09-07 RX ADMIN — NOREPINEPHRINE BITARTRATE 6.4 MCG: 1 INJECTION, SOLUTION, CONCENTRATE INTRAVENOUS at 11:00

## 2021-09-07 RX ADMIN — NOREPINEPHRINE BITARTRATE 6.4 MCG: 1 INJECTION, SOLUTION, CONCENTRATE INTRAVENOUS at 10:22

## 2021-09-07 RX ADMIN — SUGAMMADEX 200 MG: 100 INJECTION, SOLUTION INTRAVENOUS at 15:46

## 2021-09-07 RX ADMIN — NOREPINEPHRINE BITARTRATE 6.4 MCG: 1 INJECTION, SOLUTION, CONCENTRATE INTRAVENOUS at 15:36

## 2021-09-07 RX ADMIN — Medication 2 G: at 10:32

## 2021-09-07 RX ADMIN — SODIUM CHLORIDE, POTASSIUM CHLORIDE, SODIUM LACTATE AND CALCIUM CHLORIDE: 600; 310; 30; 20 INJECTION, SOLUTION INTRAVENOUS at 10:08

## 2021-09-07 RX ADMIN — HUMAN INSULIN 2 UNITS/HR: 100 INJECTION, SOLUTION SUBCUTANEOUS at 14:12

## 2021-09-07 RX ADMIN — PHENYLEPHRINE HYDROCHLORIDE 200 MCG: 10 INJECTION INTRAVENOUS at 15:07

## 2021-09-07 RX ADMIN — HEPARIN SODIUM 37000 UNITS: 1000 INJECTION INTRAVENOUS; SUBCUTANEOUS at 10:57

## 2021-09-07 RX ADMIN — NOREPINEPHRINE BITARTRATE 6.4 MCG: 1 INJECTION, SOLUTION, CONCENTRATE INTRAVENOUS at 14:25

## 2021-09-07 RX ADMIN — HYDROMORPHONE HYDROCHLORIDE 0.2 MG: 0.2 INJECTION, SOLUTION INTRAMUSCULAR; INTRAVENOUS; SUBCUTANEOUS at 19:29

## 2021-09-07 RX ADMIN — NOREPINEPHRINE BITARTRATE 6.4 MCG: 1 INJECTION, SOLUTION, CONCENTRATE INTRAVENOUS at 14:26

## 2021-09-07 RX ADMIN — ONDANSETRON 4 MG: 2 INJECTION INTRAMUSCULAR; INTRAVENOUS at 16:01

## 2021-09-07 RX ADMIN — ROCURONIUM BROMIDE 50 MG: 10 INJECTION INTRAVENOUS at 10:45

## 2021-09-07 RX ADMIN — VANCOMYCIN HYDROCHLORIDE 1500 MG: 100 INJECTION, POWDER, LYOPHILIZED, FOR SOLUTION INTRAVENOUS at 10:32

## 2021-09-07 RX ADMIN — PROTAMINE SULFATE 10 MG: 10 INJECTION, SOLUTION INTRAVENOUS at 14:38

## 2021-09-07 RX ADMIN — SODIUM CHLORIDE, POTASSIUM CHLORIDE, SODIUM LACTATE AND CALCIUM CHLORIDE 500 ML: 600; 310; 30; 20 INJECTION, SOLUTION INTRAVENOUS at 17:30

## 2021-09-07 RX ADMIN — Medication 7 ML/HR: at 13:48

## 2021-09-07 RX ADMIN — ROCURONIUM BROMIDE 100 MG: 10 INJECTION INTRAVENOUS at 10:08

## 2021-09-07 RX ADMIN — ROCURONIUM BROMIDE 30 MG: 10 INJECTION INTRAVENOUS at 15:00

## 2021-09-07 RX ADMIN — NOREPINEPHRINE BITARTRATE 6.4 MCG: 1 INJECTION, SOLUTION, CONCENTRATE INTRAVENOUS at 10:24

## 2021-09-07 RX ADMIN — PHENYLEPHRINE HYDROCHLORIDE 200 MCG: 10 INJECTION INTRAVENOUS at 11:19

## 2021-09-07 ASSESSMENT — ACTIVITIES OF DAILY LIVING (ADL)
ADLS_ACUITY_SCORE: 19
WALKING_OR_CLIMBING_STAIRS_DIFFICULTY: NO
DIFFICULTY_COMMUNICATING: NO
CONCENTRATING,_REMEMBERING_OR_MAKING_DECISIONS_DIFFICULTY: NO
VISION_MANAGEMENT: GLASSES
DIFFICULTY_EATING/SWALLOWING: NO
TOILETING_ISSUES: NO
DRESSING/BATHING_DIFFICULTY: NO
HEARING_DIFFICULTY_OR_DEAF: NO
FALL_HISTORY_WITHIN_LAST_SIX_MONTHS: NO
WEAR_GLASSES_OR_BLIND: YES
DOING_ERRANDS_INDEPENDENTLY_DIFFICULTY: NO

## 2021-09-07 ASSESSMENT — MIFFLIN-ST. JEOR: SCORE: 1503.62

## 2021-09-07 NOTE — ANESTHESIA PROCEDURE NOTES
Airway       Patient location during procedure: OR       Procedure Start/Stop Times: 9/7/2021 10:11 AM  Staff -        Anesthesiologist:  Guero Hodges MD       Resident/Fellow: Vince Sen MD       Performed By: resident  Consent for Airway        Urgency: elective  Indications and Patient Condition       Indications for airway management: yousif-procedural         Mask difficulty assessment: 2 - vent by mask + OA or adjuvant +/- NMBA    Final Airway Details       Final airway type: endotracheal airway       Successful airway: ETT - single  Endotracheal Airway Details        ETT size (mm): 8.0       Successful intubation technique: video laryngoscopy       VL Blade Size: MAC D Blade       Grade View of Cords: 1       Adjucts: stylet       Position: Right       Measured from: lips       Secured at (cm): 22       Bite block used: None    Post intubation assessment        Number of attempts at approach: 1       Number of other approaches attempted: 0       Secured with: pink tape       Ease of procedure: easy       Dentition: Intact

## 2021-09-07 NOTE — H&P
CV ICU H&P  9/7/2021      CO-MORBIDITIES:   Patient Active Problem List   Diagnosis     SHAILESH (obstructive sleep apnea)     Morbid obesity (H)     Tobacco abuse disorder     Benign essential hypertension     Midline low back pain without sciatica     Neck pain     Biliary cirrhosis (H)     Atrial fibrillation (H)     Anticoagulation monitoring, INR range 2-3     Atrial fibrillation with RVR (H)     Other ill-defined heart diseases     Pericardial effusion     CHF (congestive heart failure) (H)     Nonrheumatic mitral valve stenosis     Status post coronary angiogram       ASSESSMENT: Corina Martinez is a 66 year old female with PMH of arthritis, SHAIELSH, HTN, CHF and mitral valve stenosis, now s/p MVR w/ left atrial appendage ligation on 9/7 with Dr. Acuna.       PLAN:  Neuro/ pain/ sedation:  # Acute post-operative pain  - Monitor neurological status. Notify the MD for any acute changes in exam.  - NIKKI catheters B/l     Pulmonary care:   #Current smoker   #COPD sat's upper 80's at baseline   #SHAILESH  Arrived from the OR on BiPAP  - Bipap overnight  - Post op ABG pending     Cardiovascular:    #HTN  #CHF, EF 55-60%   #Afib, warfarin   #MV stenosis, s/p MVR 9/7  #Left atrial appendage thrombus s/p atrial appendage clip 9/7  - Monitor hemodynamic status.   - MAP goal > 65  - NE @ 0.03, Epi @ 0.02.   - ASA     GI /Nutrition:   #GERD  #Primary biliary cirrhosis   - NPO   - Bowel regimen with senna 1tab BID, miralax daily    Fluids/ Electrolytes/ Renal:   - Iniguez for strict I&Os  - Post op BMP and lytes pending     Endocrine:    #Stress hyperglycemia  - Insulin gtt, monitor BGs     ID/ Antibiotics:  - Perioperative antibiotics with cefazolin, vanc   - Post op CBC pending     Heme:     # Acute blood loss anemia  - Hgb goal >7  - Post op CBC pending     Prophylaxis:    - SCDs  - PPI  - SQH to start 9/8    Lines/ tubes/ drains:  - CVC  - Arterial line  - Iniguez  - Mediastinal tube x2  - Pleural tube x2    Disposition:  - [facility] ICU.      Patient seen, findings and plan discussed with CV ICU staff.     Juanis aSnchez MD  General Surgery, PGY3  [id number]    ====================================    HPI:   Corina Martinez is a 66 year old female with PMH of arthritis, SHAILESH, HTN, CHF and mitral valve stenosis, now s/p MVR on 9/7 with Dr. Acuna.     500 PCC and fibryga in the OR. 600 cell saver. Extubated.     PAST MEDICAL HISTORY:   Past Medical History:   Diagnosis Date     Arthritis      Congestive heart failure (H)      Hypertension      Nonrheumatic mitral valve stenosis, severe      Obese      SHAILESH (obstructive sleep apnea)      Sleep apnea        PAST SURGICAL HISTORY:   Past Surgical History:   Procedure Laterality Date     APPENDECTOMY       CV CORONARY ANGIOGRAM N/A 8/5/2021    Procedure: CV CORONARY ANGIOGRAM;  Surgeon: Juan Vance MD;  [facility]     CV RIGHT HEART CATH MEASUREMENTS RECORDED N/A 8/5/2021    Procedure: CV RIGHT HEART CATH;  Surgeon: Juan Vance MD;  Location: [facility]     KNEE DEBRIDEMENT  05/07/2019     LAPAROSCOPIC CHOLECYSTECTOMY       LUMBAR FUSION       OTHER SURGICAL HISTORY      hip replacement     RELEASE CARPAL TUNNEL       TONSILLECTOMY       TOTAL KNEE ARTHROPLASTY Right 01/30/2019     TUBAL LIGATION         FAMILY HISTORY:   Family History   Problem Relation Age of Onset     Depression Mother      Respiratory Mother      Emphysema Mother      Cancer Father      Lymphoma Father      Cardiovascular No family hx of      Anesthesia Reaction No family hx of      Deep Vein Thrombosis (DVT) No family hx of        SOCIAL HISTORY:   Social History     Tobacco Use     Smoking status: Current Some Day Smoker     Packs/day: 1.00     Years: 54.00     Pack years: 54.00     Types: Cigarettes     Smokeless tobacco: Never Used     Tobacco comment: pt has been working hard on quiting; smoking 3/4 ppd (8/25/21)   Substance Use Topics     Alcohol use: Yes      Comment: social       OBJECTIVE:  1. VITAL SIGNS:   Temp:  [97.7  F (36.5  C)] 97.7  F (36.5  C)  Pulse:  [55-58] 55  Resp:  [15-18] 18  BP: (114-135)/(62-72) 120/67  SpO2:  [92 %-99 %] 96 %    Resp: 18    2. INTAKE/ OUTPUT:   No intake/output data recorded.       3. PHYSICAL EXAMINATION:   General: awake, NAD  Neuro: alert, no focal deficits   Resp: BIPAP on   CV: RRR  Abdomen: Soft, Non-distended, appropriately tender   Incisions: dressings in place, c/d/i. CTs w/ SS output   Extremities: warm and well perfused    4. INVESTIGATIONS:   Pending       5. RADIOLOGY:   Pending   =========================================

## 2021-09-07 NOTE — LETTER
Transition Communication Hand-off for Care Transitions to Next Level of Care Provider    Name: Corina Martinez  : 1955  MRN #: [id number]  Primary Care Provider: Kika Tariq     Primary Clinic: [address] [address]     Reason for Hospitalization:  Mitral stenosis [I05.0]  Nonrheumatic mitral valve stenosis [I34.2]  Admit Date/Time: 2021  5:40 AM  Discharge Date: 2021  Payor Source: Payor: BCBS / Plan: BCBS PLATINUM BLUE / Product Type: PPO /          Reason for Communication Hand-off Referral: Multiple providers/specialties    Discharge Plan:       Concern for non-adherence with plan of care:   Y/N No  Discharge Needs Assessment:  Needs      Most Recent Value   Equipment Currently Used at Home  none          Follow-up specialty is recommended: Yes    Follow-up plan:  No future appointments.    NAVJOT Mcclure    AVS/Discharge Summary is the source of truth; this is a helpful guide for improved communication of patient story

## 2021-09-07 NOTE — BRIEF OP NOTE
[facility]    Brief Operative Note    Pre-operative diagnosis: Mitral stenosis [I05.0]  Post-operative diagnosis Same as pre-operative diagnosis    Procedure: Procedure(s):  Median Sternotomy, Mitral Valve Replacement with a 29 mm St. Andrey Epic Valve, Bilateral Pulmonary Vein Isolation, Left Atrial Appendage Ligation, On Cardiopulmonary Bypass, Transesophageal Echocardiogram by Anesthesia  Surgeon: Surgeon(s) and Role:     * Sven Acuna MD - Primary     * Vinayak Zamudio PA-C - Assisting  Anesthesia: Combined General with Block   Estimated blood loss: 1000ml  Drains: Mediastinal x2, bilateral pleural  Specimens:   ID Type Source Tests Collected by Time Destination   A : Blood for [employer] Blood, arterial Artery, Radial, Right LABORATORY MISCELLANEOUS ORDER Sven Acuna MD 9/7/2021 11:35 AM    B : Left Atrial Appendage Thrombus Tissue Heart SURGICAL PATHOLOGY EXAM Sven Acuna MD 9/7/2021 11:58 AM    C : Left Atrial Appendage Tissue Heart SURGICAL PATHOLOGY EXAM Sven Acuna MD 9/7/2021 12:07 PM    D : Mitral Valve Leaflets and Debris Tissue Heart SURGICAL PATHOLOGY EXAM, RESEARCH SPECIMEN FOR BIONET TESTING Sven Acuna MD 9/7/2021  1:22 PM      Findings:   severely MAC, dense pericardial adhesions.  Complications: None.  Implants:   Implant Name Type Inv. Item Serial No.  Lot No. LRB No. Used Action   IMP KIT SUTURE COR-KNOT MINI 4X14MM [id number] - [id number] Metallic Hardware/[place] IMP KIT SUTURE COR-KNOT MINI 4X14MM [id number]  [employer] [id number] N/A 1 Implanted   IMP ATRICLIP FLEX V DEVICE KENZIE EXLUSION 45MM [id number] - [id number] Clip IMP ATRICLIP FLEX V DEVICE KENZIE EXLUSION 45MM ACHV45  [employer] [id number] N/A 1 Implanted   GRAFT PERICARDIUM 6X8CM BOVINE E6P8 - [id number] Bone/Tissue/Biologic GRAFT PERICARDIUM 6X8CM BOVINE E6P8  [facility] [id number] N/A 1 Implanted   VALVE MITRAL EPIC STENTED  PORCINE 29MM [id number] - [id number] Valve VALVE MITRAL EPIC STENTED PORCINE 29MM [id number] [employer]  N/A 1 Implanted   DEVICE VENCES ENDO COR KNOT QUICK LOAD [id number] - [id number] Wire DEVICE VENCES ENDO COR KNOT QUICK LOAD [id number]  I SOLUTIONS [id number] N/A 1 Implanted       MVR (tissue), Bilateral PVI, KENZIE amputation with oversewing and KENZIE clip placement

## 2021-09-07 NOTE — ANESTHESIA PROCEDURE NOTES
Arterial Line Procedure Note  Pre-Procedure   Staff -        Anesthesiologist:  Guero Hodges MD       Resident/Fellow: Vince Sen MD       Performed By: resident       Location: pre-op       Procedure Start/Stop Times: 9/7/2021 8:15 AM and 9/7/2021 8:25 AM       Pre-Anesthestic Checklist: patient identified, IV checked, risks and benefits discussed, informed consent, monitors and equipment checked, pre-op evaluation and at physician/surgeon's request  Timeout:       Correct Patient: Yes        Correct Procedure: Yes        Correct Site: Yes        Correct Position: Yes   Procedure   Procedure: arterial line       Laterality: right       Insertion Site: radial.  Sterile Prep        Standard elements of sterile barrier followed       Skin prep: Chloraprep  Insertion/Injection        Catheter Type/Size: 20 G, 1.75 in/4.5 cm quick cath (integral wire)  Narrative        Biopatch and Tegaderm dressing used.       Complications: None apparent,        Arterial waveform: Yes        IBP within 10% of NIBP: Yes

## 2021-09-07 NOTE — PROGRESS NOTES
Major Shift Events:  Arrived from OR at 1641. Lethargic but following all commands. Paced DDD @80. Pressors weaned, Epi 0.02, Norepi off. Initial CVP 4 and Lactic 2.6, 500 LR given. Andrei retracted back at bedside by CVTS. BiPap, pH 7.20 CO2 50's, following ABGs closely. NPO. Iniguez in place.  at bedside, updated on plan of care. Remains on Epi, Insulin gtts. Patient resting comfortably in bed.     Plan: Continue plan of care. Trend ABGs frequently.     For vital signs and complete assessments, please see documentation flowsheets.

## 2021-09-07 NOTE — ANESTHESIA PROCEDURE NOTES
Central Line/PA Catheter Placement  Pre-Procedure   Staff -        Anesthesiologist:  Guero Hodges MD       Resident/Fellow: Vince Sen MD       Performed By: resident       Location: pre-op       Procedure Start/Stop Times: 9/7/2021 9:00 AM and 9/7/2021 9:15 AM       Pre-Anesthestic Checklist: patient identified, IV checked, site marked, risks and benefits discussed, informed consent, monitors and equipment checked, pre-op evaluation and at physician/surgeon's request  Timeout:       Correct Patient: Yes        Correct Procedure: Yes        Correct Site: Yes        Correct Position: Yes        Correct Laterality: Yes     Procedure   Procedure: central line       Laterality: right       Insertion Site: internal jugular.       Patient Position: Trendelenburg  Sterile Prep        All elements of maximal sterile barrier technique followed       Patient Prep/Sterile Barriers: draped, hand hygiene, gloves , hat , mask , draped, gown, sterile gel and probe cover  Insertion/Injection        Technique: ultrasound guided and Seldinger Technique        1. Ultrasound was used to evaluate the access site.       2. Vein evaluated via ultrasound for patency/adequacy.       3. Using real-time ultrasound the needle/catheter was observed entering the artery/vein.       5. The visualized structures were anatomically normal.       6. There were no apparent abnormal pathologic findings.       Introducer Type: 9 Fr, 2-lumen MAC        PA Catheter Type: Other         Appropriate RV, RA and PA waveforms noted:  Yes            Withdrawn and Locked at cm: 46  Narrative         Secured by: suture       Tegaderm and Biopatch dressing used.       Complications: None apparent,        blood aspirated from all lumens,        Verification method: Ultrasound

## 2021-09-07 NOTE — ANESTHESIA PROCEDURE NOTES
Perioperative ANTONIO Procedure Note    Staff -        Anesthesiologist:  Guero Hodges MD       Resident/Fellow: Rhett Almendarez MD       Performed By: fellow  Preanesthesia Checklist:  Patient identified, IV assessed, risks and benefits discussed, monitors and equipment assessed, procedure being performed at surgeon's request and anesthesia consent obtained.    ANTONIO Probe Insertion  Probe Number: 2  Probe Status PRE Insertion: NO obvious damage  Probe type:  Adult 3D  Bite block used:   Soft  Insertion Technique: Jaw Lift  Insertion complications: None obvious  Billing Report:ANTONIO report by Anesthesiologist (See Separate Report note)  Probe Status POST Removal: NO obvious damage    ANTONIO Report  General Procedure Information  Images for this study have been archived.  Modalities: 2D, 3D, CW Doppler, PW Doppler and Color flow mapping  Echocardiographic and Doppler Measurements  Right Ventricle:  Cavity size normal.   Hypertrophy not present.   Thrombus not present.    Global function normal.     Left Ventricle:  Cavity size normal.   Hypertrophy not present.   Thrombus not present.   Global Function normal.       Ventricular Regional Function:  1- Basal Anteroseptal:  normal  2- Basal Anterior:  normal  3- Basal Anterolateral:  normal  4- Basal Inferolateral:  normal  5- Basal Inferior:  normal  6- Basal Inferoseptal:  normal  7- Mid Anteroseptal:  normal  8- Mid Anterior:  normal  9- Mid Anterolateral:  normal  10- Mid Inferolateral:  normal  11- Mid Inferior:  normal  12- Mid Inferoseptal:  normal  13- Apical Anterior:  normal  14- Apical Lateral:  normal  15- Apical Inferior:  normal  16- Apical Septal:  normal  17- Goehner:  normal    Valves  Aortic Valve: Annulus normal.  Stenosis not present.  Mean Gradient: 6 mmHg.  Regurgitation absent.  Leaflets calcified.  Leaflet motions restricted.  Specific leaflet segments with abnormal motions:  NCC  Mitral Valve: Annulus calcified.  Stenosis severe.  Mean Gradient: 5  mmHg.  Regurgitation +1.  Leaflets calcified.  Leaflet motions restricted.    Tricuspid Valve: Annulus normal.  Stenosis mild.  Regurgitation +1.  Leaflets normal.  Leaflet motions normal.    Pulmonic Valve: Annulus normal.  Stenosis not present.  Regurgitation absent.      Aorta: Ascending Aorta: Size normal.  Dissection not present.  Plaque thickness less than 3 mm.  Mobile plaque not present.    Aortic Arch: Size normal.   Dissection not present.   Plaque thickness less than 3 mm.   Mobile plaque not present.    Descending Aorta: Size normal.   Dissection not present.   Plaque thickness greater than 3 mm.   Mobile plaque not present.        Right Atrium:  Size dilated.   Spontaneous echo contrast not present.   Thrombus not present.   Tumor not present.   Device not present.     Left Atrium: Size dilated.  Spontaneous echo contrast not present.  Thrombus not present.  Tumor not present.  Device not present.    Left atrial appendage thrombus.     Atrial Septum: Intra-atrial septal morphology contains patent foramen ovale.   Patent foramen ovale shunts right to left.     Ventricular Septum: Intra-ventricular septum morphology normal.       Other Findings:   Pericardium:  normal. Pleural Effusion:  none. Pulmonary Arteries:  normal. Pulmonary Venous Flow:  normal. No coronary sinus catheter present.   Post Intervention Findings  Procedure(s) performed:  Mitral Valve Repair/Replace. Global function:  Unchanged. Regional wall motion: Unchanged. Surgeon(s) notified of all postintervention findings: Yes.       Echocardiogram Comments  Echocardiogram comments: Pre:  Grossly normal biventricular function.  Moderate to severe mitral stenosis.  Mild to moderate tricuspid insufficiency.  Enlarged atria bilaterally.  Lipomatous infiltration of intraatrial septum with PFO by color.  Approximately .5cm mobile thrombus in left atrial appendage.  Extensive calcification of descending aorta.    Post:  Moderately depressed RV  function immediately post CPB.  Appropriately seated mitral valve without any appreciable perivalvular leak.  Post MVR gradient 5mmHg.  No evidence of aortic dissection, pericardial effusion.    .

## 2021-09-07 NOTE — ANESTHESIA PROCEDURE NOTES
Erector spinae Procedure Note  Pre-Procedure   Staff -        Anesthesiologist:  Alexandr Cruz MD       Resident/Fellow: Wesley Cohn DO       Performed By: resident       Location: pre-op       Procedure Start/Stop Times: 9/7/2021 7:00 AM and 9/7/2021 7:17 AM       Pre-Anesthestic Checklist: patient identified, IV checked, site marked, risks and benefits discussed, informed consent, monitors and equipment checked, pre-op evaluation, at physician/surgeon's request and post-op pain management  Timeout:       Correct Patient: Yes        Correct Procedure: Yes        Correct Site: Yes        Correct Position: Yes        Correct Laterality: Yes        Site Marked: Yes  Procedure Documentation  Procedure: Erector spinae       Diagnosis: ANALGESIA       Laterality: bilateral       Patient Position: prone       Patient Prep/Sterile Barriers: sterile gloves, mask, patient draped       Skin prep: Chloraprep       Local skin infiltrated with 5 mL of 1% lidocaine.        Insertion Site: T5-6.       Needle Type: Touhy needle       Needle Gauge: 17.        Needle Length (Inches): 3.13        Catheter: 19 G.         Catheter threaded easily.         Ultrasound guided       1. Ultrasound was used to identify targeted nerve, plexus, vascular marker, or fascial plane and place a needle adjacent to it in real-time.       2. Ultrasound was used to visualize the spread of anesthetic in close proximity to the above referenced structure.       3. A permanent image is entered into the patient's record.    Assessment/Narrative         The placement was negative for: blood aspirated, painful injection and site bleeding       Paresthesias: No.       Bolus given via catheter. No blood aspirated via catheter.        Secured via Tegaderm and Dermabond.        Insertion/Infusion Method: Continuous Infusion       Complications: none

## 2021-09-07 NOTE — OR NURSING
Bilateral erector spinae vs paravertebral catheters block performed without complications.  VSS. 50 mcg fentanyl given Pt tolerated well.  Will continue to monitor.

## 2021-09-07 NOTE — ANESTHESIA CARE TRANSFER NOTE
Patient: Corina Martinez    Procedure(s):  Median Sternotomy, Mitral Valve Replacement with a 29 mm St. Andrey Epic Valve, Bilateral Pulmonary Vein Isolation, Left Atrial Appendage Ligation, On Cardiopulmonary Bypass, Transesophageal Echocardiogram by Anesthesia    Diagnosis: Mitral stenosis [I05.0]  Diagnosis Additional Information: No value filed.    Anesthesia Type:   General     Note:    Oropharynx: CPAP/BIPAP  Level of Consciousness: awake      Independent Airway: airway patency satisfactory and stable  Dentition: dentition unchanged  Vital Signs Stable: post-procedure vital signs reviewed and stable  Report to RN Given: handoff report given  Patient transferred to: ICU    ICU Handoff: Call for PAUSE to initiate/utilize ICU HANDOFF, Discussed Surgical Course, Allowed Opportunity for Questions and Acknowledgement of Understanding, Reviewed Intra-OP Anesthesia Management and Issues during Anesthesia, Identified Patient, Identified Responsible Provider, Reviewed the Pertinent Medical History and Set Expectations for Post Procedure Period      Vitals:  Vitals Value Taken Time   BP     Temp     Pulse 80 09/07/21 1702   Resp     SpO2 97 % 09/07/21 1702   Vitals shown include unvalidated device data.    Electronically Signed By: Rhett Almendarez MD  September 7, 2021  5:04 PM

## 2021-09-07 NOTE — OP NOTE
OPERATIVE DATE: 9/7/2021    PRE-OPERATIVE DIAGNOSIS:  1) Severe mitral stenosis  2) Paroxysmal atrial fibrillation  3) Left atrial appendage thrombus  Patient Active Problem List   Diagnosis     SHAILESH (obstructive sleep apnea)     Morbid obesity (H)     Tobacco abuse disorder     Benign essential hypertension     Midline low back pain without sciatica     Neck pain     Biliary cirrhosis (H)     Atrial fibrillation (H)     Anticoagulation monitoring, INR range 2-3     Atrial fibrillation with RVR (H)     Other ill-defined heart diseases     Pericardial effusion     CHF (congestive heart failure) (H)     Nonrheumatic mitral valve stenosis     Status post coronary angiogram     POST-OPERATIVE DIAGNOSIS:  1) Severe mitral stenosis  2) Paroxysmal atrial fibrillation  3) Left atrial appendage thrombus  Patient Active Problem List   Diagnosis     SHAILESH (obstructive sleep apnea)     Morbid obesity (H)     Tobacco abuse disorder     Benign essential hypertension     Midline low back pain without sciatica     Neck pain     Biliary cirrhosis (H)     Atrial fibrillation (H)     Anticoagulation monitoring, INR range 2-3     Atrial fibrillation with RVR (H)     Other ill-defined heart diseases     Pericardial effusion     CHF (congestive heart failure) (H)     Nonrheumatic mitral valve stenosis     Status post coronary angiogram     PROCEDURE:  1) Mitral valve replacement - 29mm [facility] Andrey Epic mitral valve  2) Bilateral pulmonary vein isolation  3) Left atrial appendage ligation  4) Transesophageal echocardiogram    SURGEON: Sven Acuna MD    ASSISTANT: Vinayak Zamudio PA-C    ANESTHESIA: GETA    ESTIMATED BLOOD LOSS: 1000cc    OPERATIVE FINDINGS:  1) EF 60%  2) Severe mitral stenosis with extensive mitral annular calcification  3) Dense pericardial adhesions    INDICATIONS:  Ms. Corina Martinez is a 66 year old female admitted with severe mitral stenosis.  We were asked to evaluate for surgical valve replacement.  Risks and  benefits of the operation were explained to the patient and their family including, but not limited to, bleeding, infection, stroke and even death.  They understood these risks and agreed to proceed electively.    OPERATIVE REPORT:  The patient was transferred to the operating room and positioned supine on the OR table.  General anesthesia was initiated by the anesthesia team.  Endotracheal intubation and central venous access was performed by anesthesia.  The patients neck, chest, abdomen and bilateral lower extremities were clipped, prepped and draped in sterile fashion.  A pre-procedure time-out was performed confirming the correct patient, correct site and correct procedure.    Median sternotomy was made with reciprocating saw.  The bone was made hemostatic with cautery and bone wax.    The patient was given 400 mg/kg IV heparin.  Pledgeted 2-0 ethibond pursestring was made in the ascending aorta.  A 20F EOPA arterial cannula was placed here and connected to the bypass circuit.  Bicaval venous canulation was performed with a 24F right angle venous cannula in the SVC and 28F straight venous cannula in the IVC.  Next the aorto-pulmonary window was developed.  A 4-0 prolene pursestring was made in the ascending aorta.  A DLP root vent / antegrade cardioplegia catheter was placed here and connected to the cardioplegia circuit.  Cardiopulmonary bypass was initiated with good flows.  Flow on the circuit was decreased.  A large aortic cross clamp was applied.  Flow on the circuit was resumed.  1000cc of antegrade cold blood cardioplegia was delivered with good diastolic arrest.  Caval snares were placed and secured.  A right atriotomy was made.  Retrograde cardioplegia catheter was placed directly.  An additional 300cc of retrograde cold blood cardioplegia was used to test the retrograde.      Next a left atriotomy was made via transeptal approach.  There was a small amount of thrombus in the left atrial appendage.   This was sent for pathology.  The left atrial appendage was amputated.  Thrombus was sent for pathology.  The base of the left atrial appendage was closed with 4-0 prolene and 45mm AtriClip.  Next bilateral pulmonary vein isolation was performed with AtriCure RF clamp.      Next we focused on mitral valve replacement.  There was heavy circumferential mitral annular calcification.  We worked for several minutes to debride the annular calcification.  Pledgeted 2-0 T[place] stitches were placed in the annulus circumferentially.  The posterior annulus was patched with bovine pericardial sutures.  The annulus was sized to 29mm.  Next a 29mm St. Andrey Epic Bioprosthetic Mitral Valve was opened.  The valve stitches were placed through the sewing cuff.  The valve was seated and secured with CoreKnot device.  The left atriotomy was closed in 2 layers using running 4-0 prolene.   A retrograde hot shot of warm blood cardiplegia was delivered.  After completion of the hot shot, flow on the bypass circuit was decreased and the aortic cross clamp was removed.  Flow on the bypass circuit was resumed.  The retrograde cardioplegia catheter was removed.  The right atriotomy was closed using running 4-0 prolene.  Atrial and ventricular pacing wires were placed and delivered through the anterior abdominal wall and secured to the skin with 0-ethibond stitches.  The patient had bradycardic rhythm and was paced at 80 bpm.      The pleural space was suctioned dry.  The lungs were ventilated bilaterally.  ANTONIO showed no intra-cardiac air.  The DLP root vent / antegrade cardioplegia catheter was removed.  Intravenous calcium was administered.  Flow on the bypass circuit was weaned to off.  The patient was stable on low dose epinephrine and nitroglycerin.  The venous cannula was removed.  Pursestring at this site was tied.  This was over-sewed with 4-0 prolene.  The remaining pump blood volume was returned via the arterial cannula.  A test dose  of protamine was administered.  The arterial cannula was removed.  The pursestrings at this site were tied.  This was oversewn with pledgeted 4-0 prolene.     The sternal retractor was removed.  Two 36F straight argyle mediastinal chest tubes and bilateral 28F pleural chest tubes were placed.  These were delivered through the anterior abdominal wall and secured to the skin with 0-ethibond stitches.      The wound was made hemostatic with cautery.  The subcutaneous tissue, sternal edges, thymic fat, pericardial edges and all anastomotic and cannulation sites were inspected and hemostatic.    The sternum was reapproximated with sternal wires.  The wound was irrigated with warm antibiotic saline.  The sternum was reapproximated.    The wound was made hemostatic then closed in layers using vicryl stitches.  The subcutaneous tissue was closed with 2-0 vicryl stitches.  The skin was closed with 3-0 vicryl.  Dermabond skin glue was applied.  A sterile dressing was applied.      The patient was then transferred from the operating bed to an ICU bed and transferred to the ICU in critical, but stable, condition.    All needle, sponge and instrument counts were correct at the end of the case.    Sven Acuna  Cardiothoracic Surgery  Pager: [phone]

## 2021-09-08 ENCOUNTER — APPOINTMENT (OUTPATIENT)
Dept: GENERAL RADIOLOGY | Facility: CLINIC | Age: 66
DRG: 219 | End: 2021-09-08
Attending: ANESTHESIOLOGY
Payer: MEDICARE

## 2021-09-08 ENCOUNTER — ANESTHESIA EVENT (OUTPATIENT)
Dept: GENERAL RADIOLOGY | Facility: CLINIC | Age: 66
End: 2021-09-08

## 2021-09-08 ENCOUNTER — ANESTHESIA (OUTPATIENT)
Dept: INTENSIVE CARE | Facility: CLINIC | Age: 66
DRG: 219 | End: 2021-09-08
Payer: MEDICARE

## 2021-09-08 ENCOUNTER — APPOINTMENT (OUTPATIENT)
Dept: GENERAL RADIOLOGY | Facility: CLINIC | Age: 66
DRG: 219 | End: 2021-09-08
Attending: STUDENT IN AN ORGANIZED HEALTH CARE EDUCATION/TRAINING PROGRAM
Payer: MEDICARE

## 2021-09-08 ENCOUNTER — ANESTHESIA (OUTPATIENT)
Dept: GENERAL RADIOLOGY | Facility: CLINIC | Age: 66
End: 2021-09-08

## 2021-09-08 ENCOUNTER — APPOINTMENT (OUTPATIENT)
Dept: CT IMAGING | Facility: CLINIC | Age: 66
DRG: 219 | End: 2021-09-08
Attending: STUDENT IN AN ORGANIZED HEALTH CARE EDUCATION/TRAINING PROGRAM
Payer: MEDICARE

## 2021-09-08 ENCOUNTER — ANESTHESIA EVENT (OUTPATIENT)
Dept: INTENSIVE CARE | Facility: CLINIC | Age: 66
DRG: 219 | End: 2021-09-08
Payer: MEDICARE

## 2021-09-08 ENCOUNTER — APPOINTMENT (OUTPATIENT)
Dept: CARDIOLOGY | Facility: CLINIC | Age: 66
DRG: 219 | End: 2021-09-08
Attending: NURSE PRACTITIONER
Payer: MEDICARE

## 2021-09-08 ENCOUNTER — APPOINTMENT (OUTPATIENT)
Dept: GENERAL RADIOLOGY | Facility: CLINIC | Age: 66
DRG: 219 | End: 2021-09-08
Attending: PHYSICIAN ASSISTANT
Payer: MEDICARE

## 2021-09-08 LAB
ALBUMIN SERPL-MCNC: 2.1 G/DL (ref 3.4–5)
ALBUMIN SERPL-MCNC: 2.1 G/DL (ref 3.4–5)
ALBUMIN SERPL-MCNC: 2.5 G/DL (ref 3.4–5)
ALP SERPL-CCNC: 142 U/L (ref 40–150)
ALP SERPL-CCNC: 142 U/L (ref 40–150)
ALP SERPL-CCNC: 195 U/L (ref 40–150)
ALT SERPL W P-5'-P-CCNC: 27 U/L (ref 0–50)
ALT SERPL W P-5'-P-CCNC: 27 U/L (ref 0–50)
ALT SERPL W P-5'-P-CCNC: 60 U/L (ref 0–50)
ANION GAP SERPL CALCULATED.3IONS-SCNC: 16 MMOL/L (ref 3–14)
ANION GAP SERPL CALCULATED.3IONS-SCNC: 9 MMOL/L (ref 3–14)
ANION GAP SERPL CALCULATED.3IONS-SCNC: 9 MMOL/L (ref 3–14)
AST SERPL W P-5'-P-CCNC: 153 U/L (ref 0–45)
AST SERPL W P-5'-P-CCNC: 91 U/L (ref 0–45)
AST SERPL W P-5'-P-CCNC: 91 U/L (ref 0–45)
ATRIAL RATE - MUSE: 117 BPM
ATRIAL RATE - MUSE: 56 BPM
ATRIAL RATE - MUSE: 87 BPM
BASE EXCESS BLDA CALC-SCNC: -3.8 MMOL/L (ref -9–1.8)
BASE EXCESS BLDA CALC-SCNC: -4.1 MMOL/L (ref -9–1.8)
BASE EXCESS BLDA CALC-SCNC: -4.6 MMOL/L (ref -9–1.8)
BASE EXCESS BLDA CALC-SCNC: -4.8 MMOL/L (ref -9–1.8)
BASE EXCESS BLDA CALC-SCNC: -5.7 MMOL/L (ref -9–1.8)
BASE EXCESS BLDA CALC-SCNC: -6 MMOL/L (ref -9–1.8)
BASE EXCESS BLDA CALC-SCNC: -6.6 MMOL/L (ref -9–1.8)
BASE EXCESS BLDA CALC-SCNC: -6.9 MMOL/L (ref -9–1.8)
BASE EXCESS BLDA CALC-SCNC: -7.4 MMOL/L (ref -9–1.8)
BASE EXCESS BLDV CALC-SCNC: -1.5 MMOL/L (ref -7.7–1.9)
BASE EXCESS BLDV CALC-SCNC: -2.1 MMOL/L (ref -7.7–1.9)
BASE EXCESS BLDV CALC-SCNC: -2.6 MMOL/L (ref -7.7–1.9)
BASE EXCESS BLDV CALC-SCNC: -2.9 MMOL/L (ref -7.7–1.9)
BASE EXCESS BLDV CALC-SCNC: -3.8 MMOL/L (ref -7.7–1.9)
BASE EXCESS BLDV CALC-SCNC: -4.6 MMOL/L (ref -7.7–1.9)
BASE EXCESS BLDV CALC-SCNC: -4.8 MMOL/L (ref -7.7–1.9)
BASE EXCESS BLDV CALC-SCNC: -5.4 MMOL/L (ref -7.7–1.9)
BILIRUB DIRECT SERPL-MCNC: 0.2 MG/DL (ref 0–0.2)
BILIRUB SERPL-MCNC: 0.4 MG/DL (ref 0.2–1.3)
BILIRUB SERPL-MCNC: 0.4 MG/DL (ref 0.2–1.3)
BILIRUB SERPL-MCNC: 0.9 MG/DL (ref 0.2–1.3)
BUN SERPL-MCNC: 26 MG/DL (ref 7–30)
BUN SERPL-MCNC: 29 MG/DL (ref 7–30)
BUN SERPL-MCNC: 31 MG/DL (ref 7–30)
CA-I BLD-MCNC: 4.5 MG/DL (ref 4.4–5.2)
CA-I BLD-MCNC: 4.6 MG/DL (ref 4.4–5.2)
CA-I BLD-MCNC: 4.7 MG/DL (ref 4.4–5.2)
CALCIUM SERPL-MCNC: 6.6 MG/DL (ref 8.5–10.1)
CALCIUM SERPL-MCNC: 8.1 MG/DL (ref 8.5–10.1)
CALCIUM SERPL-MCNC: 8.4 MG/DL (ref 8.5–10.1)
CHLORIDE BLD-SCNC: 106 MMOL/L (ref 94–109)
CHLORIDE BLD-SCNC: 111 MMOL/L (ref 94–109)
CHLORIDE BLD-SCNC: 113 MMOL/L (ref 94–109)
CO2 SERPL-SCNC: 16 MMOL/L (ref 20–32)
CO2 SERPL-SCNC: 21 MMOL/L (ref 20–32)
CO2 SERPL-SCNC: 24 MMOL/L (ref 20–32)
CREAT SERPL-MCNC: 1.4 MG/DL (ref 0.52–1.04)
CREAT SERPL-MCNC: 1.44 MG/DL (ref 0.52–1.04)
CREAT SERPL-MCNC: 1.72 MG/DL (ref 0.52–1.04)
DIASTOLIC BLOOD PRESSURE - MUSE: NORMAL MMHG
ERYTHROCYTE [DISTWIDTH] IN BLOOD BY AUTOMATED COUNT: 22.7 % (ref 10–15)
GFR SERPL CREATININE-BSD FRML MDRD: 31 ML/MIN/1.73M2
GFR SERPL CREATININE-BSD FRML MDRD: 38 ML/MIN/1.73M2
GFR SERPL CREATININE-BSD FRML MDRD: 39 ML/MIN/1.73M2
GLUCOSE BLD-MCNC: 136 MG/DL (ref 70–99)
GLUCOSE BLD-MCNC: 159 MG/DL (ref 70–99)
GLUCOSE BLD-MCNC: 170 MG/DL (ref 70–99)
GLUCOSE BLDC GLUCOMTR-MCNC: 104 MG/DL (ref 70–99)
GLUCOSE BLDC GLUCOMTR-MCNC: 120 MG/DL (ref 70–99)
GLUCOSE BLDC GLUCOMTR-MCNC: 120 MG/DL (ref 70–99)
GLUCOSE BLDC GLUCOMTR-MCNC: 121 MG/DL (ref 70–99)
GLUCOSE BLDC GLUCOMTR-MCNC: 127 MG/DL (ref 70–99)
GLUCOSE BLDC GLUCOMTR-MCNC: 132 MG/DL (ref 70–99)
GLUCOSE BLDC GLUCOMTR-MCNC: 146 MG/DL (ref 70–99)
GLUCOSE BLDC GLUCOMTR-MCNC: 147 MG/DL (ref 70–99)
GLUCOSE BLDC GLUCOMTR-MCNC: 147 MG/DL (ref 70–99)
GLUCOSE BLDC GLUCOMTR-MCNC: 153 MG/DL (ref 70–99)
GLUCOSE BLDC GLUCOMTR-MCNC: 156 MG/DL (ref 70–99)
GLUCOSE BLDC GLUCOMTR-MCNC: 164 MG/DL (ref 70–99)
GLUCOSE BLDC GLUCOMTR-MCNC: 165 MG/DL (ref 70–99)
GLUCOSE BLDC GLUCOMTR-MCNC: 165 MG/DL (ref 70–99)
GLUCOSE BLDC GLUCOMTR-MCNC: 175 MG/DL (ref 70–99)
GLUCOSE BLDC GLUCOMTR-MCNC: 80 MG/DL (ref 70–99)
HCO3 BLD-SCNC: 18 MMOL/L (ref 21–28)
HCO3 BLD-SCNC: 19 MMOL/L (ref 21–28)
HCO3 BLD-SCNC: 20 MMOL/L (ref 21–28)
HCO3 BLD-SCNC: 21 MMOL/L (ref 21–28)
HCO3 BLD-SCNC: 22 MMOL/L (ref 21–28)
HCO3 BLD-SCNC: 22 MMOL/L (ref 21–28)
HCO3 BLD-SCNC: 23 MMOL/L (ref 21–28)
HCO3 BLDV-SCNC: 23 MMOL/L (ref 21–28)
HCO3 BLDV-SCNC: 24 MMOL/L (ref 21–28)
HCO3 BLDV-SCNC: 24 MMOL/L (ref 21–28)
HCO3 BLDV-SCNC: 25 MMOL/L (ref 21–28)
HCO3 BLDV-SCNC: 26 MMOL/L (ref 21–28)
HCO3 BLDV-SCNC: 26 MMOL/L (ref 21–28)
HCT VFR BLD AUTO: 39.9 % (ref 35–47)
HGB BLD-MCNC: 11.9 G/DL (ref 11.7–15.7)
INTERPRETATION ECG - MUSE: NORMAL
LACTATE SERPL-SCNC: 2.1 MMOL/L (ref 0.7–2)
LACTATE SERPL-SCNC: 2.5 MMOL/L (ref 0.7–2)
LACTATE SERPL-SCNC: 2.6 MMOL/L (ref 0.7–2)
LACTATE SERPL-SCNC: 3.3 MMOL/L (ref 0.7–2)
LACTATE SERPL-SCNC: 3.4 MMOL/L (ref 0.7–2)
LVEF ECHO: NORMAL
MAGNESIUM SERPL-MCNC: 2.5 MG/DL (ref 1.6–2.3)
MAGNESIUM SERPL-MCNC: 2.8 MG/DL (ref 1.6–2.3)
MAGNESIUM SERPL-MCNC: 2.9 MG/DL (ref 1.6–2.3)
MCH RBC QN AUTO: 27.5 PG (ref 26.5–33)
MCHC RBC AUTO-ENTMCNC: 29.8 G/DL (ref 31.5–36.5)
MCV RBC AUTO: 92 FL (ref 78–100)
O2/TOTAL GAS SETTING VFR VENT: 100 %
O2/TOTAL GAS SETTING VFR VENT: 100 %
O2/TOTAL GAS SETTING VFR VENT: 4 %
O2/TOTAL GAS SETTING VFR VENT: 40 %
O2/TOTAL GAS SETTING VFR VENT: 40 %
O2/TOTAL GAS SETTING VFR VENT: 50 %
O2/TOTAL GAS SETTING VFR VENT: 70 %
O2/TOTAL GAS SETTING VFR VENT: 80 %
O2/TOTAL GAS SETTING VFR VENT: 80 %
OXYHGB MFR BLD: 90 % (ref 92–100)
OXYHGB MFR BLD: 91 % (ref 92–100)
OXYHGB MFR BLD: 92 % (ref 92–100)
OXYHGB MFR BLD: 95 % (ref 92–100)
OXYHGB MFR BLDV: 32 % (ref 70–75)
OXYHGB MFR BLDV: 36 % (ref 70–75)
OXYHGB MFR BLDV: 38 % (ref 70–75)
OXYHGB MFR BLDV: 44 % (ref 70–75)
OXYHGB MFR BLDV: 45 % (ref 70–75)
OXYHGB MFR BLDV: 48 % (ref 70–75)
OXYHGB MFR BLDV: 50 % (ref 70–75)
OXYHGB MFR BLDV: 52 % (ref 70–75)
P AXIS - MUSE: -27 DEGREES
P AXIS - MUSE: 265 DEGREES
P AXIS - MUSE: NORMAL DEGREES
PCO2 BLD: 32 MM HG (ref 35–45)
PCO2 BLD: 32 MM HG (ref 35–45)
PCO2 BLD: 38 MM HG (ref 35–45)
PCO2 BLD: 41 MM HG (ref 35–45)
PCO2 BLD: 45 MM HG (ref 35–45)
PCO2 BLD: 47 MM HG (ref 35–45)
PCO2 BLD: 47 MM HG (ref 35–45)
PCO2 BLD: 48 MM HG (ref 35–45)
PCO2 BLD: 55 MM HG (ref 35–45)
PCO2 BLDV: 43 MM HG (ref 40–50)
PCO2 BLDV: 44 MM HG (ref 40–50)
PCO2 BLDV: 57 MM HG (ref 40–50)
PCO2 BLDV: 58 MM HG (ref 40–50)
PCO2 BLDV: 60 MM HG (ref 40–50)
PCO2 BLDV: 60 MM HG (ref 40–50)
PCO2 BLDV: 62 MM HG (ref 40–50)
PCO2 BLDV: 64 MM HG (ref 40–50)
PH BLD: 7.21 [PH] (ref 7.35–7.45)
PH BLD: 7.25 [PH] (ref 7.35–7.45)
PH BLD: 7.26 [PH] (ref 7.35–7.45)
PH BLD: 7.28 [PH] (ref 7.35–7.45)
PH BLD: 7.29 [PH] (ref 7.35–7.45)
PH BLD: 7.32 [PH] (ref 7.35–7.45)
PH BLD: 7.35 [PH] (ref 7.35–7.45)
PH BLD: 7.36 [PH] (ref 7.35–7.45)
PH BLD: 7.37 [PH] (ref 7.35–7.45)
PH BLDV: 7.18 [PH] (ref 7.32–7.43)
PH BLDV: 7.21 [PH] (ref 7.32–7.43)
PH BLDV: 7.25 [PH] (ref 7.32–7.43)
PH BLDV: 7.26 [PH] (ref 7.32–7.43)
PH BLDV: 7.33 [PH] (ref 7.32–7.43)
PH BLDV: 7.34 [PH] (ref 7.32–7.43)
PHOSPHATE SERPL-MCNC: 7.1 MG/DL (ref 2.5–4.5)
PHOSPHATE SERPL-MCNC: 7.4 MG/DL (ref 2.5–4.5)
PHOSPHATE SERPL-MCNC: 7.8 MG/DL (ref 2.5–4.5)
PLATELET # BLD AUTO: 182 10E3/UL (ref 150–450)
PO2 BLD: 64 MM HG (ref 80–105)
PO2 BLD: 67 MM HG (ref 80–105)
PO2 BLD: 68 MM HG (ref 80–105)
PO2 BLD: 69 MM HG (ref 80–105)
PO2 BLD: 75 MM HG (ref 80–105)
PO2 BLD: 76 MM HG (ref 80–105)
PO2 BLD: 80 MM HG (ref 80–105)
PO2 BLD: 81 MM HG (ref 80–105)
PO2 BLD: 89 MM HG (ref 80–105)
PO2 BLDV: 25 MM HG (ref 25–47)
PO2 BLDV: 27 MM HG (ref 25–47)
PO2 BLDV: 27 MM HG (ref 25–47)
PO2 BLDV: 30 MM HG (ref 25–47)
PO2 BLDV: 30 MM HG (ref 25–47)
PO2 BLDV: 31 MM HG (ref 25–47)
PO2 BLDV: 31 MM HG (ref 25–47)
PO2 BLDV: 32 MM HG (ref 25–47)
POTASSIUM BLD-SCNC: 2.6 MMOL/L (ref 3.4–5.3)
POTASSIUM BLD-SCNC: 2.8 MMOL/L (ref 3.4–5.3)
POTASSIUM BLD-SCNC: 2.8 MMOL/L (ref 3.4–5.3)
POTASSIUM BLD-SCNC: 4.3 MMOL/L (ref 3.4–5.3)
POTASSIUM BLD-SCNC: 4.5 MMOL/L (ref 3.4–5.3)
PR INTERVAL - MUSE: 162 MS
PR INTERVAL - MUSE: 176 MS
PR INTERVAL - MUSE: 182 MS
PROT SERPL-MCNC: 5.2 G/DL (ref 6.8–8.8)
PROT SERPL-MCNC: 5.2 G/DL (ref 6.8–8.8)
PROT SERPL-MCNC: 6 G/DL (ref 6.8–8.8)
QRS DURATION - MUSE: 178 MS
QRS DURATION - MUSE: 190 MS
QRS DURATION - MUSE: 70 MS
QT - MUSE: 504 MS
QT - MUSE: 514 MS
QT - MUSE: 560 MS
QTC - MUSE: 486 MS
QTC - MUSE: 592 MS
QTC - MUSE: 645 MS
R AXIS - MUSE: 52 DEGREES
R AXIS - MUSE: 52 DEGREES
R AXIS - MUSE: 7 DEGREES
RBC # BLD AUTO: 4.33 10E6/UL (ref 3.8–5.2)
SODIUM SERPL-SCNC: 139 MMOL/L (ref 133–144)
SODIUM SERPL-SCNC: 143 MMOL/L (ref 133–144)
SODIUM SERPL-SCNC: 143 MMOL/L (ref 133–144)
SYSTOLIC BLOOD PRESSURE - MUSE: NORMAL MMHG
T AXIS - MUSE: -30 DEGREES
T AXIS - MUSE: -71 DEGREES
T AXIS - MUSE: 33 DEGREES
VENTRICULAR RATE- MUSE: 56 BPM
VENTRICULAR RATE- MUSE: 80 BPM
VENTRICULAR RATE- MUSE: 80 BPM
WBC # BLD AUTO: 26.7 10E3/UL (ref 4–11)

## 2021-09-08 PROCEDURE — 94002 VENT MGMT INPAT INIT DAY: CPT

## 2021-09-08 PROCEDURE — 84155 ASSAY OF PROTEIN SERUM: CPT | Performed by: STUDENT IN AN ORGANIZED HEALTH CARE EDUCATION/TRAINING PROGRAM

## 2021-09-08 PROCEDURE — 370N000003 HC ANESTHESIA WARD SERVICE

## 2021-09-08 PROCEDURE — 71045 X-RAY EXAM CHEST 1 VIEW: CPT | Mod: 77

## 2021-09-08 PROCEDURE — 250N000009 HC RX 250: Performed by: STUDENT IN AN ORGANIZED HEALTH CARE EDUCATION/TRAINING PROGRAM

## 2021-09-08 PROCEDURE — 250N000011 HC RX IP 250 OP 636: Performed by: NURSE PRACTITIONER

## 2021-09-08 PROCEDURE — 999N000015 HC STATISTIC ARTERIAL MONITORING DAILY

## 2021-09-08 PROCEDURE — 999N000065 XR CHEST PORT 1 VIEW

## 2021-09-08 PROCEDURE — 82805 BLOOD GASES W/O2 SATURATION: CPT | Performed by: PHYSICIAN ASSISTANT

## 2021-09-08 PROCEDURE — 71250 CT THORAX DX C-: CPT

## 2021-09-08 PROCEDURE — 83735 ASSAY OF MAGNESIUM: CPT | Performed by: NURSE PRACTITIONER

## 2021-09-08 PROCEDURE — 74018 RADEX ABDOMEN 1 VIEW: CPT | Mod: 26 | Performed by: RADIOLOGY

## 2021-09-08 PROCEDURE — 84100 ASSAY OF PHOSPHORUS: CPT

## 2021-09-08 PROCEDURE — 71250 CT THORAX DX C-: CPT | Mod: 26 | Performed by: RADIOLOGY

## 2021-09-08 PROCEDURE — 80053 COMPREHEN METABOLIC PANEL: CPT | Performed by: SURGERY

## 2021-09-08 PROCEDURE — 36592 COLLECT BLOOD FROM PICC: CPT | Performed by: THORACIC SURGERY (CARDIOTHORACIC VASCULAR SURGERY)

## 2021-09-08 PROCEDURE — 82330 ASSAY OF CALCIUM: CPT | Performed by: PHYSICIAN ASSISTANT

## 2021-09-08 PROCEDURE — 82805 BLOOD GASES W/O2 SATURATION: CPT | Performed by: STUDENT IN AN ORGANIZED HEALTH CARE EDUCATION/TRAINING PROGRAM

## 2021-09-08 PROCEDURE — 94640 AIRWAY INHALATION TREATMENT: CPT

## 2021-09-08 PROCEDURE — 71045 X-RAY EXAM CHEST 1 VIEW: CPT | Mod: 26 | Performed by: RADIOLOGY

## 2021-09-08 PROCEDURE — 250N000011 HC RX IP 250 OP 636: Performed by: STUDENT IN AN ORGANIZED HEALTH CARE EDUCATION/TRAINING PROGRAM

## 2021-09-08 PROCEDURE — 99291 CRITICAL CARE FIRST HOUR: CPT | Mod: 24 | Performed by: ANESTHESIOLOGY

## 2021-09-08 PROCEDURE — C8924 2D TTE W OR W/O FOL W/CON,FU: HCPCS

## 2021-09-08 PROCEDURE — 250N000011 HC RX IP 250 OP 636: Performed by: PHYSICIAN ASSISTANT

## 2021-09-08 PROCEDURE — 83605 ASSAY OF LACTIC ACID: CPT | Performed by: STUDENT IN AN ORGANIZED HEALTH CARE EDUCATION/TRAINING PROGRAM

## 2021-09-08 PROCEDURE — 93321 DOPPLER ECHO F-UP/LMTD STD: CPT | Mod: 26 | Performed by: INTERNAL MEDICINE

## 2021-09-08 PROCEDURE — 255N000002 HC RX 255 OP 636: Performed by: INTERNAL MEDICINE

## 2021-09-08 PROCEDURE — 250N000013 HC RX MED GY IP 250 OP 250 PS 637: Performed by: SURGERY

## 2021-09-08 PROCEDURE — 83735 ASSAY OF MAGNESIUM: CPT

## 2021-09-08 PROCEDURE — 250N000011 HC RX IP 250 OP 636: Performed by: ANESTHESIOLOGY

## 2021-09-08 PROCEDURE — 85027 COMPLETE CBC AUTOMATED: CPT | Performed by: SURGERY

## 2021-09-08 PROCEDURE — 94660 CPAP INITIATION&MGMT: CPT

## 2021-09-08 PROCEDURE — 93010 ELECTROCARDIOGRAM REPORT: CPT | Mod: 76 | Performed by: INTERNAL MEDICINE

## 2021-09-08 PROCEDURE — 250N000011 HC RX IP 250 OP 636: Performed by: SURGERY

## 2021-09-08 PROCEDURE — P9041 ALBUMIN (HUMAN),5%, 50ML: HCPCS | Performed by: SURGERY

## 2021-09-08 PROCEDURE — 250N000011 HC RX IP 250 OP 636

## 2021-09-08 PROCEDURE — 258N000003 HC RX IP 258 OP 636: Performed by: PHYSICIAN ASSISTANT

## 2021-09-08 PROCEDURE — 93325 DOPPLER ECHO COLOR FLOW MAPG: CPT | Mod: 26 | Performed by: INTERNAL MEDICINE

## 2021-09-08 PROCEDURE — 999N000065 XR ABDOMEN PORT 1 VIEWS

## 2021-09-08 PROCEDURE — 250N000009 HC RX 250: Performed by: SURGERY

## 2021-09-08 PROCEDURE — 200N000002 HC R&B ICU UMMC

## 2021-09-08 PROCEDURE — 93308 TTE F-UP OR LMTD: CPT | Mod: 26 | Performed by: INTERNAL MEDICINE

## 2021-09-08 PROCEDURE — 250N000013 HC RX MED GY IP 250 OP 250 PS 637: Performed by: PHYSICIAN ASSISTANT

## 2021-09-08 PROCEDURE — 93005 ELECTROCARDIOGRAM TRACING: CPT

## 2021-09-08 PROCEDURE — 82374 ASSAY BLOOD CARBON DIOXIDE: CPT | Performed by: NURSE PRACTITIONER

## 2021-09-08 PROCEDURE — 83605 ASSAY OF LACTIC ACID: CPT | Performed by: SURGERY

## 2021-09-08 PROCEDURE — 71045 X-RAY EXAM CHEST 1 VIEW: CPT | Mod: 26 | Performed by: STUDENT IN AN ORGANIZED HEALTH CARE EDUCATION/TRAINING PROGRAM

## 2021-09-08 PROCEDURE — 84100 ASSAY OF PHOSPHORUS: CPT | Performed by: NURSE PRACTITIONER

## 2021-09-08 PROCEDURE — 82330 ASSAY OF CALCIUM: CPT

## 2021-09-08 PROCEDURE — 250N000009 HC RX 250

## 2021-09-08 PROCEDURE — 94799 UNLISTED PULMONARY SVC/PX: CPT

## 2021-09-08 PROCEDURE — 999N000045 HC STATISTIC DAILY SWAN MONITORING

## 2021-09-08 PROCEDURE — 83605 ASSAY OF LACTIC ACID: CPT

## 2021-09-08 PROCEDURE — 999N000157 HC STATISTIC RCP TIME EA 10 MIN

## 2021-09-08 PROCEDURE — 250N000009 HC RX 250: Performed by: NURSE PRACTITIONER

## 2021-09-08 PROCEDURE — 250N000011 HC RX IP 250 OP 636: Performed by: THORACIC SURGERY (CARDIOTHORACIC VASCULAR SURGERY)

## 2021-09-08 PROCEDURE — C9113 INJ PANTOPRAZOLE SODIUM, VIA: HCPCS | Performed by: SURGERY

## 2021-09-08 PROCEDURE — 82330 ASSAY OF CALCIUM: CPT | Performed by: NURSE PRACTITIONER

## 2021-09-08 PROCEDURE — 84100 ASSAY OF PHOSPHORUS: CPT | Performed by: SURGERY

## 2021-09-08 PROCEDURE — 250N000013 HC RX MED GY IP 250 OP 250 PS 637: Performed by: NURSE PRACTITIONER

## 2021-09-08 PROCEDURE — 94640 AIRWAY INHALATION TREATMENT: CPT | Mod: 76

## 2021-09-08 PROCEDURE — 71045 X-RAY EXAM CHEST 1 VIEW: CPT

## 2021-09-08 PROCEDURE — 258N000003 HC RX IP 258 OP 636: Performed by: STUDENT IN AN ORGANIZED HEALTH CARE EDUCATION/TRAINING PROGRAM

## 2021-09-08 PROCEDURE — 83605 ASSAY OF LACTIC ACID: CPT | Performed by: THORACIC SURGERY (CARDIOTHORACIC VASCULAR SURGERY)

## 2021-09-08 PROCEDURE — 83735 ASSAY OF MAGNESIUM: CPT | Performed by: SURGERY

## 2021-09-08 PROCEDURE — 999N000155 HC STATISTIC RAPCV CVP MONITORING

## 2021-09-08 PROCEDURE — 82803 BLOOD GASES ANY COMBINATION: CPT | Performed by: NURSE PRACTITIONER

## 2021-09-08 RX ORDER — DULOXETIN HYDROCHLORIDE 30 MG/1
30 CAPSULE, DELAYED RELEASE ORAL AT BEDTIME
Status: DISCONTINUED | OUTPATIENT
Start: 2021-09-08 | End: 2021-09-22 | Stop reason: HOSPADM

## 2021-09-08 RX ORDER — LEVALBUTEROL INHALATION SOLUTION 1.25 MG/3ML
1.25 SOLUTION RESPIRATORY (INHALATION)
Status: DISCONTINUED | OUTPATIENT
Start: 2021-09-08 | End: 2021-09-14

## 2021-09-08 RX ORDER — POTASSIUM CHLORIDE 29.8 MG/ML
20 INJECTION INTRAVENOUS
Status: COMPLETED | OUTPATIENT
Start: 2021-09-08 | End: 2021-09-09

## 2021-09-08 RX ORDER — BUMETANIDE 0.25 MG/ML
4 INJECTION INTRAMUSCULAR; INTRAVENOUS ONCE
Status: COMPLETED | OUTPATIENT
Start: 2021-09-08 | End: 2021-09-08

## 2021-09-08 RX ORDER — METHYLPREDNISOLONE SODIUM SUCCINATE 125 MG/2ML
60 INJECTION, POWDER, LYOPHILIZED, FOR SOLUTION INTRAMUSCULAR; INTRAVENOUS EVERY 24 HOURS
Status: DISCONTINUED | OUTPATIENT
Start: 2021-09-08 | End: 2021-09-10

## 2021-09-08 RX ORDER — IPRATROPIUM BROMIDE AND ALBUTEROL SULFATE 2.5; .5 MG/3ML; MG/3ML
SOLUTION RESPIRATORY (INHALATION)
Status: COMPLETED
Start: 2021-09-08 | End: 2021-09-08

## 2021-09-08 RX ORDER — PROPOFOL 10 MG/ML
5-75 INJECTION, EMULSION INTRAVENOUS CONTINUOUS
Status: DISCONTINUED | OUTPATIENT
Start: 2021-09-08 | End: 2021-09-13

## 2021-09-08 RX ORDER — BUPIVACAINE HYDROCHLORIDE 5 MG/ML
10 INJECTION, SOLUTION EPIDURAL; INTRACAUDAL ONCE
Status: COMPLETED | OUTPATIENT
Start: 2021-09-08 | End: 2021-09-08

## 2021-09-08 RX ORDER — BUMETANIDE 0.25 MG/ML
2 INJECTION INTRAMUSCULAR; INTRAVENOUS ONCE
Status: COMPLETED | OUTPATIENT
Start: 2021-09-08 | End: 2021-09-08

## 2021-09-08 RX ORDER — ALBUMIN, HUMAN INJ 5% 5 %
250 SOLUTION INTRAVENOUS ONCE
Status: COMPLETED | OUTPATIENT
Start: 2021-09-08 | End: 2021-09-08

## 2021-09-08 RX ORDER — ETOMIDATE 2 MG/ML
INJECTION INTRAVENOUS
Status: COMPLETED | OUTPATIENT
Start: 2021-09-08 | End: 2021-09-08

## 2021-09-08 RX ORDER — GABAPENTIN 300 MG/1
300 CAPSULE ORAL 2 TIMES DAILY
Status: DISCONTINUED | OUTPATIENT
Start: 2021-09-08 | End: 2021-09-09

## 2021-09-08 RX ORDER — AMIODARONE HYDROCHLORIDE 200 MG/1
200 TABLET ORAL DAILY
Status: DISCONTINUED | OUTPATIENT
Start: 2021-09-08 | End: 2021-09-09

## 2021-09-08 RX ORDER — FUROSEMIDE 10 MG/ML
40 INJECTION INTRAMUSCULAR; INTRAVENOUS ONCE
Status: COMPLETED | OUTPATIENT
Start: 2021-09-08 | End: 2021-09-08

## 2021-09-08 RX ORDER — METOLAZONE 5 MG/1
5 TABLET ORAL ONCE
Status: COMPLETED | OUTPATIENT
Start: 2021-09-08 | End: 2021-09-08

## 2021-09-08 RX ORDER — FUROSEMIDE 10 MG/ML
10 INJECTION INTRAMUSCULAR; INTRAVENOUS ONCE
Status: COMPLETED | OUTPATIENT
Start: 2021-09-08 | End: 2021-09-08

## 2021-09-08 RX ORDER — GABAPENTIN 300 MG/1
600 CAPSULE ORAL AT BEDTIME
Status: DISCONTINUED | OUTPATIENT
Start: 2021-09-08 | End: 2021-09-09

## 2021-09-08 RX ORDER — PROPOFOL 10 MG/ML
INJECTION, EMULSION INTRAVENOUS
Status: COMPLETED
Start: 2021-09-08 | End: 2021-09-08

## 2021-09-08 RX ADMIN — HEPARIN SODIUM 5000 UNITS: 10000 INJECTION, SOLUTION INTRAVENOUS; SUBCUTANEOUS at 11:24

## 2021-09-08 RX ADMIN — ALBUMIN HUMAN 250 ML: 0.05 INJECTION, SOLUTION INTRAVENOUS at 04:09

## 2021-09-08 RX ADMIN — FUROSEMIDE 40 MG: 10 INJECTION, SOLUTION INTRAVENOUS at 11:24

## 2021-09-08 RX ADMIN — HYDROMORPHONE HYDROCHLORIDE 0.4 MG: 0.2 INJECTION, SOLUTION INTRAMUSCULAR; INTRAVENOUS; SUBCUTANEOUS at 11:24

## 2021-09-08 RX ADMIN — PROPOFOL 10 MCG/KG/MIN: 10 INJECTION, EMULSION INTRAVENOUS at 17:33

## 2021-09-08 RX ADMIN — PROPOFOL 50 MCG/KG/MIN: 10 INJECTION, EMULSION INTRAVENOUS at 21:29

## 2021-09-08 RX ADMIN — METOLAZONE 5 MG: 5 TABLET ORAL at 16:32

## 2021-09-08 RX ADMIN — PANTOPRAZOLE SODIUM 40 MG: 40 INJECTION, POWDER, FOR SOLUTION INTRAVENOUS at 07:37

## 2021-09-08 RX ADMIN — AMIODARONE HYDROCHLORIDE 200 MG: 200 TABLET ORAL at 11:25

## 2021-09-08 RX ADMIN — IPRATROPIUM BROMIDE AND ALBUTEROL SULFATE 3 ML: .5; 3 SOLUTION RESPIRATORY (INHALATION) at 07:53

## 2021-09-08 RX ADMIN — BUMETANIDE 2 MG: 0.25 INJECTION, SOLUTION INTRAMUSCULAR; INTRAVENOUS at 15:27

## 2021-09-08 RX ADMIN — Medication 75 MCG/HR: at 18:17

## 2021-09-08 RX ADMIN — SODIUM BICARBONATE 50 MEQ: 84 INJECTION INTRAVENOUS at 16:31

## 2021-09-08 RX ADMIN — LEVALBUTEROL HYDROCHLORIDE 1.25 MG: 1.25 SOLUTION RESPIRATORY (INHALATION) at 15:32

## 2021-09-08 RX ADMIN — POTASSIUM CHLORIDE 20 MEQ: 29.8 INJECTION, SOLUTION INTRAVENOUS at 23:21

## 2021-09-08 RX ADMIN — BUPIVACAINE HYDROCHLORIDE 50 MG: 5 INJECTION, SOLUTION EPIDURAL; INTRACAUDAL; PERINEURAL at 09:20

## 2021-09-08 RX ADMIN — CEFAZOLIN SODIUM 2 G: 2 INJECTION, SOLUTION INTRAVENOUS at 06:12

## 2021-09-08 RX ADMIN — FUROSEMIDE 10 MG: 10 INJECTION, SOLUTION INTRAVENOUS at 06:27

## 2021-09-08 RX ADMIN — ETOMIDATE 30 MG: 40 INJECTION, SOLUTION INTRAVENOUS at 17:10

## 2021-09-08 RX ADMIN — GABAPENTIN 300 MG: 300 CAPSULE ORAL at 15:06

## 2021-09-08 RX ADMIN — SODIUM BICARBONATE 25 MEQ: 84 INJECTION INTRAVENOUS at 13:00

## 2021-09-08 RX ADMIN — BUMETANIDE 4 MG: 0.25 INJECTION, SOLUTION INTRAMUSCULAR; INTRAVENOUS at 21:21

## 2021-09-08 RX ADMIN — SODIUM BICARBONATE 25 MEQ: 84 INJECTION INTRAVENOUS at 13:11

## 2021-09-08 RX ADMIN — GABAPENTIN 600 MG: 300 CAPSULE ORAL at 21:12

## 2021-09-08 RX ADMIN — SODIUM BICARBONATE 100 MEQ: 84 INJECTION INTRAVENOUS at 21:59

## 2021-09-08 RX ADMIN — HEPARIN SODIUM 5000 UNITS: 10000 INJECTION, SOLUTION INTRAVENOUS; SUBCUTANEOUS at 21:12

## 2021-09-08 RX ADMIN — DOCUSATE SODIUM 50 MG AND SENNOSIDES 8.6 MG 1 TABLET: 8.6; 5 TABLET, FILM COATED ORAL at 21:12

## 2021-09-08 RX ADMIN — OXYCODONE HYDROCHLORIDE 10 MG: 10 TABLET ORAL at 18:23

## 2021-09-08 RX ADMIN — HYDROMORPHONE HYDROCHLORIDE 0.4 MG: 0.2 INJECTION, SOLUTION INTRAMUSCULAR; INTRAVENOUS; SUBCUTANEOUS at 07:46

## 2021-09-08 RX ADMIN — POTASSIUM CHLORIDE 20 MEQ: 29.8 INJECTION, SOLUTION INTRAVENOUS at 22:14

## 2021-09-08 RX ADMIN — BUMETANIDE 2 MG: 0.25 INJECTION, SOLUTION INTRAMUSCULAR; INTRAVENOUS at 16:32

## 2021-09-08 RX ADMIN — HYDROMORPHONE HYDROCHLORIDE 0.2 MG: 0.2 INJECTION, SOLUTION INTRAMUSCULAR; INTRAVENOUS; SUBCUTANEOUS at 02:17

## 2021-09-08 RX ADMIN — ASPIRIN 81 MG: 81 TABLET, CHEWABLE ORAL at 07:37

## 2021-09-08 RX ADMIN — OXYCODONE HYDROCHLORIDE 10 MG: 10 TABLET ORAL at 11:24

## 2021-09-08 RX ADMIN — Medication 100 MG: at 17:10

## 2021-09-08 RX ADMIN — NICARDIPINE HYDROCHLORIDE 1 MG/HR: 0.2 INJECTION, SOLUTION INTRAVENOUS at 15:35

## 2021-09-08 RX ADMIN — HYDROMORPHONE HYDROCHLORIDE 0.2 MG: 0.2 INJECTION, SOLUTION INTRAMUSCULAR; INTRAVENOUS; SUBCUTANEOUS at 15:06

## 2021-09-08 RX ADMIN — ONDANSETRON 4 MG: 2 INJECTION INTRAMUSCULAR; INTRAVENOUS at 11:40

## 2021-09-08 RX ADMIN — EPINEPHRINE 0.06 MCG/KG/MIN: 1 INJECTION PARENTERAL at 19:17

## 2021-09-08 RX ADMIN — HUMAN ALBUMIN MICROSPHERES AND PERFLUTREN 6 ML: 10; .22 INJECTION, SOLUTION INTRAVENOUS at 13:23

## 2021-09-08 RX ADMIN — METHYLPREDNISOLONE SODIUM SUCCINATE 62.5 MG: 125 INJECTION, POWDER, FOR SOLUTION INTRAMUSCULAR; INTRAVENOUS at 18:10

## 2021-09-08 RX ADMIN — VANCOMYCIN HYDROCHLORIDE 1500 MG: 100 INJECTION, POWDER, LYOPHILIZED, FOR SOLUTION INTRAVENOUS at 10:27

## 2021-09-08 RX ADMIN — POLYETHYLENE GLYCOL 3350 17 G: 17 POWDER, FOR SOLUTION ORAL at 07:37

## 2021-09-08 RX ADMIN — IPRATROPIUM BROMIDE AND ALBUTEROL SULFATE 3 ML: .5; 3 SOLUTION RESPIRATORY (INHALATION) at 11:53

## 2021-09-08 RX ADMIN — HYDROMORPHONE HYDROCHLORIDE 0.2 MG: 0.2 INJECTION, SOLUTION INTRAMUSCULAR; INTRAVENOUS; SUBCUTANEOUS at 15:46

## 2021-09-08 RX ADMIN — HYDROMORPHONE HYDROCHLORIDE 0.2 MG: 0.2 INJECTION, SOLUTION INTRAMUSCULAR; INTRAVENOUS; SUBCUTANEOUS at 03:15

## 2021-09-08 RX ADMIN — GABAPENTIN 300 MG: 300 CAPSULE ORAL at 11:25

## 2021-09-08 RX ADMIN — PHENYLEPHRINE HYDROCHLORIDE 400 MCG: 10 INJECTION INTRAVENOUS at 17:10

## 2021-09-08 RX ADMIN — CEFAZOLIN SODIUM 2 G: 2 INJECTION, SOLUTION INTRAVENOUS at 16:07

## 2021-09-08 RX ADMIN — DOCUSATE SODIUM 50 MG AND SENNOSIDES 8.6 MG 1 TABLET: 8.6; 5 TABLET, FILM COATED ORAL at 07:37

## 2021-09-08 RX ADMIN — LEVALBUTEROL HYDROCHLORIDE 1.25 MG: 1.25 SOLUTION RESPIRATORY (INHALATION) at 20:47

## 2021-09-08 RX ADMIN — OXYCODONE HYDROCHLORIDE 10 MG: 10 TABLET ORAL at 07:37

## 2021-09-08 RX ADMIN — OXYCODONE HYDROCHLORIDE 10 MG: 10 TABLET ORAL at 15:06

## 2021-09-08 ASSESSMENT — ACTIVITIES OF DAILY LIVING (ADL)
ADLS_ACUITY_SCORE: 19
ADLS_ACUITY_SCORE: 17
ADLS_ACUITY_SCORE: 19
ADLS_ACUITY_SCORE: 18
ADLS_ACUITY_SCORE: 18
ADLS_ACUITY_SCORE: 19

## 2021-09-08 ASSESSMENT — MIFFLIN-ST. JEOR: SCORE: 1534.62

## 2021-09-08 NOTE — PROGRESS NOTES
CLINICAL NUTRITION SERVICES - BRIEF NOTE    Received provider consult for nutrition education with comments post op cardiovascular surgery (automatic consult on post-op order set). S/p mitral valve replacement on 9/7. Nutrition education not indicated.    RD will follow per LOS protocol or if re-consulted.     Bertha Klein RD, LD  [id number]  Pgr: [phone]

## 2021-09-08 NOTE — PLAN OF CARE
Changes this shift: Frequent ABGs drawn during shift. PH continues to be low (most recent 7.21) and CO2 high (most recent 55). Remained on BiPAP 12/7 throughout shift. Lethargic at start of shift, improving as night progressed. A&Ox4. SVO2 45 with CI 1.4-1.5, Dobutamine gtt started but stopped d/t no change in CI/SVO2 and increased pressor needs with gtt on. Epi and Levo gtt's titrated to maintain MAP>65. Completely AV paced at 80, occasional periods of intrinsic rhythm breaking through with inappropriate pacing d/t inadequate sensing (MD aware, no change at this time). AM Lactic 3.4, 250 albumin given. Low urine output (12-30/hr) throughout shift, MD aware. PRN dilaudid given for incisional pain. Nerve blocks in place with 7ml bilaterally. Insulin gtt maintained overnight. Frequent requests for water, kept NPO d/t continuous BiPAP needs, swabs given for comfort.     Plan: Continue to monitor and notify MD with pertinent changes.

## 2021-09-08 NOTE — PHARMACY-ADMISSION MEDICATION HISTORY
Admission Medication History Completed by Pharmacy    See [facility] Admission Navigator for allergy information, preferred outpatient pharmacy, prior to admission medications and immunization status.     Medication History Sources:     Med Rec completed pre-op by PharmD on 8/25/2021    Surescripts/[facility]    Changes made to PTA medication list (reason):    Added: None    Deleted: None    Changed: None    Additional Information:    None    Prior to Admission medications    Medication Sig Last Dose Taking? Auth Provider   amiodarone (PACERONE) 200 MG tablet Take 200 mg by mouth daily (with lunch)  9/6/2021 at 1200 Yes Reported, Patient   diclofenac (VOLTAREN) 1 % topical gel Apply 2 g topically 2 times daily as needed To right shoulder Past Week at Unknown time Yes Unknown, Entered By History   diltiazem ER COATED BEADS (CARDIZEM CD/CARTIA XT) 240 MG 24 hr capsule Take 1 capsule (240 mg) by mouth daily  Patient taking differently: Take 240 mg by mouth daily (with lunch)  9/6/2021 at 1200 Yes Bijal Torres APRN CNP   diphenhydrAMINE (BENADRYL) 25 MG tablet Take 50 mg by mouth At Bedtime 9/6/2021 at Unknown time Yes Unknown, Entered By History   DULoxetine (CYMBALTA) 30 MG capsule Take 30 mg by mouth At Bedtime 9/6/2021 at Unknown time Yes Unknown, Entered By History   fluticasone (FLONASE) 50 MCG/ACT nasal spray Spray 2 sprays into both nostrils 2 times daily as needed for allergies  Past Month at Unknown time Yes Jun Rhodes MD   gabapentin (NEURONTIN) 600 MG tablet Take 600 mg in the morning  Take 600 mg in the afternoon   Take 1200 mg (2 tablets) at bedtime. 9/7/2021 at Unknown time Yes Reported, Patient   hydrOXYzine (ATARAX) 25 MG tablet Take 25 mg by mouth At Bedtime 9/6/2021 at Unknown time Yes Reported, Patient   magnesium 250 MG tablet Take 1 tablet (250 mg) by mouth 2 times daily 9/6/2021 at Unknown time Yes Bijal Torres APRN CNP   Melatonin 10 MG TABS tablet Take 10 mg by  mouth At Bedtime 9/6/2021 at Unknown time Yes Unknown, Entered By History   metoprolol tartrate (LOPRESSOR) 50 MG tablet Take 1 tablet (50 mg) by mouth 2 times daily 9/7/2021 at Unknown time Yes Bijal Torres APRN CNP   Multiple Vitamins-Minerals (MULTIVITAMIN ADULTS 50+) TABS Take 1 tablet by mouth every morning Past Week at Unknown time Yes Unknown, Entered By History   omeprazole (PRILOSEC) 20 MG CR capsule Take 20 mg by mouth daily  9/7/2021 at Unknown time Yes Reported, Patient   potassium chloride ER (KLOR-CON M) 20 MEQ CR tablet Take 20 mEq by mouth 2 times daily 9/6/2021 at Unknown time Yes Unknown, Entered By History   torsemide (DEMADEX) 10 MG tablet Take 1 tablet (10 mg) by mouth daily  Patient taking differently: Take 20 mg by mouth daily  9/6/2021 at 0800 Yes Bijal Torers APRN CNP   Ursodiol 500 MG TABS Take 500 mg by mouth 3 times daily  More than a month at Unknown time Yes Jun Rhodes MD   vitamin B complex with vitamin C (VITAMIN  B COMPLEX) tablet Take 1 tablet by mouth daily [employer] Past Week at Unknown time Yes Reported, Patient   vitamin D3 (CHOLECALCIFEROL) 50 mcg (2000 units) tablet Take 1 tablet by mouth every morning Past Week at Unknown time Yes Unknown, Entered By History   warfarin ANTICOAGULANT (COUMADIN) 5 MG tablet Take 2.5 mg x 4 days and 5 mg x 3 days/week or as directed by [facility]  Patient taking differently: Take 2.5 mg on Mon/Wed/Fri and take 5 mg on all other days of the week.  Takes in the evening. 8/30/2021  Bijal Torres APRN CNP       Date completed: 09/07/21    Medication history completed by: Consuelo Botello, AdrienneD

## 2021-09-08 NOTE — PLAN OF CARE
Care hours [id number]    Major Shift Events: Montez 1.3 and SVR 1400 this am. Dr. Doll notified and a dobutamine infusion started. Next numbers similar and dobutamine increased. Dr. Corado came to beside and ordered for Epi to be increased, dobutamine titrated off and nicardipine started. Nicardipine ordered to be titrated to SVR. After this intervention, Hemodynamics slowly improving. Meanwhile, patient was becoming more hypoxic. 100% FiO2 on BiPAP. Patient intubated at 1750. Chest CT to be done tonight.

## 2021-09-08 NOTE — ANESTHESIA PROCEDURE NOTES
Airway       Patient location during procedure: ICU       Procedure Start/Stop Times: 9/8/2021 5:00 PM and 9/8/2021 5:15 PM  Staff -        Anesthesiologist:  Leila Doll MD       Resident/Fellow: Hemalatha Garcia MD       Performed By: resident  Consent for Airway        Urgency: emergent       Consent: The procedure was performed in an emergent situation.  Report Obtained from Primary Care Team       History regarding most recent potassium obtained: Yes       History regarding presence/absence of renal failure obtained:Yes       History regarding stroke/CVA obtained:Yes       History regarding presence/absence of NM disorder: YesIndications and Patient Condition       Indications for airway management: respiratory insufficiency       Mallampati: Not Assessed     Induction type:RSI       Mask difficulty assessment: 0 - not attempted    Final Airway Details       Final airway type: endotracheal airway       Successful airway: ETT - single  Endotracheal Airway Details        ETT size (mm): 8.0       Cuffed: yes       Successful intubation technique: video laryngoscopy       VL Blade Size: MAC D Blade       Grade View of Cords: 1       Adjucts: stylet       Position: Center       Measured from: gums/teeth       Secured at (cm): 23    Post intubation assessment        ETT secured, Vent settings by primary/ICU team, Primary/ICU team to review CXR, Sedation to be ordered by primary/ICU team and No apparent complications       Placement verified by: capnometry and equal breath sounds        Number of attempts at approach: 1       Number of other approaches attempted: 0       Secured with: commercial tube [employer]       Ease of procedure: easy       Dentition: Intact    Medication(s) Administered   etomidate (AMIDATE) injection, 30 mg  succinylcholine (ANECTINE) 20 mg/mL injection, 100 mg  Medication Administration Time: 9/8/2021 5:10 PM

## 2021-09-08 NOTE — PROGRESS NOTES
CV ICU PROGRESS NOTE  September 8, 2021      CO-MORBIDITIES:   S/P MVR (mitral valve replacement)  (primary encounter diagnosis)  Mitral stenosis  Nonrheumatic mitral valve stenosis    ASSESSMENT: Corina Martinez is a 66 year old female with PMH of arthritis, SHAILESH, HTN, CHF and mitral valve stenosis, now s/p MVR w/ left atrial appendage ligation on 9/7 with Dr. Acuna.     OVERNIGHT EVENTS:   BiPAP overnight   Acidotic on gases     TODAY'S CHANGES:   TTE this AM  Dobutamine  PTA Gabapentin at half doses  Restart home cymbalta  Lasix 40  Continuous BiPAP, HFNC if needs brief pause from   Blood gases q4  xopenex q4h scheduled  Continue Home Amio    PLAN:  Neuro/ pain/ sedation:  # Acute post-operative pain  - Monitor neurological status. Notify the MD for any acute changes in exam.  - NIKKI catheters B/l   - PTA Gabapentin 600/600/1200 - plan to restart at half PTA dosing 300/300/600  - PRN Dilaudid robaxin and oxy  - restart PTA cymbalta 30 mg     Pulmonary care:   #Current smoker   #COPD sat's upper 80's at baseline   #SHAILESH  Arrived from the OR on BiPAP  - Bipap overnight 12/7 increase to 15/10 this AM  ABG showing   - Post op ABG pending   - intermittent day BiPAP  - VBG and ABG q4h for 4  - Fi O2 it sats of 92-94      Cardiovascular:    #HTN  #CHF, EF 55-60%   #Afib, warfarin   #MV stenosis, s/p MVR 9/7  #Left atrial appendage thrombus s/p atrial appendage clip 9/7  - Monitor hemodynamic status.   - MAP goal > 65  - Epi @ 0.02.   - ASA 81  - ECHO this AM  - PTA torsemide 10-20 daily, lasix 40 x1 this AM  - start dobutamine and get off of Epi for now, continue to reassess and trend gases  - continue PTA Amiodarone 200   - holding PTA Metoprolol and diltiazem   - Trend - VBG, swan, and lactate q4h     GI /Nutrition:   #GERD  #Primary biliary cirrhosis   - NPO   - Bowel regimen with senna 1tab BID, miralax daily  - PPI      Fluids/ Electrolytes/ Renal:   RADHA 1.44  - Iniguez for strict I&Os  - QID lytes   - Lasix 40 today    - PTA torsemide 10-20 daily     Endocrine:    #Stress hyperglycemia  - Insulin gtt, monitor BGs      ID/ Antibiotics:  - Perioperative antibiotics with cefazolin, vanc   - Post op CBC pending      Heme:     # Acute blood loss anemia  - Hgb goal >7  - Post op CBC pending      Prophylaxis:    - SCDs  - PPI  - SQH to start 9/8     Lines/ tubes/ drains:  - CVC  - Arterial line  - Iniguez  - Mediastinal tube x2  - Pleural tube x2     Disposition:  - CV ICU.      Patient seen, findings and plan discussed with CV ICU staff.      Hemalatha Garcia  CV ICU resident   [id number]     ====================================    SUBJECTIVE:   Alert, on BiPAP, denies any acute distress. Mild pain. No abdominal pain, nausea, or new SOB    OBJECTIVE:   1. VITAL SIGNS:   Temp:  [96.6  F (35.9  C)-97.4  F (36.3  C)] 97.4  F (36.3  C)  Pulse:  [55-80] 75  Resp:  [16-29] 21  BP: (114-124)/(62-69) 120/67  MAP:  [60 mmHg-93 mmHg] 77 mmHg  Arterial Line BP: ()/(30-73) 118/59  FiO2 (%):  [40 %-50 %] 50 %  SpO2:  [92 %-99 %] 97 %  FiO2 (%): 50 %  Resp: 21      2. INTAKE/ OUTPUT:   I/O last 3 completed shifts:  In: 2850.72 [I.V.:1329.72; Other:621; IV Piggyback:900]  Out: 1167 [Urine:802; Chest Tube:365]    3. PHYSICAL EXAMINATION:   General: sitting up, on BiPAP,   Neuro: A&Ox3, NAD, moving all extremities. Oriented x3  Resp:on BiPAP  regular rate, moderate increased work of breathing. Lung sounds equal bilaterally   CV: RRR  Abdomen: Soft, Non-distended, Non-tender  Incisions: c/d/i  Extremities: warm and well perfused    4. INVESTIGATIONS:   Arterial Blood Gases   Recent Labs   Lab 09/08/21  0307 09/07/21  2152 09/07/21 2055 09/07/21 1959   PH 7.21* 7.22* 7.21* 7.22*   PCO2 55* 53* 57* 54*   PO2 80 101 87 90   HCO3 22 22 22 22     Complete Blood Count   Recent Labs   Lab 09/08/21  0310 09/07/21  1654 09/07/21  1455 09/07/21  1453   WBC 26.7* 34.0* 16.8*  --    HGB 11.9 12.5 9.7* 9.7*    194 159  --      Basic Metabolic Panel  Recent Labs    Lab 09/08/21  0616 09/08/21  0534 09/08/21  0416 09/08/21  0311 09/08/21  0310 09/07/21  1654 09/07/21  1453 09/07/21  1336 09/07/21  1336   NA  --   --   --   --  143 142 140  --  141   POTASSIUM  --   --   --   --  4.3 4.2  4.2 4.5  --  4.7   CHLORIDE  --   --   --   --  113* 110*  --   --   --    CO2  --   --   --   --  21 24  --   --   --    BUN  --   --   --   --  26 19  --   --   --    CR  --   --   --   --  1.44* 0.88  --   --   --    * 147* 121* 175* 170* 132* 208*   < > 184*    < > = values in this interval not displayed.     Liver Function Tests  Recent Labs   Lab 09/08/21  0310 09/07/21  1654 09/07/21  1455   *  --   --    ALT 60* 70*  --    ALKPHOS 195* 228*  --    BILITOTAL 0.9 0.8  --    ALBUMIN 2.5* 2.4*  --    INR  --  1.37* 1.79*     Pancreatic Enzymes  No lab results found in last 7 days.  Coagulation Profile  Recent Labs   Lab 09/07/21  1654 09/07/21  1455   INR 1.37* 1.79*   PTT 46* 34         5. RADIOLOGY:   Recent Results (from the past 24 hour(s))   XR Chest Port 1 View    Narrative    EXAMINATION: XR CHEST PORT 1 VIEW, 9/7/2021 5:37 PM    INDICATION: Post Op CVTS Surgery    COMPARISON: Chest radiograph 6/30/2021    FINDINGS: Single AP semiupright radiograph the chest.    Trachea is midline. Enlarged cardiac silhouette. Low lung volumes.  [place]-Adrianna catheter with tip at the right interlobar segment. Bilateral  chest tubes. Intact median sternotomy wires. Left atrial appendage  clip. Sternal drains. Small bilateral pleural effusions. No  appreciable pneumothorax.      Impression    IMPRESSION:  1. [place]-Adrianna catheter with tip at the right interlobar segment.  Recommend retraction.   2. Low lung volumes with small bilateral pleural effusions and  cardiomegaly.     Findings were communicated with Sophie Garcia by Dr. Cabello at 5:50 PM  on 9/7/2021 who verbalized understanding of the above findings.    I have personally reviewed the examination and initial interpretation  and I  agree with the findings.    PATRICIA SMITH MD         SYSTEM ID:  [id number]   XR Chest Port 1 View    Narrative    EXAM: XR CHEST PORT 1 VIEW  9/7/2021 6:21 PM     HISTORY:  reassess swan placement after retraction       COMPARISON:  9/7/2021    FINDINGS: Single view of the chest. [place]-Adrianna catheter projects at the  main pulmonary artery. Stable positioning of bibasilar chest tubes.  Improved lung volumes. Postsurgical changes of the chest. Left atrial  appendage clip. Bibasilar opacities are unchanged. No pneumothorax.       Impression    IMPRESSION:   1. [place]-Adrianna catheter tip projects in the main pulmonary artery.  Stable position of additional support devices.  2. Unchanged bibasilar opacities.    I have personally reviewed the examination and initial interpretation  and I agree with the findings.    PATRICIA SMITH MD         SYSTEM ID:  [id number]       =========================================

## 2021-09-08 NOTE — PROGRESS NOTES
"[facility] Nerve Block Daily Progress Note  09/08/21    24 Hour Events  - Overnight on BiPAP  - Chest tubes x 4, Iniguez catheter in place  - has not been OOB, repositions with assist.  - Diet: clears    Subjective  - Patient reports superior 1/4 of sternotomy incision painful at rest and with deep breathing, currently rating pain 6/10  - Tolerating nerve block catheter infusion, agrees to hand delivered local anesthetic bolus this AM, states IV Dilaudid helps with pain and oral/IV analgesia (see below)  - Denies LE weakness, paresthesias, circumoral numbness, metallic taste, tinnitus    - Clinician hand-administered local anesthetic bolus:  0925   VSS.  PF bupivacaine 0.25%, total amount given 20 mL, 10 mL via each nerve block catheter, administered incrementally; negative aspirate before and during bolus.  No symptoms of local anesthetic systemic toxicity (LAST). Remained with and assessed patient for 10 min post-injection. BP, P and MAP stable. Bedside RN aware of need to continue BP, P and MAP monitoring Q 10 min for an additional 20 min. Contact RAPS (jobcode ID: [id number]) if experiencing any untoward effects or MAP less than 60    Objective  Exam  Vitals:   /67   Pulse 80   Temp (!) 96.3  F (35.7  C) (Axillary)   Resp 22   Ht 1.689 m (5' 6.5\")   Wt 97 kg (213 lb 13.5 oz)   LMP  (LMP Unknown)   SpO2 91%   BMI 34.00 kg/m        General: Alert, oriented, NAD  MSK/Neuro: Strength BLE 5/5 and overall symmetric.    Skin: bilateral erector spinae (ES) catheter sites with dressings c/d/i, erythema, heme, no tenderness at insertion sites.  Localized edema, denies tenderness during hand delivered bolus    Assessment  Corina Martinez is a 66 year old female with PMH of mitral stenosis who is now POD #1 s/p mitral valve replacement on 9/7/21. Prior to surgery uncomplicated placement of bilateral T5-6 erector spinae (ES) catheter by RAPS for analgesia. Pain moderately controlled with multimodal " analgesia. Motor function intact and no evidence of adverse side effects related to local anesthetic continuous infusion.     Plan  - Catheter Day #1 bilateral T5-6 ES catheters/infusion:    Continue Ropivacaine 0.2% PIB (programmed intermittent bolus) infusion at 7 mL Q 60 min via each catheter, total infusion 14 mL/hr   o Plan to maintain catheters while chest tubes in place, max of 7 days  o Follow up at 1020 AM: Patient reports sternotomy incision pain unchanged, denies pain at chest tube sites      Patient can be evaluated to receive local anesthetic bolus Q 12 hr PRN pain, bedside RN should page RAPS to request bolus    - Antithrombotics/Thrombolytics ordered:     Okay to continue Heparin SQ 5,000 units Q 8 hr as ordered by primary service  o Please contact RAPS jobcode pager [phone] before ordering or making any medication changes    RAPS will continue to follow and adjust as needed    Analgesic Medications ordered:  Medications related to Pain Management (From now, onward)    Start     Dose/Rate Route Frequency Ordered Stop    09/08/21 0800  polyethylene glycol (MIRALAX) Packet 17 g      17 g Oral or Feeding Tube DAILY 09/07/21 1652 09/08/21 0800  aspirin (ASA) chewable tablet 81 mg      81 mg Oral or NG Tube DAILY 09/07/21 1652 09/07/21 2000  senna-docusate (SENOKOT-S/PERICOLACE) 8.6-50 MG per tablet 1 tablet      1 tablet Oral or Feeding Tube 2 TIMES DAILY 09/07/21 1652 09/07/21 1652  HYDROmorphone (DILAUDID) injection 0.2 mg      0.2 mg Intravenous EVERY 2 HOURS PRN 09/07/21 1652 09/07/21 1652  HYDROmorphone (DILAUDID) injection 0.4 mg      0.4 mg Intravenous EVERY 2 HOURS PRN 09/07/21 1652 09/07/21 1652  oxyCODONE (ROXICODONE) tablet 5 mg      5 mg Oral or Feeding Tube EVERY 4 HOURS PRN 09/07/21 1652 09/07/21 1652  oxyCODONE IR (ROXICODONE) tablet 10 mg      10 mg Oral or Feeding Tube EVERY 4 HOURS PRN 09/07/21 1652 09/07/21 1652  lidocaine 1 % 0.1-1 mL      0.1-1 mL Other  EVERY 1 HOUR PRN 09/07/21 1652 09/07/21 1652  lidocaine (LMX4) cream       Topical EVERY 1 HOUR PRN 09/07/21 1652 09/07/21 1652  methocarbamol (ROBAXIN) tablet 500 mg      500 mg Oral or Feeding Tube EVERY 6 HOURS PRN 09/07/21 1652 09/07/21 1652  magnesium hydroxide (MILK OF MAGNESIA) suspension 30 mL      30 mL Oral DAILY PRN 09/07/21 1652 09/07/21 1652  dexmedetomidine (PRECEDEX) 400 mcg in 0.9% sodium chloride 100 mL      0.2-0.7 mcg/kg/hr × 93.9 kg (Dosing Weight)  4.7-16.4 mL/hr  Intravenous CONTINUOUS PRN 09/07/21 1652 09/07/21 1652  bisacodyl (DULCOLAX) Suppository 10 mg      10 mg Rectal DAILY PRN 09/07/21 1652 09/07/21 0830  ropivacaine 0.2% in NS perineural infusion simple       Perineural Continuous Nerve Block 09/07/21 0821      09/07/21 0830  ropivacaine 0.2% in NS perineural infusion simple       Perineural Continuous Nerve Block 09/07/21 0821                Labs/Diagnostics  Heme/INR:  Recent Labs   Lab Test 09/08/21  0310 09/07/21  1654   WBC 26.7* 34.0*   RBC 4.33 4.64   HGB 11.9 12.5   HCT 39.9 40.1   MCV 92 86   MCH 27.5 26.9   MCHC 29.8* 31.2*   RDW 22.7* 22.9*    194       Lab Results   Component Value Date    INR 1.37 09/07/2021    INR 1.79 09/07/2021    INR 2.04 08/25/2021    INR 2.6 07/28/2021    INR 2.8 07/15/2021    INR 2.9 07/07/2021    INR 3.65 06/08/2021    INR 1.99 05/29/2021    INR 2.15 05/28/2021     ---------------------------  Alexandr Cruz MD  [facility]  9/8/2021 9:29 AM    RAPS Contact Info (24 hour job code pager is the last 4 digits) For in-house use only:   Job code ID: [id number]   [place] [id number]  Peds [phone]  [place]an phone: dial * * * [phone], enter jobcode ID, then enter call-back number.    Text: Use [employer] on the Intranet <Paging/Directory> tab and enter Jobcode ID.   If no call back at any time, contact the hospital  and ask for RAPS attending or backup

## 2021-09-08 NOTE — PROGRESS NOTES
"[facility] Nerve Block Daily Progress Note  09/08/21    24 Hour Events  - Overnight on BiPAP  - Chest tubes x 4, Iniguez catheter in place  - has not been OOB, repositions with assist.  - Diet: clears    Subjective  - Patient reports superior 1/4 of sternotomy incision painful at rest and with deep breathing, currently rating pain 6/10  - Tolerating nerve block catheter infusion, agrees to hand delivered local anesthetic bolus this AM, states IV Dilaudid helps with pain and oral/IV analgesia (see below)  - Denies LE weakness, paresthesias, circumoral numbness, metallic taste, tinnitus    - Clinician hand-administered local anesthetic bolus:  0925   VSS.  PF bupivacaine 0.25%, total amount given 20 mL, 10 mL via each nerve block catheter, administered incrementally; negative aspirate before and during bolus.  No symptoms of local anesthetic systemic toxicity (LAST). Remained with and assessed patient for 10 min post-injection. BP, P and MAP stable. Bedside RN aware of need to continue BP, P and MAP monitoring Q 10 min for an additional 20 min. Contact RAPS (jobcode ID: [id number]) if experiencing any untoward effects or MAP less than 60    Objective  Exam  Vitals:   /67   Pulse 79   Temp 98.2  F (36.8  C) (Axillary)   Resp 20   Ht 1.689 m (5' 6.5\")   Wt 97 kg (213 lb 13.5 oz)   LMP  (LMP Unknown)   SpO2 96%   BMI 34.00 kg/m        General: Alert, oriented, NAD  MSK/Neuro: Strength BLE 5/5 and overall symmetric.    Skin: bilateral erector spinae (ES) catheter sites with dressings c/d/i, erythema, heme, no tenderness at insertion sites.  Localized edema, denies tenderness during hand delivered bolus    Assessment  Corina Martinez is a 66 year old female with PMH of mitral stenosis who is now POD #1 s/p mitral valve replacement on 9/7/21. Prior to surgery uncomplicated placement of bilateral T5-6 erector spinae (ES) catheter by RAPS for analgesia. Pain moderately controlled with multimodal " analgesia. Motor function intact and no evidence of adverse side effects related to local anesthetic continuous infusion.     Plan  - Catheter Day #1 bilateral T5-6 ES catheters/infusion:    Continue Ropivacaine 0.2% PIB (programmed intermittent bolus) infusion at 7 mL Q 60 min via each catheter, total infusion 14 mL/hr   o Plan to maintain catheters while chest tubes in place, max of 7 days  o Follow up at 1020 AM: Patient reports sternotomy incision pain unchanged, denies pain at chest tube sites      Patient can be evaluated to receive local anesthetic bolus Q 12 hr PRN pain, bedside RN should page RAPS to request bolus    - Antithrombotics/Thrombolytics ordered:     Okay to continue Heparin SQ 5,000 units Q 8 hr as ordered by primary service  o Please contact RAPS jobcode pager [phone] before ordering or making any medication changes    RAPS will continue to follow and adjust as needed  Discussed with attending anesthesiologist    Analgesic Medications ordered:  Medications related to Pain Management (From now, onward)    Start     Dose/Rate Route Frequency Ordered Stop    09/08/21 0800  polyethylene glycol (MIRALAX) Packet 17 g      17 g Oral or Feeding Tube DAILY 09/07/21 1652 09/08/21 0800  aspirin (ASA) chewable tablet 81 mg      81 mg Oral or NG Tube DAILY 09/07/21 1652 09/07/21 2000  senna-docusate (SENOKOT-S/PERICOLACE) 8.6-50 MG per tablet 1 tablet      1 tablet Oral or Feeding Tube 2 TIMES DAILY 09/07/21 1652 09/07/21 1652  HYDROmorphone (DILAUDID) injection 0.2 mg      0.2 mg Intravenous EVERY 2 HOURS PRN 09/07/21 1652 09/07/21 1652  HYDROmorphone (DILAUDID) injection 0.4 mg      0.4 mg Intravenous EVERY 2 HOURS PRN 09/07/21 1652 09/07/21 1652  oxyCODONE (ROXICODONE) tablet 5 mg      5 mg Oral or Feeding Tube EVERY 4 HOURS PRN 09/07/21 1652 09/07/21 1652  oxyCODONE IR (ROXICODONE) tablet 10 mg      10 mg Oral or Feeding Tube EVERY 4 HOURS PRN 09/07/21 1652 09/07/21 1652   lidocaine 1 % 0.1-1 mL      0.1-1 mL Other EVERY 1 HOUR PRN 09/07/21 1652 09/07/21 1652  lidocaine (LMX4) cream       Topical EVERY 1 HOUR PRN 09/07/21 1652 09/07/21 1652  methocarbamol (ROBAXIN) tablet 500 mg      500 mg Oral or Feeding Tube EVERY 6 HOURS PRN 09/07/21 1652 09/07/21 1652  magnesium hydroxide (MILK OF MAGNESIA) suspension 30 mL      30 mL Oral DAILY PRN 09/07/21 1652 09/07/21 1652  dexmedetomidine (PRECEDEX) 400 mcg in 0.9% sodium chloride 100 mL      0.2-0.7 mcg/kg/hr × 93.9 kg (Dosing Weight)  4.7-16.4 mL/hr  Intravenous CONTINUOUS PRN 09/07/21 1652 09/07/21 1652  bisacodyl (DULCOLAX) Suppository 10 mg      10 mg Rectal DAILY PRN 09/07/21 1652 09/07/21 0830  ropivacaine 0.2% in NS perineural infusion simple       Perineural Continuous Nerve Block 09/07/21 0821 09/07/21 0830  ropivacaine 0.2% in NS perineural infusion simple       Perineural Continuous Nerve Block 09/07/21 0821                Labs/Diagnostics  Heme/INR:  Recent Labs   Lab Test 09/08/21 0310 09/07/21  1654   WBC 26.7* 34.0*   RBC 4.33 4.64   HGB 11.9 12.5   HCT 39.9 40.1   MCV 92 86   MCH 27.5 26.9   MCHC 29.8* 31.2*   RDW 22.7* 22.9*    194       Lab Results   Component Value Date    INR 1.37 09/07/2021    INR 1.79 09/07/2021    INR 2.04 08/25/2021    INR 2.6 07/28/2021    INR 2.8 07/15/2021    INR 2.9 07/07/2021    INR 3.65 06/08/2021    INR 1.99 05/29/2021    INR 2.15 05/28/2021     ---------------------------  SUZE Mcneal [facility]  [facility]  9/8/2021 9:29 AM    RAPS Contact Info (24 hour job code pager is the last 4 digits) For in-house use only:   Job code ID: [id number]   [place] 0599  Peds [phone]  [employer]an phone: dial * * * [phone], enter jobcode ID, then enter call-back number.    Text: Use [employer] on the Intranet <Paging/Directory> tab and enter Jobcode ID.   If no call back at any time, contact the hospital  and ask for RAPS attending or backup

## 2021-09-09 ENCOUNTER — APPOINTMENT (OUTPATIENT)
Dept: GENERAL RADIOLOGY | Facility: CLINIC | Age: 66
DRG: 219 | End: 2021-09-09
Attending: STUDENT IN AN ORGANIZED HEALTH CARE EDUCATION/TRAINING PROGRAM
Payer: MEDICARE

## 2021-09-09 LAB
ABO/RH(D): NORMAL
ALBUMIN SERPL-MCNC: 2.4 G/DL (ref 3.4–5)
ALBUMIN SERPL-MCNC: 2.8 G/DL (ref 3.4–5)
ALP SERPL-CCNC: 147 U/L (ref 40–150)
ALP SERPL-CCNC: 163 U/L (ref 40–150)
ALT SERPL W P-5'-P-CCNC: 24 U/L (ref 0–50)
ALT SERPL W P-5'-P-CCNC: 28 U/L (ref 0–50)
ANION GAP SERPL CALCULATED.3IONS-SCNC: 13 MMOL/L (ref 3–14)
ANION GAP SERPL CALCULATED.3IONS-SCNC: 7 MMOL/L (ref 3–14)
ANION GAP SERPL CALCULATED.3IONS-SCNC: 9 MMOL/L (ref 3–14)
ANION GAP SERPL CALCULATED.3IONS-SCNC: 9 MMOL/L (ref 3–14)
ANTIBODY SCREEN: NEGATIVE
AST SERPL W P-5'-P-CCNC: 86 U/L (ref 0–45)
AST SERPL W P-5'-P-CCNC: 97 U/L (ref 0–45)
BASE EXCESS BLDA CALC-SCNC: -0.2 MMOL/L (ref -9–1.8)
BASE EXCESS BLDA CALC-SCNC: 2.2 MMOL/L (ref -9–1.8)
BASE EXCESS BLDA CALC-SCNC: 3.7 MMOL/L (ref -9–1.8)
BASE EXCESS BLDA CALC-SCNC: 3.7 MMOL/L (ref -9–1.8)
BASE EXCESS BLDA CALC-SCNC: 3.8 MMOL/L (ref -9–1.8)
BASE EXCESS BLDV CALC-SCNC: -0.6 MMOL/L (ref -7.7–1.9)
BASE EXCESS BLDV CALC-SCNC: 2.2 MMOL/L (ref -7.7–1.9)
BASE EXCESS BLDV CALC-SCNC: 2.7 MMOL/L (ref -7.7–1.9)
BASE EXCESS BLDV CALC-SCNC: 4.3 MMOL/L (ref -7.7–1.9)
BASE EXCESS BLDV CALC-SCNC: 4.7 MMOL/L (ref -7.7–1.9)
BASE EXCESS BLDV CALC-SCNC: 4.7 MMOL/L (ref -7.7–1.9)
BASOPHILS # BLD AUTO: 0 10E3/UL (ref 0–0.2)
BASOPHILS NFR BLD AUTO: 0 %
BILIRUB SERPL-MCNC: 0.4 MG/DL (ref 0.2–1.3)
BILIRUB SERPL-MCNC: 0.4 MG/DL (ref 0.2–1.3)
BUN SERPL-MCNC: 31 MG/DL (ref 7–30)
BUN SERPL-MCNC: 33 MG/DL (ref 7–30)
BUN SERPL-MCNC: 33 MG/DL (ref 7–30)
BUN SERPL-MCNC: 34 MG/DL (ref 7–30)
CA-I BLD-MCNC: 4.2 MG/DL (ref 4.4–5.2)
CA-I BLD-MCNC: 4.7 MG/DL (ref 4.4–5.2)
CA-I BLD-MCNC: 4.8 MG/DL (ref 4.4–5.2)
CALCIUM SERPL-MCNC: 7.8 MG/DL (ref 8.5–10.1)
CALCIUM SERPL-MCNC: 8.5 MG/DL (ref 8.5–10.1)
CALCIUM SERPL-MCNC: 9 MG/DL (ref 8.5–10.1)
CALCIUM SERPL-MCNC: 9 MG/DL (ref 8.5–10.1)
CHLORIDE BLD-SCNC: 106 MMOL/L (ref 94–109)
CHLORIDE BLD-SCNC: 107 MMOL/L (ref 94–109)
CHLORIDE BLD-SCNC: 107 MMOL/L (ref 94–109)
CHLORIDE BLD-SCNC: 109 MMOL/L (ref 94–109)
CO2 SERPL-SCNC: 23 MMOL/L (ref 20–32)
CO2 SERPL-SCNC: 25 MMOL/L (ref 20–32)
CO2 SERPL-SCNC: 25 MMOL/L (ref 20–32)
CO2 SERPL-SCNC: 27 MMOL/L (ref 20–32)
CREAT SERPL-MCNC: 1.2 MG/DL (ref 0.52–1.04)
CREAT SERPL-MCNC: 1.51 MG/DL (ref 0.52–1.04)
CREAT SERPL-MCNC: 1.51 MG/DL (ref 0.52–1.04)
CREAT SERPL-MCNC: 1.57 MG/DL (ref 0.52–1.04)
EOSINOPHIL # BLD AUTO: 0 10E3/UL (ref 0–0.7)
EOSINOPHIL NFR BLD AUTO: 0 %
ERYTHROCYTE [DISTWIDTH] IN BLOOD BY AUTOMATED COUNT: 22.4 % (ref 10–15)
ERYTHROCYTE [DISTWIDTH] IN BLOOD BY AUTOMATED COUNT: 22.5 % (ref 10–15)
GFR SERPL CREATININE-BSD FRML MDRD: 34 ML/MIN/1.73M2
GFR SERPL CREATININE-BSD FRML MDRD: 36 ML/MIN/1.73M2
GFR SERPL CREATININE-BSD FRML MDRD: 36 ML/MIN/1.73M2
GFR SERPL CREATININE-BSD FRML MDRD: 47 ML/MIN/1.73M2
GLUCOSE BLD-MCNC: 131 MG/DL (ref 70–99)
GLUCOSE BLD-MCNC: 131 MG/DL (ref 70–99)
GLUCOSE BLD-MCNC: 172 MG/DL (ref 70–99)
GLUCOSE BLD-MCNC: 95 MG/DL (ref 70–99)
GLUCOSE BLDC GLUCOMTR-MCNC: 104 MG/DL (ref 70–99)
GLUCOSE BLDC GLUCOMTR-MCNC: 109 MG/DL (ref 70–99)
GLUCOSE BLDC GLUCOMTR-MCNC: 110 MG/DL (ref 70–99)
GLUCOSE BLDC GLUCOMTR-MCNC: 117 MG/DL (ref 70–99)
GLUCOSE BLDC GLUCOMTR-MCNC: 118 MG/DL (ref 70–99)
GLUCOSE BLDC GLUCOMTR-MCNC: 120 MG/DL (ref 70–99)
GLUCOSE BLDC GLUCOMTR-MCNC: 121 MG/DL (ref 70–99)
GLUCOSE BLDC GLUCOMTR-MCNC: 127 MG/DL (ref 70–99)
GLUCOSE BLDC GLUCOMTR-MCNC: 129 MG/DL (ref 70–99)
GLUCOSE BLDC GLUCOMTR-MCNC: 133 MG/DL (ref 70–99)
GLUCOSE BLDC GLUCOMTR-MCNC: 138 MG/DL (ref 70–99)
GLUCOSE BLDC GLUCOMTR-MCNC: 139 MG/DL (ref 70–99)
GLUCOSE BLDC GLUCOMTR-MCNC: 169 MG/DL (ref 70–99)
GLUCOSE BLDC GLUCOMTR-MCNC: 86 MG/DL (ref 70–99)
GLUCOSE BLDC GLUCOMTR-MCNC: 87 MG/DL (ref 70–99)
GLUCOSE BLDC GLUCOMTR-MCNC: 94 MG/DL (ref 70–99)
GLUCOSE BLDC GLUCOMTR-MCNC: 95 MG/DL (ref 70–99)
HCO3 BLD-SCNC: 25 MMOL/L (ref 21–28)
HCO3 BLD-SCNC: 27 MMOL/L (ref 21–28)
HCO3 BLD-SCNC: 28 MMOL/L (ref 21–28)
HCO3 BLDV-SCNC: 25 MMOL/L (ref 21–28)
HCO3 BLDV-SCNC: 27 MMOL/L (ref 21–28)
HCO3 BLDV-SCNC: 28 MMOL/L (ref 21–28)
HCO3 BLDV-SCNC: 29 MMOL/L (ref 21–28)
HCO3 BLDV-SCNC: 30 MMOL/L (ref 21–28)
HCO3 BLDV-SCNC: 30 MMOL/L (ref 21–28)
HCT VFR BLD AUTO: 28.2 % (ref 35–47)
HCT VFR BLD AUTO: 30.6 % (ref 35–47)
HGB BLD-MCNC: 8.6 G/DL (ref 11.7–15.7)
HGB BLD-MCNC: 9.4 G/DL (ref 11.7–15.7)
IMM GRANULOCYTES # BLD: 0.2 10E3/UL
IMM GRANULOCYTES NFR BLD: 1 %
LACTATE SERPL-SCNC: 0.7 MMOL/L (ref 0.7–2)
LACTATE SERPL-SCNC: 3 MMOL/L (ref 0.7–2)
LACTATE SERPL-SCNC: 4.6 MMOL/L (ref 0.7–2)
LYMPHOCYTES # BLD AUTO: 0.9 10E3/UL (ref 0.8–5.3)
LYMPHOCYTES NFR BLD AUTO: 4 %
MAGNESIUM SERPL-MCNC: 2.4 MG/DL (ref 1.6–2.3)
MAGNESIUM SERPL-MCNC: 2.4 MG/DL (ref 1.6–2.3)
MAGNESIUM SERPL-MCNC: 2.5 MG/DL (ref 1.6–2.3)
MCH RBC QN AUTO: 27.2 PG (ref 26.5–33)
MCH RBC QN AUTO: 27.5 PG (ref 26.5–33)
MCHC RBC AUTO-ENTMCNC: 30.5 G/DL (ref 31.5–36.5)
MCHC RBC AUTO-ENTMCNC: 30.7 G/DL (ref 31.5–36.5)
MCV RBC AUTO: 89 FL (ref 78–100)
MCV RBC AUTO: 90 FL (ref 78–100)
MONOCYTES # BLD AUTO: 1 10E3/UL (ref 0–1.3)
MONOCYTES NFR BLD AUTO: 4 %
NEUTROPHILS # BLD AUTO: 22.1 10E3/UL (ref 1.6–8.3)
NEUTROPHILS NFR BLD AUTO: 91 %
NRBC # BLD AUTO: 0 10E3/UL
NRBC BLD AUTO-RTO: 0 /100
O2/TOTAL GAS SETTING VFR VENT: 40 %
O2/TOTAL GAS SETTING VFR VENT: 50 %
O2/TOTAL GAS SETTING VFR VENT: 60 %
O2/TOTAL GAS SETTING VFR VENT: 60 %
O2/TOTAL GAS SETTING VFR VENT: 70 %
OXYHGB MFR BLD: 95 % (ref 92–100)
OXYHGB MFR BLD: 96 % (ref 92–100)
OXYHGB MFR BLD: 96 % (ref 92–100)
OXYHGB MFR BLD: 97 % (ref 92–100)
OXYHGB MFR BLD: 98 % (ref 92–100)
OXYHGB MFR BLDV: 50 % (ref 70–75)
OXYHGB MFR BLDV: 54 % (ref 70–75)
OXYHGB MFR BLDV: 55 % (ref 70–75)
OXYHGB MFR BLDV: 55 % (ref 70–75)
OXYHGB MFR BLDV: 61 % (ref 70–75)
OXYHGB MFR BLDV: 66 % (ref 70–75)
PCO2 BLD: 38 MM HG (ref 35–45)
PCO2 BLD: 39 MM HG (ref 35–45)
PCO2 BLD: 41 MM HG (ref 35–45)
PCO2 BLDV: 44 MM HG (ref 40–50)
PCO2 BLDV: 45 MM HG (ref 40–50)
PCO2 BLDV: 46 MM HG (ref 40–50)
PCO2 BLDV: 47 MM HG (ref 40–50)
PH BLD: 7.39 [PH] (ref 7.35–7.45)
PH BLD: 7.43 [PH] (ref 7.35–7.45)
PH BLD: 7.44 [PH] (ref 7.35–7.45)
PH BLD: 7.46 [PH] (ref 7.35–7.45)
PH BLD: 7.47 [PH] (ref 7.35–7.45)
PH BLDV: 7.35 [PH] (ref 7.32–7.43)
PH BLDV: 7.4 [PH] (ref 7.32–7.43)
PH BLDV: 7.4 [PH] (ref 7.32–7.43)
PH BLDV: 7.41 [PH] (ref 7.32–7.43)
PH BLDV: 7.42 [PH] (ref 7.32–7.43)
PH BLDV: 7.43 [PH] (ref 7.32–7.43)
PHOSPHATE SERPL-MCNC: 3.8 MG/DL (ref 2.5–4.5)
PHOSPHATE SERPL-MCNC: 3.9 MG/DL (ref 2.5–4.5)
PHOSPHATE SERPL-MCNC: 5.1 MG/DL (ref 2.5–4.5)
PLATELET # BLD AUTO: 121 10E3/UL (ref 150–450)
PLATELET # BLD AUTO: 132 10E3/UL (ref 150–450)
PO2 BLD: 108 MM HG (ref 80–105)
PO2 BLD: 141 MM HG (ref 80–105)
PO2 BLD: 81 MM HG (ref 80–105)
PO2 BLD: 89 MM HG (ref 80–105)
PO2 BLD: 89 MM HG (ref 80–105)
PO2 BLDV: 30 MM HG (ref 25–47)
PO2 BLDV: 32 MM HG (ref 25–47)
PO2 BLDV: 33 MM HG (ref 25–47)
PO2 BLDV: 38 MM HG (ref 25–47)
POTASSIUM BLD-SCNC: 3.4 MMOL/L (ref 3.4–5.3)
POTASSIUM BLD-SCNC: 3.4 MMOL/L (ref 3.4–5.3)
POTASSIUM BLD-SCNC: 3.6 MMOL/L (ref 3.4–5.3)
POTASSIUM BLD-SCNC: 3.8 MMOL/L (ref 3.4–5.3)
POTASSIUM BLD-SCNC: 4.2 MMOL/L (ref 3.4–5.3)
POTASSIUM BLD-SCNC: 4.4 MMOL/L (ref 3.4–5.3)
PROT SERPL-MCNC: 5.9 G/DL (ref 6.8–8.8)
PROT SERPL-MCNC: 6.2 G/DL (ref 6.8–8.8)
RBC # BLD AUTO: 3.16 10E6/UL (ref 3.8–5.2)
RBC # BLD AUTO: 3.42 10E6/UL (ref 3.8–5.2)
SODIUM SERPL-SCNC: 141 MMOL/L (ref 133–144)
SODIUM SERPL-SCNC: 141 MMOL/L (ref 133–144)
SODIUM SERPL-SCNC: 142 MMOL/L (ref 133–144)
SODIUM SERPL-SCNC: 143 MMOL/L (ref 133–144)
SPECIMEN EXPIRATION DATE: NORMAL
WBC # BLD AUTO: 22.3 10E3/UL (ref 4–11)
WBC # BLD AUTO: 24.1 10E3/UL (ref 4–11)

## 2021-09-09 PROCEDURE — 258N000003 HC RX IP 258 OP 636: Performed by: THORACIC SURGERY (CARDIOTHORACIC VASCULAR SURGERY)

## 2021-09-09 PROCEDURE — 84450 TRANSFERASE (AST) (SGOT): CPT | Performed by: SURGERY

## 2021-09-09 PROCEDURE — 250N000009 HC RX 250: Performed by: STUDENT IN AN ORGANIZED HEALTH CARE EDUCATION/TRAINING PROGRAM

## 2021-09-09 PROCEDURE — 82330 ASSAY OF CALCIUM: CPT

## 2021-09-09 PROCEDURE — 71045 X-RAY EXAM CHEST 1 VIEW: CPT | Mod: 26 | Performed by: RADIOLOGY

## 2021-09-09 PROCEDURE — 82805 BLOOD GASES W/O2 SATURATION: CPT | Performed by: STUDENT IN AN ORGANIZED HEALTH CARE EDUCATION/TRAINING PROGRAM

## 2021-09-09 PROCEDURE — 83605 ASSAY OF LACTIC ACID: CPT | Performed by: STUDENT IN AN ORGANIZED HEALTH CARE EDUCATION/TRAINING PROGRAM

## 2021-09-09 PROCEDURE — 250N000009 HC RX 250: Performed by: PHYSICIAN ASSISTANT

## 2021-09-09 PROCEDURE — 84075 ASSAY ALKALINE PHOSPHATASE: CPT | Performed by: SURGERY

## 2021-09-09 PROCEDURE — 250N000011 HC RX IP 250 OP 636: Performed by: PHYSICIAN ASSISTANT

## 2021-09-09 PROCEDURE — 94640 AIRWAY INHALATION TREATMENT: CPT

## 2021-09-09 PROCEDURE — 84100 ASSAY OF PHOSPHORUS: CPT | Performed by: STUDENT IN AN ORGANIZED HEALTH CARE EDUCATION/TRAINING PROGRAM

## 2021-09-09 PROCEDURE — 84100 ASSAY OF PHOSPHORUS: CPT

## 2021-09-09 PROCEDURE — 82330 ASSAY OF CALCIUM: CPT | Performed by: STUDENT IN AN ORGANIZED HEALTH CARE EDUCATION/TRAINING PROGRAM

## 2021-09-09 PROCEDURE — 258N000003 HC RX IP 258 OP 636: Performed by: PHYSICIAN ASSISTANT

## 2021-09-09 PROCEDURE — 84132 ASSAY OF SERUM POTASSIUM: CPT

## 2021-09-09 PROCEDURE — 80048 BASIC METABOLIC PNL TOTAL CA: CPT | Performed by: THORACIC SURGERY (CARDIOTHORACIC VASCULAR SURGERY)

## 2021-09-09 PROCEDURE — 250N000009 HC RX 250: Performed by: NURSE PRACTITIONER

## 2021-09-09 PROCEDURE — 85025 COMPLETE CBC W/AUTO DIFF WBC: CPT | Performed by: STUDENT IN AN ORGANIZED HEALTH CARE EDUCATION/TRAINING PROGRAM

## 2021-09-09 PROCEDURE — 85027 COMPLETE CBC AUTOMATED: CPT | Performed by: SURGERY

## 2021-09-09 PROCEDURE — P9041 ALBUMIN (HUMAN),5%, 50ML: HCPCS | Performed by: STUDENT IN AN ORGANIZED HEALTH CARE EDUCATION/TRAINING PROGRAM

## 2021-09-09 PROCEDURE — 83605 ASSAY OF LACTIC ACID: CPT

## 2021-09-09 PROCEDURE — 250N000011 HC RX IP 250 OP 636: Performed by: NURSE PRACTITIONER

## 2021-09-09 PROCEDURE — 74018 RADEX ABDOMEN 1 VIEW: CPT | Mod: 26 | Performed by: STUDENT IN AN ORGANIZED HEALTH CARE EDUCATION/TRAINING PROGRAM

## 2021-09-09 PROCEDURE — 200N000002 HC R&B ICU UMMC

## 2021-09-09 PROCEDURE — 5A1945Z RESPIRATORY VENTILATION, 24-96 CONSECUTIVE HOURS: ICD-10-PCS | Performed by: INTERNAL MEDICINE

## 2021-09-09 PROCEDURE — 82374 ASSAY BLOOD CARBON DIOXIDE: CPT

## 2021-09-09 PROCEDURE — 94003 VENT MGMT INPAT SUBQ DAY: CPT

## 2021-09-09 PROCEDURE — 84155 ASSAY OF PROTEIN SERUM: CPT | Performed by: SURGERY

## 2021-09-09 PROCEDURE — 271N000002 HC RX 271: Performed by: PHYSICIAN ASSISTANT

## 2021-09-09 PROCEDURE — 83735 ASSAY OF MAGNESIUM: CPT | Performed by: STUDENT IN AN ORGANIZED HEALTH CARE EDUCATION/TRAINING PROGRAM

## 2021-09-09 PROCEDURE — 93010 ELECTROCARDIOGRAM REPORT: CPT | Performed by: INTERNAL MEDICINE

## 2021-09-09 PROCEDURE — 83735 ASSAY OF MAGNESIUM: CPT

## 2021-09-09 PROCEDURE — 74018 RADEX ABDOMEN 1 VIEW: CPT

## 2021-09-09 PROCEDURE — 86900 BLOOD TYPING SEROLOGIC ABO: CPT | Performed by: THORACIC SURGERY (CARDIOTHORACIC VASCULAR SURGERY)

## 2021-09-09 PROCEDURE — 250N000011 HC RX IP 250 OP 636: Performed by: STUDENT IN AN ORGANIZED HEALTH CARE EDUCATION/TRAINING PROGRAM

## 2021-09-09 PROCEDURE — 71045 X-RAY EXAM CHEST 1 VIEW: CPT

## 2021-09-09 PROCEDURE — 250N000013 HC RX MED GY IP 250 OP 250 PS 637: Performed by: THORACIC SURGERY (CARDIOTHORACIC VASCULAR SURGERY)

## 2021-09-09 PROCEDURE — 80053 COMPREHEN METABOLIC PANEL: CPT | Performed by: STUDENT IN AN ORGANIZED HEALTH CARE EDUCATION/TRAINING PROGRAM

## 2021-09-09 PROCEDURE — 250N000013 HC RX MED GY IP 250 OP 250 PS 637: Performed by: STUDENT IN AN ORGANIZED HEALTH CARE EDUCATION/TRAINING PROGRAM

## 2021-09-09 PROCEDURE — 84132 ASSAY OF SERUM POTASSIUM: CPT | Performed by: THORACIC SURGERY (CARDIOTHORACIC VASCULAR SURGERY)

## 2021-09-09 PROCEDURE — C9113 INJ PANTOPRAZOLE SODIUM, VIA: HCPCS | Performed by: STUDENT IN AN ORGANIZED HEALTH CARE EDUCATION/TRAINING PROGRAM

## 2021-09-09 PROCEDURE — 99291 CRITICAL CARE FIRST HOUR: CPT | Mod: 24 | Performed by: INTERNAL MEDICINE

## 2021-09-09 PROCEDURE — 999N000157 HC STATISTIC RCP TIME EA 10 MIN

## 2021-09-09 PROCEDURE — 250N000013 HC RX MED GY IP 250 OP 250 PS 637: Performed by: NURSE PRACTITIONER

## 2021-09-09 PROCEDURE — 82805 BLOOD GASES W/O2 SATURATION: CPT | Performed by: PHYSICIAN ASSISTANT

## 2021-09-09 PROCEDURE — 250N000013 HC RX MED GY IP 250 OP 250 PS 637: Performed by: PHYSICIAN ASSISTANT

## 2021-09-09 PROCEDURE — 94799 UNLISTED PULMONARY SVC/PX: CPT

## 2021-09-09 PROCEDURE — 94640 AIRWAY INHALATION TREATMENT: CPT | Mod: 76

## 2021-09-09 PROCEDURE — 93005 ELECTROCARDIOGRAM TRACING: CPT

## 2021-09-09 PROCEDURE — 250N000011 HC RX IP 250 OP 636: Performed by: THORACIC SURGERY (CARDIOTHORACIC VASCULAR SURGERY)

## 2021-09-09 RX ORDER — GABAPENTIN 250 MG/5ML
1200 SOLUTION ORAL AT BEDTIME
Status: DISCONTINUED | OUTPATIENT
Start: 2021-09-09 | End: 2021-09-14

## 2021-09-09 RX ORDER — GABAPENTIN 250 MG/5ML
600 SOLUTION ORAL 2 TIMES DAILY
Status: DISCONTINUED | OUTPATIENT
Start: 2021-09-10 | End: 2021-09-14

## 2021-09-09 RX ORDER — AMIODARONE HYDROCHLORIDE 200 MG/1
200 TABLET ORAL DAILY
Status: DISCONTINUED | OUTPATIENT
Start: 2021-09-10 | End: 2021-09-15

## 2021-09-09 RX ORDER — POTASSIUM CHLORIDE 29.8 MG/ML
20 INJECTION INTRAVENOUS ONCE
Status: COMPLETED | OUTPATIENT
Start: 2021-09-09 | End: 2021-09-09

## 2021-09-09 RX ORDER — GABAPENTIN 300 MG/1
1200 CAPSULE ORAL AT BEDTIME
Status: DISCONTINUED | OUTPATIENT
Start: 2021-09-09 | End: 2021-09-09

## 2021-09-09 RX ORDER — DEXTROSE MONOHYDRATE 100 MG/ML
INJECTION, SOLUTION INTRAVENOUS CONTINUOUS PRN
Status: DISCONTINUED | OUTPATIENT
Start: 2021-09-09 | End: 2021-09-16

## 2021-09-09 RX ORDER — ALBUMIN, HUMAN INJ 5% 5 %
500 SOLUTION INTRAVENOUS ONCE
Status: COMPLETED | OUTPATIENT
Start: 2021-09-09 | End: 2021-09-09

## 2021-09-09 RX ORDER — POTASSIUM CHLORIDE 29.8 MG/ML
20 INJECTION INTRAVENOUS
Status: COMPLETED | OUTPATIENT
Start: 2021-09-09 | End: 2021-09-09

## 2021-09-09 RX ORDER — ATORVASTATIN CALCIUM 40 MG/1
40 TABLET, FILM COATED ORAL EVERY EVENING
Status: DISCONTINUED | OUTPATIENT
Start: 2021-09-09 | End: 2021-09-20

## 2021-09-09 RX ORDER — POTASSIUM CHLORIDE 20MEQ/15ML
20 LIQUID (ML) ORAL 2 TIMES DAILY
Status: DISCONTINUED | OUTPATIENT
Start: 2021-09-09 | End: 2021-09-10

## 2021-09-09 RX ORDER — PROCHLORPERAZINE MALEATE 5 MG
5 TABLET ORAL EVERY 6 HOURS PRN
Status: DISCONTINUED | OUTPATIENT
Start: 2021-09-09 | End: 2021-09-22 | Stop reason: HOSPADM

## 2021-09-09 RX ORDER — GABAPENTIN 300 MG/1
600 CAPSULE ORAL 2 TIMES DAILY
Status: DISCONTINUED | OUTPATIENT
Start: 2021-09-09 | End: 2021-09-09

## 2021-09-09 RX ORDER — CALCIUM GLUCONATE 20 MG/ML
2 INJECTION, SOLUTION INTRAVENOUS ONCE
Status: COMPLETED | OUTPATIENT
Start: 2021-09-09 | End: 2021-09-09

## 2021-09-09 RX ORDER — DEXMEDETOMIDINE HYDROCHLORIDE 4 UG/ML
.2-.7 INJECTION, SOLUTION INTRAVENOUS CONTINUOUS
Status: DISCONTINUED | OUTPATIENT
Start: 2021-09-09 | End: 2021-09-09

## 2021-09-09 RX ORDER — GUAIFENESIN 600 MG/1
15 TABLET, EXTENDED RELEASE ORAL DAILY
Status: DISCONTINUED | OUTPATIENT
Start: 2021-09-09 | End: 2021-09-15

## 2021-09-09 RX ADMIN — ATORVASTATIN CALCIUM 40 MG: 40 TABLET, FILM COATED ORAL at 19:52

## 2021-09-09 RX ADMIN — LEVALBUTEROL HYDROCHLORIDE 1.25 MG: 1.25 SOLUTION RESPIRATORY (INHALATION) at 08:13

## 2021-09-09 RX ADMIN — OXYCODONE HYDROCHLORIDE 10 MG: 10 TABLET ORAL at 07:34

## 2021-09-09 RX ADMIN — LEVALBUTEROL HYDROCHLORIDE 1.25 MG: 1.25 SOLUTION RESPIRATORY (INHALATION) at 16:09

## 2021-09-09 RX ADMIN — HEPARIN SODIUM 5000 UNITS: 10000 INJECTION, SOLUTION INTRAVENOUS; SUBCUTANEOUS at 12:07

## 2021-09-09 RX ADMIN — DOCUSATE SODIUM 50 MG AND SENNOSIDES 8.6 MG 1 TABLET: 8.6; 5 TABLET, FILM COATED ORAL at 07:33

## 2021-09-09 RX ADMIN — POTASSIUM CHLORIDE 20 MEQ: 29.8 INJECTION, SOLUTION INTRAVENOUS at 05:58

## 2021-09-09 RX ADMIN — DEXMEDETOMIDINE 0.2 MCG/KG/HR: 100 INJECTION, SOLUTION, CONCENTRATE INTRAVENOUS at 02:38

## 2021-09-09 RX ADMIN — PROPOFOL 35 MCG/KG/MIN: 10 INJECTION, EMULSION INTRAVENOUS at 01:40

## 2021-09-09 RX ADMIN — POTASSIUM CHLORIDE 20 MEQ: 20 SOLUTION ORAL at 13:45

## 2021-09-09 RX ADMIN — EPINEPHRINE 0.04 MCG/KG/MIN: 1 INJECTION PARENTERAL at 09:30

## 2021-09-09 RX ADMIN — PROPOFOL 25 MCG/KG/MIN: 10 INJECTION, EMULSION INTRAVENOUS at 17:25

## 2021-09-09 RX ADMIN — POTASSIUM CHLORIDE 20 MEQ: 29.8 INJECTION, SOLUTION INTRAVENOUS at 01:42

## 2021-09-09 RX ADMIN — GABAPENTIN 300 MG: 300 CAPSULE ORAL at 07:33

## 2021-09-09 RX ADMIN — POTASSIUM CHLORIDE 20 MEQ: 29.8 INJECTION, SOLUTION INTRAVENOUS at 10:43

## 2021-09-09 RX ADMIN — HUMAN INSULIN 4 UNITS/HR: 100 INJECTION, SOLUTION SUBCUTANEOUS at 05:23

## 2021-09-09 RX ADMIN — OXYCODONE HYDROCHLORIDE 10 MG: 10 TABLET ORAL at 11:06

## 2021-09-09 RX ADMIN — PANTOPRAZOLE SODIUM 40 MG: 40 INJECTION, POWDER, FOR SOLUTION INTRAVENOUS at 07:33

## 2021-09-09 RX ADMIN — POTASSIUM CHLORIDE 20 MEQ: 20 SOLUTION ORAL at 19:53

## 2021-09-09 RX ADMIN — OXYCODONE HYDROCHLORIDE 10 MG: 10 TABLET ORAL at 15:09

## 2021-09-09 RX ADMIN — PROPOFOL 40 MCG/KG/MIN: 10 INJECTION, EMULSION INTRAVENOUS at 23:02

## 2021-09-09 RX ADMIN — GABAPENTIN 1200 MG: 250 SUSPENSION ORAL at 22:07

## 2021-09-09 RX ADMIN — PROPOFOL 35 MCG/KG/MIN: 10 INJECTION, EMULSION INTRAVENOUS at 06:29

## 2021-09-09 RX ADMIN — ASPIRIN 81 MG: 81 TABLET, CHEWABLE ORAL at 07:32

## 2021-09-09 RX ADMIN — LEVALBUTEROL HYDROCHLORIDE 1.25 MG: 1.25 SOLUTION RESPIRATORY (INHALATION) at 20:17

## 2021-09-09 RX ADMIN — Medication 15 ML: at 15:27

## 2021-09-09 RX ADMIN — MUPIROCIN 0.5 G: 20 OINTMENT TOPICAL at 19:53

## 2021-09-09 RX ADMIN — HEPARIN SODIUM 5000 UNITS: 10000 INJECTION, SOLUTION INTRAVENOUS; SUBCUTANEOUS at 19:52

## 2021-09-09 RX ADMIN — OXYCODONE HYDROCHLORIDE 10 MG: 10 TABLET ORAL at 18:33

## 2021-09-09 RX ADMIN — Medication: at 01:20

## 2021-09-09 RX ADMIN — CALCIUM GLUCONATE 2 G: 20 INJECTION, SOLUTION INTRAVENOUS at 02:47

## 2021-09-09 RX ADMIN — PROPOFOL 35 MCG/KG/MIN: 10 INJECTION, EMULSION INTRAVENOUS at 11:06

## 2021-09-09 RX ADMIN — POLYETHYLENE GLYCOL 3350 17 G: 17 POWDER, FOR SOLUTION ORAL at 07:32

## 2021-09-09 RX ADMIN — Medication 200 MCG/HR: at 19:50

## 2021-09-09 RX ADMIN — HEPARIN SODIUM 5000 UNITS: 10000 INJECTION, SOLUTION INTRAVENOUS; SUBCUTANEOUS at 04:41

## 2021-09-09 RX ADMIN — DOCUSATE SODIUM 50 MG AND SENNOSIDES 8.6 MG 1 TABLET: 8.6; 5 TABLET, FILM COATED ORAL at 19:52

## 2021-09-09 RX ADMIN — METHYLPREDNISOLONE SODIUM SUCCINATE 62.5 MG: 125 INJECTION, POWDER, FOR SOLUTION INTRAMUSCULAR; INTRAVENOUS at 18:32

## 2021-09-09 RX ADMIN — AMIODARONE HYDROCHLORIDE 200 MG: 200 TABLET ORAL at 07:32

## 2021-09-09 RX ADMIN — POTASSIUM CHLORIDE 20 MEQ: 29.8 INJECTION, SOLUTION INTRAVENOUS at 04:51

## 2021-09-09 RX ADMIN — ALBUMIN HUMAN 500 ML: 0.05 INJECTION, SOLUTION INTRAVENOUS at 01:08

## 2021-09-09 RX ADMIN — LEVALBUTEROL HYDROCHLORIDE 1.25 MG: 1.25 SOLUTION RESPIRATORY (INHALATION) at 11:55

## 2021-09-09 RX ADMIN — GABAPENTIN 600 MG: 300 CAPSULE ORAL at 13:45

## 2021-09-09 RX ADMIN — LEVALBUTEROL HYDROCHLORIDE 1.25 MG: 1.25 SOLUTION RESPIRATORY (INHALATION) at 00:23

## 2021-09-09 ASSESSMENT — ACTIVITIES OF DAILY LIVING (ADL)
ADLS_ACUITY_SCORE: 20
ADLS_ACUITY_SCORE: 18
ADLS_ACUITY_SCORE: 18
ADLS_ACUITY_SCORE: 20
ADLS_ACUITY_SCORE: 18
ADLS_ACUITY_SCORE: 20

## 2021-09-09 ASSESSMENT — MIFFLIN-ST. JEOR: SCORE: 1563.62

## 2021-09-09 NOTE — PROGRESS NOTES
"CLINICAL NUTRITION SERVICES - ASSESSMENT NOTE     Nutrition Prescription    Malnutrition Status:    Does not meet criteria for malnutrition, however, pt is at risk.     Recommendations orderd by Registered Dietitian (RD):  - Vital HP @ 20 ml/hr via OGT. No advancement per MD  - FWF of 30 ml q4h for tube patency   - Certavite daily    Vital HP @ 20 ml/hr (480 ml/day) to provide 480 kcal (7 g/kg), 42 g protein (0.6 g/kg), 53 g CHO, 367 ml H2O, 0 g fiber daily. This meets ~25% kcal needs and ~50% protein needs.     Future Recommendations:  If remains intubated, recommend meeting full nutrition needs with Novasource Renal @ 40 ml/hr (960 ml/day) + Prosource (1 pkt BID) to provide 2000 kcal/day (29 kcal/kg), 109 g protein (1.6 g/kg), 176 g CHO, 691 ml H2O and 0 g fiber daily.        REASON FOR ASSESSMENT  Corina Martinez is a 66 year old female assessed by dietitian for Provider Order - RD to Assess and Order TF  PMH of arthritis, SHAILESH, HTN, CHF and mitral valve stenosis s/p MVR with left atrial appendage ligation on 9/7.    NUTRITION HISTORY  Unable to obtain nutrition hx from pt.     CURRENT NUTRITION ORDERS  Diet: NPO  Intake: No intake since admit (x2 days)    LABS  Labs reviewed  Na 1441, Cr 1.5, K 4.2, Mg 2.5, Phos 3.8, lactate 3.0, euglycemia     MEDICATIONS  Medications reviewed  Lipitor, methylprednisolone, protonix, miralax, senna-docusate, epi 0.03 mcg/kg/min, insulin gtt, propofol, KCl 20 mEq BID    ANTHROPOMETRICS  Height: 168.9 cm (5' 6.496\")  Most Recent Weight: 99.9 kg (220 lb 3.8 oz)    IBW: 60.2 kg    BMI: Obesity Grade I BMI 30-34.9  Weight History: Based on records, pt has lost ~25 lbs (11%) since June 2021. Admitted at 93.9 kg on 9/7 - will use for dosing weight below.  Wt Readings from Last 10 Encounters:   09/09/21 99.9 kg (220 lb 3.8 oz)   08/25/21 93.4 kg (206 lb)   08/05/21 93.4 kg (206 lb)   08/03/21 93.4 kg (206 lb)   07/15/21 94.2 kg (207 lb 9.6 oz)   07/15/21 94.2 kg (207 lb 9.6 oz)   07/07/21 " 96 kg (211 lb 9.6 oz)   06/30/21 98.3 kg (216 lb 12.8 oz)   06/23/21 104.2 kg (229 lb 12.8 oz)   06/08/21 103.9 kg (229 lb)       Dosing Weight: 69 kg (adjusted for obesity, based on IBW of 60.2 kg and admit weight of 93.9 kg on 9/7)      ASSESSED NUTRITION NEEDS  Estimated Energy Needs: 1700 - 2100 kcals/day (25 - 30 kcals/kg)  Justification: Maintenance  Estimated Protein Needs: 80 - 105 grams protein/day (1.2 - 1.5 grams of pro/kg)  Justification: Increased needs post-op  Estimated Fluid Needs: (1 mL/kcal)   Justification: Maintenance    PHYSICAL FINDINGS  See malnutrition section below.  OGT in place.     MALNUTRITION  % Intake: Unable to assess  % Weight Loss: > 7.5% in 3 months (severe)  Subcutaneous Fat Loss: None observed  Muscle Loss: Thoracic region (clavicle, acromium bone, deltoid, trapezius, pectoral): Mild, Upper arm (bicep, tricep): Mild and Posterior calf: Mild, though may be consistent with age-related muscle loss.   Fluid Accumulation/Edema: None noted  Malnutrition Diagnosis: Patient does not meet two of the established criteria necessary for diagnosing malnutrition    NUTRITION DIAGNOSIS  Inadequate protein-energy intake related to NPO (intubated) without nutrition needs as evidenced by currently meeting 0% estimated nutrition needs.      INTERVENTIONS  See interventions at top of progress note.    Goals  Total avg nutritional intake to meet a minimum of 25 kcal/kg and 1.2 g PRO/kg daily (per dosing wt 73 kg).     Monitoring/Evaluation  Progress toward goals will be monitored and evaluated per protocol.  Bertha Klein, PURA, LD  [id number]  Pgr: [phone]

## 2021-09-09 NOTE — PLAN OF CARE
Care hours [id number]    Major Shift Events: Sedation holiday performed, patient followed all commands and moved all extremities. Sedation weaned to RASS goal of -1. Patient converted to Atrial Fibrillation with RVR at 0835. Dr. Garcia notified and a EKG was obtained. Contacted Intensivist Fellow after EKG and he stated that as long as her HR remains below 130, there would be no further intervention. Patient spontaneously converted at approximately 1445. Epi titrated off. Hemodynamics stable throughout shift CI >2, SVR 1100's. SvO2 50-61%. CVP 15-13-15. PA pressures 30s/20s. Nitric Oxide weaned to 5 PPM-PA pressures increased to 41/29. Dr. Garcia aware. FiO2 on vent weaned to 40%.Trophic tube feeding initiated though OG tube.     For vital signs and complete assessments, please refer to the flowsheets.

## 2021-09-09 NOTE — PHARMACY
Pharmacy Tube Feeding Consult    Medication reviewed for administration by feeding tube and for potential food/drug interactions.    Recommendation: No changes are needed at this time.     Pharmacy will continue to follow as new medications are ordered.    Consuelo Botello, AdrienneD

## 2021-09-09 NOTE — PROGRESS NOTES
CV ICU PROGRESS NOTE  2021      CO-MORBIDITIES:   S/P MVR (mitral valve replacement)  (primary encounter diagnosis)  Mitral stenosis  Nonrheumatic mitral valve stenosis    ASSESSMENT: Corina Martinez is a 66 year old female with PMH of arthritis, SHAILESH, HTN, CHF and mitral valve stenosis, now s/p MVR w/ left atrial appendage ligation on  with Dr. Acuna.     OVERNIGHT EVENTS:   500 LR given overnight for lactate 5    TODAY'S CHANGES:   Gabapentin back to home dose  D/c  precedex   Liberalize fentanyl gtt  Wean NO today  Net even to -500 fluid goal today, assess need for diuretic in the afternoon  K 20 mEq BID    PLAN:  Neuro/ pain/ sedation:  # Acute post-operative pain  - Monitor neurological status. Notify the MD for any acute changes in exam.  - acetaminophen allergy,   - PRN Dilaudid robaxin and oxy  - NIKKI catheters B/l   - Gabapentin - increase back to PTA dosing 600/600/1200    - PTA cymbalta 30 mg  - PTA on atarax, holding while intubated  - d/c precedex off, liberalize fent      Pulmonary care:   #Current smoker   #COPD sat's upper 80's at baseline   #SHAILESH  Arrived from the OR on BiPAP - declining oxygenation and gases showing sustained respiratory acidosis on BiPAP with increasing hemodynamic instability so was reintubated   - intubated, mechanicalyl ventilated.   AB.43/141/41/27  - NO started  at 20 --> wean down to 10 this AM  - Methylprednisolone 60 mg daily for 5 days   - Fi O2 it sats of 92-94   - ABG in the afternoon   - wean NO to 10 this AM, continue in the afternoon   - decrease tidal volume 480 --> 400    Ventilation Mode: CMV/AC  (Continuous Mandatory Ventilation/ Assist Control)  FiO2 (%): 40 %  Rate Set (breaths/minute): 16 breaths/min  Tidal Volume Set (mL): 480 mL  PEEP (cm H2O): 10 cmH2O  Oxygen Concentration (%): 60 %  Resp: 18    Cardiovascular:    #HTN  #CHF, EF 55-60%   #Afib, warfarin   #MV stenosis, s/p MVR   #Left atrial appendage thrombus s/p atrial  appendage clip 9/7  AV-paced epicardial leads   - Monitor hemodynamic status.   - MAP goal > 65  - Epi @ 0.03 (was on dobutamine briefly yesterday for declining CI and svo2 with increased SVR, was switched to nicardipine and Epi later in the afternoon 2/2 declining   - ABG this afternoon     ECHO 9/8 Global and regional left ventricular function is normal with an EF of 60-65%.Right ventricular function cannot be assessed due to poor image quality. no pericardial effusion is present.    - ASA 81, Lipitor   - PTA Amiodarone 200   - PTA on torsemide 10-20 daily  - holding PTA Metoprolol and diltiazem   - holding PTA on diltiazem , metoprolol 50 BID    GI /Nutrition:   #GERD  #Primary biliary cirrhosis   - NPO   - Bowel regimen with senna 1tab BID, miralax daily  - PPI   - OG trickle feeds    - Nutrition     Fluids/ Electrolytes/ Renal:   RADHA 1.51 (1.44), , net +241 yesterday/ +383 since midnight  - lasix 40/Bumex 2 and 2 ~yesterday with little response.   - Overnight bumex 4 mg - with 785 uop  - Iniguez for strict I&Os  - fluid goal net even to - 500  - start scheduled K 20 mEq BID     Endocrine:    #Stress hyperglycemia  - Insulin gtt, monitor BGs      ID/ Antibiotics:  - Perioperative antibiotics with cefazolin, vanc   - Post op CBC pending      Heme:     # Acute blood loss anemia  - Hgb goal >7  - Post op CBC pending      Prophylaxis:    - SCDs  - PPI  - SQH to start 9/8     Lines/ tubes/ drains:  - CVC  - Arterial line  - Iniguez  - Mediastinal tube x2  - Pleural tube x2     Disposition:  - CV ICU.      Patient seen, findings and plan discussed with CV ICU staff.      Hemalatha Garcia  CV ICU resident   [id number]     ====================================    SUBJECTIVE:   Sedated, intubated, mechanically ventilated.     OBJECTIVE:   1. VITAL SIGNS:   Temp:  [96.3  F (35.7  C)-99.6  F (37.6  C)] 99.6  F (37.6  C)  Pulse:  [] 89  Resp:  [10-23] 16  MAP:  [56 mmHg-101 mmHg] 68 mmHg  Arterial Line BP:  ()/(20-82) 98/56  FiO2 (%):  [40 %-80 %] 70 %  SpO2:  [89 %-100 %] 100 %  Ventilation Mode: CMV/AC  (Continuous Mandatory Ventilation/ Assist Control)  FiO2 (%): 70 %  Rate Set (breaths/minute): 16 breaths/min  Tidal Volume Set (mL): 480 mL  PEEP (cm H2O): 10 cmH2O  Oxygen Concentration (%): 70 %  Resp: 16      2. INTAKE/ OUTPUT:   I/O last 3 completed shifts:  In: 2120.08 [I.V.:1830.08; NG/GT:40]  Out: 1764 [Urine:889; Emesis/NG output:350; Chest Tube:525]    3. PHYSICAL EXAMINATION:   General: sedated, intubated, supine in bed   Neuro: sedated, currently rass -4 to -5. Following commands and moving all extremities when sedation is weaned.   Resp: intubated, mechanically ventilated. Lung sounds equal bilaterally   CV: RRR, paced  Abdomen: Soft, Non-distended, Non-tender  Incisions: c/d/i  Extremities: warm and well perfused    4. INVESTIGATIONS:   Arterial Blood Gases   Recent Labs   Lab 09/09/21  0341 09/09/21  0013 09/08/21  2135 09/08/21  2024   PH 7.43 7.39 7.36 7.37   PCO2 41 41 32* 32*   PO2 141* 89 89 81   HCO3 27 25 18* 19*     Complete Blood Count   Recent Labs   Lab 09/09/21  0341 09/09/21  0014 09/08/21  0310 09/07/21  1654   WBC 22.3* 24.1* 26.7* 34.0*   HGB 8.6* 9.4* 11.9 12.5   * 132* 182 194     Basic Metabolic Panel  Recent Labs   Lab 09/09/21  0348 09/09/21  0341 09/09/21  0243 09/09/21  0126 09/09/21  0014 09/08/21  2146 09/08/21  2135 09/08/21  1402 09/08/21  1402   NA  --  141  141  --   --  142 143  --   --  139   POTASSIUM  --  3.4  3.4  --   --  3.6 2.8*  2.8* 2.6*  --  4.5   CHLORIDE  --  107  107  --   --  106 111*  --   --  106   CO2  --  25  25  --   --  23 16*  --   --  24   BUN  --  33*  33*  --   --  34* 29  --   --  31*   CR  --  1.51*  1.51*  --   --  1.57* 1.40*  --   --  1.72*   * 131*  131* 129* 139* 172* 159* 165*   < > 136*    < > = values in this interval not displayed.     Liver Function Tests  Recent Labs   Lab 09/09/21  0341 09/09/21  0014  21  2146 21  0310 21  1654 21  1455   AST 86* 97* 91*  91* 153*  --   --    ALT 24 28 27  27 60* 70*  --    ALKPHOS 147 163* 142  142 195* 228*  --    BILITOTAL 0.4 0.4 0.4  0.4 0.9 0.8  --    ALBUMIN 2.8* 2.4* 2.1*  2.1* 2.5* 2.4*  --    INR  --   --   --   --  1.37* 1.79*     Pancreatic Enzymes  No lab results found in last 7 days.  Coagulation Profile  Recent Labs   Lab 21  1654 21  1455   INR 1.37* 1.79*   PTT 46* 34         5. RADIOLOGY:   Recent Results (from the past 24 hour(s))   Echocardiogram Limited   Result Value    LVEF  60-65%    Narrative    [id number]  [id number]  [id number]  [id number]^RITA^BRIANA     [facility]  Echocardiography Laboratory  [address] [place] [id number]     Name: DARIEN TRIMBLE  MRN: [id number]  : 1955  Study Date: 2021 01:12 PM  Age: 66 yrs  Gender: Female  Patient Location: [facility]  Reason For Study: Concern for declining function, r/r tamponade  Ordering Physician: BRIANA SALINAS  Performed By: Laura Emery     BSA: 2.1 m2  Height: 66 in  Weight: 213 lb  BP: 120/67 mmHg  ______________________________________________________________________________  Procedure  Limited Portable Echo Adult. Optison (NDC #7305-7018-41) given intravenously.  Patient was given 7 ml mixture of 3 ml Optison and 6 ml saline. 3 ml wasted.  ______________________________________________________________________________  Interpretation Summary  Global and regional left ventricular function is normal with an EF of 60-65%.  Right ventricular function cannot be assessed due to poor image quality.  No pericardial effusion is present.  ______________________________________________________________________________  Left Ventricle  Global and regional left ventricular function is normal with an EF of 60-65%.     Right Ventricle  Right ventricular function cannot be assessed due to poor image quality.     Vessels  The  inferior vena cava cannot be assessed.     Pericardium  No pericardial effusion is present.  ______________________________________________________________________________  Doppler Measurements & Calculations  MV max P.4 mmHg  MV mean P.1 mmHg  MV V2 VTI: 24.4 cm     ______________________________________________________________________________  Report approved by: Jennifer Swan 2021 01:54 PM         XR Chest Port 1 View    Narrative    EXAMINATION: XR CHEST PORT 1 VIEW, 2021 4:00 PM    INDICATION: respiratory failure    COMPARISON: Chest radiograph 2021    FINDINGS: Single AP semiupright radiograph of the chest..     Trachea is midline. Cardiac silhouette is stable. Cardiomediastinal  borders are clear. Right IJ Byers-Darianna catheter with tip projecting at  the proximal main right pulmonary artery. Intact median sternotomy  wires. No pneumothorax. Small bilateral pleural effusions. Unchanged  position of bilateral chest tubes. Low lung volumes. Distended  air-filled stomach.       Impression    IMPRESSION:  Low lung volumes with bibasilar atelectasis. Support devices in stable  position.    I have personally reviewed the examination and initial interpretation  and I agree with the findings.    MARISOL CASTELLON MD         SYSTEM ID:  [id number]   XR Chest Port 1 View    Narrative    EXAM: XR CHEST PORT 1 VIEW  2021 6:08 PM     HISTORY:  intubation - ETT placement       COMPARISON:  2021    FINDINGS: Single view of the chest. Endotracheal tube projects over  the midthoracic trachea, 4.3 cm from the hood. Byers-Adrianna catheter  tip projects at the level of the main pulmonary artery, unchanged.  Bilateral chest tubes, unchanged. Atrial clip. Perihilar opacities,  decreased.      Impression    IMPRESSION:   1. Endotracheal tube tip projects in mid thoracic trachea, 4.3 cm from  the hood. Stable position of additional support devices.  2. Improved perihilar opacities..    I have personally  reviewed the examination and initial interpretation  and I agree with the findings.    MARISOL CASTELLON MD         SYSTEM ID:  [id number]   XR Abdomen Port 1 View    Narrative    EXAMINATION:  XR ABDOMEN PORT 1 VIEWS 9/8/2021 6:12 PM     INDICATION: new OGT placement    COMPARISON: None.    FINDINGS: Single AP supine view of the abdomen.     Enteric tube with tip in side-port projecting over the stomach.  Partially visualized Steuben-Adrianna catheter and surgical and mediastinal  drains. Nonobstructive bowel gas pattern. The small intestine and  colon are nondistended. No evidence of pneumatosis or portal venous  gas. Limited evaluation of intra-abdominal free air due to supine  position. The lung bases are unremarkable.      Impression    IMPRESSION:  Enteric tube with tip and side-port projecting over the stomach.    I have personally reviewed the examination and initial interpretation  and I agree with the findings.    MARISOL CASTELLON MD         SYSTEM ID:  [id number]   CT Chest w/o Contrast    Narrative    EXAMINATION: CT CHEST W/O CONTRAST, 9/8/2021 7:57 PM    TECHNIQUE:  Helical CT images from the thoracic inlet through the  upper abdomen were obtained without intravenous contrast.  Images are  displayed at 1 and 5 mm intervals. Images reviewed in lung, soft  tissue, and bone windows.    COMPARISON: CT chest 6/8/2021    HISTORY: Status post mitral valve replacement worsening respiratory  status requiring elevation. Concern for cardiac tamponade.     FINDINGS:  Visualized portion of the thyroid is unremarkable. Right IJ Steuben-Adrianna  catheter with tip located in the mid right main pulmonary artery.  Postsurgical changes of mitral valve replacement with bibasilar chest  tubes and 2 mediastinal drains. Atrial appendage clip noted. Mild  hemopericardium. Prominent pretracheal lymph node measuring 1.5 cm,  likely reactive. No hilar or axillary lymphadenopathy.    Endotracheal tube with tip projecting over the midthoracic trachea.  No  focal consolidative opacity. Bibasilar atelectasis. Trace left sided  pneumothorax. No appreciable right-sided pneumothorax. No pleural  effusion.    Postsurgical changes of cholecystectomy. Enteric tube with tip in the  stomach. The remainder of the partially visualized upper abdomen is  unremarkable.    Intact median sternotomy wires. Subcutaneous tissues are within normal  limits for postsurgical state. No acute osseous lesions. No acute  fractures.      Impression    IMPRESSION:     1. Postsurgical changes of mitral valve replacement with adequate  position of support devices.  2. Mild pneumopericardium.   3. Trace left-sided pneumothorax.  4. Bibasilar atelectasis.    I have personally reviewed the examination and initial interpretation  and I agree with the findings.    MARISOL CASTELLON MD         SYSTEM ID:  [id number]       =========================================

## 2021-09-09 NOTE — PROGRESS NOTES
"[facility]ional Anesthesia Pain Service Nerve Block Daily Progress Note  09/09/21    24 Hour Events  - Reintubated yesterday evening  - Chest tubes x 4, Iniguez catheter in place  - repositioned with assist    Subjective  - Intubated, follows commands when sedation lightened.  Wiggles fingers and toes. and lower extremities. No objective evidence of LAST.     Objective  Exam  Vitals:   /67   Pulse 89   Temp 97.8  F (36.6  C) (Axillary)   Resp 18   Ht 1.689 m (5' 6.5\")   Wt 99.9 kg (220 lb 3.8 oz)   LMP  (LMP Unknown)   SpO2 100%   BMI 35.02 kg/m        General: Intubated, on CMV, NAD  MSK/Neuro: moving extremities   Skin: bilateral erector spinae (ES) catheter sites with dressings c/d/i, no tenderness, erythema, heme. Edema resolved.    Assessment  Corina Martinez is a 66 year old female with PMH of mitral stenosis who is now POD #2 s/p mitral valve replacement on 9/7/21. Prior to surgery uncomplicated placement of bilateral T5-6 erector spinae (ES) catheter by RAPS for analgesia. Reintubated overnight. Motor function intact and no evidence of adverse side effects related to local anesthetic continuous infusion.     Plan  - Catheter Day #2 bilateral T5-6 ES catheter/infusion:    Continue Ropivacaine 0.2% PIB (programmed intermittent bolus) infusion at 7 mL Q 60 min via each catheter, total infusion 14 mL/hr   o Plan to maintain catheters while chest tubes in place, max of 7 days      When ready for extubation, patient can be evaluated to receive local anesthetic bolus. Bedside RN should page RAPS to request bolus    - Antithrombotics/Thrombolytics ordered:     Okay to continue Heparin SQ 5,000 units Q 8 hr as ordered by primary service  o Please contact SHAHANA jobcode pager [phone] before ordering or making any medication changes    RAPS will continue to follow and adjust as needed  Discussed with attending anesthesiologist    Analgesic Medications ordered:  Medications related to Pain Management (From now, onward) "    Start     Dose/Rate Route Frequency Ordered Stop    09/09/21 2200  gabapentin (NEURONTIN) capsule 1,200 mg      1,200 mg Oral AT BEDTIME 09/09/21 1146      09/09/21 1400  gabapentin (NEURONTIN) capsule 600 mg      600 mg Oral 2 TIMES DAILY 09/09/21 1146      09/08/21 2200  [Held by provider]  DULoxetine (CYMBALTA) DR capsule 30 mg     (Held by provider since Thu 9/9/2021 at 0746 by Sven Acuna MD.Hold Reason: Other.Hold Comments: Hold while intubated)    30 mg Oral AT BEDTIME 09/08/21 1105      09/08/21 1730  fentaNYL (SUBLIMAZE) infusion       mcg/hr  1-4 mL/hr  Intravenous CONTINUOUS 09/08/21 1710 09/08/21 1730  propofol (DIPRIVAN) infusion      5-75 mcg/kg/min × 93.9 kg (Dosing Weight)  2.8-42.3 mL/hr  Intravenous CONTINUOUS 09/08/21 1710 09/08/21 0800  polyethylene glycol (MIRALAX) Packet 17 g      17 g Oral or Feeding Tube DAILY 09/07/21 1652 09/08/21 0800  aspirin (ASA) chewable tablet 81 mg      81 mg Oral or NG Tube DAILY 09/07/21 1652 09/07/21 2000  senna-docusate (SENOKOT-S/PERICOLACE) 8.6-50 MG per tablet 1 tablet      1 tablet Oral or Feeding Tube 2 TIMES DAILY 09/07/21 1652 09/07/21 1652  HYDROmorphone (DILAUDID) injection 0.2 mg      0.2 mg Intravenous EVERY 2 HOURS PRN 09/07/21 1652 09/07/21 1652  HYDROmorphone (DILAUDID) injection 0.4 mg      0.4 mg Intravenous EVERY 2 HOURS PRN 09/07/21 1652 09/07/21 1652  oxyCODONE (ROXICODONE) tablet 5 mg      5 mg Oral or Feeding Tube EVERY 4 HOURS PRN 09/07/21 1652 09/07/21 1652  oxyCODONE IR (ROXICODONE) tablet 10 mg      10 mg Oral or Feeding Tube EVERY 4 HOURS PRN 09/07/21 1652      09/07/21 1652  lidocaine 1 % 0.1-1 mL      0.1-1 mL Other EVERY 1 HOUR PRN 09/07/21 1652 09/07/21 1652  lidocaine (LMX4) cream       Topical EVERY 1 HOUR PRN 09/07/21 1652 09/07/21 1652  methocarbamol (ROBAXIN) tablet 500 mg      500 mg Oral or Feeding Tube EVERY 6 HOURS PRN 09/07/21 1652      09/07/21 1652   magnesium hydroxide (MILK OF MAGNESIA) suspension 30 mL      30 mL Oral DAILY PRN 09/07/21 1652      09/07/21 1652  bisacodyl (DULCOLAX) Suppository 10 mg      10 mg Rectal DAILY PRN 09/07/21 1652      09/07/21 0830  ropivacaine 0.2% in NS perineural infusion simple       Perineural Continuous Nerve Block 09/07/21 0821      09/07/21 0830  ropivacaine 0.2% in NS perineural infusion simple       Perineural Continuous Nerve Block 09/07/21 0821                Labs/Diagnostics  Heme/INR:  Recent Labs   Lab Test 09/09/21  0341 09/09/21  0014   WBC 22.3* 24.1*   RBC 3.16* 3.42*   HGB 8.6* 9.4*   HCT 28.2* 30.6*   MCV 89 90   MCH 27.2 27.5   MCHC 30.5* 30.7*   RDW 22.4* 22.5*   * 132*       Lab Results   Component Value Date    INR 1.37 09/07/2021    INR 1.79 09/07/2021    INR 2.04 08/25/2021    INR 2.6 07/28/2021    INR 2.8 07/15/2021    INR 2.9 07/07/2021    INR 3.65 06/08/2021    INR 1.99 05/29/2021    INR 2.15 05/28/2021     ---------------------------  SUZE Mcneal [facility]  Regional Anesthesia Pain Service  9/9/2021 3:21 PM    RAPS Contact Info (24 hour job code pager is the last 4 digits) For in-house use only:   Job code ID: [id number]   [place] "[employer]" [id number]  Peds [phone]  [place] phone: dial * * * [phone], enter jobcode ID, then enter call-back number.    Text: Use AMCOM on the Intranet <Paging/Directory> tab and enter Jobcode ID.   If no call back at any time, contact the hospital  and ask for RAPS attending or backup

## 2021-09-09 NOTE — PLAN OF CARE
Changes this shift: Chest CT done at start of shift. Inhaled Nitric started shortly following CT. Frequent labs drawn during evening. Epi maintain 0.06-0.09, Levo started briefly to maintain MAP>65. Nicardipine turned back on briefly during evening. SVO2 improving throughout shift. KIMMY numbers improving. 2 Amp bicarb given for Bicarb of 18. FiO2 weaned to 60%. Completely AV paced at 90, one episode of afib 110-140s from 7207-2912. Pt converted to paced without intervention. Insulin gtt restarted, running at 4 units/hr most of night. Sedated on propofol, fentanyl and precedex. Following commands. K replaced multiple times overnight. 4mg IV bumex given, urine output /hr. CT with minimal output. Lactic 4.6 at 0000, given 500 albumin. Recheck 3.0, no further fluid given.    Plan: Continue to monitor and notify MD with pertinent changes.

## 2021-09-10 ENCOUNTER — APPOINTMENT (OUTPATIENT)
Dept: GENERAL RADIOLOGY | Facility: CLINIC | Age: 66
DRG: 219 | End: 2021-09-10
Attending: STUDENT IN AN ORGANIZED HEALTH CARE EDUCATION/TRAINING PROGRAM
Payer: MEDICARE

## 2021-09-10 LAB
ALBUMIN SERPL-MCNC: 2.4 G/DL (ref 3.4–5)
ALP SERPL-CCNC: 160 U/L (ref 40–150)
ALT SERPL W P-5'-P-CCNC: 19 U/L (ref 0–50)
ANION GAP SERPL CALCULATED.3IONS-SCNC: 5 MMOL/L (ref 3–14)
ANION GAP SERPL CALCULATED.3IONS-SCNC: 7 MMOL/L (ref 3–14)
ANION GAP SERPL CALCULATED.3IONS-SCNC: 7 MMOL/L (ref 3–14)
AST SERPL W P-5'-P-CCNC: 57 U/L (ref 0–45)
ATRIAL RATE - MUSE: 150 BPM
ATRIAL RATE - MUSE: 18 BPM
BASE EXCESS BLDA CALC-SCNC: 3.2 MMOL/L (ref -9–1.8)
BASE EXCESS BLDV CALC-SCNC: 3 MMOL/L (ref -7.7–1.9)
BASE EXCESS BLDV CALC-SCNC: 4 MMOL/L (ref -7.7–1.9)
BASE EXCESS BLDV CALC-SCNC: 6.1 MMOL/L (ref -7.7–1.9)
BASE EXCESS BLDV CALC-SCNC: 6.8 MMOL/L (ref -7.7–1.9)
BASE EXCESS BLDV CALC-SCNC: 6.8 MMOL/L (ref -7.7–1.9)
BILIRUB SERPL-MCNC: 0.4 MG/DL (ref 0.2–1.3)
BUN SERPL-MCNC: 34 MG/DL (ref 7–30)
BUN SERPL-MCNC: 34 MG/DL (ref 7–30)
BUN SERPL-MCNC: 40 MG/DL (ref 7–30)
CA-I BLD-MCNC: 4.7 MG/DL (ref 4.4–5.2)
CALCIUM SERPL-MCNC: 8.4 MG/DL (ref 8.5–10.1)
CALCIUM SERPL-MCNC: 8.6 MG/DL (ref 8.5–10.1)
CALCIUM SERPL-MCNC: 8.6 MG/DL (ref 8.5–10.1)
CHLORIDE BLD-SCNC: 109 MMOL/L (ref 94–109)
CHLORIDE BLD-SCNC: 109 MMOL/L (ref 94–109)
CHLORIDE BLD-SCNC: 110 MMOL/L (ref 94–109)
CO2 SERPL-SCNC: 26 MMOL/L (ref 20–32)
CO2 SERPL-SCNC: 26 MMOL/L (ref 20–32)
CO2 SERPL-SCNC: 28 MMOL/L (ref 20–32)
CREAT SERPL-MCNC: 1.05 MG/DL (ref 0.52–1.04)
CREAT SERPL-MCNC: 1.05 MG/DL (ref 0.52–1.04)
CREAT SERPL-MCNC: 1.14 MG/DL (ref 0.52–1.04)
DIASTOLIC BLOOD PRESSURE - MUSE: NORMAL MMHG
DIASTOLIC BLOOD PRESSURE - MUSE: NORMAL MMHG
ERYTHROCYTE [DISTWIDTH] IN BLOOD BY AUTOMATED COUNT: 23.4 % (ref 10–15)
GFR SERPL CREATININE-BSD FRML MDRD: 50 ML/MIN/1.73M2
GFR SERPL CREATININE-BSD FRML MDRD: 55 ML/MIN/1.73M2
GFR SERPL CREATININE-BSD FRML MDRD: 55 ML/MIN/1.73M2
GLUCOSE BLD-MCNC: 101 MG/DL (ref 70–99)
GLUCOSE BLD-MCNC: 139 MG/DL (ref 70–99)
GLUCOSE BLD-MCNC: 139 MG/DL (ref 70–99)
GLUCOSE BLDC GLUCOMTR-MCNC: 101 MG/DL (ref 70–99)
GLUCOSE BLDC GLUCOMTR-MCNC: 107 MG/DL (ref 70–99)
GLUCOSE BLDC GLUCOMTR-MCNC: 111 MG/DL (ref 70–99)
GLUCOSE BLDC GLUCOMTR-MCNC: 115 MG/DL (ref 70–99)
GLUCOSE BLDC GLUCOMTR-MCNC: 118 MG/DL (ref 70–99)
GLUCOSE BLDC GLUCOMTR-MCNC: 125 MG/DL (ref 70–99)
GLUCOSE BLDC GLUCOMTR-MCNC: 126 MG/DL (ref 70–99)
GLUCOSE BLDC GLUCOMTR-MCNC: 128 MG/DL (ref 70–99)
GLUCOSE BLDC GLUCOMTR-MCNC: 90 MG/DL (ref 70–99)
GLUCOSE BLDC GLUCOMTR-MCNC: 97 MG/DL (ref 70–99)
HCO3 BLD-SCNC: 28 MMOL/L (ref 21–28)
HCO3 BLDV-SCNC: 28 MMOL/L (ref 21–28)
HCO3 BLDV-SCNC: 30 MMOL/L (ref 21–28)
HCO3 BLDV-SCNC: 30 MMOL/L (ref 21–28)
HCO3 BLDV-SCNC: 32 MMOL/L (ref 21–28)
HCO3 BLDV-SCNC: 32 MMOL/L (ref 21–28)
HCT VFR BLD AUTO: 26 % (ref 35–47)
HGB BLD-MCNC: 8 G/DL (ref 11.7–15.7)
INTERPRETATION ECG - MUSE: NORMAL
INTERPRETATION ECG - MUSE: NORMAL
LACTATE SERPL-SCNC: 0.9 MMOL/L (ref 0.7–2)
MAGNESIUM SERPL-MCNC: 2.3 MG/DL (ref 1.6–2.3)
MAGNESIUM SERPL-MCNC: 2.5 MG/DL (ref 1.6–2.3)
MCH RBC QN AUTO: 27.9 PG (ref 26.5–33)
MCHC RBC AUTO-ENTMCNC: 30.8 G/DL (ref 31.5–36.5)
MCV RBC AUTO: 91 FL (ref 78–100)
O2/TOTAL GAS SETTING VFR VENT: 40 %
O2/TOTAL GAS SETTING VFR VENT: 50 %
O2/TOTAL GAS SETTING VFR VENT: 60 %
O2/TOTAL GAS SETTING VFR VENT: 60 %
OXYHGB MFR BLD: 94 % (ref 92–100)
OXYHGB MFR BLDV: 48 % (ref 70–75)
OXYHGB MFR BLDV: 50 % (ref 70–75)
OXYHGB MFR BLDV: 52 % (ref 70–75)
OXYHGB MFR BLDV: 71 % (ref 70–75)
OXYHGB MFR BLDV: 98 % (ref 70–75)
P AXIS - MUSE: NORMAL DEGREES
P AXIS - MUSE: NORMAL DEGREES
PCO2 BLD: 40 MM HG (ref 35–45)
PCO2 BLDV: 39 MM HG (ref 40–50)
PCO2 BLDV: 44 MM HG (ref 40–50)
PCO2 BLDV: 45 MM HG (ref 40–50)
PCO2 BLDV: 47 MM HG (ref 40–50)
PCO2 BLDV: 50 MM HG (ref 40–50)
PH BLD: 7.44 [PH] (ref 7.35–7.45)
PH BLDV: 7.38 [PH] (ref 7.32–7.43)
PH BLDV: 7.41 [PH] (ref 7.32–7.43)
PH BLDV: 7.44 [PH] (ref 7.32–7.43)
PH BLDV: 7.45 [PH] (ref 7.32–7.43)
PH BLDV: 7.49 [PH] (ref 7.32–7.43)
PHOSPHATE SERPL-MCNC: 3.8 MG/DL (ref 2.5–4.5)
PHOSPHATE SERPL-MCNC: 3.9 MG/DL (ref 2.5–4.5)
PLATELET # BLD AUTO: 119 10E3/UL (ref 150–450)
PO2 BLD: 76 MM HG (ref 80–105)
PO2 BLDV: 139 MM HG (ref 25–47)
PO2 BLDV: 30 MM HG (ref 25–47)
PO2 BLDV: 41 MM HG (ref 25–47)
POTASSIUM BLD-SCNC: 4.8 MMOL/L (ref 3.4–5.3)
PR INTERVAL - MUSE: NORMAL MS
PR INTERVAL - MUSE: NORMAL MS
PROT SERPL-MCNC: 5.6 G/DL (ref 6.8–8.8)
QRS DURATION - MUSE: 132 MS
QRS DURATION - MUSE: 74 MS
QT - MUSE: 408 MS
QT - MUSE: 486 MS
QTC - MUSE: 493 MS
QTC - MUSE: 581 MS
R AXIS - MUSE: 78 DEGREES
R AXIS - MUSE: 79 DEGREES
RBC # BLD AUTO: 2.87 10E6/UL (ref 3.8–5.2)
SODIUM SERPL-SCNC: 142 MMOL/L (ref 133–144)
SODIUM SERPL-SCNC: 142 MMOL/L (ref 133–144)
SODIUM SERPL-SCNC: 143 MMOL/L (ref 133–144)
SYSTOLIC BLOOD PRESSURE - MUSE: NORMAL MMHG
SYSTOLIC BLOOD PRESSURE - MUSE: NORMAL MMHG
T AXIS - MUSE: -70 DEGREES
T AXIS - MUSE: 3 DEGREES
VENTRICULAR RATE- MUSE: 122 BPM
VENTRICULAR RATE- MUSE: 62 BPM
WBC # BLD AUTO: 28.2 10E3/UL (ref 4–11)

## 2021-09-10 PROCEDURE — 71045 X-RAY EXAM CHEST 1 VIEW: CPT

## 2021-09-10 PROCEDURE — 999N000157 HC STATISTIC RCP TIME EA 10 MIN

## 2021-09-10 PROCEDURE — 80048 BASIC METABOLIC PNL TOTAL CA: CPT

## 2021-09-10 PROCEDURE — 250N000012 HC RX MED GY IP 250 OP 636 PS 637: Performed by: SURGERY

## 2021-09-10 PROCEDURE — 94640 AIRWAY INHALATION TREATMENT: CPT | Mod: 76

## 2021-09-10 PROCEDURE — 250N000011 HC RX IP 250 OP 636: Performed by: PHYSICIAN ASSISTANT

## 2021-09-10 PROCEDURE — 94003 VENT MGMT INPAT SUBQ DAY: CPT

## 2021-09-10 PROCEDURE — 71045 X-RAY EXAM CHEST 1 VIEW: CPT | Mod: 26 | Performed by: STUDENT IN AN ORGANIZED HEALTH CARE EDUCATION/TRAINING PROGRAM

## 2021-09-10 PROCEDURE — 999N000015 HC STATISTIC ARTERIAL MONITORING DAILY

## 2021-09-10 PROCEDURE — 88305 TISSUE EXAM BY PATHOLOGIST: CPT | Mod: 26 | Performed by: PATHOLOGY

## 2021-09-10 PROCEDURE — 99291 CRITICAL CARE FIRST HOUR: CPT | Mod: 24 | Performed by: INTERNAL MEDICINE

## 2021-09-10 PROCEDURE — 94799 UNLISTED PULMONARY SVC/PX: CPT

## 2021-09-10 PROCEDURE — 84100 ASSAY OF PHOSPHORUS: CPT

## 2021-09-10 PROCEDURE — 999N000045 HC STATISTIC DAILY SWAN MONITORING

## 2021-09-10 PROCEDURE — 250N000011 HC RX IP 250 OP 636: Performed by: NURSE PRACTITIONER

## 2021-09-10 PROCEDURE — 258N000003 HC RX IP 258 OP 636: Performed by: THORACIC SURGERY (CARDIOTHORACIC VASCULAR SURGERY)

## 2021-09-10 PROCEDURE — 83735 ASSAY OF MAGNESIUM: CPT

## 2021-09-10 PROCEDURE — 250N000011 HC RX IP 250 OP 636: Performed by: SURGERY

## 2021-09-10 PROCEDURE — 250N000011 HC RX IP 250 OP 636: Performed by: STUDENT IN AN ORGANIZED HEALTH CARE EDUCATION/TRAINING PROGRAM

## 2021-09-10 PROCEDURE — 250N000009 HC RX 250: Performed by: NURSE PRACTITIONER

## 2021-09-10 PROCEDURE — 250N000011 HC RX IP 250 OP 636: Performed by: THORACIC SURGERY (CARDIOTHORACIC VASCULAR SURGERY)

## 2021-09-10 PROCEDURE — 999N000155 HC STATISTIC RAPCV CVP MONITORING

## 2021-09-10 PROCEDURE — 88311 DECALCIFY TISSUE: CPT | Performed by: PATHOLOGY

## 2021-09-10 PROCEDURE — 250N000013 HC RX MED GY IP 250 OP 250 PS 637: Performed by: PHYSICIAN ASSISTANT

## 2021-09-10 PROCEDURE — 94640 AIRWAY INHALATION TREATMENT: CPT

## 2021-09-10 PROCEDURE — 250N000013 HC RX MED GY IP 250 OP 250 PS 637: Performed by: NURSE PRACTITIONER

## 2021-09-10 PROCEDURE — 88304 TISSUE EXAM BY PATHOLOGIST: CPT | Mod: 26 | Performed by: PATHOLOGY

## 2021-09-10 PROCEDURE — 82330 ASSAY OF CALCIUM: CPT

## 2021-09-10 PROCEDURE — 85027 COMPLETE CBC AUTOMATED: CPT | Performed by: SURGERY

## 2021-09-10 PROCEDURE — 82805 BLOOD GASES W/O2 SATURATION: CPT | Performed by: PHYSICIAN ASSISTANT

## 2021-09-10 PROCEDURE — 200N000002 HC R&B ICU UMMC

## 2021-09-10 PROCEDURE — 271N000002 HC RX 271: Performed by: PHYSICIAN ASSISTANT

## 2021-09-10 PROCEDURE — 83605 ASSAY OF LACTIC ACID: CPT

## 2021-09-10 PROCEDURE — 250N000013 HC RX MED GY IP 250 OP 250 PS 637: Performed by: THORACIC SURGERY (CARDIOTHORACIC VASCULAR SURGERY)

## 2021-09-10 RX ORDER — NOREPINEPHRINE BITARTRATE 0.06 MG/ML
.01-.4 INJECTION, SOLUTION INTRAVENOUS CONTINUOUS
Status: DISCONTINUED | OUTPATIENT
Start: 2021-09-10 | End: 2021-09-11

## 2021-09-10 RX ORDER — PREDNISONE 20 MG/1
40 TABLET ORAL DAILY
Status: COMPLETED | OUTPATIENT
Start: 2021-09-10 | End: 2021-09-12

## 2021-09-10 RX ORDER — BUMETANIDE 0.25 MG/ML
4 INJECTION INTRAMUSCULAR; INTRAVENOUS ONCE
Status: COMPLETED | OUTPATIENT
Start: 2021-09-10 | End: 2021-09-10

## 2021-09-10 RX ORDER — AMINO AC/PROTEIN HYDR/WHEY PRO 10G-100/30
1 LIQUID (ML) ORAL 2 TIMES DAILY
Status: DISCONTINUED | OUTPATIENT
Start: 2021-09-11 | End: 2021-09-19

## 2021-09-10 RX ADMIN — ASPIRIN 81 MG: 81 TABLET, CHEWABLE ORAL at 08:11

## 2021-09-10 RX ADMIN — PROPOFOL 20 MCG/KG/MIN: 10 INJECTION, EMULSION INTRAVENOUS at 16:13

## 2021-09-10 RX ADMIN — MUPIROCIN 0.5 G: 20 OINTMENT TOPICAL at 08:11

## 2021-09-10 RX ADMIN — GABAPENTIN 600 MG: 250 SUSPENSION ORAL at 19:53

## 2021-09-10 RX ADMIN — BUMETANIDE 4 MG: 0.25 INJECTION, SOLUTION INTRAMUSCULAR; INTRAVENOUS at 11:33

## 2021-09-10 RX ADMIN — MUPIROCIN 0.5 G: 20 OINTMENT TOPICAL at 19:52

## 2021-09-10 RX ADMIN — AMIODARONE HYDROCHLORIDE 200 MG: 200 TABLET ORAL at 08:11

## 2021-09-10 RX ADMIN — Medication 150 MCG/HR: at 22:25

## 2021-09-10 RX ADMIN — PREDNISONE 40 MG: 20 TABLET ORAL at 11:33

## 2021-09-10 RX ADMIN — Medication 200 MCG/HR: at 07:51

## 2021-09-10 RX ADMIN — DOCUSATE SODIUM 50 MG AND SENNOSIDES 8.6 MG 1 TABLET: 8.6; 5 TABLET, FILM COATED ORAL at 08:12

## 2021-09-10 RX ADMIN — GABAPENTIN 600 MG: 250 SUSPENSION ORAL at 08:11

## 2021-09-10 RX ADMIN — DOCUSATE SODIUM 50 MG AND SENNOSIDES 8.6 MG 1 TABLET: 8.6; 5 TABLET, FILM COATED ORAL at 19:53

## 2021-09-10 RX ADMIN — HEPARIN SODIUM 5000 UNITS: 10000 INJECTION, SOLUTION INTRAVENOUS; SUBCUTANEOUS at 19:53

## 2021-09-10 RX ADMIN — LEVALBUTEROL HYDROCHLORIDE 1.25 MG: 1.25 SOLUTION RESPIRATORY (INHALATION) at 07:38

## 2021-09-10 RX ADMIN — GABAPENTIN 1200 MG: 250 SUSPENSION ORAL at 22:04

## 2021-09-10 RX ADMIN — EPINEPHRINE 0.01 MCG/KG/MIN: 1 INJECTION PARENTERAL at 07:02

## 2021-09-10 RX ADMIN — HEPARIN SODIUM 5000 UNITS: 10000 INJECTION, SOLUTION INTRAVENOUS; SUBCUTANEOUS at 04:11

## 2021-09-10 RX ADMIN — Medication 200 MCG/HR: at 08:04

## 2021-09-10 RX ADMIN — Medication: at 13:23

## 2021-09-10 RX ADMIN — Medication: at 13:17

## 2021-09-10 RX ADMIN — LEVALBUTEROL HYDROCHLORIDE 1.25 MG: 1.25 SOLUTION RESPIRATORY (INHALATION) at 11:39

## 2021-09-10 RX ADMIN — HEPARIN SODIUM 5000 UNITS: 10000 INJECTION, SOLUTION INTRAVENOUS; SUBCUTANEOUS at 11:33

## 2021-09-10 RX ADMIN — POLYETHYLENE GLYCOL 3350 17 G: 17 POWDER, FOR SOLUTION ORAL at 08:12

## 2021-09-10 RX ADMIN — ATORVASTATIN CALCIUM 40 MG: 40 TABLET, FILM COATED ORAL at 19:53

## 2021-09-10 RX ADMIN — PROPOFOL 30 MCG/KG/MIN: 10 INJECTION, EMULSION INTRAVENOUS at 08:57

## 2021-09-10 RX ADMIN — Medication 15 ML: at 08:11

## 2021-09-10 RX ADMIN — PROPOFOL 20 MCG/KG/MIN: 10 INJECTION, EMULSION INTRAVENOUS at 23:40

## 2021-09-10 ASSESSMENT — MIFFLIN-ST. JEOR: SCORE: 1574.62

## 2021-09-10 ASSESSMENT — ACTIVITIES OF DAILY LIVING (ADL)
ADLS_ACUITY_SCORE: 22
ADLS_ACUITY_SCORE: 19
ADLS_ACUITY_SCORE: 22
ADLS_ACUITY_SCORE: 19
ADLS_ACUITY_SCORE: 22
ADLS_ACUITY_SCORE: 19

## 2021-09-10 NOTE — PROGRESS NOTES
"[facility] Nerve Block Daily Progress Note  09/10/21    24 Hour Events  - Remains intubated, on nitric; sedated on propofol, fentanyl IV infusion for analgesia  - Chest tubes x 4, Iniguez catheter in place  - Repositioned with full assist for turning.    Subjective  - No objective evidence of LAST  - No pain behaviors with repositioning      Objective  Exam  Vitals:   /67   Pulse 89   Temp 98.7  F (37.1  C) (Axillary)   Resp 18   Ht 1.689 m (5' 6.5\")   Wt 101 kg (222 lb 10.6 oz)   LMP  (LMP Unknown)   SpO2 100%   BMI 35.40 kg/m        General: On CMV  MSK/Neuro: not assessed    Skin: bilateral erector spinae (ES) catheter sites with dressings c/d/i, no tenderness, no erythema, heme, edema present    Assessment  Corina Martinez is a 66 year old female with PMH of mitral stenosis who is now POD #3 s/p mitral valve replacement on 9/7/21. Prior to surgery uncomplicated placement of bilateral T5-6 erector spinae (ES) catheter by RAPS for analgesia. Remains intubated, appears comfortable. No evidence of adverse side effects related to local anesthetic continuous infusion    Plan  - Catheter Day #3 bilateral T5-6 ES catheters/infusion:    Continue Ropivacaine 0.2% PIB (programmed intermittent bolus) infusion at 7 mL Q 60 min via each catheter, total infusion 14 mL/hr   o Plan to maintain catheters while chest tubes in place, max of 7 days      When ready to extubate, patient can be evaluated to receive local anesthetic bolus, bedside RN should page RAPS to request bolus    - Antithrombotics/Thrombolytics ordered:     Okay to continue Heparin SQ 5,000 units Q 8 hr as ordered by primary service  o Please contact SHAHANA jobcode pager [phone] before ordering or making any medication changes    RAPS will continue to follow and adjust as needed  Discussed with attending anesthesiologist    Analgesic Medications ordered:  Medications related to Pain Management (From now, onward)    Start     Dose/Rate " Route Frequency Ordered Stop    09/10/21 0800  gabapentin (NEURONTIN) solution 600 mg      600 mg Oral or Feeding Tube 2 TIMES DAILY 09/09/21 1717 09/09/21 2200  gabapentin (NEURONTIN) solution 1,200 mg      1,200 mg Oral or Feeding Tube AT BEDTIME 09/09/21 1715 09/08/21 2200  [Held by provider]  DULoxetine (CYMBALTA) DR capsule 30 mg     (Held by provider since Thu 9/9/2021 at 0746 by Sven Acuna MD.Hold Reason: Other.Hold Comments: Hold while intubated)    30 mg Oral AT BEDTIME 09/08/21 1105      09/08/21 1730  fentaNYL (SUBLIMAZE) infusion       mcg/hr  1-4 mL/hr  Intravenous CONTINUOUS 09/08/21 1710 09/08/21 1730  propofol (DIPRIVAN) infusion      5-75 mcg/kg/min × 93.9 kg (Dosing Weight)  2.8-42.3 mL/hr  Intravenous CONTINUOUS 09/08/21 1710 09/08/21 0800  polyethylene glycol (MIRALAX) Packet 17 g      17 g Oral or Feeding Tube DAILY 09/07/21 1652 09/08/21 0800  aspirin (ASA) chewable tablet 81 mg      81 mg Oral or NG Tube DAILY 09/07/21 1652 09/07/21 2000  senna-docusate (SENOKOT-S/PERICOLACE) 8.6-50 MG per tablet 1 tablet      1 tablet Oral or Feeding Tube 2 TIMES DAILY 09/07/21 1652 09/07/21 1652  HYDROmorphone (DILAUDID) injection 0.2 mg      0.2 mg Intravenous EVERY 2 HOURS PRN 09/07/21 1652 09/07/21 1652  HYDROmorphone (DILAUDID) injection 0.4 mg      0.4 mg Intravenous EVERY 2 HOURS PRN 09/07/21 1652 09/07/21 1652  oxyCODONE (ROXICODONE) tablet 5 mg      5 mg Oral or Feeding Tube EVERY 4 HOURS PRN 09/07/21 1652 09/07/21 1652  oxyCODONE IR (ROXICODONE) tablet 10 mg      10 mg Oral or Feeding Tube EVERY 4 HOURS PRN 09/07/21 1652 09/07/21 1652  lidocaine 1 % 0.1-1 mL      0.1-1 mL Other EVERY 1 HOUR PRN 09/07/21 1652 09/07/21 1652  lidocaine (LMX4) cream       Topical EVERY 1 HOUR PRN 09/07/21 1652 09/07/21 1652  methocarbamol (ROBAXIN) tablet 500 mg      500 mg Oral or Feeding Tube EVERY 6 HOURS PRN 09/07/21 1652       09/07/21 1652  magnesium hydroxide (MILK OF MAGNESIA) suspension 30 mL      30 mL Oral DAILY PRN 09/07/21 1652      09/07/21 1652  bisacodyl (DULCOLAX) Suppository 10 mg      10 mg Rectal DAILY PRN 09/07/21 1652 09/07/21 0830  ropivacaine 0.2% in NS perineural infusion simple       Perineural Continuous Nerve Block 09/07/21 0821      09/07/21 0830  ropivacaine 0.2% in NS perineural infusion simple       Perineural Continuous Nerve Block 09/07/21 0821                Labs/Diagnostics  Heme/INR:  Recent Labs   Lab Test 09/10/21  0341 09/09/21  0341   WBC 28.2* 22.3*   RBC 2.87* 3.16*   HGB 8.0* 8.6*   HCT 26.0* 28.2*   MCV 91 89   MCH 27.9 27.2   MCHC 30.8* 30.5*   RDW 23.4* 22.4*   * 121*       Lab Results   Component Value Date    INR 1.37 09/07/2021    INR 1.79 09/07/2021    INR 2.04 08/25/2021    INR 2.6 07/28/2021    INR 2.8 07/15/2021    INR 2.9 07/07/2021    INR 3.65 06/08/2021    INR 1.99 05/29/2021    INR 2.15 05/28/2021       ---------------------------  SUZE Mcneal [facility]  [facility]  9/10/2021 10:20 AM    RAPS Contact Info (24 hour job code pager is the last 4 digits) For in-house use only:   Job code ID: [id number]   [place] [id number]  Peds [phone]  [place] phone: dial * * * [phone], enter jobcode ID, then enter call-back number.    Text: Use [employer] on the Intranet <Paging/Directory> tab and enter Jobcode ID.   If no call back at any time, contact the hospital  and ask for RAPS attending or backup

## 2021-09-10 NOTE — PROGRESS NOTES
[facility] ICU PROGRESS NOTE  September 10, 2021      CO-MORBIDITIES:   S/P MVR (mitral valve replacement)  (primary encounter diagnosis)  Mitral stenosis  Nonrheumatic mitral valve stenosis    ASSESSMENT: Corina Martinez is a 66 year old female with PMH of arthritis, SHAILESH, HTN, CHF and mitral valve stenosis, now s/p MVR w/ left atrial appendage ligation on  with Dr. Acuna.     OVERNIGHT EVENTS:   500 LR given overnight for lactate 5    TODAY'S CHANGES:   switch methylpred to to Pred 40 daily for remainder of burst  bumex 4mg today - goal net -500 to 1L  Wean sedation as tolerated  Wean NO today  Titrate pressor as needed     PLAN:  Neuro/ pain/ sedation:  # Acute post-operative pain  - Monitor neurological status. Notify the MD for any acute changes in exam.  - acetaminophen allergy,   - PRN Dilaudid robaxin and oxy  - NIKKI catheters B/l  (-  - Gabapentin /600/1200    - PTA cymbalta 30 mg, holding until PO intake   - PTA on atarax, holding while intubated    Pulmonary care:   #Current smoker   #COPD sat's upper 80's at baseline   #SHAILESH  Arrived from the OR on BiPAP - declining oxygenation and gases showing sustained respiratory acidosis on BiPAP with increasing hemodynamic instability so was reintubated   - intubated, mechanicalyl ventilated.   AB.43/141/41/27  - NO started  at 20 --> weaned down to 3 this AM  - Methylprednisolone 40 mg daily for 4 more days   - Fi O2 it sats of 92-94   - weaning NO down to 5 this AM, continue today 1 pt every to hours to off.     Ventilation Mode: CMV/AC  (Continuous Mandatory Ventilation/ Assist Control)  FiO2 (%): 40 %  Rate Set (breaths/minute): 18 breaths/min  Tidal Volume Set (mL): 400 mL  PEEP (cm H2O): 10 cmH2O  Oxygen Concentration (%): 40 %  Resp: 18    Cardiovascular:    #HTN  #CHF, EF 55-60%   #Afib, warfarin   #MV stenosis, s/p MVR   #Left atrial appendage thrombus s/p atrial appendage clip   AV-paced epicardial leads - at 90   - Monitor  hemodynamic status.   - MAP goal > 65  - NorEpi @ 0.03  - trend alexander/cvp/vbg with NO wean  - ABG if clinical concerns  - if has another episode of a fib, likely need to start anticoagulation - check with CVTS  - ASA 81, Lipitor   - PTA Amiodarone 200     - holding PTA on torsemide 10-20 daily  - holding PTA Metoprolol and diltiazem   - holding PTA on diltiazem , metoprolol 50 BID    GI /Nutrition:   #GERD  #Primary biliary cirrhosis   - NPO   - Bowel regimen with senna 1tab BID, miralax daily  - PPI   - OG   - Nutrition: feeds advance from trophic    Fluids/ Electrolytes/ Renal:   RADHA , resolved. Cr. 1.05, , net +241 yesterday/ +383 since midnight  - lasix 40/Bumex 2 and 2 ~yesterday with little response.   - Iniguez for strict I&Os  - BID electrolytes   - scheduled K 20 mEq BID   - Bumex 4 mg x1  - fluid goal net - 500 to 1L today     Endocrine:    #Stress hyperglycemia  - Insulin gtt, monitor BGs      ID/ Antibiotics:  - Perioperative antibiotics with cefazolin, vanc   - Post op CBC pending      Heme:     # Acute blood loss anemia  - Hgb goal >7  - Post op CBC pending      Prophylaxis:    - SCDs  - PPI  - SQH to start 9/8     Lines/ tubes/ drains:  - CVC  - Arterial line  - Iniguez  - Mediastinal tube x2  - Pleural tube x2     Disposition:  - CV ICU.      Patient seen, findings and plan discussed with CV ICU staff.      Hemalatha Garcia  CV ICU resident   [id number]     ====================================    SUBJECTIVE:   Sedated, intubated, mechanically ventilated. Opens eyes to voice. Following commands and moving all extremities when sedation is weaned.     OBJECTIVE:   1. VITAL SIGNS:   Temp:  [97.4  F (36.3  C)-99.2  F (37.3  C)] 98.5  F (36.9  C)  Pulse:  [] 89  Resp:  [16-18] 18  MAP:  [63 mmHg-91 mmHg] 76 mmHg  Arterial Line BP: ()/(10-70) 105/61  FiO2 (%):  [40 %-60 %] 40 %  SpO2:  [92 %-100 %] 92 %  Ventilation Mode: CMV/AC  (Continuous Mandatory Ventilation/ Assist Control)  FiO2 (%): 40  %  Rate Set (breaths/minute): 18 breaths/min  Tidal Volume Set (mL): 400 mL  PEEP (cm H2O): 10 cmH2O  Oxygen Concentration (%): 40 %  Resp: 18      2. INTAKE/ OUTPUT:   I/O last 3 completed shifts:  In: 2633.11 [I.V.:1653.11; NG/GT:320]  Out: 2240 [Urine:1950; Emesis/NG output:150; Chest Tube:140]    3. PHYSICAL EXAMINATION:   General: sedated, intubated, supine in bed   Neuro: sedated,opens eyes to voice . Moves extremities when sedation is weaned  Resp: intubated, mechanically ventilated. Lung sounds equal bilaterally   CV: RRR, paced  Abdomen: Soft, Non-distended, Non-tender  Incisions: c/d/i  Extremities: warm and well perfused, +1 edema in b/l LE    4. INVESTIGATIONS:   Arterial Blood Gases   Recent Labs   Lab 09/10/21  0340 09/09/21  1230 09/09/21  1014 09/09/21  0807   PH 7.44 7.44 7.47* 7.46*   PCO2 40 41 38 39   PO2 76* 81 89 108*   HCO3 28 28 28 28     Complete Blood Count   Recent Labs   Lab 09/10/21  0341 09/09/21  0341 09/09/21  0014 09/08/21  0310   WBC 28.2* 22.3* 24.1* 26.7*   HGB 8.0* 8.6* 9.4* 11.9   * 121* 132* 182     Basic Metabolic Panel  Recent Labs   Lab 09/10/21  0344 09/10/21  0341 09/10/21  0212 09/10/21  0022 09/09/21  1608 09/09/21  1230 09/09/21  0806 09/09/21  0341 09/09/21  0014   NA  --  142  142  --   --  143  --   --  141  141 142   POTASSIUM  --  4.8  4.8  --   --  4.4 4.2 3.8 3.4  3.4 3.6   CHLORIDE  --  109  109  --   --  109  --   --  107  107 106   CO2  --  26  26  --   --  27  --   --  25  25 23   BUN  --  34*  34*  --   --  31*  --   --  33*  33* 34*   CR  --  1.05*  1.05*  --   --  1.20*  --   --  1.51*  1.51* 1.57*   * 139*  139* 125* 118* 95 118*  --  131*  131* 172*     Liver Function Tests  Recent Labs   Lab 09/10/21  0341 09/09/21  0341 09/09/21  0014 09/08/21  2146 09/07/21  1654 09/07/21  1455   AST 57* 86* 97* 91*  91*  --   --    ALT 19 24 28 27  27 70*  --    ALKPHOS 160* 147 163* 142  142 228*  --    BILITOTAL 0.4 0.4 0.4 0.4  0.4  0.8  --    ALBUMIN 2.4* 2.8* 2.4* 2.1*  2.1* 2.4*  --    INR  --   --   --   --  1.37* 1.79*     Pancreatic Enzymes  No lab results found in last 7 days.  Coagulation Profile  Recent Labs   Lab 09/07/21  1654 09/07/21  1455   INR 1.37* 1.79*   PTT 46* 34         5. RADIOLOGY:   Recent Results (from the past 24 hour(s))   XR Abdomen Port 1 View    Narrative    EXAM: XR ABDOMEN PORT 1 VIEWS  9/9/2021 7:48 PM      HISTORY: Check OGT position    COMPARISON: 9/8/2021    FINDINGS: Enteric tube sidehole projects over the stomach. Right  basilar chest tube. Partial visualization of right [place] catheter.  Mediastinal drains. Atrial clip. Postsurgical changes of the chest  with intact appearing sternal wires.    Nonobstructive bowel gas pattern. No pneumatosis. No portal venous  gas.       Impression    IMPRESSION: Enteric tube tip and sidehole projects over the expected  location of the stomach.    I have personally reviewed the examination and initial interpretation  and I agree with the findings.    JING HOWE MD         SYSTEM ID:  [id number]       =========================================

## 2021-09-10 NOTE — PROGRESS NOTES
"[facility] Nerve Block Daily Progress Note  09/09/21    24 Hour Events  - Reintubated yesterday evening  - Chest tubes x 4, Iniguez catheter in place  - repositioned with assist    Subjective  - Intubated, follows commands when sedation lightened.  Wiggles fingers and toes. and lower extremities. No objective evidence of LAST.     Objective  Exam  Vitals:   /67   Pulse 89   Temp 98.2  F (36.8  C) (Axillary)   Resp 18   Ht 1.689 m (5' 6.5\")   Wt 99.9 kg (220 lb 3.8 oz)   LMP  (LMP Unknown)   SpO2 97%   BMI 35.02 kg/m        General: Intubated, on CMV, NAD  MSK/Neuro: moving extremities   Skin: bilateral erector spinae (ES) catheter sites with dressings c/d/i, no tenderness, erythema, heme. Edema resolved.    Assessment  Corina Martinez is a 66 year old female with PMH of mitral stenosis who is now POD #2 s/p mitral valve replacement on 9/7/21. Prior to surgery uncomplicated placement of bilateral T5-6 erector spinae (ES) catheter by RAPS for analgesia. Reintubated overnight. Motor function intact and no evidence of adverse side effects related to local anesthetic continuous infusion.     Plan  - Catheter Day #2 bilateral T5-6 ES catheter/infusion:    Continue Ropivacaine 0.2% PIB (programmed intermittent bolus) infusion at 7 mL Q 60 min via each catheter, total infusion 14 mL/hr   o Plan to maintain catheters while chest tubes in place, max of 7 days      When ready for extubation, patient can be evaluated to receive local anesthetic bolus. Bedside RN should page RAPS to request bolus    - Antithrombotics/Thrombolytics ordered:     Okay to continue Heparin SQ 5,000 units Q 8 hr as ordered by primary service  o Please contact SHAHANA jobcode pager [phone] before ordering or making any medication changes    RAPS will continue to follow and adjust as needed    Analgesic Medications ordered:  Medications related to Pain Management (From now, onward)    Start     Dose/Rate Route Frequency " Ordered Stop    09/09/21 2200  gabapentin (NEURONTIN) capsule 1,200 mg      1,200 mg Oral AT BEDTIME 09/09/21 1146      09/09/21 1400  gabapentin (NEURONTIN) capsule 600 mg      600 mg Oral 2 TIMES DAILY 09/09/21 1146      09/08/21 2200  [Held by provider]  DULoxetine (CYMBALTA) DR capsule 30 mg     (Held by provider since Thu 9/9/2021 at 0746 by Sven Acuna MD.Hold Reason: Other.Hold Comments: Hold while intubated)    30 mg Oral AT BEDTIME 09/08/21 1105      09/08/21 1730  fentaNYL (SUBLIMAZE) infusion       mcg/hr  1-4 mL/hr  Intravenous CONTINUOUS 09/08/21 1710 09/08/21 1730  propofol (DIPRIVAN) infusion      5-75 mcg/kg/min × 93.9 kg (Dosing Weight)  2.8-42.3 mL/hr  Intravenous CONTINUOUS 09/08/21 1710 09/08/21 0800  polyethylene glycol (MIRALAX) Packet 17 g      17 g Oral or Feeding Tube DAILY 09/07/21 1652 09/08/21 0800  aspirin (ASA) chewable tablet 81 mg      81 mg Oral or NG Tube DAILY 09/07/21 1652 09/07/21 2000  senna-docusate (SENOKOT-S/PERICOLACE) 8.6-50 MG per tablet 1 tablet      1 tablet Oral or Feeding Tube 2 TIMES DAILY 09/07/21 1652 09/07/21 1652  HYDROmorphone (DILAUDID) injection 0.2 mg      0.2 mg Intravenous EVERY 2 HOURS PRN 09/07/21 1652 09/07/21 1652  HYDROmorphone (DILAUDID) injection 0.4 mg      0.4 mg Intravenous EVERY 2 HOURS PRN 09/07/21 1652 09/07/21 1652  oxyCODONE (ROXICODONE) tablet 5 mg      5 mg Oral or Feeding Tube EVERY 4 HOURS PRN 09/07/21 1652 09/07/21 1652  oxyCODONE IR (ROXICODONE) tablet 10 mg      10 mg Oral or Feeding Tube EVERY 4 HOURS PRN 09/07/21 1652 09/07/21 1652  lidocaine 1 % 0.1-1 mL      0.1-1 mL Other EVERY 1 HOUR PRN 09/07/21 1652      09/07/21 1652  lidocaine (LMX4) cream       Topical EVERY 1 HOUR PRN 09/07/21 1652 09/07/21 1652  methocarbamol (ROBAXIN) tablet 500 mg      500 mg Oral or Feeding Tube EVERY 6 HOURS PRN 09/07/21 1652 09/07/21 1652  magnesium hydroxide (MILK OF MAGNESIA)  suspension 30 mL      30 mL Oral DAILY PRN 09/07/21 1652      09/07/21 1652  bisacodyl (DULCOLAX) Suppository 10 mg      10 mg Rectal DAILY PRN 09/07/21 1652      09/07/21 0830  ropivacaine 0.2% in NS perineural infusion simple       Perineural Continuous Nerve Block 09/07/21 0821      09/07/21 0830  ropivacaine 0.2% in NS perineural infusion simple       Perineural Continuous Nerve Block 09/07/21 0821                Labs/Diagnostics  Heme/INR:  Recent Labs   Lab Test 09/09/21  0341 09/09/21  0014   WBC 22.3* 24.1*   RBC 3.16* 3.42*   HGB 8.6* 9.4*   HCT 28.2* 30.6*   MCV 89 90   MCH 27.2 27.5   MCHC 30.5* 30.7*   RDW 22.4* 22.5*   * 132*       Lab Results   Component Value Date    INR 1.37 09/07/2021    INR 1.79 09/07/2021    INR 2.04 08/25/2021    INR 2.6 07/28/2021    INR 2.8 07/15/2021    INR 2.9 07/07/2021    INR 3.65 06/08/2021    INR 1.99 05/29/2021    INR 2.15 05/28/2021     ---------------------------  Alexandr Cruz MD  [facility]  9/9/2021 3:21 PM    RAPS Contact Info (24 hour job code pager is the last 4 digits) For in-house use only:   Job code ID: [id number]   [place] [employer] 0599  Peds [phone]  [place] phone: dial * * * [phone], enter jobcode ID, then enter call-back number.    Text: Use [employer] on the Intranet <Paging/Directory> tab and enter Jobcode ID.   If no call back at any time, contact the hospital  and ask for RAPS attending or backup

## 2021-09-10 NOTE — PLAN OF CARE
Neuro: Patient able to follow simple commands. Pupils equal and reactive. Weaning sedation as tolerated. Grimacing.  CV: 100% paced. SBP 90-110s. HR 80-89.  Pulm: Clear to coarse lung sounds. Peep down to 5 per team. Weaning nitric.  Oxygen saturation 87-90%, team aware - Peep at 8 and FiO2 at 60%. Nitric at 1.4.  GI: TF at 30, goal of 40. Hypoactive bowel sounds.   : Urinary catheter. Urine output 45 every 2 hs. Bumex given, 200-300 ml/hr.   Surg: Wound vac in place over sternal incision, no output.  IV/Gtt: Epi off. Norepi adjusted per patient tolerance. Propofol and fentanyl.    Will continue to monitor for safety and comfort.  See flowsheet.    Roberta Corona RN

## 2021-09-10 NOTE — PLAN OF CARE
Changes this shift: Paced at 90, short episodes of loss of capture during bath and for 30 minutes following. MD aware. Spare V wires uncapped and available if needed. Required 0.02 of epi overnight to maintain MAP>65. KIMMY Q6H, CI 2.5/2.9. /hr urine output overnight. Continues on 5 of nitric. Fentanyl increased to 200 as pt endorsing pain. Sedated on propofol overnight, resists movements even with sedation. Insulin gtt off throughout shift. Trophic feeds in OG, 70ml residual at 0400.     Plan:Weaning of nitric today. Continue to monitor and notify MD with pertinent changes.

## 2021-09-10 NOTE — PROGRESS NOTES
CLINICAL NUTRITION SERVICES - BRIEF NOTE   (see RD note on 9/9 for full assessment)    Receiving trophic feeds (Vital HP @ 20 ml/hr) via OGT.   Unlikely to extubate today, so plan to advance feeds to meet full nutrition needs.    Nutrition changes today:  - Adv by 10 ml q8h to eventual goal with Novasource renal @ 40 ml/hr   - Prosource (1 pkt BID) to meet protein needs.    Bertha Klein, PURA, LD  [id number]  Pgr: [phone]

## 2021-09-11 ENCOUNTER — APPOINTMENT (OUTPATIENT)
Dept: GENERAL RADIOLOGY | Facility: CLINIC | Age: 66
DRG: 219 | End: 2021-09-11
Attending: STUDENT IN AN ORGANIZED HEALTH CARE EDUCATION/TRAINING PROGRAM
Payer: MEDICARE

## 2021-09-11 LAB
ALBUMIN SERPL-MCNC: 2.3 G/DL (ref 3.4–5)
ALP SERPL-CCNC: 212 U/L (ref 40–150)
ALT SERPL W P-5'-P-CCNC: 20 U/L (ref 0–50)
ANION GAP SERPL CALCULATED.3IONS-SCNC: 5 MMOL/L (ref 3–14)
ANION GAP SERPL CALCULATED.3IONS-SCNC: 7 MMOL/L (ref 3–14)
ANION GAP SERPL CALCULATED.3IONS-SCNC: 7 MMOL/L (ref 3–14)
AST SERPL W P-5'-P-CCNC: 44 U/L (ref 0–45)
BASE EXCESS BLDA CALC-SCNC: 5.3 MMOL/L (ref -9–1.8)
BASE EXCESS BLDV CALC-SCNC: 5.7 MMOL/L (ref -7.7–1.9)
BASE EXCESS BLDV CALC-SCNC: 6.2 MMOL/L (ref -7.7–1.9)
BASE EXCESS BLDV CALC-SCNC: 6.7 MMOL/L (ref -7.7–1.9)
BASE EXCESS BLDV CALC-SCNC: 8.1 MMOL/L (ref -7.7–1.9)
BASE EXCESS BLDV CALC-SCNC: 8.2 MMOL/L (ref -7.7–1.9)
BASE EXCESS BLDV CALC-SCNC: 8.3 MMOL/L (ref -7.7–1.9)
BILIRUB SERPL-MCNC: 0.3 MG/DL (ref 0.2–1.3)
BUN SERPL-MCNC: 46 MG/DL (ref 7–30)
BUN SERPL-MCNC: 46 MG/DL (ref 7–30)
BUN SERPL-MCNC: 47 MG/DL (ref 7–30)
CA-I BLD-MCNC: 4.6 MG/DL (ref 4.4–5.2)
CALCIUM SERPL-MCNC: 8.7 MG/DL (ref 8.5–10.1)
CALCIUM SERPL-MCNC: 8.7 MG/DL (ref 8.5–10.1)
CALCIUM SERPL-MCNC: 8.9 MG/DL (ref 8.5–10.1)
CHLORIDE BLD-SCNC: 109 MMOL/L (ref 94–109)
CO2 SERPL-SCNC: 27 MMOL/L (ref 20–32)
CO2 SERPL-SCNC: 27 MMOL/L (ref 20–32)
CO2 SERPL-SCNC: 30 MMOL/L (ref 20–32)
CREAT SERPL-MCNC: 0.99 MG/DL (ref 0.52–1.04)
CREAT SERPL-MCNC: 1.08 MG/DL (ref 0.52–1.04)
CREAT SERPL-MCNC: 1.08 MG/DL (ref 0.52–1.04)
ERYTHROCYTE [DISTWIDTH] IN BLOOD BY AUTOMATED COUNT: 23.7 % (ref 10–15)
GFR SERPL CREATININE-BSD FRML MDRD: 54 ML/MIN/1.73M2
GFR SERPL CREATININE-BSD FRML MDRD: 54 ML/MIN/1.73M2
GFR SERPL CREATININE-BSD FRML MDRD: 60 ML/MIN/1.73M2
GLUCOSE BLD-MCNC: 113 MG/DL (ref 70–99)
GLUCOSE BLD-MCNC: 113 MG/DL (ref 70–99)
GLUCOSE BLD-MCNC: 130 MG/DL (ref 70–99)
GLUCOSE BLDC GLUCOMTR-MCNC: 102 MG/DL (ref 70–99)
GLUCOSE BLDC GLUCOMTR-MCNC: 115 MG/DL (ref 70–99)
GLUCOSE BLDC GLUCOMTR-MCNC: 135 MG/DL (ref 70–99)
GLUCOSE BLDC GLUCOMTR-MCNC: 135 MG/DL (ref 70–99)
GLUCOSE BLDC GLUCOMTR-MCNC: 142 MG/DL (ref 70–99)
GLUCOSE BLDC GLUCOMTR-MCNC: 143 MG/DL (ref 70–99)
GLUCOSE BLDC GLUCOMTR-MCNC: 99 MG/DL (ref 70–99)
HCO3 BLD-SCNC: 29 MMOL/L (ref 21–28)
HCO3 BLDV-SCNC: 31 MMOL/L (ref 21–28)
HCO3 BLDV-SCNC: 31 MMOL/L (ref 21–28)
HCO3 BLDV-SCNC: 32 MMOL/L (ref 21–28)
HCO3 BLDV-SCNC: 33 MMOL/L (ref 21–28)
HCT VFR BLD AUTO: 24.7 % (ref 35–47)
HGB BLD-MCNC: 7.7 G/DL (ref 11.7–15.7)
HGB BLD-MCNC: 8.3 G/DL (ref 11.7–15.7)
LACTATE SERPL-SCNC: 1.3 MMOL/L (ref 0.7–2)
MAGNESIUM SERPL-MCNC: 2.1 MG/DL (ref 1.6–2.3)
MAGNESIUM SERPL-MCNC: 2.2 MG/DL (ref 1.6–2.3)
MCH RBC QN AUTO: 27.3 PG (ref 26.5–33)
MCHC RBC AUTO-ENTMCNC: 31.2 G/DL (ref 31.5–36.5)
MCV RBC AUTO: 88 FL (ref 78–100)
O2/TOTAL GAS SETTING VFR VENT: 40 %
OXYHGB MFR BLD: 97 % (ref 92–100)
OXYHGB MFR BLDV: 43 % (ref 70–75)
OXYHGB MFR BLDV: 45 % (ref 70–75)
OXYHGB MFR BLDV: 46 % (ref 70–75)
OXYHGB MFR BLDV: 47 % (ref 70–75)
OXYHGB MFR BLDV: 49 % (ref 70–75)
OXYHGB MFR BLDV: 55 % (ref 70–75)
PCO2 BLD: 40 MM HG (ref 35–45)
PCO2 BLDV: 46 MM HG (ref 40–50)
PCO2 BLDV: 48 MM HG (ref 40–50)
PCO2 BLDV: 48 MM HG (ref 40–50)
PCO2 BLDV: 49 MM HG (ref 40–50)
PCO2 BLDV: 50 MM HG (ref 40–50)
PCO2 BLDV: 50 MM HG (ref 40–50)
PH BLD: 7.47 [PH] (ref 7.35–7.45)
PH BLDV: 7.41 [PH] (ref 7.32–7.43)
PH BLDV: 7.43 [PH] (ref 7.32–7.43)
PH BLDV: 7.43 [PH] (ref 7.32–7.43)
PH BLDV: 7.44 [PH] (ref 7.32–7.43)
PH BLDV: 7.44 [PH] (ref 7.32–7.43)
PH BLDV: 7.46 [PH] (ref 7.32–7.43)
PHOSPHATE SERPL-MCNC: 3.4 MG/DL (ref 2.5–4.5)
PHOSPHATE SERPL-MCNC: 4.5 MG/DL (ref 2.5–4.5)
PLATELET # BLD AUTO: 132 10E3/UL (ref 150–450)
PO2 BLD: 96 MM HG (ref 80–105)
PO2 BLDV: 26 MM HG (ref 25–47)
PO2 BLDV: 29 MM HG (ref 25–47)
PO2 BLDV: 29 MM HG (ref 25–47)
PO2 BLDV: 30 MM HG (ref 25–47)
PO2 BLDV: 30 MM HG (ref 25–47)
PO2 BLDV: 33 MM HG (ref 25–47)
POTASSIUM BLD-SCNC: 3.6 MMOL/L (ref 3.4–5.3)
POTASSIUM BLD-SCNC: 3.6 MMOL/L (ref 3.4–5.3)
POTASSIUM BLD-SCNC: 4.3 MMOL/L (ref 3.4–5.3)
POTASSIUM BLD-SCNC: 4.3 MMOL/L (ref 3.4–5.3)
PROT SERPL-MCNC: 5.8 G/DL (ref 6.8–8.8)
RBC # BLD AUTO: 2.82 10E6/UL (ref 3.8–5.2)
SODIUM SERPL-SCNC: 143 MMOL/L (ref 133–144)
SODIUM SERPL-SCNC: 143 MMOL/L (ref 133–144)
SODIUM SERPL-SCNC: 144 MMOL/L (ref 133–144)
WBC # BLD AUTO: 19.4 10E3/UL (ref 4–11)

## 2021-09-11 PROCEDURE — 84132 ASSAY OF SERUM POTASSIUM: CPT | Performed by: THORACIC SURGERY (CARDIOTHORACIC VASCULAR SURGERY)

## 2021-09-11 PROCEDURE — 87070 CULTURE OTHR SPECIMN AEROBIC: CPT | Performed by: SURGERY

## 2021-09-11 PROCEDURE — 83735 ASSAY OF MAGNESIUM: CPT

## 2021-09-11 PROCEDURE — 82330 ASSAY OF CALCIUM: CPT | Performed by: SURGERY

## 2021-09-11 PROCEDURE — 84100 ASSAY OF PHOSPHORUS: CPT

## 2021-09-11 PROCEDURE — 250N000011 HC RX IP 250 OP 636: Performed by: SURGERY

## 2021-09-11 PROCEDURE — 94003 VENT MGMT INPAT SUBQ DAY: CPT

## 2021-09-11 PROCEDURE — 83605 ASSAY OF LACTIC ACID: CPT | Performed by: ANESTHESIOLOGY

## 2021-09-11 PROCEDURE — 80048 BASIC METABOLIC PNL TOTAL CA: CPT

## 2021-09-11 PROCEDURE — 250N000012 HC RX MED GY IP 250 OP 636 PS 637: Performed by: SURGERY

## 2021-09-11 PROCEDURE — 250N000011 HC RX IP 250 OP 636: Performed by: PHYSICIAN ASSISTANT

## 2021-09-11 PROCEDURE — 250N000009 HC RX 250: Performed by: NURSE PRACTITIONER

## 2021-09-11 PROCEDURE — 200N000002 HC R&B ICU UMMC

## 2021-09-11 PROCEDURE — 250N000013 HC RX MED GY IP 250 OP 250 PS 637: Performed by: THORACIC SURGERY (CARDIOTHORACIC VASCULAR SURGERY)

## 2021-09-11 PROCEDURE — 87205 SMEAR GRAM STAIN: CPT | Performed by: SURGERY

## 2021-09-11 PROCEDURE — 250N000011 HC RX IP 250 OP 636: Performed by: THORACIC SURGERY (CARDIOTHORACIC VASCULAR SURGERY)

## 2021-09-11 PROCEDURE — 82805 BLOOD GASES W/O2 SATURATION: CPT | Performed by: ANESTHESIOLOGY

## 2021-09-11 PROCEDURE — 250N000013 HC RX MED GY IP 250 OP 250 PS 637: Performed by: NURSE PRACTITIONER

## 2021-09-11 PROCEDURE — 94640 AIRWAY INHALATION TREATMENT: CPT | Mod: 76

## 2021-09-11 PROCEDURE — 250N000013 HC RX MED GY IP 250 OP 250 PS 637: Performed by: SURGERY

## 2021-09-11 PROCEDURE — 71045 X-RAY EXAM CHEST 1 VIEW: CPT | Mod: 26 | Performed by: RADIOLOGY

## 2021-09-11 PROCEDURE — 99291 CRITICAL CARE FIRST HOUR: CPT | Mod: 24 | Performed by: ANESTHESIOLOGY

## 2021-09-11 PROCEDURE — 82805 BLOOD GASES W/O2 SATURATION: CPT | Performed by: PHYSICIAN ASSISTANT

## 2021-09-11 PROCEDURE — 999N000157 HC STATISTIC RCP TIME EA 10 MIN

## 2021-09-11 PROCEDURE — 71045 X-RAY EXAM CHEST 1 VIEW: CPT

## 2021-09-11 PROCEDURE — 85027 COMPLETE CBC AUTOMATED: CPT | Performed by: SURGERY

## 2021-09-11 PROCEDURE — 94640 AIRWAY INHALATION TREATMENT: CPT

## 2021-09-11 PROCEDURE — 999N000015 HC STATISTIC ARTERIAL MONITORING DAILY

## 2021-09-11 PROCEDURE — 250N000011 HC RX IP 250 OP 636: Performed by: STUDENT IN AN ORGANIZED HEALTH CARE EDUCATION/TRAINING PROGRAM

## 2021-09-11 PROCEDURE — 87186 SC STD MICRODIL/AGAR DIL: CPT | Performed by: SURGERY

## 2021-09-11 PROCEDURE — 999N000045 HC STATISTIC DAILY SWAN MONITORING

## 2021-09-11 PROCEDURE — 85018 HEMOGLOBIN: CPT | Performed by: PHYSICIAN ASSISTANT

## 2021-09-11 PROCEDURE — 250N000013 HC RX MED GY IP 250 OP 250 PS 637: Performed by: PHYSICIAN ASSISTANT

## 2021-09-11 RX ORDER — BUMETANIDE 0.25 MG/ML
4 INJECTION INTRAMUSCULAR; INTRAVENOUS ONCE
Status: COMPLETED | OUTPATIENT
Start: 2021-09-11 | End: 2021-09-11

## 2021-09-11 RX ORDER — NICOTINE POLACRILEX 4 MG
15-30 LOZENGE BUCCAL
Status: DISCONTINUED | OUTPATIENT
Start: 2021-09-11 | End: 2021-09-16

## 2021-09-11 RX ORDER — POTASSIUM CHLORIDE 1.5 G/1.58G
20 POWDER, FOR SOLUTION ORAL ONCE
Status: COMPLETED | OUTPATIENT
Start: 2021-09-11 | End: 2021-09-11

## 2021-09-11 RX ORDER — POTASSIUM CHLORIDE 20MEQ/15ML
20 LIQUID (ML) ORAL 2 TIMES DAILY
Status: DISCONTINUED | OUTPATIENT
Start: 2021-09-11 | End: 2021-09-15

## 2021-09-11 RX ORDER — DEXTROSE MONOHYDRATE 25 G/50ML
25-50 INJECTION, SOLUTION INTRAVENOUS
Status: DISCONTINUED | OUTPATIENT
Start: 2021-09-11 | End: 2021-09-16

## 2021-09-11 RX ORDER — POTASSIUM CHLORIDE 29.8 MG/ML
20 INJECTION INTRAVENOUS ONCE
Status: COMPLETED | OUTPATIENT
Start: 2021-09-11 | End: 2021-09-11

## 2021-09-11 RX ORDER — BUMETANIDE 0.25 MG/ML
4 INJECTION INTRAMUSCULAR; INTRAVENOUS ONCE
Status: DISCONTINUED | OUTPATIENT
Start: 2021-09-11 | End: 2021-09-11

## 2021-09-11 RX ORDER — PIPERACILLIN SODIUM, TAZOBACTAM SODIUM 4; .5 G/20ML; G/20ML
4.5 INJECTION, POWDER, LYOPHILIZED, FOR SOLUTION INTRAVENOUS EVERY 6 HOURS
Status: DISCONTINUED | OUTPATIENT
Start: 2021-09-11 | End: 2021-09-13

## 2021-09-11 RX ADMIN — ASPIRIN 81 MG: 81 TABLET, CHEWABLE ORAL at 08:25

## 2021-09-11 RX ADMIN — DOCUSATE SODIUM 50 MG AND SENNOSIDES 8.6 MG 1 TABLET: 8.6; 5 TABLET, FILM COATED ORAL at 20:11

## 2021-09-11 RX ADMIN — DOCUSATE SODIUM 50 MG AND SENNOSIDES 8.6 MG 1 TABLET: 8.6; 5 TABLET, FILM COATED ORAL at 08:25

## 2021-09-11 RX ADMIN — PREDNISONE 40 MG: 20 TABLET ORAL at 08:25

## 2021-09-11 RX ADMIN — BUMETANIDE 4 MG: 0.25 INJECTION INTRAMUSCULAR; INTRAVENOUS at 13:05

## 2021-09-11 RX ADMIN — GABAPENTIN 600 MG: 250 SUSPENSION ORAL at 18:24

## 2021-09-11 RX ADMIN — GABAPENTIN 600 MG: 250 SUSPENSION ORAL at 08:27

## 2021-09-11 RX ADMIN — GABAPENTIN 1200 MG: 250 SUSPENSION ORAL at 22:04

## 2021-09-11 RX ADMIN — LEVALBUTEROL HYDROCHLORIDE 1.25 MG: 1.25 SOLUTION RESPIRATORY (INHALATION) at 08:33

## 2021-09-11 RX ADMIN — MUPIROCIN 0.5 G: 20 OINTMENT TOPICAL at 08:26

## 2021-09-11 RX ADMIN — LEVALBUTEROL HYDROCHLORIDE 1.25 MG: 1.25 SOLUTION RESPIRATORY (INHALATION) at 20:31

## 2021-09-11 RX ADMIN — MUPIROCIN 0.5 G: 20 OINTMENT TOPICAL at 20:11

## 2021-09-11 RX ADMIN — PROPOFOL 10 MCG/KG/MIN: 10 INJECTION, EMULSION INTRAVENOUS at 06:17

## 2021-09-11 RX ADMIN — POTASSIUM CHLORIDE 20 MEQ: 40 SOLUTION ORAL at 13:03

## 2021-09-11 RX ADMIN — PIPERACILLIN SODIUM AND TAZOBACTAM SODIUM 4.5 G: 4; .5 INJECTION, POWDER, LYOPHILIZED, FOR SOLUTION INTRAVENOUS at 13:03

## 2021-09-11 RX ADMIN — PIPERACILLIN SODIUM AND TAZOBACTAM SODIUM 4.5 G: 4; .5 INJECTION, POWDER, LYOPHILIZED, FOR SOLUTION INTRAVENOUS at 18:24

## 2021-09-11 RX ADMIN — Medication 1 PACKET: at 11:11

## 2021-09-11 RX ADMIN — POTASSIUM CHLORIDE 20 MEQ: 40 SOLUTION ORAL at 20:40

## 2021-09-11 RX ADMIN — Medication 100 MCG/HR: at 20:38

## 2021-09-11 RX ADMIN — AMIODARONE HYDROCHLORIDE 200 MG: 200 TABLET ORAL at 08:25

## 2021-09-11 RX ADMIN — POTASSIUM CHLORIDE 20 MEQ: 29.8 INJECTION, SOLUTION INTRAVENOUS at 05:14

## 2021-09-11 RX ADMIN — PROPOFOL 20 MCG/KG/MIN: 10 INJECTION, EMULSION INTRAVENOUS at 18:30

## 2021-09-11 RX ADMIN — LEVALBUTEROL HYDROCHLORIDE 1.25 MG: 1.25 SOLUTION RESPIRATORY (INHALATION) at 15:51

## 2021-09-11 RX ADMIN — POLYETHYLENE GLYCOL 3350 17 G: 17 POWDER, FOR SOLUTION ORAL at 11:11

## 2021-09-11 RX ADMIN — Medication 1 PACKET: at 08:27

## 2021-09-11 RX ADMIN — HEPARIN SODIUM 5000 UNITS: 10000 INJECTION, SOLUTION INTRAVENOUS; SUBCUTANEOUS at 04:15

## 2021-09-11 RX ADMIN — HEPARIN SODIUM 5000 UNITS: 10000 INJECTION, SOLUTION INTRAVENOUS; SUBCUTANEOUS at 11:15

## 2021-09-11 RX ADMIN — ATORVASTATIN CALCIUM 40 MG: 40 TABLET, FILM COATED ORAL at 20:11

## 2021-09-11 RX ADMIN — Medication 15 ML: at 08:25

## 2021-09-11 RX ADMIN — POTASSIUM CHLORIDE 20 MEQ: 1.5 POWDER, FOR SOLUTION ORAL at 13:03

## 2021-09-11 RX ADMIN — LEVALBUTEROL HYDROCHLORIDE 1.25 MG: 1.25 SOLUTION RESPIRATORY (INHALATION) at 12:04

## 2021-09-11 RX ADMIN — HEPARIN SODIUM 5000 UNITS: 10000 INJECTION, SOLUTION INTRAVENOUS; SUBCUTANEOUS at 20:11

## 2021-09-11 RX ADMIN — PIPERACILLIN SODIUM AND TAZOBACTAM SODIUM 4.5 G: 4; .5 INJECTION, POWDER, LYOPHILIZED, FOR SOLUTION INTRAVENOUS at 23:58

## 2021-09-11 ASSESSMENT — ACTIVITIES OF DAILY LIVING (ADL)
ADLS_ACUITY_SCORE: 22
ADLS_ACUITY_SCORE: 23
ADLS_ACUITY_SCORE: 22
ADLS_ACUITY_SCORE: 23

## 2021-09-11 NOTE — PROGRESS NOTES
CV ICU PROGRESS NOTE  September 11, 2021      CO-MORBIDITIES:   S/P MVR (mitral valve replacement)  (primary encounter diagnosis)  Mitral stenosis  Nonrheumatic mitral valve stenosis    ASSESSMENT: Corina Martinez is a 66 year old female with PMH of arthritis, SHAILESH, HTN, CHF and mitral valve stenosis, now s/p MVR w/ left atrial appendage ligation on 9/7 with Dr. Acuna.     OVERNIGHT EVENTS:   No acute events     TODAY'S CHANGES:   Trach culture, start zosyn   Bumex x1  Scheduled K and additional 20mg now   SSI     PLAN:  Neuro/ pain/ sedation:  # Acute post-operative pain  - Monitor neurological status. Notify the MD for any acute changes in exam.  - acetaminophen allergy,   - Scheduled gabapentin (PTA dose), PRN oxy, robaxin and dilaudid   - NIKKI catheters B/l  (9/7-  - PTA cymbalta 30 mg, holding until PO intake   - PTA on atarax, holding while intubated     Pulmonary care:   #Current smoker   #COPD sat's upper 80's at baseline   #SHAILESH  - Extubated in OR, reintubated POD1  - Vent, AC. 18/400/8, 50%  - ABG 7.47/96/40/29  - Weaned off Giovani yesterday   - Prednisone 40mg (5d course)     Cardiovascular:    #HTN  #CHF, EF 55-60%   #Afib, warfarin   #MV stenosis, s/p MVR 9/7  #Left atrial appendage thrombus s/p atrial appendage clip 9/7  AV-paced epicardial leads - at 90   - Monitor hemodynamic status.   - MAP goal > 65  - Off pressors   - if has another episode of a fib, likely need to start anticoagulation - check with CVTS  - ASA, atorvastatin, amiodarone (PTA dose)    - holding PTA on torsemide 10-20 daily, PTA Metoprolol and diltiazem     GI /Nutrition:   #GERD  #Primary biliary cirrhosis   - NPO   - OGT, TFs.   - Miralax daily, senna 1 tab BID, PRN MoM and supp     Fluids/ Electrolytes/ Renal:   #RADHA  - Iniguez for strict I&Os, UOP 3L//445cc (net -1.2L yesterday)   -  Bumex 4 mg x1 again today  - Scheduled K 20mg BID, +20mg now      Endocrine:    #Stress hyperglycemia  - off gtt this AM, start SSI      ID/  Antibiotics:  - Perioperative antibiotics with cefazolin, vanc   - Afebrile, WBC 19.4 (28.2).    - Trach cx 9/11, follow  - Start empiric zosyn d/t increased secretions     Heme:     # Acute blood loss anemia  - Hgb 7.7 (8).   -Transfuse for hgb <7     Prophylaxis:    - SCDs  - PPI  - SQH     Lines/ tubes/ drains:  - CVC  - Arterial line  - Iniguez  - Mediastinal tube x2  - Pleural tube x2     Disposition:  - CV ICU.      Patient seen, findings and plan discussed with CV ICU staff.      Juanis Sanchez MD  General Surgery, PGY3  [id number]     ====================================    SUBJECTIVE:   Sitting up in chair, intubated. Voiding.     OBJECTIVE:   1. VITAL SIGNS:   Temp:  [98.1  F (36.7  C)-98.9  F (37.2  C)] 98.6  F (37  C)  Pulse:  [80-89] 80  Resp:  [18] 18  BP: (100)/(66) 100/66  MAP:  [62 mmHg-101 mmHg] 80 mmHg  Arterial Line BP: ()/(42-84) 104/64  FiO2 (%):  [40 %-60 %] 40 %  SpO2:  [85 %-100 %] 98 %  Ventilation Mode: CMV/AC  (Continuous Mandatory Ventilation/ Assist Control)  FiO2 (%): 40 %  Rate Set (breaths/minute): 18 breaths/min  Tidal Volume Set (mL): 400 mL  PEEP (cm H2O): 8 cmH2O  Oxygen Concentration (%): 50 %  Resp: 18      2. INTAKE/ OUTPUT:   I/O last 3 completed shifts:  In: 1942.18 [I.V.:827.18; NG/GT:345]  Out: 3200 [Urine:2980; Chest Tube:220]    3. PHYSICAL EXAMINATION:   General: intubated, NAD  Neuro: lethargic but follows commands with sedation off  Resp: mechanically ventilated   CV: RRR, paced  Abdomen: Soft, non distended, CTs with SS output   Incisions: c/d/i  Extremities: warm and well perfused, bilateral LE edema     4. INVESTIGATIONS:   Arterial Blood Gases   Recent Labs   Lab 09/11/21  0408 09/10/21  0340 09/09/21  1230 09/09/21  1014   PH 7.47* 7.44 7.44 7.47*   PCO2 40 40 41 38   PO2 96 76* 81 89   HCO3 29* 28 28 28     Complete Blood Count   Recent Labs   Lab 09/11/21  0407 09/10/21  0341 09/09/21  0341 09/09/21  0014   WBC 19.4* 28.2* 22.3* 24.1*   HGB 7.7* 8.0* 8.6*  9.4*   * 119* 121* 132*     Basic Metabolic Panel  Recent Labs   Lab 09/11/21  0407 09/11/21  0355 09/10/21  2349 09/10/21  2003 09/10/21  1413 09/10/21  0341 09/09/21  1608     143  --   --   --  143 142  142 143   POTASSIUM 3.6  3.6  --   --   --  4.8 4.8  4.8 4.4   CHLORIDE 109  109  --   --   --  110* 109  109 109   CO2 27  27  --   --   --  28 26  26 27   BUN 46*  46*  --   --   --  40* 34*  34* 31*   CR 1.08*  1.08*  --   --   --  1.14* 1.05*  1.05* 1.20*   *  113* 115* 97 111* 101* 139*  139* 95     Liver Function Tests  Recent Labs   Lab 09/11/21  0407 09/10/21  0341 09/09/21  0341 09/09/21  0014 09/07/21  1654 09/07/21  1455   AST 44 57* 86* 97*  --   --    ALT 20 19 24 28 70*  --    ALKPHOS 212* 160* 147 163* 228*  --    BILITOTAL 0.3 0.4 0.4 0.4 0.8  --    ALBUMIN 2.3* 2.4* 2.8* 2.4* 2.4*  --    INR  --   --   --   --  1.37* 1.79*     Coagulation Profile  Recent Labs   Lab 09/07/21 1654 09/07/21  1455   INR 1.37* 1.79*   PTT 46* 34       5. RADIOLOGY:   No results found for this or any previous visit (from the past 24 hour(s)).    =========================================

## 2021-09-11 NOTE — PLAN OF CARE
Major Shift Events:  Pt has copious creamy secretions, sputum culture sent. Zosyn started. Pt OOB to chair with lift. Pt follows commands and answers questions on current sedation. SvO2 trending down, most recent is 45%. Per Dr Doll hgb sent and epi ordered to start as needed overnight.   Plan: Plan to pressure support tomorrow. Continue to monitor and notify CVTS team of questions/concerns.   For vital signs and complete assessments, please see documentation flowsheets.

## 2021-09-11 NOTE — PROGRESS NOTES
"[facility] Pain Service Nerve Block Daily Progress Note  09/11/21    24 Hour Events  - Remains intubated; sedated on propofol, fentanyl IV infusion for analgesia  - Chest tubes x 4, Iniguez catheter in place  - Repositioned with full assist for turning    Subjective  - No objective evidence of LAST  - Able to communicate via head nods and hand squeezes    Objective  Exam  Vitals:   /66 (BP Location: Left arm, Cuff Size: Adult Regular)   Pulse 80   Temp 37.3  C (99.1  F) (Axillary)   Resp 18   Ht 1.689 m (5' 6.5\")   Wt 101 kg (222 lb 10.6 oz)   LMP  (LMP Unknown)   SpO2 99%   BMI 35.40 kg/m        General: On CMV  MSK/Neuro: not assessed    Skin: bilateral erector spinae (ES) catheter sites with dressings c/d/i, no tenderness, no erythema, heme, edema present    Assessment  Corina Martinez is a 66 year old female with PMH of mitral stenosis who is now POD #4 s/p mitral valve replacement on 9/7/21. Prior to surgery uncomplicated placement of bilateral T5-6 erector spinae (ES) catheter by RAPS for analgesia. Remains intubated, appears comfortable. No evidence of adverse side effects related to local anesthetic continuous infusion    Plan  - Catheter Day #4 bilateral T5-6 ES catheters/infusion:    Continue Ropivacaine 0.2% PIB (programmed intermittent bolus) infusion at 7 mL Q 60 min via each catheter, total infusion 14 mL/hr   o Plan to maintain catheters while chest tubes in place, max of 7 days      When ready to extubate, patient can be evaluated to receive local anesthetic bolus, bedside RN should page RAPS to request bolus    Each catheter was hand bolused, 10mL 0.25% bupivacaine, bilaterally at 0800.    - Antithrombotics/Thrombolytics ordered:     Okay to continue Heparin SQ 5,000 units Q 8 hr as ordered by primary service  o Please contact RAPS jobcode pager [phone] before ordering or making any medication changes    RAPS will continue to follow and adjust as needed  Discussed with attending " anesthesiologist    Analgesic Medications ordered:  Medications related to Pain Management (From now, onward)    Start     Dose/Rate Route Frequency Ordered Stop    09/10/21 0800  gabapentin (NEURONTIN) solution 600 mg      600 mg Oral or Feeding Tube 2 TIMES DAILY 09/09/21 1717 09/09/21 2200  gabapentin (NEURONTIN) solution 1,200 mg      1,200 mg Oral or Feeding Tube AT BEDTIME 09/09/21 1715 09/08/21 2200  [Held by provider]  DULoxetine (CYMBALTA) DR capsule 30 mg     (Held by provider since Thu 9/9/2021 at 0746 by Sven Acuna MD.Hold Reason: Other.Hold Comments: Hold while intubated)    30 mg Oral AT BEDTIME 09/08/21 1105      09/08/21 1730  fentaNYL (SUBLIMAZE) infusion       mcg/hr  1-4 mL/hr  Intravenous CONTINUOUS 09/08/21 1710 09/08/21 1730  propofol (DIPRIVAN) infusion      5-75 mcg/kg/min × 93.9 kg (Dosing Weight)  2.8-42.3 mL/hr  Intravenous CONTINUOUS 09/08/21 1710 09/08/21 0800  polyethylene glycol (MIRALAX) Packet 17 g      17 g Oral or Feeding Tube DAILY 09/07/21 1652 09/08/21 0800  aspirin (ASA) chewable tablet 81 mg      81 mg Oral or NG Tube DAILY 09/07/21 1652 09/07/21 2000  senna-docusate (SENOKOT-S/PERICOLACE) 8.6-50 MG per tablet 1 tablet      1 tablet Oral or Feeding Tube 2 TIMES DAILY 09/07/21 1652 09/07/21 1652  HYDROmorphone (DILAUDID) injection 0.2 mg      0.2 mg Intravenous EVERY 2 HOURS PRN 09/07/21 1652 09/07/21 1652  HYDROmorphone (DILAUDID) injection 0.4 mg      0.4 mg Intravenous EVERY 2 HOURS PRN 09/07/21 1652 09/07/21 1652  oxyCODONE (ROXICODONE) tablet 5 mg      5 mg Oral or Feeding Tube EVERY 4 HOURS PRN 09/07/21 1652 09/07/21 1652  oxyCODONE IR (ROXICODONE) tablet 10 mg      10 mg Oral or Feeding Tube EVERY 4 HOURS PRN 09/07/21 1652      09/07/21 1652  lidocaine 1 % 0.1-1 mL      0.1-1 mL Other EVERY 1 HOUR PRN 09/07/21 1652 09/07/21 1652  lidocaine (LMX4) cream       Topical EVERY 1 HOUR PRN 09/07/21 1652       09/07/21 1652  methocarbamol (ROBAXIN) tablet 500 mg      500 mg Oral or Feeding Tube EVERY 6 HOURS PRN 09/07/21 1652      09/07/21 1652  magnesium hydroxide (MILK OF MAGNESIA) suspension 30 mL      30 mL Oral DAILY PRN 09/07/21 1652 09/07/21 1652  bisacodyl (DULCOLAX) Suppository 10 mg      10 mg Rectal DAILY PRN 09/07/21 1652 09/07/21 0830  ropivacaine 0.2% in NS perineural infusion simple       Perineural Continuous Nerve Block 09/07/21 0821      09/07/21 0830  ropivacaine 0.2% in NS perineural infusion simple       Perineural Continuous Nerve Block 09/07/21 0821                Labs/Diagnostics  Heme/INR:  Recent Labs   Lab Test 09/10/21  0341 09/09/21  0341   WBC 28.2* 22.3*   RBC 2.87* 3.16*   HGB 8.0* 8.6*   HCT 26.0* 28.2*   MCV 91 89   MCH 27.9 27.2   MCHC 30.8* 30.5*   RDW 23.4* 22.4*   * 121*       Lab Results   Component Value Date    INR 1.37 09/07/2021    INR 1.79 09/07/2021    INR 2.04 08/25/2021    INR 2.6 07/28/2021    INR 2.8 07/15/2021    INR 2.9 07/07/2021    INR 3.65 06/08/2021    INR 1.99 05/29/2021    INR 2.15 05/28/2021       ---------------------------  Saji Holcomb MD  Anesthesiology, CA-2, PGY3    RAPS Contact Info (24 hour job code pager is the last 4 digits) For in-house use only:   Job code ID: [id number] [id number]   [facility] 0599  [facility] [address]  [employer] phone: dial * * * [phone], enter jobcode ID, then enter call-back number.    Text: Use [employer] on the Intranet <Paging/Directory> tab and enter Jobcode ID.   If no call back at any time, contact the hospital  and ask for RAPS attending or backup

## 2021-09-12 ENCOUNTER — APPOINTMENT (OUTPATIENT)
Dept: GENERAL RADIOLOGY | Facility: CLINIC | Age: 66
DRG: 219 | End: 2021-09-12
Attending: STUDENT IN AN ORGANIZED HEALTH CARE EDUCATION/TRAINING PROGRAM
Payer: MEDICARE

## 2021-09-12 LAB
ABO/RH(D): NORMAL
ALBUMIN SERPL-MCNC: 2.2 G/DL (ref 3.4–5)
ALP SERPL-CCNC: 229 U/L (ref 40–150)
ALT SERPL W P-5'-P-CCNC: 23 U/L (ref 0–50)
ANION GAP SERPL CALCULATED.3IONS-SCNC: 3 MMOL/L (ref 3–14)
ANION GAP SERPL CALCULATED.3IONS-SCNC: 7 MMOL/L (ref 3–14)
ANION GAP SERPL CALCULATED.3IONS-SCNC: 7 MMOL/L (ref 3–14)
ANTIBODY SCREEN: NEGATIVE
AST SERPL W P-5'-P-CCNC: 39 U/L (ref 0–45)
BASE EXCESS BLDA CALC-SCNC: 4.9 MMOL/L (ref -9–1.8)
BASE EXCESS BLDA CALC-SCNC: 5.9 MMOL/L (ref -9–1.8)
BASE EXCESS BLDV CALC-SCNC: 4.5 MMOL/L (ref -7.7–1.9)
BASE EXCESS BLDV CALC-SCNC: 6.3 MMOL/L (ref -7.7–1.9)
BASE EXCESS BLDV CALC-SCNC: 6.6 MMOL/L (ref -7.7–1.9)
BASE EXCESS BLDV CALC-SCNC: 6.7 MMOL/L (ref -7.7–1.9)
BASE EXCESS BLDV CALC-SCNC: 7.5 MMOL/L (ref -7.7–1.9)
BILIRUB SERPL-MCNC: 0.4 MG/DL (ref 0.2–1.3)
BLD PROD TYP BPU: NORMAL
BLOOD COMPONENT TYPE: NORMAL
BUN SERPL-MCNC: 45 MG/DL (ref 7–30)
BUN SERPL-MCNC: 46 MG/DL (ref 7–30)
BUN SERPL-MCNC: 46 MG/DL (ref 7–30)
CA-I BLD-MCNC: 4.7 MG/DL (ref 4.4–5.2)
CALCIUM SERPL-MCNC: 8.7 MG/DL (ref 8.5–10.1)
CALCIUM SERPL-MCNC: 8.7 MG/DL (ref 8.5–10.1)
CALCIUM SERPL-MCNC: 8.9 MG/DL (ref 8.5–10.1)
CHLORIDE BLD-SCNC: 110 MMOL/L (ref 94–109)
CHLORIDE BLD-SCNC: 111 MMOL/L (ref 94–109)
CHLORIDE BLD-SCNC: 111 MMOL/L (ref 94–109)
CO2 SERPL-SCNC: 30 MMOL/L (ref 20–32)
CO2 SERPL-SCNC: 30 MMOL/L (ref 20–32)
CO2 SERPL-SCNC: 32 MMOL/L (ref 20–32)
CODING SYSTEM: NORMAL
CREAT SERPL-MCNC: 1.05 MG/DL (ref 0.52–1.04)
CREAT SERPL-MCNC: 1.1 MG/DL (ref 0.52–1.04)
CREAT SERPL-MCNC: 1.1 MG/DL (ref 0.52–1.04)
CROSSMATCH: NORMAL
ERYTHROCYTE [DISTWIDTH] IN BLOOD BY AUTOMATED COUNT: 23.5 % (ref 10–15)
GFR SERPL CREATININE-BSD FRML MDRD: 52 ML/MIN/1.73M2
GFR SERPL CREATININE-BSD FRML MDRD: 52 ML/MIN/1.73M2
GFR SERPL CREATININE-BSD FRML MDRD: 55 ML/MIN/1.73M2
GLUCOSE BLD-MCNC: 100 MG/DL (ref 70–99)
GLUCOSE BLD-MCNC: 100 MG/DL (ref 70–99)
GLUCOSE BLD-MCNC: 161 MG/DL (ref 70–99)
GLUCOSE BLDC GLUCOMTR-MCNC: 106 MG/DL (ref 70–99)
GLUCOSE BLDC GLUCOMTR-MCNC: 113 MG/DL (ref 70–99)
GLUCOSE BLDC GLUCOMTR-MCNC: 149 MG/DL (ref 70–99)
GLUCOSE BLDC GLUCOMTR-MCNC: 153 MG/DL (ref 70–99)
GLUCOSE BLDC GLUCOMTR-MCNC: 159 MG/DL (ref 70–99)
GRAM STAIN RESULT: NORMAL
GRAM STAIN RESULT: NORMAL
HCO3 BLD-SCNC: 30 MMOL/L (ref 21–28)
HCO3 BLD-SCNC: 31 MMOL/L (ref 21–28)
HCO3 BLDV-SCNC: 31 MMOL/L (ref 21–28)
HCO3 BLDV-SCNC: 31 MMOL/L (ref 21–28)
HCO3 BLDV-SCNC: 32 MMOL/L (ref 21–28)
HCO3 BLDV-SCNC: 32 MMOL/L (ref 21–28)
HCO3 BLDV-SCNC: 33 MMOL/L (ref 21–28)
HCT VFR BLD AUTO: 24.4 % (ref 35–47)
HGB BLD-MCNC: 7.5 G/DL (ref 11.7–15.7)
HGB BLD-MCNC: 8.9 G/DL (ref 11.7–15.7)
ISSUE DATE AND TIME: NORMAL
LACTATE SERPL-SCNC: 1.2 MMOL/L (ref 0.7–2)
MAGNESIUM SERPL-MCNC: 2 MG/DL (ref 1.6–2.3)
MAGNESIUM SERPL-MCNC: 2.3 MG/DL (ref 1.6–2.3)
MAGNESIUM SERPL-MCNC: 2.6 MG/DL (ref 1.6–2.3)
MCH RBC QN AUTO: 27.2 PG (ref 26.5–33)
MCHC RBC AUTO-ENTMCNC: 30.7 G/DL (ref 31.5–36.5)
MCV RBC AUTO: 88 FL (ref 78–100)
MRSA DNA SPEC QL NAA+PROBE: NEGATIVE
NT-PROBNP SERPL-MCNC: 2682 PG/ML (ref 0–900)
O2/TOTAL GAS SETTING VFR VENT: 30 %
O2/TOTAL GAS SETTING VFR VENT: 40 %
OXYHGB MFR BLD: 93 % (ref 92–100)
OXYHGB MFR BLDV: 44 % (ref 70–75)
OXYHGB MFR BLDV: 49 % (ref 70–75)
OXYHGB MFR BLDV: 54 % (ref 70–75)
OXYHGB MFR BLDV: 55 % (ref 70–75)
OXYHGB MFR BLDV: 58 % (ref 70–75)
PCO2 BLD: 47 MM HG (ref 35–45)
PCO2 BLD: 48 MM HG (ref 35–45)
PCO2 BLDV: 46 MM HG (ref 40–50)
PCO2 BLDV: 47 MM HG (ref 40–50)
PCO2 BLDV: 50 MM HG (ref 40–50)
PCO2 BLDV: 51 MM HG (ref 40–50)
PCO2 BLDV: 52 MM HG (ref 40–50)
PH BLD: 7.41 [PH] (ref 7.35–7.45)
PH BLD: 7.43 [PH] (ref 7.35–7.45)
PH BLDV: 7.39 [PH] (ref 7.32–7.43)
PH BLDV: 7.42 [PH] (ref 7.32–7.43)
PH BLDV: 7.42 [PH] (ref 7.32–7.43)
PH BLDV: 7.43 [PH] (ref 7.32–7.43)
PH BLDV: 7.44 [PH] (ref 7.32–7.43)
PHOSPHATE SERPL-MCNC: 3.7 MG/DL (ref 2.5–4.5)
PHOSPHATE SERPL-MCNC: 5.3 MG/DL (ref 2.5–4.5)
PLATELET # BLD AUTO: 148 10E3/UL (ref 150–450)
PO2 BLD: 76 MM HG (ref 80–105)
PO2 BLD: 83 MM HG (ref 80–105)
PO2 BLDV: 27 MM HG (ref 25–47)
PO2 BLDV: 29 MM HG (ref 25–47)
PO2 BLDV: 32 MM HG (ref 25–47)
PO2 BLDV: 33 MM HG (ref 25–47)
PO2 BLDV: 35 MM HG (ref 25–47)
POTASSIUM BLD-SCNC: 3.6 MMOL/L (ref 3.4–5.3)
POTASSIUM BLD-SCNC: 3.6 MMOL/L (ref 3.4–5.3)
POTASSIUM BLD-SCNC: 4.3 MMOL/L (ref 3.4–5.3)
POTASSIUM BLD-SCNC: 4.4 MMOL/L (ref 3.4–5.3)
PROT SERPL-MCNC: 5.7 G/DL (ref 6.8–8.8)
RBC # BLD AUTO: 2.76 10E6/UL (ref 3.8–5.2)
SA TARGET DNA: POSITIVE
SODIUM SERPL-SCNC: 145 MMOL/L (ref 133–144)
SODIUM SERPL-SCNC: 148 MMOL/L (ref 133–144)
SODIUM SERPL-SCNC: 148 MMOL/L (ref 133–144)
SPECIMEN EXPIRATION DATE: NORMAL
UNIT ABO/RH: NORMAL
UNIT NUMBER: NORMAL
UNIT STATUS: NORMAL
UNIT TYPE ISBT: 2800
WBC # BLD AUTO: 15.5 10E3/UL (ref 4–11)

## 2021-09-12 PROCEDURE — 85018 HEMOGLOBIN: CPT | Performed by: PHYSICIAN ASSISTANT

## 2021-09-12 PROCEDURE — 250N000011 HC RX IP 250 OP 636: Performed by: PHYSICIAN ASSISTANT

## 2021-09-12 PROCEDURE — 86901 BLOOD TYPING SEROLOGIC RH(D): CPT | Performed by: THORACIC SURGERY (CARDIOTHORACIC VASCULAR SURGERY)

## 2021-09-12 PROCEDURE — 84132 ASSAY OF SERUM POTASSIUM: CPT

## 2021-09-12 PROCEDURE — 82805 BLOOD GASES W/O2 SATURATION: CPT | Performed by: PHYSICIAN ASSISTANT

## 2021-09-12 PROCEDURE — 85027 COMPLETE CBC AUTOMATED: CPT | Performed by: SURGERY

## 2021-09-12 PROCEDURE — 87640 STAPH A DNA AMP PROBE: CPT | Performed by: THORACIC SURGERY (CARDIOTHORACIC VASCULAR SURGERY)

## 2021-09-12 PROCEDURE — P9016 RBC LEUKOCYTES REDUCED: HCPCS | Performed by: THORACIC SURGERY (CARDIOTHORACIC VASCULAR SURGERY)

## 2021-09-12 PROCEDURE — 86923 COMPATIBILITY TEST ELECTRIC: CPT | Performed by: THORACIC SURGERY (CARDIOTHORACIC VASCULAR SURGERY)

## 2021-09-12 PROCEDURE — 250N000012 HC RX MED GY IP 250 OP 636 PS 637: Performed by: SURGERY

## 2021-09-12 PROCEDURE — 83735 ASSAY OF MAGNESIUM: CPT | Performed by: THORACIC SURGERY (CARDIOTHORACIC VASCULAR SURGERY)

## 2021-09-12 PROCEDURE — 31624 DX BRONCHOSCOPE/LAVAGE: CPT

## 2021-09-12 PROCEDURE — 250N000013 HC RX MED GY IP 250 OP 250 PS 637: Performed by: PHYSICIAN ASSISTANT

## 2021-09-12 PROCEDURE — 999N000015 HC STATISTIC ARTERIAL MONITORING DAILY

## 2021-09-12 PROCEDURE — 250N000011 HC RX IP 250 OP 636: Performed by: THORACIC SURGERY (CARDIOTHORACIC VASCULAR SURGERY)

## 2021-09-12 PROCEDURE — 83880 ASSAY OF NATRIURETIC PEPTIDE: CPT | Performed by: ANESTHESIOLOGY

## 2021-09-12 PROCEDURE — 258N000003 HC RX IP 258 OP 636: Performed by: SURGERY

## 2021-09-12 PROCEDURE — 83735 ASSAY OF MAGNESIUM: CPT

## 2021-09-12 PROCEDURE — 250N000011 HC RX IP 250 OP 636: Performed by: SURGERY

## 2021-09-12 PROCEDURE — 250N000011 HC RX IP 250 OP 636: Performed by: STUDENT IN AN ORGANIZED HEALTH CARE EDUCATION/TRAINING PROGRAM

## 2021-09-12 PROCEDURE — 82330 ASSAY OF CALCIUM: CPT | Performed by: SURGERY

## 2021-09-12 PROCEDURE — 87205 SMEAR GRAM STAIN: CPT | Performed by: SURGERY

## 2021-09-12 PROCEDURE — 87081 CULTURE SCREEN ONLY: CPT | Performed by: SURGERY

## 2021-09-12 PROCEDURE — 83735 ASSAY OF MAGNESIUM: CPT | Performed by: STUDENT IN AN ORGANIZED HEALTH CARE EDUCATION/TRAINING PROGRAM

## 2021-09-12 PROCEDURE — 94003 VENT MGMT INPAT SUBQ DAY: CPT

## 2021-09-12 PROCEDURE — 80048 BASIC METABOLIC PNL TOTAL CA: CPT

## 2021-09-12 PROCEDURE — 200N000002 HC R&B ICU UMMC

## 2021-09-12 PROCEDURE — 250N000009 HC RX 250: Performed by: NURSE PRACTITIONER

## 2021-09-12 PROCEDURE — 87075 CULTR BACTERIA EXCEPT BLOOD: CPT | Performed by: SURGERY

## 2021-09-12 PROCEDURE — 250N000009 HC RX 250: Performed by: SURGERY

## 2021-09-12 PROCEDURE — 87077 CULTURE AEROBIC IDENTIFY: CPT | Performed by: SURGERY

## 2021-09-12 PROCEDURE — 84100 ASSAY OF PHOSPHORUS: CPT

## 2021-09-12 PROCEDURE — 250N000013 HC RX MED GY IP 250 OP 250 PS 637: Performed by: NURSE PRACTITIONER

## 2021-09-12 PROCEDURE — C9113 INJ PANTOPRAZOLE SODIUM, VIA: HCPCS | Performed by: STUDENT IN AN ORGANIZED HEALTH CARE EDUCATION/TRAINING PROGRAM

## 2021-09-12 PROCEDURE — 71045 X-RAY EXAM CHEST 1 VIEW: CPT

## 2021-09-12 PROCEDURE — 82040 ASSAY OF SERUM ALBUMIN: CPT | Performed by: SURGERY

## 2021-09-12 PROCEDURE — 250N000013 HC RX MED GY IP 250 OP 250 PS 637: Performed by: SURGERY

## 2021-09-12 PROCEDURE — 84132 ASSAY OF SERUM POTASSIUM: CPT | Performed by: STUDENT IN AN ORGANIZED HEALTH CARE EDUCATION/TRAINING PROGRAM

## 2021-09-12 PROCEDURE — 87106 FUNGI IDENTIFICATION YEAST: CPT | Performed by: SURGERY

## 2021-09-12 PROCEDURE — 82803 BLOOD GASES ANY COMBINATION: CPT | Performed by: ANESTHESIOLOGY

## 2021-09-12 PROCEDURE — 71045 X-RAY EXAM CHEST 1 VIEW: CPT | Mod: 26 | Performed by: RADIOLOGY

## 2021-09-12 PROCEDURE — 94640 AIRWAY INHALATION TREATMENT: CPT | Mod: 76

## 2021-09-12 PROCEDURE — 31624 DX BRONCHOSCOPE/LAVAGE: CPT | Mod: GC | Performed by: ANESTHESIOLOGY

## 2021-09-12 PROCEDURE — 0B9M8ZX DRAINAGE OF BILATERAL LUNGS, VIA NATURAL OR ARTIFICIAL OPENING ENDOSCOPIC, DIAGNOSTIC: ICD-10-PCS | Performed by: ANESTHESIOLOGY

## 2021-09-12 PROCEDURE — 83605 ASSAY OF LACTIC ACID: CPT | Performed by: ANESTHESIOLOGY

## 2021-09-12 PROCEDURE — 87102 FUNGUS ISOLATION CULTURE: CPT | Performed by: SURGERY

## 2021-09-12 PROCEDURE — 99291 CRITICAL CARE FIRST HOUR: CPT | Mod: 24 | Performed by: ANESTHESIOLOGY

## 2021-09-12 PROCEDURE — 84100 ASSAY OF PHOSPHORUS: CPT | Performed by: STUDENT IN AN ORGANIZED HEALTH CARE EDUCATION/TRAINING PROGRAM

## 2021-09-12 PROCEDURE — 80048 BASIC METABOLIC PNL TOTAL CA: CPT | Performed by: SURGERY

## 2021-09-12 PROCEDURE — 250N000013 HC RX MED GY IP 250 OP 250 PS 637: Performed by: THORACIC SURGERY (CARDIOTHORACIC VASCULAR SURGERY)

## 2021-09-12 PROCEDURE — 250N000013 HC RX MED GY IP 250 OP 250 PS 637: Performed by: STUDENT IN AN ORGANIZED HEALTH CARE EDUCATION/TRAINING PROGRAM

## 2021-09-12 PROCEDURE — 87641 MR-STAPH DNA AMP PROBE: CPT | Performed by: THORACIC SURGERY (CARDIOTHORACIC VASCULAR SURGERY)

## 2021-09-12 PROCEDURE — 94640 AIRWAY INHALATION TREATMENT: CPT

## 2021-09-12 PROCEDURE — 271N000002 HC RX 271: Performed by: PHYSICIAN ASSISTANT

## 2021-09-12 PROCEDURE — 999N000157 HC STATISTIC RCP TIME EA 10 MIN

## 2021-09-12 RX ORDER — BUMETANIDE 0.25 MG/ML
2 INJECTION INTRAMUSCULAR; INTRAVENOUS ONCE
Status: COMPLETED | OUTPATIENT
Start: 2021-09-12 | End: 2021-09-12

## 2021-09-12 RX ORDER — DEXMEDETOMIDINE HYDROCHLORIDE 4 UG/ML
.2-1.2 INJECTION, SOLUTION INTRAVENOUS CONTINUOUS
Status: DISCONTINUED | OUTPATIENT
Start: 2021-09-12 | End: 2021-09-14

## 2021-09-12 RX ORDER — POTASSIUM CHLORIDE 1.5 G/1.58G
40 POWDER, FOR SOLUTION ORAL ONCE
Status: COMPLETED | OUTPATIENT
Start: 2021-09-12 | End: 2021-09-12

## 2021-09-12 RX ORDER — MAGNESIUM SULFATE HEPTAHYDRATE 40 MG/ML
2 INJECTION, SOLUTION INTRAVENOUS ONCE
Status: COMPLETED | OUTPATIENT
Start: 2021-09-12 | End: 2021-09-12

## 2021-09-12 RX ADMIN — INSULIN ASPART 1 UNITS: 100 INJECTION, SOLUTION INTRAVENOUS; SUBCUTANEOUS at 19:37

## 2021-09-12 RX ADMIN — DEXMEDETOMIDINE 0.7 MCG/KG/HR: 100 INJECTION, SOLUTION, CONCENTRATE INTRAVENOUS at 20:10

## 2021-09-12 RX ADMIN — LEVALBUTEROL HYDROCHLORIDE 1.25 MG: 1.25 SOLUTION RESPIRATORY (INHALATION) at 13:50

## 2021-09-12 RX ADMIN — PREDNISONE 40 MG: 20 TABLET ORAL at 07:22

## 2021-09-12 RX ADMIN — BUMETANIDE 2 MG: 0.25 INJECTION, SOLUTION INTRAMUSCULAR; INTRAVENOUS at 19:33

## 2021-09-12 RX ADMIN — POTASSIUM CHLORIDE 20 MEQ: 40 SOLUTION ORAL at 19:33

## 2021-09-12 RX ADMIN — GABAPENTIN 600 MG: 250 SUSPENSION ORAL at 07:22

## 2021-09-12 RX ADMIN — Medication: at 01:25

## 2021-09-12 RX ADMIN — ATORVASTATIN CALCIUM 40 MG: 40 TABLET, FILM COATED ORAL at 19:32

## 2021-09-12 RX ADMIN — LEVALBUTEROL HYDROCHLORIDE 1.25 MG: 1.25 SOLUTION RESPIRATORY (INHALATION) at 09:57

## 2021-09-12 RX ADMIN — PIPERACILLIN SODIUM AND TAZOBACTAM SODIUM 4.5 G: 4; .5 INJECTION, POWDER, LYOPHILIZED, FOR SOLUTION INTRAVENOUS at 11:52

## 2021-09-12 RX ADMIN — Medication: at 01:17

## 2021-09-12 RX ADMIN — PROPOFOL 10 MCG/KG/MIN: 10 INJECTION, EMULSION INTRAVENOUS at 20:40

## 2021-09-12 RX ADMIN — Medication 75 MCG/HR: at 20:46

## 2021-09-12 RX ADMIN — OXYCODONE HYDROCHLORIDE 10 MG: 10 TABLET ORAL at 10:59

## 2021-09-12 RX ADMIN — POTASSIUM CHLORIDE 40 MEQ: 1.5 POWDER, FOR SOLUTION ORAL at 05:30

## 2021-09-12 RX ADMIN — BUMETANIDE 2 MG: 0.25 INJECTION, SOLUTION INTRAMUSCULAR; INTRAVENOUS at 12:34

## 2021-09-12 RX ADMIN — POTASSIUM CHLORIDE 20 MEQ: 40 SOLUTION ORAL at 07:21

## 2021-09-12 RX ADMIN — MAGNESIUM SULFATE HEPTAHYDRATE 2 G: 40 INJECTION, SOLUTION INTRAVENOUS at 05:30

## 2021-09-12 RX ADMIN — PROPOFOL 25 MCG/KG/MIN: 10 INJECTION, EMULSION INTRAVENOUS at 02:50

## 2021-09-12 RX ADMIN — AMIODARONE HYDROCHLORIDE 200 MG: 200 TABLET ORAL at 07:22

## 2021-09-12 RX ADMIN — INSULIN ASPART 1 UNITS: 100 INJECTION, SOLUTION INTRAVENOUS; SUBCUTANEOUS at 15:14

## 2021-09-12 RX ADMIN — PIPERACILLIN SODIUM AND TAZOBACTAM SODIUM 4.5 G: 4; .5 INJECTION, POWDER, LYOPHILIZED, FOR SOLUTION INTRAVENOUS at 05:03

## 2021-09-12 RX ADMIN — LEVALBUTEROL HYDROCHLORIDE 1.25 MG: 1.25 SOLUTION RESPIRATORY (INHALATION) at 16:47

## 2021-09-12 RX ADMIN — PANTOPRAZOLE SODIUM 40 MG: 40 INJECTION, POWDER, FOR SOLUTION INTRAVENOUS at 07:21

## 2021-09-12 RX ADMIN — Medication 15 ML: at 07:21

## 2021-09-12 RX ADMIN — MUPIROCIN 0.5 G: 20 OINTMENT TOPICAL at 07:23

## 2021-09-12 RX ADMIN — BUMETANIDE 2 MG: 0.25 INJECTION, SOLUTION INTRAMUSCULAR; INTRAVENOUS at 06:07

## 2021-09-12 RX ADMIN — DEXMEDETOMIDINE 0.2 MCG/KG/HR: 100 INJECTION, SOLUTION, CONCENTRATE INTRAVENOUS at 09:38

## 2021-09-12 RX ADMIN — Medication 1 PACKET: at 08:48

## 2021-09-12 RX ADMIN — POLYETHYLENE GLYCOL 3350 17 G: 17 POWDER, FOR SOLUTION ORAL at 07:21

## 2021-09-12 RX ADMIN — HEPARIN SODIUM 5000 UNITS: 10000 INJECTION, SOLUTION INTRAVENOUS; SUBCUTANEOUS at 19:32

## 2021-09-12 RX ADMIN — LEVALBUTEROL HYDROCHLORIDE 1.25 MG: 1.25 SOLUTION RESPIRATORY (INHALATION) at 20:23

## 2021-09-12 RX ADMIN — GABAPENTIN 1200 MG: 250 SUSPENSION ORAL at 21:30

## 2021-09-12 RX ADMIN — Medication 1 PACKET: at 11:53

## 2021-09-12 RX ADMIN — PROPOFOL 30 MCG/KG/MIN: 10 INJECTION, EMULSION INTRAVENOUS at 06:31

## 2021-09-12 RX ADMIN — HEPARIN SODIUM 5000 UNITS: 10000 INJECTION, SOLUTION INTRAVENOUS; SUBCUTANEOUS at 11:52

## 2021-09-12 RX ADMIN — ASPIRIN 81 MG: 81 TABLET, CHEWABLE ORAL at 07:22

## 2021-09-12 RX ADMIN — DOCUSATE SODIUM 50 MG AND SENNOSIDES 8.6 MG 1 TABLET: 8.6; 5 TABLET, FILM COATED ORAL at 07:22

## 2021-09-12 RX ADMIN — DOCUSATE SODIUM 50 MG AND SENNOSIDES 8.6 MG 1 TABLET: 8.6; 5 TABLET, FILM COATED ORAL at 19:32

## 2021-09-12 RX ADMIN — DEXMEDETOMIDINE 0.7 MCG/KG/HR: 100 INJECTION, SOLUTION, CONCENTRATE INTRAVENOUS at 15:18

## 2021-09-12 RX ADMIN — PIPERACILLIN SODIUM AND TAZOBACTAM SODIUM 4.5 G: 4; .5 INJECTION, POWDER, LYOPHILIZED, FOR SOLUTION INTRAVENOUS at 18:21

## 2021-09-12 RX ADMIN — HEPARIN SODIUM 5000 UNITS: 10000 INJECTION, SOLUTION INTRAVENOUS; SUBCUTANEOUS at 03:58

## 2021-09-12 ASSESSMENT — ACTIVITIES OF DAILY LIVING (ADL)
ADLS_ACUITY_SCORE: 24
ADLS_ACUITY_SCORE: 22
ADLS_ACUITY_SCORE: 22

## 2021-09-12 ASSESSMENT — MIFFLIN-ST. JEOR: SCORE: 1452.62

## 2021-09-12 NOTE — PROGRESS NOTES
Major Shift Events:  Started precedex to help with pressure support. PS for 1hr, then back to vent settings. Team bronched in the afternoon to clear secretions. Started low dose epi for low CI. Will leave on until extubated. Art line replaced. No BM. Up in chair.   Plan: Pressure support tomorrow and possible extubation.   For vital signs and complete assessments, please see documentation flowsheets.

## 2021-09-12 NOTE — PROGRESS NOTES
CV ICU PROGRESS NOTE  September 12, 2021      CO-MORBIDITIES:   S/P MVR (mitral valve replacement)  (primary encounter diagnosis)  Mitral stenosis  Nonrheumatic mitral valve stenosis    ASSESSMENT: Corina Martinez is a 66 year old female with PMH of arthritis, SHAILESH, HTN, CHF and mitral valve stenosis, now s/p MVR w/ left atrial appendage ligation on 9/7 with Dr. Acuna.     OVERNIGHT EVENTS:   1u pRBCs  Bumex 2mg     TODAY'S CHANGES:   Dex gtt   PS as able this AM. Follow up gas.   Restart Epi   Bronch today   Replace uday.     PLAN:  Neuro/ pain/ sedation:  # Acute post-operative pain  - Monitor neurological status. Notify the MD for any acute changes in exam.  - acetaminophen allergy   - Scheduled gabapentin (PTA dose), PRN oxy, robaxin and dilaudid   - NIKKI catheters B/l  (9/7-  - PTA cymbalta 30 mg, holding until PO intake   - PTA on atarax, holding while intubated   - Precedex gtt   - Prop gtt   - Fentanyl gtt     Pulmonary care:   #Current smoker   #COPD sat's upper 80's at baseline   #SHAILESH  - Extubated in OR, reintubated POD1  - Vent, AC. 18/400/8, 40%. PS this AM.   - ABG 7.44/29/46/31, on vent.   - Off Giovani 9/10   - Prednisone 40mg (5d course)   - Bronch today     Cardiovascular:    #HTN  #CHF, EF 55-60%   #Afib, warfarin   #MV stenosis, s/p MVR 9/7  #Left atrial appendage thrombus s/p atrial appendage clip 9/7  AV-paced epicardial leads - at 90   - Monitor hemodynamic status.   - MAP goal > 65  - Restart Epi this AM  - if has another episode of a fib, likely need to start anticoagulation - check with CVTS  - ASA, atorvastatin, amiodarone (PTA dose)    - holding PTA on torsemide 10-20 daily, PTA Metoprolol and diltiazem   - Replace uday given need for close monitoring    GI /Nutrition:   #GERD  #Primary biliary cirrhosis   - NPO   - OGT, TFs.   - Miralax daily, senna 1 tab BID, PRN MoM and supp     Fluids/ Electrolytes/ Renal:   #RADHA  - Iniguez for strict I&Os, UOP 4L//545cc (net -2L yesterday)   - Bumex this  AM 2mg, reassess PM for bumex in the PM   - Scheduled K 20mg BID      Endocrine:    #Stress hyperglycemia  - SSI      ID/ Antibiotics:  - Perioperative antibiotics with cefazolin, vanc   - Afebrile, WBC 15.5 (19.4).    - Trach cx 9/11, NGTD  - 9/11 empiric zosyn d/t increased secretions     Heme:     # Acute blood loss anemia  - Hgb 7.5 (7.7)   -Transfuse for hgb <7     Prophylaxis:    - SCDs  - PPI  - SQH     Lines/ tubes/ drains:  - CVC  - Arterial line  - Rebolledo  - Mediastinal tube x2  - Pleural tube x2     Disposition:  - CV ICU.      Patient seen, findings and plan discussed with CV ICU staff.      Juanis Sanchez MD  General Surgery, PGY3  [id number]     ====================================    SUBJECTIVE:   Remains intubated, sedated. On PS this AM. Voiding via rebolledo.     OBJECTIVE:   1. VITAL SIGNS:   Temp:  [97.6  F (36.4  C)-99.1  F (37.3  C)] 98.4  F (36.9  C)  Pulse:  [] 69  Resp:  [18] 18  BP: ()/(53-89) 87/56  MAP:  [66 mmHg-201 mmHg] 201 mmHg  Arterial Line BP: ()/() 222/222  FiO2 (%):  [40 %] 40 %  SpO2:  [92 %-100 %] 93 %  Ventilation Mode: CMV/AC  (Continuous Mandatory Ventilation/ Assist Control)  FiO2 (%): 40 %  Rate Set (breaths/minute): 18 breaths/min  Tidal Volume Set (mL): 400 mL  PEEP (cm H2O): 8 cmH2O  Oxygen Concentration (%): 40 %  Resp: 18      2. INTAKE/ OUTPUT:   I/O last 3 completed shifts:  In: 2752.55 [I.V.:972.55; NG/GT:470]  Out: 4440 [Urine:4270; Chest Tube:170]    3. PHYSICAL EXAMINATION:   General: intubated, NAD  Neuro: sedated   Resp: mechanically ventilated   CV: RRR, paced  Abdomen: Soft, non distended, CTs with SS output   Incisions: c/d/i  Extremities: warm and well perfused, bilateral LE edema     4. INVESTIGATIONS:   Arterial Blood Gases   Recent Labs   Lab 09/11/21  0408 09/10/21  0340 09/09/21  1230 09/09/21  1014   PH 7.47* 7.44 7.44 7.47*   PCO2 40 40 41 38   PO2 96 76* 81 89   HCO3 29* 28 28 28     Complete Blood Count   Recent Labs   Lab  09/12/21 0330 09/11/21  1903 09/11/21  0407 09/10/21  0341 09/09/21  0341   WBC 15.5*  --  19.4* 28.2* 22.3*   HGB 7.5* 8.3* 7.7* 8.0* 8.6*   *  --  132* 119* 121*     Basic Metabolic Panel  Recent Labs   Lab 09/12/21  0343 09/12/21  0330 09/11/21  2334 09/11/21  2329 09/11/21  2102 09/11/21  1719 09/11/21  0407 09/10/21  1413   NA  --  148*  148*  --   --   --  144 143  143 143   POTASSIUM  --  3.6  3.6  --  4.3  --  4.3 3.6  3.6 4.8   CHLORIDE  --  111*  111*  --   --   --  109 109  109 110*   CO2  --  30  30  --   --   --  30 27  27 28   BUN  --  46*  46*  --   --   --  47* 46*  46* 40*   CR  --  1.10*  1.10*  --   --   --  0.99 1.08*  1.08* 1.14*   * 100*  100* 135*  --  99 130* 113*  113* 101*     Liver Function Tests  Recent Labs   Lab 09/12/21  0330 09/11/21  0407 09/10/21  0341 09/09/21  0341 09/07/21  1654 09/07/21  1455   AST 39 44 57* 86*  --   --    ALT 23 20 19 24 70*  --    ALKPHOS 229* 212* 160* 147 228*  --    BILITOTAL 0.4 0.3 0.4 0.4 0.8  --    ALBUMIN 2.2* 2.3* 2.4* 2.8* 2.4*  --    INR  --   --   --   --  1.37* 1.79*     Coagulation Profile  Recent Labs   Lab 09/07/21  1654 09/07/21  1455   INR 1.37* 1.79*   PTT 46* 34       5. RADIOLOGY:   CXR reviewed  =========================================

## 2021-09-12 NOTE — ANESTHESIA POST-OP FOLLOW-UP NOTE
"PERIOPERATIVE AND INTERVENTIONAL PAIN SERVICE CONTINUOUS NERVE INFUSION NOTE  24 Hour Events  - Remains intubated; sedated on propofol, fentanyl IV infusion for analgesia  - Chest tubes x 4, Iniguez catheter in place  - Repositioned with full assist for turning     Subjective  - No objective evidence of LAST  - Able to communicate via head nods and hand squeezes     Objective  Exam  Vitals:   /66 (BP Location: Left arm, Cuff Size: Adult Regular)   Pulse 80   Temp 37.3  C (99.1  F) (Axillary)   Resp 18   Ht 1.689 m (5' 6.5\")   Wt 101 kg (222 lb 10.6 oz)   LMP  (LMP Unknown)   SpO2 99%   BMI 35.40 kg/m        General: On CMV  MSK/Neuro: not assessed    Skin: bilateral erector spinae (ES) catheter sites with dressings c/d/i, no tenderness, no erythema, heme, edema present     Assessment  Corina Martinez is a 66 year old female with PMH of mitral stenosis who is now POD #4 s/p mitral valve replacement on 9/7/21. Prior to surgery uncomplicated placement of bilateral T5-6 erector spinae (ES) catheter by RAPS for analgesia. Remains intubated, appears comfortable. No evidence of adverse side effects related to local anesthetic continuous infusion     Plan  - Catheter Day #4 bilateral T5-6 ES catheters/infusion:    Continue Ropivacaine 0.2% PIB (programmed intermittent bolus) infusion at 7 mL Q 60 min via each catheter, total infusion 14 mL/hr   ? Plan to maintain catheters while chest tubes in place, max of 7 days       When ready to extubate, patient can be evaluated to receive local anesthetic bolus, bedside RN should page RAPS to request bolus    Each catheter was hand bolused, 10mL 0.25% bupivacaine, bilaterally at 0800.     - Antithrombotics/Thrombolytics ordered:     Okay to continue Heparin SQ 5,000 units Q 8 hr as ordered by primary service  ? Please contact RAPS jobcode pager [phone] before ordering or making any medication changes     RAPS will continue to follow and adjust as needed  Discussed with " attending anesthesiologist     Analgesic Medications ordered:              Medications related to Pain Management (From now, onward)     Start     Dose/Rate Route Frequency Ordered Stop     09/10/21 0800   gabapentin (NEURONTIN) solution 600 mg      600 mg Oral or Feeding Tube 2 TIMES DAILY 09/09/21 1717 09/09/21 2200   gabapentin (NEURONTIN) solution 1,200 mg      1,200 mg Oral or Feeding Tube AT BEDTIME 09/09/21 1715 09/08/21 2200   [Held by provider]  DULoxetine (CYMBALTA) DR capsule 30 mg     (Held by provider since Thu 9/9/2021 at 0746 by Sven Acuna MD.Hold Reason: Other.Hold Comments: Hold while intubated)    30 mg Oral AT BEDTIME 09/08/21 1105       09/08/21 1730   fentaNYL (SUBLIMAZE) infusion       mcg/hr  1-4 mL/hr  Intravenous CONTINUOUS 09/08/21 1710 09/08/21 1730   propofol (DIPRIVAN) infusion      5-75 mcg/kg/min × 93.9 kg (Dosing Weight)  2.8-42.3 mL/hr  Intravenous CONTINUOUS 09/08/21 1710 09/08/21 0800   polyethylene glycol (MIRALAX) Packet 17 g      17 g Oral or Feeding Tube DAILY 09/07/21 1652 09/08/21 0800   aspirin (ASA) chewable tablet 81 mg      81 mg Oral or NG Tube DAILY 09/07/21 1652 09/07/21 2000   senna-docusate (SENOKOT-S/PERICOLACE) 8.6-50 MG per tablet 1 tablet      1 tablet Oral or Feeding Tube 2 TIMES DAILY 09/07/21 1652 09/07/21 1652   HYDROmorphone (DILAUDID) injection 0.2 mg      0.2 mg Intravenous EVERY 2 HOURS PRN 09/07/21 1652 09/07/21 1652   HYDROmorphone (DILAUDID) injection 0.4 mg      0.4 mg Intravenous EVERY 2 HOURS PRN 09/07/21 1652 09/07/21 1652   oxyCODONE (ROXICODONE) tablet 5 mg      5 mg Oral or Feeding Tube EVERY 4 HOURS PRN 09/07/21 1652 09/07/21 1652   oxyCODONE IR (ROXICODONE) tablet 10 mg      10 mg Oral or Feeding Tube EVERY 4 HOURS PRN 09/07/21 1652       09/07/21 1652   lidocaine 1 % 0.1-1 mL      0.1-1 mL Other EVERY 1 HOUR PRN 09/07/21 1652 09/07/21 1652   lidocaine (LMX4)  cream        Topical EVERY 1 HOUR PRN 09/07/21 1652       09/07/21 1652   methocarbamol (ROBAXIN) tablet 500 mg      500 mg Oral or Feeding Tube EVERY 6 HOURS PRN 09/07/21 1652       09/07/21 1652   magnesium hydroxide (MILK OF MAGNESIA) suspension 30 mL      30 mL Oral DAILY PRN 09/07/21 1652       09/07/21 1652   bisacodyl (DULCOLAX) Suppository 10 mg      10 mg Rectal DAILY PRN 09/07/21 1652 09/07/21 0830   ropivacaine 0.2% in NS perineural infusion simple        Perineural Continuous Nerve Block 09/07/21 0821       09/07/21 0830   ropivacaine 0.2% in NS perineural infusion simple        Perineural Continuous Nerve Block 09/07/21 0821                     Labs/Diagnostics  Heme/INR:       Recent Labs   Lab Test 09/10/21  0341 09/09/21  0341   WBC 28.2* 22.3*   RBC 2.87* 3.16*   HGB 8.0* 8.6*   HCT 26.0* 28.2*   MCV 91 89   MCH 27.9 27.2   MCHC 30.8* 30.5*   RDW 23.4* 22.4*   * 121*               Lab Results   Component Value Date     INR 1.37 09/07/2021     INR 1.79 09/07/2021     INR 2.04 08/25/2021     INR 2.6 07/28/2021     INR 2.8 07/15/2021     INR 2.9 07/07/2021     INR 3.65 06/08/2021     INR 1.99 05/29/2021     INR 2.15 05/28/2021       Sebastian Phelps M.D  Perioperative and Interventional Pain Service  9/11/2021 9:02 PM  PIPS 24-hour Job Code Pagers (for in-house use only, may text page using [employer]  [place]:  [phone]  [place]:  [phone]  Peds [facility]: [phone]

## 2021-09-12 NOTE — PROGRESS NOTES
Major Shift Events:  Following commands but restless and requiring increased propofol and fentanyl for comfort and sedation.  BP stable, breathing unlablred on ordered vent settings with ETT suctioning for large amounts of thick secretions.  Tolerating tube feeds, voiding.  Turned q 2  Plan: Pain control, maintain hemodynamics  For vital signs and complete assessments, please see documentation flowsheets.

## 2021-09-12 NOTE — PROCEDURES
Bronchoscopy Procedure Note - 9/12/2021   Corina Martinez  MR: [id number]    Indications: Respiratory Failure, Hypoxia, Inability to clear secretions   Specimens: BAL for gram stain, aerobic, anaerobic and fungal cultures.  Premedication: patient already intubated on sedation prior to procedure (propofol gtt and fentanyl gtt with boluses)  Procedure Meds:  1% topical lidocaine solution via ETT   Findings: Moderate, thick and adherent secretions in the ET tube and bronchial tree  Complications: no immediate complications     Procedure: A timeout was called to confirm patient and procedure. Lidocaine was given, following which flexible bronchoscope was advanced through  ET tube without difficulty. Bilateral tracheobronchial trees were inspected closely to the level of the subsegmental bronchi.  A moderate amount of thick, adherent secretions were found bilaterally, as well as within the ET tube.  These were lavage and suctioned. There was no trauma or bleeding identified within bronchial tree . A lukens trap was utilized to collect the specimens.  The patient tolerated the procedure well, without immediate complications.   Will continue daily CXR or sooner if clinically indication. Repeat bronchoscopy as needed.     Dr. Doll was present throughout the entirety of the procdure.     Juanis Sanchez MD  General Surgery, PGY3

## 2021-09-12 NOTE — PROGRESS NOTES
"[facility] Nerve Block Daily Progress Note    Subjective  Patient remains intubated and sedated. ICU team plans to possibly extubate this afternoon. Bilateral NIKKI sites c/d/i per bedside RN. Off pressers. Has propofol and fentanyl infusions.     Objective  Exam  Vitals:   BP (!) 141/87   Pulse 70   Temp 36.9  C (98.4  F) (Axillary)   Resp 18   Ht 1.689 m (5' 6.5\")   Wt 88.8 kg (195 lb 12.3 oz)   LMP  (LMP Unknown)   SpO2 98%   BMI 31.13 kg/m        General: Sedated  Resp: MV   MSK/Neuro: not assessed    Skin: bilateral erector spinae (ES) catheter sites with dressings c/d/i (per bedside RN assessment), no tenderness, no erythema, heme, edema present    Assessment  Corina Martinez is a 66 year old female with PMH of mitral stenosis who is now POD #5 s/p mitral valve replacement on 9/7/21. Prior to surgery uncomplicated placement of bilateral T5-6 erector spinae (ES) catheter by RAPS for analgesia. Currently intubated and sedated with plan to possibly extubate today.     Plan  - Catheter Day #5 bilateral T5-6 ES catheters/infusion:    Continue Ropivacaine 0.2% PIB (programmed intermittent bolus) infusion at 7 mL Q 60 min via each catheter, total infusion 14 mL/hr   o Plan to maintain catheters while chest tubes in place, max of 7 days      When ready to extubate, patient can be evaluated to receive local anesthetic bolus, bedside RN should page RAPS to request bolus    Each catheter was hand bolused, 5mL 0.25% bupivacaine, bilaterally at 0930    - Antithrombotics/Thrombolytics ordered:     Okay to continue Heparin SQ 5,000 units Q 8 hr as ordered by primary service  o Please contact RAPS jobcode pager [phone] before ordering or making any medication changes    [facility] will continue to follow and adjust as needed  Discussed with attending anesthesiologist    Analgesic Medications ordered:    Current Facility-Administered Medications:      - MEDICATION INSTRUCTIONS -, , Does not apply, Continuous " PRN, Vinayak Zamudio PA-C     amiodarone (PACERONE) tablet 200 mg, 200 mg, Oral or Feeding Tube, Daily, Sven Acuna MD, 200 mg at 09/12/21 0722     aspirin (ASA) chewable tablet 81 mg, 81 mg, Oral or NG Tube, Daily, DroVinayak parra PA-C, 81 mg at 09/12/21 0722     atorvastatin (LIPITOR) tablet 40 mg, 40 mg, Oral or Feeding Tube, QPM, Kimberli Padilla APRN CNP, 40 mg at 09/11/21 2011     bisacodyl (DULCOLAX) Suppository 10 mg, 10 mg, Rectal, Daily PRN, Vinayak Zamudio PA-C     dexmedetomidine (PRECEDEX) 400 mcg in 0.9% sodium chloride 100 mL, 0.2-0.7 mcg/kg/hr (Dosing Weight), Intravenous, Continuous, Juanis Sanchez MD, Last Rate: 16.4 mL/hr at 09/12/21 1100, 0.7 mcg/kg/hr at 09/12/21 1100     dextrose 10% infusion, , Intravenous, Continuous PRN, Kimberli Padilla APRN CNP     glucose gel 15-30 g, 15-30 g, Oral, Q15 Min PRN **OR** dextrose 50 % injection 25-50 mL, 25-50 mL, Intravenous, Q15 Min PRN **OR** glucagon injection 1 mg, 1 mg, Subcutaneous, Q15 Min PRN, Juanis Sanchez MD     [Held by provider] DULoxetine (CYMBALTA) DR capsule 30 mg, 30 mg, Oral, At Bedtime, Kimberli Padilla APRN CNP     EPINEPHrine (ADRENALIN) 5 mg in sodium chloride 0.9 % 250 mL infusion, 0.01-0.3 mcg/kg/min (Dosing Weight), Intravenous, Continuous, Leila Doll MD, Held at 09/12/21 0323     fentaNYL (SUBLIMAZE) infusion,  mcg/hr, Intravenous, Continuous, Juanis Sanchez MD, Last Rate: 1.5 mL/hr at 09/12/21 1100, 75 mcg/hr at 09/12/21 1100     gabapentin (NEURONTIN) solution 1,200 mg, 1,200 mg, Oral or Feeding Tube, At Bedtime, Sven Acuna MD, 1,200 mg at 09/11/21 2204     gabapentin (NEURONTIN) solution 600 mg, 600 mg, Oral or Feeding Tube, BID, Sven Acuna MD, 600 mg at 09/12/21 0722     heparin ANTICOAGULANT injection 5,000 Units, 5,000 Units, Subcutaneous, Q8H, Vinayak Zamudio PA-C, 5,000 Units at 09/12/21 0358     hydrALAZINE (APRESOLINE) injection 10 mg, 10  mg, Intravenous, Q30 Min PRN, Vinayak Zamudio PA-C     HYDROmorphone (DILAUDID) injection 0.2 mg, 0.2 mg, Intravenous, Q2H PRN, 0.2 mg at 09/08/21 1546 **OR** HYDROmorphone (DILAUDID) injection 0.4 mg, 0.4 mg, Intravenous, Q2H PRN, Vinayak Zamudio PA-C, 0.4 mg at 09/08/21 1124     insulin aspart (NovoLOG) injection (RAPID ACTING), 1-6 Units, Subcutaneous, Q4H, Juanis Sanchez MD     levalbuterol (XOPENEX) neb solution 1.25 mg, 1.25 mg, Nebulization, Q4H WA, Kimberli Padilla APRN CNP, 1.25 mg at 09/12/21 0957     lidocaine (LMX4) cream, , Topical, Q1H PRN, Vinayak Zamudio PA-C     lidocaine 1 % 0.1-1 mL, 0.1-1 mL, Other, Q1H PRN, Vinayak Zamudio PA-C     magnesium hydroxide (MILK OF MAGNESIA) suspension 30 mL, 30 mL, Oral, Daily PRN, Vinayak Zamudio PA-C     methocarbamol (ROBAXIN) tablet 500 mg, 500 mg, Oral or Feeding Tube, Q6H PRN, Vinayak Zamudio PA-C     multivitamins w/minerals liquid 15 mL, 15 mL, Per Feeding Tube, Daily, Kimberli Padilla APRN CNP, 15 mL at 09/12/21 0721     mupirocin (BACTROBAN) 2 % ointment 0.5 g, 0.5 g, Both Nostrils, BID, Vinayak Zamudio PA-C, 0.5 g at 09/12/21 0723     naloxone (NARCAN) injection 0.2 mg, 0.2 mg, Intravenous, Q2 Min PRN **OR** naloxone (NARCAN) injection 0.4 mg, 0.4 mg, Intravenous, Q2 Min PRN **OR** naloxone (NARCAN) injection 0.2 mg, 0.2 mg, Intramuscular, Q2 Min PRN **OR** naloxone (NARCAN) injection 0.4 mg, 0.4 mg, Intramuscular, Q2 Min PRN, Sven Acuna MD     ondansetron (ZOFRAN-ODT) ODT tab 4 mg, 4 mg, Oral, Q6H PRN **OR** ondansetron (ZOFRAN) injection 4 mg, 4 mg, Intravenous, Q6H PRN, Vinayak Zamudio PA-C, 4 mg at 09/08/21 1140     oxyCODONE (ROXICODONE) tablet 5 mg, 5 mg, Oral or Feeding Tube, Q4H PRN **OR** oxyCODONE IR (ROXICODONE) tablet 10 mg, 10 mg, Oral or Feeding Tube, Q4H PRN, Vinayak Zamudio PA-C, 10 mg at 09/12/21 1059     pantoprazole (PROTONIX) 2 mg/mL suspension 40 mg, 40 mg, Per Feeding Tube, NADJA TRAN,  40 mg at 09/11/21 0826 **OR** pantoprazole (PROTONIX) IV push injection 40 mg, 40 mg, Intravenous, QAM AC, Hemalatha Garcia MD, 40 mg at 09/12/21 0721     piperacillin-tazobactam (ZOSYN) 4.5 g vial to attach to  mL bag, 4.5 g, Intravenous, Q6H, Juanis Sanchez MD, 4.5 g at 09/12/21 0503     polyethylene glycol (MIRALAX) Packet 17 g, 17 g, Oral or Feeding Tube, Daily, Vinayak Zamudio PA-C, 17 g at 09/12/21 0721     potassium chloride (KAYCIEL) solution 20 mEq, 20 mEq, Oral or Feeding Tube, BID, Juanis Sanchze MD, 20 mEq at 09/12/21 0721     prochlorperazine (COMPAZINE) injection 5 mg, 5 mg, Intravenous, Q6H PRN **OR** prochlorperazine (COMPAZINE) tablet 5 mg, 5 mg, Oral or Feeding Tube, Q6H PRN, Sven Acuna MD     propofol (DIPRIVAN) infusion, 5-75 mcg/kg/min (Dosing Weight), Intravenous, Continuous, Hemalatha Garcia MD, Stopped at 09/12/21 1025     protein modular (PROSOURCE TF) 1 packet, 1 packet, Per Feeding Tube, BID 09 12, Kimberli Padilla, APRN CNP, 1 packet at 09/12/21 0848     Reason beta blocker order not selected, , Does not apply, DOES NOT GO TO [facility] BORJAS Alec James, PA-C     ropivacaine 0.2% in NS perineural infusion simple, , Perineural, Continuous Nerve Block, Vinayak Zamudio PA-C, Last Rate: 7 mL/hr at 09/10/21 1323, New Syringe/Cartridge at 09/12/21 0125     ropivacaine 0.2% in NS perineural infusion simple, , Perineural, Continuous Nerve Block, Vinayak Zamudio PA-C, Last Rate: 7 mL/hr at 09/12/21 0117, New Syringe/Cartridge at 09/12/21 0117     senna-docusate (SENOKOT-S/PERICOLACE) 8.6-50 MG per tablet 1 tablet, 1 tablet, Oral or Feeding Tube, BID, Vinayak Zamudio PA-C, 1 tablet at 09/12/21 0722     sodium chloride (PF) 0.9% PF flush 3 mL, 3 mL, Intracatheter, Q8H, Vinayak Zamudio PA-C, 3 mL at 09/11/21 2358     sodium chloride (PF) 0.9% PF flush 3 mL, 3 mL, Intracatheter, q1 min prn, Vinayak Zamudio  DEZ        Labs/Diagnostics  Heme/INR:  Recent Labs   Lab Test 09/10/21  0341 09/09/21  0341   WBC 28.2* 22.3*   RBC 2.87* 3.16*   HGB 8.0* 8.6*   HCT 26.0* 28.2*   MCV 91 89   MCH 27.9 27.2   MCHC 30.8* 30.5*   RDW 23.4* 22.4*   * 121*       Lab Results   Component Value Date    INR 1.37 09/07/2021    INR 1.79 09/07/2021    INR 2.04 08/25/2021    INR 2.6 07/28/2021    INR 2.8 07/15/2021    INR 2.9 07/07/2021    INR 3.65 06/08/2021    INR 1.99 05/29/2021    INR 2.15 05/28/2021       ---------------------------  Araceli Lund MD  Anesthesiology, CA-3, PGY4  Araceli Lund MD on 9/12/2021 at 11:26 AM      RAPS Contact Info (24 hour job code pager is the last 4 digits) For in-house use only:   Job code ID: [place] [id number]   [place] [employer] [address]  [facility] [place] [address]  Adan phone: dial * * * [phone], enter jobcode ID, then enter call-back number.    Text: Use [employer] on the Intranet <Paging/Directory> tab and enter Jobcode ID.   If no call back at any time, contact the hospital  and ask for RAPS attending or backup

## 2021-09-12 NOTE — PROGRESS NOTES
[facility]    Corina Martinez was evaluated this evening, experiencing moderate pain. A bolus of 10mL 0.25% bupivacaine was administered by hand to the bilateral erector spinae plane catheters at 2110. There were no immediate complications. The patient and their nurse were informed of necessary vigilance over vital signs for 30 minutes following the bolus. Please reach out to the RAPS team for any questions or concerns.    Saji Holcomb MD  Anesthesiology, CA-2, PGY3

## 2021-09-12 NOTE — PLAN OF CARE
[id number]  Major Shift Events: Prop and fent for sedation. 1 unit RBC given for hgb 7.5, followed by 2 of bumex. Excellent UOP. See flowsheets for hemodynamics. Electrolytes replaced.   Plan: Continue with plan of care.   For vital signs and complete assessments, please see documentation flowsheets.

## 2021-09-12 NOTE — PROGRESS NOTES
"Bronchoscopy Risk Assessment Guidelines      A. Patient symptoms to consider when assessing pulmonary TB risk are:    I. Cough greater than 3 weeks; and fever, hemoptysis, pleuritic chest    pain, weight loss greater than 10 lbs, night sweats, fatigue, infiltrates on    upper lobes or superior segments of lower lobes, cavitation on chest    x-ray.   B. Patient risk factors to consider when assessing pulmonary TB risk are:    I. Exposure to known TB case, foreign-born persons (within 5 years of    arrival to [place]), residence in a crowded setting (correctional facility,     long-term care center, etc.), persons with HIV or immunosuppression.    Patients with symptoms and risk factors should generally be considered \"suspect risk\" and bronchoscopies should be performed in airborne precautions.    This patient has NO KNOWN RISK of Tuberculosis (proceed with bronchoscopy)    Specimens sent: yes  Complications: None  Scope used: #[id number] Adult  Attending Physician: Dr.Lupei Huseyin Loco, RT on 9/12/2021 at 5:55 PM  "

## 2021-09-12 NOTE — PROCEDURES
[facility]    Arterial line placement    Date/Time: 9/12/2021 1:54 PM  Performed by: Juanis Sanchez MD  Authorized by: Leila Doll MD     UNIVERSAL PROTOCOL   Site Marked: NA  Prior Images Obtained and Reviewed:  NA  Required items: Required blood products, implants, devices and special equipment available    Patient identity confirmed:  Arm band  NA - No sedation, light sedation, or local anesthesia  Confirmation Checklist:  Patient's identity using two indicators, procedure was appropriate and matched the consent or emergent situation, relevant allergies and correct equipment/implants were available  Time out: Immediately prior to the procedure a time out was called    Universal Protocol: the Joint Commission Universal Protocol was followed    Preparation: Patient was prepped and draped in usual sterile fashion    ESBL (mL):  2      Indication: multiple ABGs respiratory failure hemodynamic monitoring  Location:  Left radial       ANESTHESIA    Anesthesia: Local infiltration  Local Anesthetic:  Lidocaine 1% without epinephrine  Anesthetic Total (mL):  2      SEDATION    Patient Sedated: Yes (Patient already sedated 2/2 intubation)    Sedation:  See MAR for details  Vital signs: Vital signs monitored during sedation      PROCEDURE DETAILS  Elmer's Test Normal?: Elmer's test not abnormal    Seldinger technique: Seldinger technique used    Number of Attempts:  2  Post-procedure:  Line sutured and dressing applied  CMS: normal  PROCEDURE   Patient Tolerance:  Patient tolerated the procedure well with no immediate complications    Length of time physician/provider present for 1:1 monitoring during sedation: 20        Juanis Sanchez MD  General Surgery, PGY3

## 2021-09-13 ENCOUNTER — APPOINTMENT (OUTPATIENT)
Dept: GENERAL RADIOLOGY | Facility: CLINIC | Age: 66
DRG: 219 | End: 2021-09-13
Attending: STUDENT IN AN ORGANIZED HEALTH CARE EDUCATION/TRAINING PROGRAM
Payer: MEDICARE

## 2021-09-13 LAB
ALBUMIN SERPL-MCNC: 2.6 G/DL (ref 3.4–5)
ALP SERPL-CCNC: 263 U/L (ref 40–150)
ALT SERPL W P-5'-P-CCNC: 29 U/L (ref 0–50)
ANION GAP SERPL CALCULATED.3IONS-SCNC: 2 MMOL/L (ref 3–14)
ANION GAP SERPL CALCULATED.3IONS-SCNC: 6 MMOL/L (ref 3–14)
ANION GAP SERPL CALCULATED.3IONS-SCNC: 6 MMOL/L (ref 3–14)
AST SERPL W P-5'-P-CCNC: 34 U/L (ref 0–45)
BASE EXCESS BLDA CALC-SCNC: 6.4 MMOL/L (ref -9–1.8)
BASE EXCESS BLDA CALC-SCNC: 6.7 MMOL/L (ref -9–1.8)
BASE EXCESS BLDA CALC-SCNC: 7.7 MMOL/L (ref -9–1.8)
BASE EXCESS BLDV CALC-SCNC: 6.8 MMOL/L (ref -7.7–1.9)
BASE EXCESS BLDV CALC-SCNC: 7.2 MMOL/L (ref -7.7–1.9)
BASE EXCESS BLDV CALC-SCNC: 8.8 MMOL/L (ref -7.7–1.9)
BILIRUB SERPL-MCNC: 0.5 MG/DL (ref 0.2–1.3)
BUN SERPL-MCNC: 36 MG/DL (ref 7–30)
BUN SERPL-MCNC: 43 MG/DL (ref 7–30)
BUN SERPL-MCNC: 43 MG/DL (ref 7–30)
CA-I BLD-MCNC: 4.6 MG/DL (ref 4.4–5.2)
CALCIUM SERPL-MCNC: 8.9 MG/DL (ref 8.5–10.1)
CALCIUM SERPL-MCNC: 9.2 MG/DL (ref 8.5–10.1)
CALCIUM SERPL-MCNC: 9.2 MG/DL (ref 8.5–10.1)
CHLORIDE BLD-SCNC: 110 MMOL/L (ref 94–109)
CHLORIDE BLD-SCNC: 110 MMOL/L (ref 94–109)
CHLORIDE BLD-SCNC: 117 MMOL/L (ref 94–109)
CO2 SERPL-SCNC: 29 MMOL/L (ref 20–32)
CO2 SERPL-SCNC: 29 MMOL/L (ref 20–32)
CO2 SERPL-SCNC: 32 MMOL/L (ref 20–32)
CREAT SERPL-MCNC: 0.97 MG/DL (ref 0.52–1.04)
CREAT SERPL-MCNC: 1.08 MG/DL (ref 0.52–1.04)
CREAT SERPL-MCNC: 1.08 MG/DL (ref 0.52–1.04)
ERYTHROCYTE [DISTWIDTH] IN BLOOD BY AUTOMATED COUNT: 24.6 % (ref 10–15)
GFR SERPL CREATININE-BSD FRML MDRD: 54 ML/MIN/1.73M2
GFR SERPL CREATININE-BSD FRML MDRD: 54 ML/MIN/1.73M2
GFR SERPL CREATININE-BSD FRML MDRD: 61 ML/MIN/1.73M2
GLUCOSE BLD-MCNC: 118 MG/DL (ref 70–99)
GLUCOSE BLD-MCNC: 143 MG/DL (ref 70–99)
GLUCOSE BLD-MCNC: 143 MG/DL (ref 70–99)
GLUCOSE BLDC GLUCOMTR-MCNC: 118 MG/DL (ref 70–99)
GLUCOSE BLDC GLUCOMTR-MCNC: 120 MG/DL (ref 70–99)
GLUCOSE BLDC GLUCOMTR-MCNC: 122 MG/DL (ref 70–99)
GLUCOSE BLDC GLUCOMTR-MCNC: 123 MG/DL (ref 70–99)
GLUCOSE BLDC GLUCOMTR-MCNC: 130 MG/DL (ref 70–99)
GLUCOSE BLDC GLUCOMTR-MCNC: 138 MG/DL (ref 70–99)
GLUCOSE BLDC GLUCOMTR-MCNC: 142 MG/DL (ref 70–99)
GLUCOSE BLDC GLUCOMTR-MCNC: 87 MG/DL (ref 70–99)
HCO3 BLD-SCNC: 32 MMOL/L (ref 21–28)
HCO3 BLDV-SCNC: 33 MMOL/L (ref 21–28)
HCO3 BLDV-SCNC: 33 MMOL/L (ref 21–28)
HCO3 BLDV-SCNC: 34 MMOL/L (ref 21–28)
HCT VFR BLD AUTO: 32.1 % (ref 35–47)
HGB BLD-MCNC: 9.8 G/DL (ref 11.7–15.7)
LACTATE SERPL-SCNC: 1.2 MMOL/L (ref 0.7–2)
MAGNESIUM SERPL-MCNC: 2.2 MG/DL (ref 1.6–2.3)
MAGNESIUM SERPL-MCNC: 2.2 MG/DL (ref 1.6–2.3)
MAGNESIUM SERPL-MCNC: 2.3 MG/DL (ref 1.6–2.3)
MCH RBC QN AUTO: 26.6 PG (ref 26.5–33)
MCHC RBC AUTO-ENTMCNC: 30.5 G/DL (ref 31.5–36.5)
MCV RBC AUTO: 87 FL (ref 78–100)
NT-PROBNP SERPL-MCNC: 2804 PG/ML (ref 0–900)
O2/TOTAL GAS SETTING VFR VENT: 3 %
O2/TOTAL GAS SETTING VFR VENT: 30 %
OXYHGB MFR BLD: 91 % (ref 92–100)
OXYHGB MFR BLD: 94 % (ref 92–100)
OXYHGB MFR BLD: 95 % (ref 92–100)
OXYHGB MFR BLDV: 53 % (ref 70–75)
OXYHGB MFR BLDV: 54 % (ref 70–75)
OXYHGB MFR BLDV: 58 % (ref 70–75)
PCO2 BLD: 44 MM HG (ref 35–45)
PCO2 BLD: 47 MM HG (ref 35–45)
PCO2 BLD: 49 MM HG (ref 35–45)
PCO2 BLDV: 47 MM HG (ref 40–50)
PCO2 BLDV: 52 MM HG (ref 40–50)
PCO2 BLDV: 52 MM HG (ref 40–50)
PH BLD: 7.42 [PH] (ref 7.35–7.45)
PH BLD: 7.44 [PH] (ref 7.35–7.45)
PH BLD: 7.47 [PH] (ref 7.35–7.45)
PH BLDV: 7.41 [PH] (ref 7.32–7.43)
PH BLDV: 7.41 [PH] (ref 7.32–7.43)
PH BLDV: 7.47 [PH] (ref 7.32–7.43)
PHOSPHATE SERPL-MCNC: 3.4 MG/DL (ref 2.5–4.5)
PHOSPHATE SERPL-MCNC: 4.6 MG/DL (ref 2.5–4.5)
PHOSPHATE SERPL-MCNC: 5 MG/DL (ref 2.5–4.5)
PLATELET # BLD AUTO: 234 10E3/UL (ref 150–450)
PO2 BLD: 69 MM HG (ref 80–105)
PO2 BLD: 77 MM HG (ref 80–105)
PO2 BLD: 78 MM HG (ref 80–105)
PO2 BLDV: 32 MM HG (ref 25–47)
PO2 BLDV: 32 MM HG (ref 25–47)
PO2 BLDV: 33 MM HG (ref 25–47)
POTASSIUM BLD-SCNC: 3.3 MMOL/L (ref 3.4–5.3)
POTASSIUM BLD-SCNC: 3.6 MMOL/L (ref 3.4–5.3)
POTASSIUM BLD-SCNC: 3.6 MMOL/L (ref 3.4–5.3)
POTASSIUM BLD-SCNC: 3.9 MMOL/L (ref 3.4–5.3)
PROT SERPL-MCNC: 6.9 G/DL (ref 6.8–8.8)
RBC # BLD AUTO: 3.68 10E6/UL (ref 3.8–5.2)
SODIUM SERPL-SCNC: 145 MMOL/L (ref 133–144)
SODIUM SERPL-SCNC: 145 MMOL/L (ref 133–144)
SODIUM SERPL-SCNC: 151 MMOL/L (ref 133–144)
WBC # BLD AUTO: 22 10E3/UL (ref 4–11)

## 2021-09-13 PROCEDURE — 258N000003 HC RX IP 258 OP 636: Performed by: ANESTHESIOLOGY

## 2021-09-13 PROCEDURE — 250N000013 HC RX MED GY IP 250 OP 250 PS 637: Performed by: THORACIC SURGERY (CARDIOTHORACIC VASCULAR SURGERY)

## 2021-09-13 PROCEDURE — 250N000013 HC RX MED GY IP 250 OP 250 PS 637: Performed by: STUDENT IN AN ORGANIZED HEALTH CARE EDUCATION/TRAINING PROGRAM

## 2021-09-13 PROCEDURE — 250N000013 HC RX MED GY IP 250 OP 250 PS 637: Performed by: PHYSICIAN ASSISTANT

## 2021-09-13 PROCEDURE — 83880 ASSAY OF NATRIURETIC PEPTIDE: CPT | Performed by: ANESTHESIOLOGY

## 2021-09-13 PROCEDURE — 71045 X-RAY EXAM CHEST 1 VIEW: CPT | Mod: 26 | Performed by: STUDENT IN AN ORGANIZED HEALTH CARE EDUCATION/TRAINING PROGRAM

## 2021-09-13 PROCEDURE — 82805 BLOOD GASES W/O2 SATURATION: CPT | Performed by: PHYSICIAN ASSISTANT

## 2021-09-13 PROCEDURE — 250N000011 HC RX IP 250 OP 636: Performed by: PHYSICIAN ASSISTANT

## 2021-09-13 PROCEDURE — 83605 ASSAY OF LACTIC ACID: CPT | Performed by: ANESTHESIOLOGY

## 2021-09-13 PROCEDURE — 83735 ASSAY OF MAGNESIUM: CPT

## 2021-09-13 PROCEDURE — 200N000002 HC R&B ICU UMMC

## 2021-09-13 PROCEDURE — 94640 AIRWAY INHALATION TREATMENT: CPT | Mod: 76

## 2021-09-13 PROCEDURE — 250N000013 HC RX MED GY IP 250 OP 250 PS 637

## 2021-09-13 PROCEDURE — 84132 ASSAY OF SERUM POTASSIUM: CPT | Performed by: STUDENT IN AN ORGANIZED HEALTH CARE EDUCATION/TRAINING PROGRAM

## 2021-09-13 PROCEDURE — 85027 COMPLETE CBC AUTOMATED: CPT | Performed by: SURGERY

## 2021-09-13 PROCEDURE — 94640 AIRWAY INHALATION TREATMENT: CPT

## 2021-09-13 PROCEDURE — 84100 ASSAY OF PHOSPHORUS: CPT

## 2021-09-13 PROCEDURE — 250N000013 HC RX MED GY IP 250 OP 250 PS 637: Performed by: NURSE PRACTITIONER

## 2021-09-13 PROCEDURE — 258N000003 HC RX IP 258 OP 636: Performed by: SURGERY

## 2021-09-13 PROCEDURE — 250N000009 HC RX 250

## 2021-09-13 PROCEDURE — C9113 INJ PANTOPRAZOLE SODIUM, VIA: HCPCS | Performed by: STUDENT IN AN ORGANIZED HEALTH CARE EDUCATION/TRAINING PROGRAM

## 2021-09-13 PROCEDURE — 80053 COMPREHEN METABOLIC PANEL: CPT

## 2021-09-13 PROCEDURE — 250N000011 HC RX IP 250 OP 636

## 2021-09-13 PROCEDURE — 80048 BASIC METABOLIC PNL TOTAL CA: CPT

## 2021-09-13 PROCEDURE — 250N000013 HC RX MED GY IP 250 OP 250 PS 637: Performed by: SURGERY

## 2021-09-13 PROCEDURE — 250N000011 HC RX IP 250 OP 636: Performed by: STUDENT IN AN ORGANIZED HEALTH CARE EDUCATION/TRAINING PROGRAM

## 2021-09-13 PROCEDURE — 250N000009 HC RX 250: Performed by: NURSE PRACTITIONER

## 2021-09-13 PROCEDURE — 250N000011 HC RX IP 250 OP 636: Performed by: INTERNAL MEDICINE

## 2021-09-13 PROCEDURE — 71045 X-RAY EXAM CHEST 1 VIEW: CPT

## 2021-09-13 PROCEDURE — 250N000011 HC RX IP 250 OP 636: Performed by: ANESTHESIOLOGY

## 2021-09-13 PROCEDURE — 999N000157 HC STATISTIC RCP TIME EA 10 MIN

## 2021-09-13 PROCEDURE — 250N000009 HC RX 250: Performed by: SURGERY

## 2021-09-13 PROCEDURE — 250N000011 HC RX IP 250 OP 636: Performed by: SURGERY

## 2021-09-13 PROCEDURE — 94003 VENT MGMT INPAT SUBQ DAY: CPT

## 2021-09-13 PROCEDURE — 99291 CRITICAL CARE FIRST HOUR: CPT | Mod: 24 | Performed by: INTERNAL MEDICINE

## 2021-09-13 PROCEDURE — 82330 ASSAY OF CALCIUM: CPT | Performed by: SURGERY

## 2021-09-13 RX ORDER — POTASSIUM CHLORIDE 20MEQ/15ML
20 LIQUID (ML) ORAL ONCE
Status: COMPLETED | OUTPATIENT
Start: 2021-09-13 | End: 2021-09-13

## 2021-09-13 RX ORDER — QUETIAPINE FUMARATE 25 MG/1
25 TABLET, FILM COATED ORAL 2 TIMES DAILY
Status: DISCONTINUED | OUTPATIENT
Start: 2021-09-13 | End: 2021-09-16

## 2021-09-13 RX ORDER — ALBUMIN, HUMAN INJ 5% 5 %
SOLUTION INTRAVENOUS
Status: DISCONTINUED
Start: 2021-09-13 | End: 2021-09-14 | Stop reason: HOSPADM

## 2021-09-13 RX ORDER — HALOPERIDOL 5 MG/ML
3 INJECTION INTRAMUSCULAR
Status: COMPLETED | OUTPATIENT
Start: 2021-09-13 | End: 2021-09-13

## 2021-09-13 RX ORDER — HALOPERIDOL 5 MG/ML
2 INJECTION INTRAMUSCULAR ONCE
Status: COMPLETED | OUTPATIENT
Start: 2021-09-13 | End: 2021-09-13

## 2021-09-13 RX ORDER — POTASSIUM CHLORIDE 20MEQ/15ML
40 LIQUID (ML) ORAL ONCE
Status: COMPLETED | OUTPATIENT
Start: 2021-09-13 | End: 2021-09-13

## 2021-09-13 RX ORDER — NICOTINE 21 MG/24HR
1 PATCH, TRANSDERMAL 24 HOURS TRANSDERMAL DAILY
Status: DISCONTINUED | OUTPATIENT
Start: 2021-09-13 | End: 2021-09-22 | Stop reason: HOSPADM

## 2021-09-13 RX ORDER — POTASSIUM CHLORIDE 1.5 G/1.58G
20 POWDER, FOR SOLUTION ORAL ONCE
Status: COMPLETED | OUTPATIENT
Start: 2021-09-13 | End: 2021-09-13

## 2021-09-13 RX ORDER — POTASSIUM CHLORIDE 1.5 G/1.58G
40 POWDER, FOR SOLUTION ORAL ONCE
Status: DISCONTINUED | OUTPATIENT
Start: 2021-09-13 | End: 2021-09-13

## 2021-09-13 RX ORDER — NOREPINEPHRINE BITARTRATE 0.06 MG/ML
INJECTION, SOLUTION INTRAVENOUS
Status: DISCONTINUED
Start: 2021-09-13 | End: 2021-09-14 | Stop reason: HOSPADM

## 2021-09-13 RX ORDER — ERTAPENEM 1 G/1
1 INJECTION, POWDER, LYOPHILIZED, FOR SOLUTION INTRAMUSCULAR; INTRAVENOUS EVERY 24 HOURS
Status: COMPLETED | OUTPATIENT
Start: 2021-09-13 | End: 2021-09-22

## 2021-09-13 RX ORDER — NOREPINEPHRINE BITARTRATE 0.06 MG/ML
.01-.6 INJECTION, SOLUTION INTRAVENOUS CONTINUOUS
Status: DISCONTINUED | OUTPATIENT
Start: 2021-09-13 | End: 2021-09-15

## 2021-09-13 RX ADMIN — DEXMEDETOMIDINE 0.7 MCG/KG/HR: 100 INJECTION, SOLUTION, CONCENTRATE INTRAVENOUS at 03:57

## 2021-09-13 RX ADMIN — HYDROMORPHONE HYDROCHLORIDE 0.2 MG: 0.2 INJECTION, SOLUTION INTRAMUSCULAR; INTRAVENOUS; SUBCUTANEOUS at 10:53

## 2021-09-13 RX ADMIN — PANTOPRAZOLE SODIUM 40 MG: 40 INJECTION, POWDER, FOR SOLUTION INTRAVENOUS at 07:56

## 2021-09-13 RX ADMIN — POTASSIUM CHLORIDE 20 MEQ: 40 SOLUTION ORAL at 20:01

## 2021-09-13 RX ADMIN — ASPIRIN 81 MG: 81 TABLET, CHEWABLE ORAL at 07:56

## 2021-09-13 RX ADMIN — DOCUSATE SODIUM 50 MG AND SENNOSIDES 8.6 MG 1 TABLET: 8.6; 5 TABLET, FILM COATED ORAL at 20:01

## 2021-09-13 RX ADMIN — HEPARIN SODIUM 5000 UNITS: 10000 INJECTION, SOLUTION INTRAVENOUS; SUBCUTANEOUS at 11:30

## 2021-09-13 RX ADMIN — NICOTINE 1 PATCH: 21 PATCH, EXTENDED RELEASE TRANSDERMAL at 15:01

## 2021-09-13 RX ADMIN — POTASSIUM CHLORIDE 20 MEQ: 1.5 POWDER, FOR SOLUTION ORAL at 05:52

## 2021-09-13 RX ADMIN — OXYCODONE HYDROCHLORIDE 5 MG: 5 TABLET ORAL at 20:00

## 2021-09-13 RX ADMIN — PIPERACILLIN SODIUM AND TAZOBACTAM SODIUM 4.5 G: 4; .5 INJECTION, POWDER, LYOPHILIZED, FOR SOLUTION INTRAVENOUS at 05:52

## 2021-09-13 RX ADMIN — POTASSIUM CHLORIDE 20 MEQ: 40 SOLUTION ORAL at 07:56

## 2021-09-13 RX ADMIN — ERTAPENEM SODIUM 1 G: 1 INJECTION, POWDER, LYOPHILIZED, FOR SOLUTION INTRAMUSCULAR; INTRAVENOUS at 17:30

## 2021-09-13 RX ADMIN — POTASSIUM CHLORIDE 20 MEQ: 20 SOLUTION ORAL at 20:02

## 2021-09-13 RX ADMIN — PIPERACILLIN SODIUM AND TAZOBACTAM SODIUM 4.5 G: 4; .5 INJECTION, POWDER, LYOPHILIZED, FOR SOLUTION INTRAVENOUS at 11:30

## 2021-09-13 RX ADMIN — POLYETHYLENE GLYCOL 3350 17 G: 17 POWDER, FOR SOLUTION ORAL at 07:57

## 2021-09-13 RX ADMIN — LEVALBUTEROL HYDROCHLORIDE 1.25 MG: 1.25 SOLUTION RESPIRATORY (INHALATION) at 21:28

## 2021-09-13 RX ADMIN — Medication 15 ML: at 07:57

## 2021-09-13 RX ADMIN — HEPARIN SODIUM 5000 UNITS: 10000 INJECTION, SOLUTION INTRAVENOUS; SUBCUTANEOUS at 20:01

## 2021-09-13 RX ADMIN — HALOPERIDOL LACTATE 2 MG: 5 INJECTION, SOLUTION INTRAMUSCULAR at 14:04

## 2021-09-13 RX ADMIN — GABAPENTIN 1200 MG: 250 SUSPENSION ORAL at 22:09

## 2021-09-13 RX ADMIN — LEVALBUTEROL HYDROCHLORIDE 1.25 MG: 1.25 SOLUTION RESPIRATORY (INHALATION) at 08:35

## 2021-09-13 RX ADMIN — PIPERACILLIN SODIUM AND TAZOBACTAM SODIUM 4.5 G: 4; .5 INJECTION, POWDER, LYOPHILIZED, FOR SOLUTION INTRAVENOUS at 00:00

## 2021-09-13 RX ADMIN — DEXMEDETOMIDINE 0.7 MCG/KG/HR: 100 INJECTION, SOLUTION, CONCENTRATE INTRAVENOUS at 09:17

## 2021-09-13 RX ADMIN — Medication 5 MG: at 23:20

## 2021-09-13 RX ADMIN — ATORVASTATIN CALCIUM 40 MG: 40 TABLET, FILM COATED ORAL at 20:01

## 2021-09-13 RX ADMIN — POTASSIUM CHLORIDE 40 MEQ: 40 SOLUTION ORAL at 18:12

## 2021-09-13 RX ADMIN — EPINEPHRINE 0.02 MCG/KG/MIN: 1 INJECTION PARENTERAL at 11:22

## 2021-09-13 RX ADMIN — HYDROMORPHONE HYDROCHLORIDE 0.2 MG: 0.2 INJECTION, SOLUTION INTRAMUSCULAR; INTRAVENOUS; SUBCUTANEOUS at 10:50

## 2021-09-13 RX ADMIN — HEPARIN SODIUM 5000 UNITS: 10000 INJECTION, SOLUTION INTRAVENOUS; SUBCUTANEOUS at 03:57

## 2021-09-13 RX ADMIN — LEVALBUTEROL HYDROCHLORIDE 1.25 MG: 1.25 SOLUTION RESPIRATORY (INHALATION) at 17:57

## 2021-09-13 RX ADMIN — GABAPENTIN 600 MG: 250 SUSPENSION ORAL at 07:57

## 2021-09-13 RX ADMIN — AMIODARONE HYDROCHLORIDE 200 MG: 200 TABLET ORAL at 07:56

## 2021-09-13 RX ADMIN — DOCUSATE SODIUM 50 MG AND SENNOSIDES 8.6 MG 1 TABLET: 8.6; 5 TABLET, FILM COATED ORAL at 07:56

## 2021-09-13 RX ADMIN — HALOPERIDOL LACTATE 2 MG: 5 INJECTION, SOLUTION INTRAMUSCULAR at 13:15

## 2021-09-13 RX ADMIN — QUETIAPINE FUMARATE 25 MG: 25 TABLET ORAL at 20:01

## 2021-09-13 RX ADMIN — HALOPERIDOL LACTATE 3 MG: 5 INJECTION, SOLUTION INTRAMUSCULAR at 13:39

## 2021-09-13 ASSESSMENT — MIFFLIN-ST. JEOR: SCORE: 1506.62

## 2021-09-13 ASSESSMENT — ACTIVITIES OF DAILY LIVING (ADL)
ADLS_ACUITY_SCORE: 24
ADLS_ACUITY_SCORE: 22
ADLS_ACUITY_SCORE: 21
ADLS_ACUITY_SCORE: 22

## 2021-09-13 NOTE — PLAN OF CARE
Major Shift Events: No acute events overnight. Tmax 99.6. Dex, prop, and fent for comfort overnight. Following all commands. Epi remains on for SVO2 and CI. See flowsheets for hemodynamics. Bumex given with good UOP. TF turned off at 0400.   Plan: Work towards extubation. Continue diuresis.   For vital signs and complete assessments, please see documentation flowsheets.

## 2021-09-13 NOTE — PROGRESS NOTES
Pt extubated following PS trial and cuff leak test. Placed on 3L oxymask, pt has adequate cough. Pt is very agitated, crawling out of bed, mild delirium.

## 2021-09-13 NOTE — PROGRESS NOTES
CV ICU PROGRESS NOTE  September 13, 2021      CO-MORBIDITIES:   S/P MVR (mitral valve replacement)  (primary encounter diagnosis)  Mitral stenosis  Nonrheumatic mitral valve stenosis    ASSESSMENT: Corina Martinez is a 66 year old female with PMH of arthritis, SHAILESH, HTN, CHF and mitral valve stenosis, now s/p MVR w/ left atrial appendage ligation on 9/7 with Dr. Acuna.     OVERNIGHT EVENTS:   NAEO    TODAY'S CHANGES:   PS as able this AM. Follow up gas.   Extubate  Seroquel 25 at bedtime    PLAN:  Neuro/ pain/ sedation:  # Acute post-operative pain  - Monitor neurological status. Notify the MD for any acute changes in exam.  - acetaminophen allergy   - Scheduled gabapentin (PTA dose), PRN oxy, robaxin and dilaudid   - NIKKI catheters B/l  (9/7-  - PTA cymbalta 30 mg, holding until PO intake   - PTA on atarax, holding while intubated   - Precedex/propofol gtt   - Fentanyl gtt while intubated  - Seroquel 25 at bedtime    Pulmonary care:   #Current smoker   #COPD sat's upper 80's at baseline   #SHAILESH  - Extubated in OR, reintubated POD1  - s/p - Giovani off 9/10 and, Prednisone 40mg (5d course)   - Vent, AC. 16/400/8, 40%.   - ABG 7.47/77/44/32, on vent.   - repeat gases as clinically appropriate  - PS this AM. Plan to Potentially extubate later this AM  - Nicotine patch     Cardiovascular:    #HTN  #CHF, EF 55-60%   #Afib, warfarin   #MV stenosis, s/p MVR 9/7  #Left atrial appendage thrombus s/p atrial appendage clip 9/7  AV-paced epicardial leads - at 90   - Monitor hemodynamic status.   - MAP goal > 65  - Epi 0.02 wean as tolerated following extubation   - if has another episode of a fib, likely need to start anticoagulation - check with CVTS  - ASA, atorvastatin, amiodarone (PTA dose)    - holding PTA on torsemide 10-20 daily, PTA Metoprolol and diltiazem     GI /Nutrition:   #GERD  #Primary biliary cirrhosis   - NPO, reassess following extubation   - Miralax daily, senna 1 tab BID, PRN MoM and supp     Fluids/  Electrolytes/ Renal:   #RADHA  - Rebolledo for strict I&Os, UOP 4L//545cc (net -2L yesterday)   - Bumex 2mg/2 mg yesterday.    - Scheduled K 20mg BID      Endocrine:    #Stress hyperglycemia  - SSI      ID/ Antibiotics:  - Perioperative antibiotics with cefazolin, vanc   - Afebrile, WBC 15.5 (19.4).    - Trach cx 9/11, NGTD  - 9/11 empiric zosyn d/t increased secretions     Heme:     # Acute blood loss anemia  - Hgb 7.5 (7.7)   -Transfuse for hgb <7     Prophylaxis:    - SCDs  - PPI  - SQH     Lines/ tubes/ drains:  - CVC  - [place] - likely remove today  - Arterial line  - Rebolledo  - Mediastinal tube x2  - Pleural tube x2     Disposition:  - CV ICU.      Patient seen, findings and plan discussed with CV ICU staff.      Hemalatha Garcia MD  Anesthesia Resident CA-2  [phone]     ====================================    SUBJECTIVE:   Remains intubated, sedated. On PS this AM. Voiding via rebolledo. Intermittently agitated, but following commands    OBJECTIVE:   1. VITAL SIGNS:   Temp:  [98.4  F (36.9  C)-99.6  F (37.6  C)] 99.4  F (37.4  C)  Pulse:  [69-77] 70  Resp:  [16-18] 16  BP: ()/(56-97) 109/67  MAP:  [61 mmHg-96 mmHg] 83 mmHg  Arterial Line BP: ()/(43-75) 127/61  FiO2 (%):  [30 %-40 %] 30 %  SpO2:  [93 %-100 %] 99 %  Ventilation Mode: CMV/AC  (Continuous Mandatory Ventilation/ Assist Control)  FiO2 (%): 30 %  Rate Set (breaths/minute): 16 breaths/min  Tidal Volume Set (mL): 400 mL  PEEP (cm H2O): 8 cmH2O  Pressure Support (cm H2O): 10 cmH2O  Oxygen Concentration (%): 30 %  Resp: 16      2. INTAKE/ OUTPUT:   I/O last 3 completed shifts:  In: 2974.25 [I.V.:1084.25; NG/GT:580]  Out: 5435 [Urine:5305; Chest Tube:130]    3. PHYSICAL EXAMINATION:   General: intubated, NAD  Neuro: sedated, intermittent agitation, moving all extremities   Resp: mechanically ventilated, equal lung sounds bilaterally   CV: RRR, paced  Abdomen: Soft, non distended, CTs with SS output   Incisions: c/d/i  Extremities: warm and well perfused,  bilateral LE edema     4. INVESTIGATIONS:   Arterial Blood Gases   Recent Labs   Lab 09/12/21  1759 09/12/21  1103 09/11/21  0408 09/10/21  0340   PH 7.43 7.41 7.47* 7.44   PCO2 47* 48* 40 40   PO2 83 76* 96 76*   HCO3 31* 30* 29* 28     Complete Blood Count   Recent Labs   Lab 09/13/21  0409 09/12/21  0818 09/12/21  0330 09/11/21  1903 09/11/21  0407 09/10/21  0341   WBC 22.0*  --  15.5*  --  19.4* 28.2*   HGB 9.8* 8.9* 7.5* 8.3* 7.7* 8.0*     --  148*  --  132* 119*     Basic Metabolic Panel  Recent Labs   Lab 09/13/21  0409 09/13/21  0406 09/13/21  0025 09/12/21  2347 09/12/21  1937 09/12/21  1511 09/12/21  1041 09/12/21  0330 09/11/21  1723 09/11/21  1719   *  145*  --   --   --   --  145*  --  148*  148*  --  144   POTASSIUM 3.6  3.6  --   --  3.9  --  4.3 4.4 3.6  3.6   < > 4.3   CHLORIDE 110*  110*  --   --   --   --  110*  --  111*  111*  --  109   CO2 29  29  --   --   --   --  32  --  30  30  --  30   BUN 43*  43*  --   --   --   --  45*  --  46*  46*  --  47*   CR 1.08*  1.08*  --   --   --   --  1.05*  --  1.10*  1.10*  --  0.99   *  143* 142* 130*  --  149* 161*  159*  --  100*  100*  --  130*    < > = values in this interval not displayed.     Liver Function Tests  Recent Labs   Lab 09/13/21  0409 09/12/21  0330 09/11/21  0407 09/10/21  0341 09/07/21  1654 09/07/21  1455   AST 34 39 44 57*  --   --    ALT 29 23 20 19 70*  --    ALKPHOS 263* 229* 212* 160* 228*  --    BILITOTAL 0.5 0.4 0.3 0.4 0.8  --    ALBUMIN 2.6* 2.2* 2.3* 2.4* 2.4*  --    INR  --   --   --   --  1.37* 1.79*     Coagulation Profile  Recent Labs   Lab 09/07/21  1654 09/07/21  1455   INR 1.37* 1.79*   PTT 46* 34       5. RADIOLOGY:   CXR reviewed  =========================================

## 2021-09-13 NOTE — PLAN OF CARE
Major Shift Events:  Extubated today around 1300. Patient was very agitated and required Haldol. Precedex on all day. Haldol helped with her agitation and she was alert and oriented by 1800. Passed bedside swallow. EPI discontinued and will control BP with Levo as needed. Iniguez with good urine output. No BM for a few days.     Plan: pull CT tomorrow. Up to chair. PT and OT. Lasix.     All questions answered and safety checks complete.   See chart for all other details.     For vital signs and complete assessments, please see documentation flowsheets.

## 2021-09-13 NOTE — ANESTHESIA POST-OP FOLLOW-UP NOTE
"[facility] Nerve Block Daily Progress Note     Subjective  Patient remains intubated and sedated. ICU team plans to possibly extubate this afternoon. Bilateral NIKKI sites c/d/i per bedside RN. Off pressers. Has propofol and fentanyl infusions.      Objective  Exam  Vitals:   BP (!) 141/87   Pulse 70   Temp 36.9  C (98.4  F) (Axillary)   Resp 18   Ht 1.689 m (5' 6.5\")   Wt 88.8 kg (195 lb 12.3 oz)   LMP  (LMP Unknown)   SpO2 98%   BMI 31.13 kg/m        General: Sedated  Resp: MV   MSK/Neuro: not assessed    Skin: bilateral erector spinae (ES) catheter sites with dressings c/d/i (per bedside RN assessment), no tenderness, no erythema, heme, edema present     Assessment  Corina Martinez is a 66 year old female with PMH of mitral stenosis who is now POD #5 s/p mitral valve replacement on 9/7/21. Prior to surgery uncomplicated placement of bilateral T5-6 erector spinae (ES) catheter by RAPS for analgesia. Currently intubated and sedated with plan to possibly extubate today.      Plan  - Catheter Day #5 bilateral T5-6 ES catheters/infusion:    Continue Ropivacaine 0.2% PIB (programmed intermittent bolus) infusion at 7 mL Q 60 min via each catheter, total infusion 14 mL/hr   ? Plan to maintain catheters while chest tubes in place, max of 7 days       When ready to extubate, patient can be evaluated to receive local anesthetic bolus, bedside RN should page RAPS to request bolus    Each catheter was hand bolused, 5mL 0.25% bupivacaine, bilaterally at 0930     - Antithrombotics/Thrombolytics ordered:     Okay to continue Heparin SQ 5,000 units Q 8 hr as ordered by primary service  ? Please contact RAPS jobcode pager [phone] before ordering or making any medication changes     RAPS will continue to follow and adjust as needed  Discussed with attending anesthesiologist     Analgesic Medications ordered:     Current Facility-Administered Medications:      - MEDICATION INSTRUCTIONS -, , Does not apply, " Continuous PRN, Vinayak Zamudio PA-C     amiodarone (PACERONE) tablet 200 mg, 200 mg, Oral or Feeding Tube, Daily, Sven Acuna MD, 200 mg at 09/12/21 0722     aspirin (ASA) chewable tablet 81 mg, 81 mg, Oral or NG Tube, Daily, Vinayak Zamudio PA-C, 81 mg at 09/12/21 0722     atorvastatin (LIPITOR) tablet 40 mg, 40 mg, Oral or Feeding Tube, QPM, Kimberli Padilla APRN CNP, 40 mg at 09/11/21 2011     bisacodyl (DULCOLAX) Suppository 10 mg, 10 mg, Rectal, Daily PRN, Vinayak Zamudio PA-C     dexmedetomidine (PRECEDEX) 400 mcg in 0.9% sodium chloride 100 mL, 0.2-0.7 mcg/kg/hr (Dosing Weight), Intravenous, Continuous, Juanis Sanchez MD, Last Rate: 16.4 mL/hr at 09/12/21 1100, 0.7 mcg/kg/hr at 09/12/21 1100     dextrose 10% infusion, , Intravenous, Continuous PRN, Kimberli Padilla APRN CNP     glucose gel 15-30 g, 15-30 g, Oral, Q15 Min PRN **OR** dextrose 50 % injection 25-50 mL, 25-50 mL, Intravenous, Q15 Min PRN **OR** glucagon injection 1 mg, 1 mg, Subcutaneous, Q15 Min PRN, Juanis Sanchez MD     [Held by provider] DULoxetine (CYMBALTA) DR capsule 30 mg, 30 mg, Oral, At Bedtime, Kimberli Padilla APRN CNP     EPINEPHrine (ADRENALIN) 5 mg in sodium chloride 0.9 % 250 mL infusion, 0.01-0.3 mcg/kg/min (Dosing Weight), Intravenous, Continuous, Leila Doll MD, Held at 09/12/21 0323     fentaNYL (SUBLIMAZE) infusion,  mcg/hr, Intravenous, Continuous, Juanis Sanchez MD, Last Rate: 1.5 mL/hr at 09/12/21 1100, 75 mcg/hr at 09/12/21 1100     gabapentin (NEURONTIN) solution 1,200 mg, 1,200 mg, Oral or Feeding Tube, At Bedtime, Sven Acuna MD, 1,200 mg at 09/11/21 2204     gabapentin (NEURONTIN) solution 600 mg, 600 mg, Oral or Feeding Tube, BID, Sven Acuna MD, 600 mg at 09/12/21 0722     heparin ANTICOAGULANT injection 5,000 Units, 5,000 Units, Subcutaneous, Q8H, Vinayak Zamudio PA-C, 5,000 Units at 09/12/21 0358     hydrALAZINE (APRESOLINE) injection  10 mg, 10 mg, Intravenous, Q30 Min PRN, Vinayak Zamudio PA-C     HYDROmorphone (DILAUDID) injection 0.2 mg, 0.2 mg, Intravenous, Q2H PRN, 0.2 mg at 09/08/21 1546 **OR** HYDROmorphone (DILAUDID) injection 0.4 mg, 0.4 mg, Intravenous, Q2H PRN, Vinayak Zamudio PA-C, 0.4 mg at 09/08/21 1124     insulin aspart (NovoLOG) injection (RAPID ACTING), 1-6 Units, Subcutaneous, Q4H, Juanis Sanchez MD     levalbuterol (XOPENEX) neb solution 1.25 mg, 1.25 mg, Nebulization, Q4H WA, Kimberli Padilla APRN CNP, 1.25 mg at 09/12/21 0957     lidocaine (LMX4) cream, , Topical, Q1H PRN, Vinayak Zamudio PA-C     lidocaine 1 % 0.1-1 mL, 0.1-1 mL, Other, Q1H PRN, Vinayak Zamudio PA-C     magnesium hydroxide (MILK OF MAGNESIA) suspension 30 mL, 30 mL, Oral, Daily PRN, Vinayak Zamudio PA-C     methocarbamol (ROBAXIN) tablet 500 mg, 500 mg, Oral or Feeding Tube, Q6H PRN, Vinayak Zamudio PA-C     multivitamins w/minerals liquid 15 mL, 15 mL, Per Feeding Tube, Daily, Kimberli Padilla APRN CNP, 15 mL at 09/12/21 0721     mupirocin (BACTROBAN) 2 % ointment 0.5 g, 0.5 g, Both Nostrils, BID, Vinayak Zamudio PA-C, 0.5 g at 09/12/21 0723     naloxone (NARCAN) injection 0.2 mg, 0.2 mg, Intravenous, Q2 Min PRN **OR** naloxone (NARCAN) injection 0.4 mg, 0.4 mg, Intravenous, Q2 Min PRN **OR** naloxone (NARCAN) injection 0.2 mg, 0.2 mg, Intramuscular, Q2 Min PRN **OR** naloxone (NARCAN) injection 0.4 mg, 0.4 mg, Intramuscular, Q2 Min PRN, Sven Acuna MD     ondansetron (ZOFRAN-ODT) ODT tab 4 mg, 4 mg, Oral, Q6H PRN **OR** ondansetron (ZOFRAN) injection 4 mg, 4 mg, Intravenous, Q6H PRN, Vinayak Zamudio PA-C, 4 mg at 09/08/21 1140     oxyCODONE (ROXICODONE) tablet 5 mg, 5 mg, Oral or Feeding Tube, Q4H PRN **OR** oxyCODONE IR (ROXICODONE) tablet 10 mg, 10 mg, Oral or Feeding Tube, Q4H PRN, Vinayak Zamudio PA-C, 10 mg at 09/12/21 1059     pantoprazole (PROTONIX) 2 mg/mL suspension 40 mg, 40 mg, Per Feeding  Tube, QAM AC, 40 mg at 09/11/21 0826 **OR** pantoprazole (PROTONIX) IV push injection 40 mg, 40 mg, Intravenous, QAM AC, Hemalatha Garcia MD, 40 mg at 09/12/21 0721     piperacillin-tazobactam (ZOSYN) 4.5 g vial to attach to  mL bag, 4.5 g, Intravenous, Q6H, Juanis Sanchez MD, 4.5 g at 09/12/21 0503     polyethylene glycol (MIRALAX) Packet 17 g, 17 g, Oral or Feeding Tube, Daily, Vinayak Zamudio PA-C, 17 g at 09/12/21 0721     potassium chloride (KAYCIEL) solution 20 mEq, 20 mEq, Oral or Feeding Tube, BID, Juanis Sanchez MD, 20 mEq at 09/12/21 0721     prochlorperazine (COMPAZINE) injection 5 mg, 5 mg, Intravenous, Q6H PRN **OR** prochlorperazine (COMPAZINE) tablet 5 mg, 5 mg, Oral or Feeding Tube, Q6H PRN, Sven Acuna MD     propofol (DIPRIVAN) infusion, 5-75 mcg/kg/min (Dosing Weight), Intravenous, Continuous, Hemalatha Garcia MD, Stopped at 09/12/21 1025     protein modular (PROSOURCE TF) 1 packet, 1 packet, Per Feeding Tube, BID 09 12, Kimberli Padilla, SUZE CNP, 1 packet at 09/12/21 0848     Reason beta blocker order not selected, , Does not apply, DOES NOT GO TO MARLizz Alec James, PA-C     ropivacaine 0.2% in NS perineural infusion simple, , Perineural, Continuous Nerve Block, Vinayak Zamudio PA-C, Last Rate: 7 mL/hr at 09/10/21 1323, New Syringe/Cartridge at 09/12/21 0125     ropivacaine 0.2% in NS perineural infusion simple, , Perineural, Continuous Nerve Block, Vinayak Zamudio PA-C, Last Rate: 7 mL/hr at 09/12/21 0117, New Syringe/Cartridge at 09/12/21 0117     senna-docusate (SENOKOT-S/PERICOLACE) 8.6-50 MG per tablet 1 tablet, 1 tablet, Oral or Feeding Tube, BID, Vinayak Zamudio PA-C, 1 tablet at 09/12/21 0722     sodium chloride (PF) 0.9% PF flush 3 mL, 3 mL, Intracatheter, Q8H, Vinayak Zamudio PA-C, 3 mL at 09/11/21 2358     sodium chloride (PF) 0.9% PF flush 3 mL, 3 mL, Intracatheter, q1 min prn, Vinayak Zamudio  DEZ           Labs/Diagnostics  Heme/INR:       Recent Labs   Lab Test 09/10/21  0341 09/09/21  0341   WBC 28.2* 22.3*   RBC 2.87* 3.16*   HGB 8.0* 8.6*   HCT 26.0* 28.2*   MCV 91 89   MCH 27.9 27.2   MCHC 30.8* 30.5*   RDW 23.4* 22.4*   * 121*               Lab Results   Component Value Date     INR 1.37 09/07/2021     INR 1.79 09/07/2021     INR 2.04 08/25/2021     INR 2.6 07/28/2021     INR 2.8 07/15/2021     INR 2.9 07/07/2021     INR 3.65 06/08/2021     INR 1.99 05/29/2021     INR 2.15 05/28/2021         Jimmie Phelps MD  Perioperative and Interventional Pain Service  9/12/2021 9:51 AM  PIPS 24-hour Job Code Pagers (for in-house use only, may text page using [employer])  [place]:  [phone]  [place]:  [phone]  Peds [facility]: [phone]

## 2021-09-13 NOTE — PROGRESS NOTES
Tried first spontaneous breathing trial at 0845 with propofol stopped, but patient became very agitated, MAP >110, RR 38 on PS mode.  ICU team notified, restart propofol and switch back to A/C mode..  During team around, tried spontaneous breathing trial again with propofol and precedex turned off, PS mode for 30 mins, ABG satisfactory.  Patient was extubated at 0935, to NC initially and then to oxymask for mouth breathing.    Patient is delirious, RASS between 0 to 3.  Unable to do swallowing eval due to altered mental status.  Unable to take any pills due to removal of OG.

## 2021-09-14 ENCOUNTER — APPOINTMENT (OUTPATIENT)
Dept: GENERAL RADIOLOGY | Facility: CLINIC | Age: 66
DRG: 219 | End: 2021-09-14
Attending: NURSE PRACTITIONER
Payer: MEDICARE

## 2021-09-14 ENCOUNTER — APPOINTMENT (OUTPATIENT)
Dept: GENERAL RADIOLOGY | Facility: CLINIC | Age: 66
DRG: 219 | End: 2021-09-14
Attending: STUDENT IN AN ORGANIZED HEALTH CARE EDUCATION/TRAINING PROGRAM
Payer: MEDICARE

## 2021-09-14 ENCOUNTER — APPOINTMENT (OUTPATIENT)
Dept: OCCUPATIONAL THERAPY | Facility: CLINIC | Age: 66
DRG: 219 | End: 2021-09-14
Attending: PHYSICIAN ASSISTANT
Payer: MEDICARE

## 2021-09-14 LAB
ALBUMIN SERPL-MCNC: 2.4 G/DL (ref 3.4–5)
ALP SERPL-CCNC: 229 U/L (ref 40–150)
ALT SERPL W P-5'-P-CCNC: 36 U/L (ref 0–50)
ANION GAP SERPL CALCULATED.3IONS-SCNC: 4 MMOL/L (ref 3–14)
ANION GAP SERPL CALCULATED.3IONS-SCNC: 4 MMOL/L (ref 3–14)
AST SERPL W P-5'-P-CCNC: 39 U/L (ref 0–45)
BACTERIA SPT CULT: ABNORMAL
BASE EXCESS BLDA CALC-SCNC: 4.4 MMOL/L (ref -9–1.8)
BILIRUB SERPL-MCNC: 0.8 MG/DL (ref 0.2–1.3)
BUN SERPL-MCNC: 30 MG/DL (ref 7–30)
BUN SERPL-MCNC: 30 MG/DL (ref 7–30)
CA-I BLD-MCNC: 4.7 MG/DL (ref 4.4–5.2)
CALCIUM SERPL-MCNC: 8.5 MG/DL (ref 8.5–10.1)
CALCIUM SERPL-MCNC: 8.5 MG/DL (ref 8.5–10.1)
CHLORIDE BLD-SCNC: 112 MMOL/L (ref 94–109)
CHLORIDE BLD-SCNC: 112 MMOL/L (ref 94–109)
CO2 SERPL-SCNC: 28 MMOL/L (ref 20–32)
CO2 SERPL-SCNC: 28 MMOL/L (ref 20–32)
CREAT SERPL-MCNC: 0.8 MG/DL (ref 0.52–1.04)
CREAT SERPL-MCNC: 0.8 MG/DL (ref 0.52–1.04)
ERYTHROCYTE [DISTWIDTH] IN BLOOD BY AUTOMATED COUNT: 23.8 % (ref 10–15)
GFR SERPL CREATININE-BSD FRML MDRD: 77 ML/MIN/1.73M2
GFR SERPL CREATININE-BSD FRML MDRD: 77 ML/MIN/1.73M2
GLUCOSE BLD-MCNC: 98 MG/DL (ref 70–99)
GLUCOSE BLD-MCNC: 98 MG/DL (ref 70–99)
GLUCOSE BLDC GLUCOMTR-MCNC: 73 MG/DL (ref 70–99)
GLUCOSE BLDC GLUCOMTR-MCNC: 80 MG/DL (ref 70–99)
GLUCOSE BLDC GLUCOMTR-MCNC: 88 MG/DL (ref 70–99)
GLUCOSE BLDC GLUCOMTR-MCNC: 99 MG/DL (ref 70–99)
GRAM STAIN RESULT: ABNORMAL
GRAM STAIN RESULT: ABNORMAL
HCO3 BLD-SCNC: 29 MMOL/L (ref 21–28)
HCT VFR BLD AUTO: 28.5 % (ref 35–47)
HGB BLD-MCNC: 8.6 G/DL (ref 11.7–15.7)
LACTATE SERPL-SCNC: 0.8 MMOL/L (ref 0.7–2)
MAGNESIUM SERPL-MCNC: 2 MG/DL (ref 1.6–2.3)
MAGNESIUM SERPL-MCNC: 2.1 MG/DL (ref 1.6–2.3)
MCH RBC QN AUTO: 26.8 PG (ref 26.5–33)
MCHC RBC AUTO-ENTMCNC: 30.2 G/DL (ref 31.5–36.5)
MCV RBC AUTO: 89 FL (ref 78–100)
NT-PROBNP SERPL-MCNC: 1873 PG/ML (ref 0–900)
O2/TOTAL GAS SETTING VFR VENT: 2 %
OXYHGB MFR BLD: 94 % (ref 92–100)
PATH REPORT.ADDENDUM SPEC: NORMAL
PATH REPORT.COMMENTS IMP SPEC: NORMAL
PATH REPORT.COMMENTS IMP SPEC: NORMAL
PATH REPORT.FINAL DX SPEC: NORMAL
PATH REPORT.GROSS SPEC: NORMAL
PATH REPORT.MICROSCOPIC SPEC OTHER STN: NORMAL
PATH REPORT.RELEVANT HX SPEC: NORMAL
PCO2 BLD: 43 MM HG (ref 35–45)
PH BLD: 7.44 [PH] (ref 7.35–7.45)
PHOSPHATE SERPL-MCNC: 3.1 MG/DL (ref 2.5–4.5)
PHOTO IMAGE: NORMAL
PLATELET # BLD AUTO: 217 10E3/UL (ref 150–450)
PO2 BLD: 88 MM HG (ref 80–105)
POTASSIUM BLD-SCNC: 3.7 MMOL/L (ref 3.4–5.3)
POTASSIUM BLD-SCNC: 3.7 MMOL/L (ref 3.4–5.3)
POTASSIUM BLD-SCNC: 3.8 MMOL/L (ref 3.4–5.3)
POTASSIUM BLD-SCNC: 4 MMOL/L (ref 3.4–5.3)
PROT SERPL-MCNC: 6.2 G/DL (ref 6.8–8.8)
RBC # BLD AUTO: 3.21 10E6/UL (ref 3.8–5.2)
SODIUM SERPL-SCNC: 144 MMOL/L (ref 133–144)
SODIUM SERPL-SCNC: 144 MMOL/L (ref 133–144)
WBC # BLD AUTO: 19 10E3/UL (ref 4–11)

## 2021-09-14 PROCEDURE — 97535 SELF CARE MNGMENT TRAINING: CPT | Mod: GO

## 2021-09-14 PROCEDURE — 250N000013 HC RX MED GY IP 250 OP 250 PS 637: Performed by: STUDENT IN AN ORGANIZED HEALTH CARE EDUCATION/TRAINING PROGRAM

## 2021-09-14 PROCEDURE — 250N000009 HC RX 250

## 2021-09-14 PROCEDURE — 83880 ASSAY OF NATRIURETIC PEPTIDE: CPT | Performed by: ANESTHESIOLOGY

## 2021-09-14 PROCEDURE — 250N000009 HC RX 250: Performed by: NURSE PRACTITIONER

## 2021-09-14 PROCEDURE — 250N000013 HC RX MED GY IP 250 OP 250 PS 637: Performed by: THORACIC SURGERY (CARDIOTHORACIC VASCULAR SURGERY)

## 2021-09-14 PROCEDURE — 250N000013 HC RX MED GY IP 250 OP 250 PS 637: Performed by: SURGERY

## 2021-09-14 PROCEDURE — 83735 ASSAY OF MAGNESIUM: CPT | Performed by: STUDENT IN AN ORGANIZED HEALTH CARE EDUCATION/TRAINING PROGRAM

## 2021-09-14 PROCEDURE — 84132 ASSAY OF SERUM POTASSIUM: CPT | Performed by: STUDENT IN AN ORGANIZED HEALTH CARE EDUCATION/TRAINING PROGRAM

## 2021-09-14 PROCEDURE — 94640 AIRWAY INHALATION TREATMENT: CPT

## 2021-09-14 PROCEDURE — 250N000013 HC RX MED GY IP 250 OP 250 PS 637: Performed by: PHYSICIAN ASSISTANT

## 2021-09-14 PROCEDURE — 250N000013 HC RX MED GY IP 250 OP 250 PS 637: Performed by: NURSE PRACTITIONER

## 2021-09-14 PROCEDURE — 71045 X-RAY EXAM CHEST 1 VIEW: CPT | Mod: 26 | Performed by: RADIOLOGY

## 2021-09-14 PROCEDURE — 71045 X-RAY EXAM CHEST 1 VIEW: CPT | Mod: 76

## 2021-09-14 PROCEDURE — 250N000011 HC RX IP 250 OP 636: Performed by: PHYSICIAN ASSISTANT

## 2021-09-14 PROCEDURE — 71045 X-RAY EXAM CHEST 1 VIEW: CPT

## 2021-09-14 PROCEDURE — 36415 COLL VENOUS BLD VENIPUNCTURE: CPT | Performed by: STUDENT IN AN ORGANIZED HEALTH CARE EDUCATION/TRAINING PROGRAM

## 2021-09-14 PROCEDURE — 82805 BLOOD GASES W/O2 SATURATION: CPT | Performed by: PHYSICIAN ASSISTANT

## 2021-09-14 PROCEDURE — 71045 X-RAY EXAM CHEST 1 VIEW: CPT | Mod: 26 | Performed by: STUDENT IN AN ORGANIZED HEALTH CARE EDUCATION/TRAINING PROGRAM

## 2021-09-14 PROCEDURE — 250N000011 HC RX IP 250 OP 636: Performed by: STUDENT IN AN ORGANIZED HEALTH CARE EDUCATION/TRAINING PROGRAM

## 2021-09-14 PROCEDURE — 84100 ASSAY OF PHOSPHORUS: CPT

## 2021-09-14 PROCEDURE — 200N000002 HC R&B ICU UMMC

## 2021-09-14 PROCEDURE — 250N000013 HC RX MED GY IP 250 OP 250 PS 637

## 2021-09-14 PROCEDURE — 85027 COMPLETE CBC AUTOMATED: CPT | Performed by: SURGERY

## 2021-09-14 PROCEDURE — 83605 ASSAY OF LACTIC ACID: CPT | Performed by: ANESTHESIOLOGY

## 2021-09-14 PROCEDURE — C9113 INJ PANTOPRAZOLE SODIUM, VIA: HCPCS | Performed by: STUDENT IN AN ORGANIZED HEALTH CARE EDUCATION/TRAINING PROGRAM

## 2021-09-14 PROCEDURE — 99291 CRITICAL CARE FIRST HOUR: CPT | Mod: 24 | Performed by: INTERNAL MEDICINE

## 2021-09-14 PROCEDURE — 258N000003 HC RX IP 258 OP 636

## 2021-09-14 PROCEDURE — 80053 COMPREHEN METABOLIC PANEL: CPT

## 2021-09-14 PROCEDURE — 97165 OT EVAL LOW COMPLEX 30 MIN: CPT | Mod: GO

## 2021-09-14 PROCEDURE — 82330 ASSAY OF CALCIUM: CPT | Performed by: SURGERY

## 2021-09-14 PROCEDURE — 250N000011 HC RX IP 250 OP 636: Performed by: SURGERY

## 2021-09-14 PROCEDURE — 83735 ASSAY OF MAGNESIUM: CPT

## 2021-09-14 PROCEDURE — 82310 ASSAY OF CALCIUM: CPT

## 2021-09-14 PROCEDURE — 999N000127 HC STATISTIC PERIPHERAL IV START W US GUIDANCE

## 2021-09-14 PROCEDURE — 82040 ASSAY OF SERUM ALBUMIN: CPT | Performed by: SURGERY

## 2021-09-14 RX ORDER — QUETIAPINE FUMARATE 25 MG/1
25 TABLET, FILM COATED ORAL ONCE
Status: COMPLETED | OUTPATIENT
Start: 2021-09-14 | End: 2021-09-14

## 2021-09-14 RX ORDER — PANTOPRAZOLE SODIUM 40 MG/1
40 TABLET, DELAYED RELEASE ORAL
Status: DISCONTINUED | OUTPATIENT
Start: 2021-09-15 | End: 2021-09-22 | Stop reason: HOSPADM

## 2021-09-14 RX ORDER — MAGNESIUM SULFATE HEPTAHYDRATE 40 MG/ML
2 INJECTION, SOLUTION INTRAVENOUS ONCE
Status: DISCONTINUED | OUTPATIENT
Start: 2021-09-14 | End: 2021-09-15

## 2021-09-14 RX ORDER — GABAPENTIN 400 MG/1
1200 CAPSULE ORAL AT BEDTIME
Status: DISCONTINUED | OUTPATIENT
Start: 2021-09-14 | End: 2021-09-22 | Stop reason: HOSPADM

## 2021-09-14 RX ORDER — POTASSIUM CHLORIDE 29.8 MG/ML
20 INJECTION INTRAVENOUS ONCE
Status: COMPLETED | OUTPATIENT
Start: 2021-09-14 | End: 2021-09-14

## 2021-09-14 RX ORDER — POLYETHYLENE GLYCOL 3350 17 G/17G
17 POWDER, FOR SOLUTION ORAL 2 TIMES DAILY
Status: DISCONTINUED | OUTPATIENT
Start: 2021-09-14 | End: 2021-09-22 | Stop reason: HOSPADM

## 2021-09-14 RX ORDER — BUMETANIDE 0.25 MG/ML
2 INJECTION INTRAMUSCULAR; INTRAVENOUS ONCE
Status: COMPLETED | OUTPATIENT
Start: 2021-09-14 | End: 2021-09-14

## 2021-09-14 RX ORDER — POTASSIUM CHLORIDE 7.45 MG/ML
10 INJECTION INTRAVENOUS
Status: DISCONTINUED | OUTPATIENT
Start: 2021-09-14 | End: 2021-09-14 | Stop reason: ALTCHOICE

## 2021-09-14 RX ORDER — LEVALBUTEROL INHALATION SOLUTION 1.25 MG/3ML
1.25 SOLUTION RESPIRATORY (INHALATION) 4 TIMES DAILY
Status: DISCONTINUED | OUTPATIENT
Start: 2021-09-14 | End: 2021-09-14

## 2021-09-14 RX ORDER — AMOXICILLIN 250 MG
2 CAPSULE ORAL 2 TIMES DAILY
Status: DISCONTINUED | OUTPATIENT
Start: 2021-09-14 | End: 2021-09-22 | Stop reason: HOSPADM

## 2021-09-14 RX ORDER — POTASSIUM CHLORIDE 1.5 G/1.58G
40 POWDER, FOR SOLUTION ORAL ONCE
Status: COMPLETED | OUTPATIENT
Start: 2021-09-14 | End: 2021-09-14

## 2021-09-14 RX ORDER — LEVALBUTEROL INHALATION SOLUTION 1.25 MG/3ML
1.25 SOLUTION RESPIRATORY (INHALATION) EVERY 4 HOURS PRN
Status: DISCONTINUED | OUTPATIENT
Start: 2021-09-14 | End: 2021-09-22 | Stop reason: HOSPADM

## 2021-09-14 RX ORDER — GABAPENTIN 300 MG/1
600 CAPSULE ORAL 2 TIMES DAILY
Status: DISCONTINUED | OUTPATIENT
Start: 2021-09-15 | End: 2021-09-22 | Stop reason: HOSPADM

## 2021-09-14 RX ORDER — MAGNESIUM SULFATE HEPTAHYDRATE 40 MG/ML
2 INJECTION, SOLUTION INTRAVENOUS ONCE
Status: COMPLETED | OUTPATIENT
Start: 2021-09-14 | End: 2021-09-14

## 2021-09-14 RX ADMIN — POLYETHYLENE GLYCOL 3350 17 G: 17 POWDER, FOR SOLUTION ORAL at 08:24

## 2021-09-14 RX ADMIN — Medication 5 MG: at 21:33

## 2021-09-14 RX ADMIN — ATORVASTATIN CALCIUM 40 MG: 40 TABLET, FILM COATED ORAL at 19:46

## 2021-09-14 RX ADMIN — LEVALBUTEROL HYDROCHLORIDE 1.25 MG: 1.25 SOLUTION RESPIRATORY (INHALATION) at 08:32

## 2021-09-14 RX ADMIN — DEXMEDETOMIDINE 0.9 MCG/KG/HR: 100 INJECTION, SOLUTION, CONCENTRATE INTRAVENOUS at 00:09

## 2021-09-14 RX ADMIN — PANTOPRAZOLE SODIUM 40 MG: 40 INJECTION, POWDER, FOR SOLUTION INTRAVENOUS at 08:18

## 2021-09-14 RX ADMIN — ERTAPENEM SODIUM 1 G: 1 INJECTION, POWDER, LYOPHILIZED, FOR SOLUTION INTRAMUSCULAR; INTRAVENOUS at 15:35

## 2021-09-14 RX ADMIN — QUETIAPINE FUMARATE 25 MG: 25 TABLET ORAL at 00:57

## 2021-09-14 RX ADMIN — DEXMEDETOMIDINE 1 MCG/KG/HR: 100 INJECTION, SOLUTION, CONCENTRATE INTRAVENOUS at 05:15

## 2021-09-14 RX ADMIN — QUETIAPINE FUMARATE 25 MG: 25 TABLET ORAL at 21:30

## 2021-09-14 RX ADMIN — POTASSIUM CHLORIDE 20 MEQ: 29.8 INJECTION, SOLUTION INTRAVENOUS at 06:21

## 2021-09-14 RX ADMIN — DOCUSATE SODIUM 50 MG AND SENNOSIDES 8.6 MG 1 TABLET: 8.6; 5 TABLET, FILM COATED ORAL at 08:18

## 2021-09-14 RX ADMIN — BUMETANIDE 2 MG: 0.25 INJECTION, SOLUTION INTRAMUSCULAR; INTRAVENOUS at 18:26

## 2021-09-14 RX ADMIN — QUETIAPINE FUMARATE 25 MG: 25 TABLET ORAL at 08:18

## 2021-09-14 RX ADMIN — POTASSIUM CHLORIDE 20 MEQ: 29.8 INJECTION, SOLUTION INTRAVENOUS at 00:52

## 2021-09-14 RX ADMIN — GABAPENTIN 1200 MG: 300 CAPSULE ORAL at 21:29

## 2021-09-14 RX ADMIN — ASPIRIN 81 MG: 81 TABLET, CHEWABLE ORAL at 08:18

## 2021-09-14 RX ADMIN — POTASSIUM CHLORIDE 20 MEQ: 40 SOLUTION ORAL at 08:24

## 2021-09-14 RX ADMIN — HEPARIN SODIUM 5000 UNITS: 10000 INJECTION, SOLUTION INTRAVENOUS; SUBCUTANEOUS at 01:07

## 2021-09-14 RX ADMIN — NICOTINE 1 PATCH: 21 PATCH, EXTENDED RELEASE TRANSDERMAL at 08:47

## 2021-09-14 RX ADMIN — DULOXETINE 30 MG: 30 CAPSULE, DELAYED RELEASE ORAL at 21:30

## 2021-09-14 RX ADMIN — GABAPENTIN 600 MG: 250 SUSPENSION ORAL at 08:25

## 2021-09-14 RX ADMIN — HEPARIN SODIUM 5000 UNITS: 10000 INJECTION, SOLUTION INTRAVENOUS; SUBCUTANEOUS at 18:26

## 2021-09-14 RX ADMIN — POTASSIUM CHLORIDE 40 MEQ: 1.5 POWDER, FOR SOLUTION ORAL at 08:18

## 2021-09-14 RX ADMIN — AMIODARONE HYDROCHLORIDE 200 MG: 200 TABLET ORAL at 08:18

## 2021-09-14 RX ADMIN — GABAPENTIN 600 MG: 250 SUSPENSION ORAL at 15:34

## 2021-09-14 RX ADMIN — POTASSIUM CHLORIDE 20 MEQ: 40 SOLUTION ORAL at 19:45

## 2021-09-14 RX ADMIN — MAGNESIUM SULFATE IN WATER 2 G: 40 INJECTION, SOLUTION INTRAVENOUS at 06:16

## 2021-09-14 ASSESSMENT — MIFFLIN-ST. JEOR: SCORE: 1498.62

## 2021-09-14 ASSESSMENT — ACTIVITIES OF DAILY LIVING (ADL)
ADLS_ACUITY_SCORE: 21

## 2021-09-14 NOTE — PROGRESS NOTES
"[facility] Nerve Block Daily Progress Note  09/10/21    24 Hour Events  - Remains intubated, on nitric; sedated on propofol, fentanyl IV infusion for analgesia  - Chest tubes x 4, Iniguez catheter in place  - Repositioned with full assist for turning.    Subjective  - No objective evidence of LAST  - No pain behaviors with repositioning      Objective  Exam  Vitals:   /48   Pulse (!) 49   Temp 36.7  C (98.1  F) (Axillary)   Resp 15   Ht 1.689 m (5' 6.5\")   Wt 93.4 kg (205 lb 14.6 oz)   LMP  (LMP Unknown)   SpO2 90%   BMI 32.74 kg/m        General: On CMV  MSK/Neuro: not assessed    Skin: bilateral erector spinae (ES) catheter sites with dressings c/d/i, no tenderness, no erythema, heme, edema present    Assessment  Corina Martinez is a 66 year old female with PMH of mitral stenosis who is now POD #3 s/p mitral valve replacement on 9/7/21. Prior to surgery uncomplicated placement of bilateral T5-6 erector spinae (ES) catheter by RAPS for analgesia. Remains intubated, appears comfortable. No evidence of adverse side effects related to local anesthetic continuous infusion    Plan  - Catheter Day #3 bilateral T5-6 ES catheters/infusion:    Continue Ropivacaine 0.2% PIB (programmed intermittent bolus) infusion at 7 mL Q 60 min via each catheter, total infusion 14 mL/hr   o Plan to maintain catheters while chest tubes in place, max of 7 days      When ready to extubate, patient can be evaluated to receive local anesthetic bolus, bedside RN should page RAPS to request bolus    - Antithrombotics/Thrombolytics ordered:     Okay to continue Heparin SQ 5,000 units Q 8 hr as ordered by primary service  o Please contact RAPS jobcode pager [phone] before ordering or making any medication changes    RAPS will continue to follow and adjust as needed      Analgesic Medications ordered:  Medications related to Pain Management (From now, onward)    Start     Dose/Rate Route Frequency Ordered Stop    " 09/10/21 0800  gabapentin (NEURONTIN) solution 600 mg      600 mg Oral or Feeding Tube 2 TIMES DAILY 09/09/21 1717 09/09/21 2200  gabapentin (NEURONTIN) solution 1,200 mg      1,200 mg Oral or Feeding Tube AT BEDTIME 09/09/21 1715 09/08/21 2200  [Held by provider]  DULoxetine (CYMBALTA) DR capsule 30 mg     (Held by provider since Thu 9/9/2021 at 0746 by Sven Acuna MD.Hold Reason: Other.Hold Comments: Hold while intubated)    30 mg Oral AT BEDTIME 09/08/21 1105      09/08/21 1730  fentaNYL (SUBLIMAZE) infusion       mcg/hr  1-4 mL/hr  Intravenous CONTINUOUS 09/08/21 1710 09/08/21 1730  propofol (DIPRIVAN) infusion      5-75 mcg/kg/min × 93.9 kg (Dosing Weight)  2.8-42.3 mL/hr  Intravenous CONTINUOUS 09/08/21 1710 09/08/21 0800  polyethylene glycol (MIRALAX) Packet 17 g      17 g Oral or Feeding Tube DAILY 09/07/21 1652 09/08/21 0800  aspirin (ASA) chewable tablet 81 mg      81 mg Oral or NG Tube DAILY 09/07/21 1652 09/07/21 2000  senna-docusate (SENOKOT-S/PERICOLACE) 8.6-50 MG per tablet 1 tablet      1 tablet Oral or Feeding Tube 2 TIMES DAILY 09/07/21 1652 09/07/21 1652  HYDROmorphone (DILAUDID) injection 0.2 mg      0.2 mg Intravenous EVERY 2 HOURS PRN 09/07/21 1652 09/07/21 1652  HYDROmorphone (DILAUDID) injection 0.4 mg      0.4 mg Intravenous EVERY 2 HOURS PRN 09/07/21 1652 09/07/21 1652  oxyCODONE (ROXICODONE) tablet 5 mg      5 mg Oral or Feeding Tube EVERY 4 HOURS PRN 09/07/21 1652 09/07/21 1652  oxyCODONE IR (ROXICODONE) tablet 10 mg      10 mg Oral or Feeding Tube EVERY 4 HOURS PRN 09/07/21 1652      09/07/21 1652  lidocaine 1 % 0.1-1 mL      0.1-1 mL Other EVERY 1 HOUR PRN 09/07/21 1652      09/07/21 1652  lidocaine (LMX4) cream       Topical EVERY 1 HOUR PRN 09/07/21 1652      09/07/21 1652  methocarbamol (ROBAXIN) tablet 500 mg      500 mg Oral or Feeding Tube EVERY 6 HOURS PRN 09/07/21 1652      09/07/21 1652  magnesium hydroxide  (MILK OF MAGNESIA) suspension 30 mL      30 mL Oral DAILY PRN 09/07/21 1652      09/07/21 1652  bisacodyl (DULCOLAX) Suppository 10 mg      10 mg Rectal DAILY PRN 09/07/21 1652      09/07/21 0830  ropivacaine 0.2% in NS perineural infusion simple       Perineural Continuous Nerve Block 09/07/21 0821      09/07/21 0830  ropivacaine 0.2% in NS perineural infusion simple       Perineural Continuous Nerve Block 09/07/21 0821                Labs/Diagnostics  Heme/INR:  Recent Labs   Lab Test 09/10/21  0341 09/09/21  0341   WBC 28.2* 22.3*   RBC 2.87* 3.16*   HGB 8.0* 8.6*   HCT 26.0* 28.2*   MCV 91 89   MCH 27.9 27.2   MCHC 30.8* 30.5*   RDW 23.4* 22.4*   * 121*       Lab Results   Component Value Date    INR 1.37 09/07/2021    INR 1.79 09/07/2021    INR 2.04 08/25/2021    INR 2.6 07/28/2021    INR 2.8 07/15/2021    INR 2.9 07/07/2021    INR 3.65 06/08/2021    INR 1.99 05/29/2021    INR 2.15 05/28/2021       ---------------------------  Navi Rosado MD  [facility]  9/10/2021 10:20 AM    RAPS Contact Info (24 hour job code pager is the last 4 digits) For in-house use only:   Job code ID: [id number]   [place] 0599  Peds [phone]  [email or web address] phone: dial * * * [phone], enter jobcode ID, then enter call-back number.    Text: Use [email or web address] on the Intranet <Paging/Directory> tab and enter Jobcode ID.   If no call back at any time, contact the hospital  and ask for RAPS attending or backup

## 2021-09-14 NOTE — PROGRESS NOTES
Corina Martinez is a 66 year old female with PMH of mitral stenosis who is now POD #7 s/p mitral valve replacement on 9/7/21. Prior to surgery uncomplicated placement of bilateral T5-6 erector spinae (ES) catheter by RAPS for analgesia. Remains intubated, appears comfortable. No evidence of adverse side effects related to local anesthetic continuous infusion    Subjective and Interval History: Extubated yesterday afternoon. CVTS possibly will remove chest tubes today. Bedside RN reports patient denies pain with current nerve catheter infusion, oral and IV opioid analagesia PRN.  Denies weakness, paresthesias, circumoral numbness, metallic taste or tinnitus    Antithrombotic/Thrombolytic Therapy: Last dose Heparin SQ administered today at 0107     Medications related to Pain Management (From now, onward)    Start     Dose/Rate Route Frequency Ordered Stop    09/12/21 0900  dexmedetomidine (PRECEDEX) 400 mcg in 0.9% sodium chloride 100 mL      0.2-1.2 mcg/kg/hr × 93.9 kg (Dosing Weight)  4.7-28.2 mL/hr  Intravenous CONTINUOUS 09/12/21 0845      09/10/21 0800  gabapentin (NEURONTIN) solution 600 mg      600 mg Oral or Feeding Tube 2 TIMES DAILY 09/09/21 1717 09/09/21 2200  gabapentin (NEURONTIN) solution 1,200 mg      1,200 mg Oral or Feeding Tube AT BEDTIME 09/09/21 1715 09/08/21 2200  [Held by provider]  DULoxetine (CYMBALTA) DR capsule 30 mg     (Held by provider since Thu 9/9/2021 at 0746 by Sven Acuna MD.Hold Reason: Other.Hold Comments: Hold while intubated)    30 mg Oral AT BEDTIME 09/08/21 1105      09/08/21 0800  polyethylene glycol (MIRALAX) Packet 17 g      17 g Oral or Feeding Tube DAILY 09/07/21 1652      09/08/21 0800  aspirin (ASA) chewable tablet 81 mg      81 mg Oral or NG Tube DAILY 09/07/21 1652 09/07/21 2000  senna-docusate (SENOKOT-S/PERICOLACE) 8.6-50 MG per tablet 1 tablet      1 tablet Oral or Feeding Tube 2 TIMES DAILY 09/07/21 1652 09/07/21 1652  HYDROmorphone  "(DILAUDID) injection 0.2 mg      0.2 mg Intravenous EVERY 2 HOURS PRN 09/07/21 1652 09/07/21 1652  HYDROmorphone (DILAUDID) injection 0.4 mg      0.4 mg Intravenous EVERY 2 HOURS PRN 09/07/21 1652 09/07/21 1652  oxyCODONE (ROXICODONE) tablet 5 mg      5 mg Oral or Feeding Tube EVERY 4 HOURS PRN 09/07/21 1652 09/07/21 1652  oxyCODONE IR (ROXICODONE) tablet 10 mg      10 mg Oral or Feeding Tube EVERY 4 HOURS PRN 09/07/21 1652 09/07/21 1652  lidocaine 1 % 0.1-1 mL      0.1-1 mL Other EVERY 1 HOUR PRN 09/07/21 1652 09/07/21 1652  lidocaine (LMX4) cream       Topical EVERY 1 HOUR PRN 09/07/21 1652 09/07/21 1652  methocarbamol (ROBAXIN) tablet 500 mg      500 mg Oral or Feeding Tube EVERY 6 HOURS PRN 09/07/21 1652 09/07/21 1652  magnesium hydroxide (MILK OF MAGNESIA) suspension 30 mL      30 mL Oral DAILY PRN 09/07/21 1652 09/07/21 1652  bisacodyl (DULCOLAX) Suppository 10 mg      10 mg Rectal DAILY PRN 09/07/21 1652 09/07/21 0830  ropivacaine 0.2% in NS perineural infusion simple       Perineural Continuous Nerve Block 09/07/21 0821      09/07/21 0830  ropivacaine 0.2% in NS perineural infusion simple       Perineural Continuous Nerve Block 09/07/21 0821              Objective  Lab Results     Recent Labs   Lab Test 09/14/21  0502   WBC 19.0*   RBC 3.21*   HGB 8.6*   HCT 28.5*   MCV 89   MCH 26.8   MCHC 30.2*   RDW 23.8*        Lab Results   Component Value Date    INR 1.37 09/07/2021    INR 1.79 09/07/2021    INR 2.04 08/25/2021    INR 1.3 08/05/2021    INR 2.11 08/03/2021    INR 2.6 07/28/2021    INR 2.8 07/15/2021    INR 2.9 07/07/2021    INR 2.0 06/30/2021    INR 3.65 06/08/2021    INR 1.99 05/29/2021    INR 2.15 05/28/2021    INR 2.20 05/27/2021       /48   Pulse 51   Temp 96.9  F (36.1  C) (Axillary)   Resp 10   Ht 1.689 m (5' 6.5\")   Wt 93.4 kg (205 lb 14.6 oz)   LMP  (LMP Unknown)   SpO2 100%   BMI 32.74 kg/m       Exam:  Constitutional: awake and " no distress, spO2 89% on room air  Neuro/MSK:  Strength BLE 5/5  and overall symmetric  Skin: erector spinae (ES) catheter sites c/d/i, no tenderness, erythema, heme, edema.      Assessment  Patient currently achieving adequate pain control, POD #7 bilateral erector spinae (ES) catheters/infusion and multimodal analgesia.  Patient meeting activity goals. No weakness or paresthesias. No evidence of adverse side effects associated with local anesthetic. Pleural chest tubes removed this AM    Plan    1100. Bilateral ES catheters removed without complication, dark tips intact.  Insertion sites c/d/i. Small bandage applied    Wait one hour to administer Heparin SQ as ordered   o Primary service and Bedside RN aware of plans.    RAPS will sign off    Thank you for allowing us the opportunity to participate in the care of Corina Martinez  - discussed plan with attending anesthesiologist    SUZE Mcneal [facility]   [facility] An[facility] Pain Service    RAPS Contact Info (for in-house use only):  Job code ID: [id number]   [facility] [place] 0599  Peds [phone]  [employer] phone: dial * * * [phone], enter jobcode ID, then enter call-back number.    Text: Use [employer] on the Intranet <Paging/Directory> tab and enter Jobcode ID.   If no call back at any time, contact the hospital  and ask for RAPS attending or backup

## 2021-09-14 NOTE — PLAN OF CARE
"Major Shift Events:     PRN Melatonin 5 mg  order given @ 2320 as Pt wide awake and constantly asking for water even after she gets some.   Pt began having visual hallucinations @ 0100 seeing \"bugs\" on the ceiling and multiple times said there were \"power rangers\" in the room.  On call MD ( Dr. Hammer ) notified and one time extra dose of Seroquel 25 mg given.  Pt slept off and on after that but continually asks for water when ever she is  awake.  Discovered at end of shift that  ELIANA kelsey is leaking out at skin exit site the IV medications that are infusing into her. Day shift Primary team here and stated they will check it out. Magnesium and Potasium levels replaced per ordered lytes protocol.    Plan:    Nursing to pace Pt's water intake.   Monitor for further hallucinations.  Nursing anticipates Pt will need re-wire or another central line since Pt requiring Levo Gtt for soft MAP's down to upper 50's  when she falls asleep.    For vital signs and complete assessments, please see documentation flowsheets.    "

## 2021-09-14 NOTE — ANESTHESIA POSTPROCEDURE EVALUATION
Patient: Corina Martinez    Procedure(s):  Median Sternotomy, Mitral Valve Replacement with a 29 mm St. Andrey Epic Valve, Bilateral Pulmonary Vein Isolation, Left Atrial Appendage Ligation, On Cardiopulmonary Bypass, Transesophageal Echocardiogram by Anesthesia    Diagnosis:Mitral stenosis [I05.0]  Diagnosis Additional Information: No value filed.    Anesthesia Type:  General    Note:  Disposition: ICU            ICU Sign Out: Anesthesiologist/ICU physician sign out WAS performed   Postop Pain Control: Uneventful            Sign Out: Well controlled pain   PONV: No   Neuro/Psych:             Sign Out: Acceptable/Baseline neuro status   Airway/Respiratory:             Sign Out: Acceptable/Baseline resp. status   CV/Hemodynamics:             Sign Out: Acceptable CV status   Other NRE:    DID A NON-ROUTINE EVENT OCCUR?            Last vitals:  Vitals Value Taken Time   /51 09/14/21 1400   Temp 36.8  C (98.2  F) 09/14/21 1100   Pulse 67 09/14/21 1557   Resp 13 09/14/21 1557   SpO2 89 % 09/14/21 1557   Vitals shown include unvalidated device data.    Electronically Signed By: Guero Hodges MD  September 14, 2021  3:58 PM

## 2021-09-14 NOTE — PROGRESS NOTES
[facility] ICU PROGRESS NOTE  September 14, 2021      CO-MORBIDITIES:   S/P MVR (mitral valve replacement)  (primary encounter diagnosis)  Mitral stenosis  Nonrheumatic mitral valve stenosis    ASSESSMENT: Corina Martinez is a 66 year old female with PMH of arthritis, SHAILESH, HTN, CHF and mitral valve stenosis, now s/p MVR w/ left atrial appendage ligation on 9/7 with Dr. Acuna.     OVERNIGHT EVENTS:   NAEO    TODAY'S CHANGES:   Removed pleural chest tubes  Remove rebolledo  Remove arterial line and RIJ  Start PTA cymbalta  Increase Seroquel to BID  Delirium precautions    PLAN:  Neuro/ pain/ sedation:  # Acute post-operative pain  # Delirium  - Monitor neurological status. Notify the MD for any acute changes in exam.  - acetaminophen allergy   - Scheduled gabapentin (PTA dose), PRN oxy, robaxin and dilaudid   - NIKKI catheters B/l  (9/7-9/14)- to be removed today 9/14  - Start PTA cymbalta 30 mg daily  - PTA atarax, still holding  - Precedex/propofol/fentanyl discontinued  - Increase Seroquel to 25 mg BID    Pulmonary care:   # Current smoker   # COPD sat's upper 80's at baseline   # SHAILESH  # HAP  - Extubated in OR, reintubated POD1  - Extubated yesterday 9/13  - Saturating 99% on 2 L Oxymask  - Continue Xopenex 1.25 mg q6h  - Nicotine patch   - ID as below    Cardiovascular:    # HTN  # CHF, EF 55-60%   # Paroxysmal Afib, warfarin PTA  # Sinus bradycardia  # MV stenosis, s/p MVR 9/7  # Left atrial appendage thrombus s/p atrial appendage clip 9/7  Echocardiogram 9/8/2021 demonstrates LVEF 60-65% and inability to assess RV fxn  AV-paced epicardial leads backup at 40- HR 49-60 past 24h with little to no pacer support  - Monitor hemodynamic status.   - MAP goal > 65  - Off all pressors   - if has repeat episode of a fib, likely need to start anticoagulation (on warfarin PTA)  - ASA, atorvastatin, amiodarone (PTA dose)    - Continue to hold PTA torsemide, metoprolol, diltiazem    CTs- b/l pleurals removed 9/14; leave mediastinal in  while pacing wires in  TPW- cap- remove on 9/15 if rhythm stable    GI /Nutrition:   # GERD  # Primary biliary cirrhosis   - ADAT  - Passed swallow evaluation yesterday 9/13  - Increase miralax and senna-docusate BID  - PRN MoM and supp     Fluids/ Electrolytes/ Renal:   # RADHA, resolved  BL creat ~0.8-1, peaked at 1.72, now normal  - Good UOP; net positive 200 mL yesterday and positive 1.5 L since midnight  - Remove rebolledo  - Hold bumex today  - Scheduled K 20mg BID    - PTA torsemide on hold (10 mg daily)    Endocrine:    # Stress induced hyperglycemia  - Continue medium resistance SSI   - Goal BG <180 for optimal healing     ID/ Antibiotics:  # HAP  Completed perioperative antibiotics with cefazolin, vanc   Afebrile, WBC 15.5 (19.4).    Sputum cx 9/11, positive for 2+ strep constallatus and 1+ enterobacter cloacae  - Zosyn 9/11-9/13;  switched to ertapenem 9/13 for strep and enterobacter coverage - to complete course through 9/19    Heme:     # Acute blood loss anemia  - Hgb stable (8.6)  -Transfuse for hgb <7     Prophylaxis:    - SCDs  - PPI  - SQH     Lines/ tubes/ drains:  - CVC - remove today  - Arterial line - remove today  - Rebolledo - remove today  - Mediastinal tube x2 - remove today  - Pleural tube x2     Disposition:  - [facility] ICU.      Patient seen, findings and plan discussed with CV ICU staff.      Gen Mcdonald  MS4  [id number]  Resident/Fellow Attestation   I, Amrita Noel, was present with the medical/CAR student who participated in the service and in the documentation of the note.  I have verified the history and personally performed the physical exam and medical decision making.  I agree with the assessment and plan of care as documented in the note.        Amrita Noel, BONNIE  Date of Service (when I saw the patient): 09/14/21     ====================================    SUBJECTIVE:   Visual hallucinations overnight; extra dose of Seroquel 25 mg and Melatonin 5 mg given. No longer requiring pressor  support. Precedex stopped. Backup pacing at 40 rarely used. Alert and oriented at 0730. Good UOP. Minimal output from chest tubes. Pain well controlled.    OBJECTIVE:   1. VITAL SIGNS:   Temp:  [96.9  F (36.1  C)-98.6  F (37  C)] 98.1  F (36.7  C)  Pulse:  [48-64] 49  Resp:  [6-53] 15  MAP:  [57 mmHg-255 mmHg] 65 mmHg  Arterial Line BP: ()/() 86/49  SpO2:  [89 %-100 %] 90 %  Ventilation Mode: CMV/AC  (Continuous Mandatory Ventilation/ Assist Control)  FiO2 (%): 30 %  Rate Set (breaths/minute): 16 breaths/min  Tidal Volume Set (mL): 40 mL  PEEP (cm H2O): 8 cmH2O  Pressure Support (cm H2O): 10 cmH2O  Oxygen Concentration (%): 30 %  Resp: 15      2. INTAKE/ OUTPUT:   I/O last 3 completed shifts:  In: 3381.75 [P.O.:2150; I.V.:1001.75; NG/GT:230]  Out: 1880 [Urine:1640; Chest Tube:240]    3. PHYSICAL EXAMINATION:   General: tired, NAD  Neuro: A&O x4, delirious at times   Resp: equal lung sounds bilaterally   CV: RRR  Abdomen: Soft, non distended, CTs with SS output   Incisions: c/d/i  Extremities: warm and well perfused, 1+ bilateral LE pitting edema to mid tibias    4. INVESTIGATIONS:   Arterial Blood Gases   Recent Labs   Lab 09/14/21  0441 09/13/21  1100 09/13/21  0959 09/13/21  0727   PH 7.44 7.42 7.44 7.47*   PCO2 43 49* 47* 44   PO2 88 69* 78* 77*   HCO3 29* 32* 32* 32*     Complete Blood Count   Recent Labs   Lab 09/14/21  0502 09/13/21  0409 09/12/21  0818 09/12/21  0330 09/11/21  0407   WBC 19.0* 22.0*  --  15.5* 19.4*   HGB 8.6* 9.8* 8.9* 7.5* 7.7*    234  --  148* 132*     Basic Metabolic Panel  Recent Labs   Lab 09/14/21  1206 09/14/21  0845 09/14/21  0502 09/13/21  2331 09/13/21  2326 09/13/21  1641 09/13/21  0409 09/12/21  1937 09/12/21  1511   NA  --   --  144  144  --   --  151* 145*  145*  --  145*   POTASSIUM  --   --  3.7  3.7  --  3.8 3.3* 3.6  3.6   < > 4.3   CHLORIDE  --   --  112*  112*  --   --  117* 110*  110*  --  110*   CO2  --   --  28  28  --   --  32 29  29  --  32    BUN  --   --  30  30  --   --  36* 43*  43*  --  45*   CR  --   --  0.80  0.80  --   --  0.97 1.08*  1.08*  --  1.05*   GLC 88 99 98  98 122*  --  118* 143*  143*  --  161*  159*    < > = values in this interval not displayed.     Liver Function Tests  Recent Labs   Lab 09/14/21  0502 09/13/21  0409 09/12/21  0330 09/11/21  0407 09/07/21  1654 09/07/21  1455   AST 39 34 39 44  --   --    ALT 36 29 23 20 70*  --    ALKPHOS 229* 263* 229* 212* 228*  --    BILITOTAL 0.8 0.5 0.4 0.3 0.8  --    ALBUMIN 2.4* 2.6* 2.2* 2.3* 2.4*  --    INR  --   --   --   --  1.37* 1.79*     Coagulation Profile  Recent Labs   Lab 09/07/21  1654 09/07/21  1455   INR 1.37* 1.79*   PTT 46* 34       5. RADIOLOGY:   CXR reviewed  =========================================

## 2021-09-14 NOTE — PLAN OF CARE
Major Shift Events:  Line and drains pulled. Room air today with SATs over 88%. PT and OT worked with her. Alert and oriented x4. Afib. Pacer wires capped. Hemodynamically stable. No major changes today.       Plan: transfer after one more night in the ICU.    All questions answered and safety checks complete.  See chart for all other details.       For vital signs and complete assessments, please see documentation flow sheets.

## 2021-09-14 NOTE — PROGRESS NOTES
Pt stated she does not have COPD, she has clear lung sounds, does not use nebs at home. Xopenex is not indicated at this time. Pt will call for prn if she feels her breathing is getting more difficult.

## 2021-09-14 NOTE — PROGRESS NOTES
09/14/21 1300   Quick Adds   Type of Visit Initial Occupational Therapy Evaluation   Living Environment   People in home spouse   Current Living Arrangements mobile home;other (see comments)  (RV)   Transportation Anticipated car, drives self;family or friend will provide   Self-Care   Usual Activity Tolerance moderate   Current Activity Tolerance poor   Regular Exercise Yes   Activity/Exercise Type walking   Exercise Amount/Frequency 20 mins;3-5 times/wk   Equipment Currently Used at Home none   Disability/Function   Hearing Difficulty or Deaf no   Wear Glasses or Blind yes   Vision Management glasses   Concentrating, Remembering or Making Decisions Difficulty no   Difficulty Communicating no   Difficulty Eating/Swallowing no   Walking or Climbing Stairs Difficulty no   Dressing/Bathing Difficulty no   Toileting issues no   Doing Errands Independently Difficulty (such as shopping) no   Fall history within last six months no   Change in Functional Status Since Onset of Current Illness/Injury yes   General Information   Onset of Illness/Injury or Date of Surgery 09/07/21   Referring Physician Vinayak Zamudio PA-C   Patient/Family Therapy Goal Statement (OT) Pt would like to return to independence.    Additional Occupational Profile Info/Pertinent History of Current Problem Corina Martinez is a 66 year old female with PMH of arthritis, SHAILESH, HTN, CHF and mitral valve stenosis, now s/p MVR w/ left atrial appendage ligation on 9/7 with Dr. Acuna.    Existing Precautions/Restrictions cardiac;fall;sternal   Left Upper Extremity (Weight-bearing Status)   (10lbs)   Right Upper Extremity (Weight-bearing Status)   (10lbs)   Left Lower Extremity (Weight-bearing Status) full weight-bearing (FWB)   Right Lower Extremity (Weight-bearing Status) full weight-bearing (FWB)   Cognitive Status Examination   Orientation Status orientation to person, place and time   Affect/Mental Status (Cognitive) WFL   Follows Commands  physical/tactile prompts required;verbal cues/prompting required   Visual Perception   Visual Impairment/Limitations WFL;corrective lenses full-time   Sensory   Sensory Quick Adds No deficits were identified   Pain Assessment   Patient Currently in Pain No   Integumentary/Edema   Integumentary/Edema no deficits were identifed   Range of Motion Comprehensive   Comment, General Range of Motion BUE AROM limited by pain, post surgical precautions.    Strength Comprehensive (MMT)   Comment, General Manual Muscle Testing (MMT) Assessment Pt demonstrates generalized post surgical weakness throughout. Mild deficit in  strength bilaterally    Bed Mobility   Comment (Bed Mobility) Mod A and Max vc's   Transfers   Transfer Comments Mod A x 2 for transfer sit<->standing pivot transfer to bedside chair.    Clinical Impression   Criteria for Skilled Therapeutic Interventions Met (OT) yes;meets criteria;skilled treatment is necessary   OT Diagnosis Decreased independence with functional transfers and ADLs/IADLS.    OT Problem List-Impairments impacting ADL activity tolerance impaired;cognition;fear & anxiety;flexibility;mobility;range of motion (ROM);strength;pain;post-surgical precautions;postural control   Assessment of Occupational Performance 5 or more Performance Deficits   Identified Performance Deficits Decreased independence with functional transfers and ADLs/IADLs.    Planned Therapy Interventions (OT) ADL retraining;IADL retraining;bed mobility training;ROM;strengthening;stretching;transfer training;home program guidelines;progressive activity/exercise   Clinical Decision Making Complexity (OT) low complexity   Therapy Frequency (OT) 5x/week   Predicted Duration of Therapy 2 weeks   Risk & Benefits of therapy have been explained evaluation/treatment results reviewed;care plan/treatment goals reviewed;patient;spouse/significant other   OT Discharge Planning    OT Discharge Recommendation (DC Rec) Transitional Care  Facility   OT Rationale for DC Rec Pt is well below baseline function. pt will benefit from continued therapy to maximize functional independence.    Total Evaluation Time (Minutes)   Total Evaluation Time (Minutes) 10

## 2021-09-14 NOTE — PROGRESS NOTES
CLINICAL NUTRITION SERVICES - BRIEF NOTE  *Please see full assessment note from 9/9/2021    New Findings:  Extubated 9/13, enteral access lost (OGT). Pt passed bedside swallow and is drinking water this morning. Diet to be advanced. Discontinued TF orders. Will monitor PO intake and need for supplements.     No BM x 4 days, increasing bowel regimen.     Interventions  Collaboration with other providers - rounds  Discontinued enteral nutrition orders    RD to follow per protocol.    Keisha Lozano, MS, RD, LD, CNSC  [facility] RD: [id number]  Pgr: [phone]

## 2021-09-15 ENCOUNTER — APPOINTMENT (OUTPATIENT)
Dept: GENERAL RADIOLOGY | Facility: CLINIC | Age: 66
DRG: 219 | End: 2021-09-15
Attending: NURSE PRACTITIONER
Payer: MEDICARE

## 2021-09-15 ENCOUNTER — APPOINTMENT (OUTPATIENT)
Dept: PHYSICAL THERAPY | Facility: CLINIC | Age: 66
DRG: 219 | End: 2021-09-15
Attending: PHYSICIAN ASSISTANT
Payer: MEDICARE

## 2021-09-15 LAB
ABO/RH(D): NORMAL
ALBUMIN UR-MCNC: NEGATIVE MG/DL
ANION GAP SERPL CALCULATED.3IONS-SCNC: 4 MMOL/L (ref 3–14)
ANION GAP SERPL CALCULATED.3IONS-SCNC: 7 MMOL/L (ref 3–14)
ANTIBODY SCREEN: NEGATIVE
APPEARANCE UR: CLEAR
BACTERIA #/AREA URNS HPF: ABNORMAL /HPF
BACTERIA BRONCH: ABNORMAL
BACTERIA BRONCH: ABNORMAL
BILIRUB UR QL STRIP: NEGATIVE
BUN SERPL-MCNC: 22 MG/DL (ref 7–30)
BUN SERPL-MCNC: 22 MG/DL (ref 7–30)
CALCIUM SERPL-MCNC: 8.6 MG/DL (ref 8.5–10.1)
CALCIUM SERPL-MCNC: 8.7 MG/DL (ref 8.5–10.1)
CHLORIDE BLD-SCNC: 105 MMOL/L (ref 94–109)
CHLORIDE BLD-SCNC: 107 MMOL/L (ref 94–109)
CO2 SERPL-SCNC: 27 MMOL/L (ref 20–32)
CO2 SERPL-SCNC: 29 MMOL/L (ref 20–32)
COLOR UR AUTO: ABNORMAL
CREAT SERPL-MCNC: 0.75 MG/DL (ref 0.52–1.04)
CREAT SERPL-MCNC: 0.77 MG/DL (ref 0.52–1.04)
ERYTHROCYTE [DISTWIDTH] IN BLOOD BY AUTOMATED COUNT: 22.7 % (ref 10–15)
ERYTHROCYTE [DISTWIDTH] IN BLOOD BY AUTOMATED COUNT: 22.7 % (ref 10–15)
GFR SERPL CREATININE-BSD FRML MDRD: 81 ML/MIN/1.73M2
GFR SERPL CREATININE-BSD FRML MDRD: 83 ML/MIN/1.73M2
GLUCOSE BLD-MCNC: 132 MG/DL (ref 70–99)
GLUCOSE BLD-MCNC: 88 MG/DL (ref 70–99)
GLUCOSE BLDC GLUCOMTR-MCNC: 102 MG/DL (ref 70–99)
GLUCOSE BLDC GLUCOMTR-MCNC: 81 MG/DL (ref 70–99)
GLUCOSE BLDC GLUCOMTR-MCNC: 82 MG/DL (ref 70–99)
GLUCOSE BLDC GLUCOMTR-MCNC: 90 MG/DL (ref 70–99)
GLUCOSE BLDC GLUCOMTR-MCNC: 92 MG/DL (ref 70–99)
GLUCOSE UR STRIP-MCNC: NEGATIVE MG/DL
HCT VFR BLD AUTO: 30.6 % (ref 35–47)
HCT VFR BLD AUTO: 34.8 % (ref 35–47)
HGB BLD-MCNC: 10.8 G/DL (ref 11.7–15.7)
HGB BLD-MCNC: 9.2 G/DL (ref 11.7–15.7)
HGB UR QL STRIP: NEGATIVE
HOLD SPECIMEN: NORMAL
HYALINE CASTS: 7 /LPF
INR PPP: 1.26 (ref 0.85–1.15)
KETONES UR STRIP-MCNC: NEGATIVE MG/DL
LACTATE SERPL-SCNC: 2.5 MMOL/L (ref 0.7–2)
LACTATE SERPL-SCNC: 2.6 MMOL/L (ref 0.7–2)
LEUKOCYTE ESTERASE UR QL STRIP: ABNORMAL
MAGNESIUM SERPL-MCNC: 2 MG/DL (ref 1.6–2.3)
MAGNESIUM SERPL-MCNC: 2.2 MG/DL (ref 1.6–2.3)
MCH RBC QN AUTO: 26.5 PG (ref 26.5–33)
MCH RBC QN AUTO: 26.9 PG (ref 26.5–33)
MCHC RBC AUTO-ENTMCNC: 30.1 G/DL (ref 31.5–36.5)
MCHC RBC AUTO-ENTMCNC: 31 G/DL (ref 31.5–36.5)
MCV RBC AUTO: 87 FL (ref 78–100)
MCV RBC AUTO: 88 FL (ref 78–100)
MUCOUS THREADS #/AREA URNS LPF: PRESENT /LPF
NITRATE UR QL: NEGATIVE
PH UR STRIP: 6.5 [PH] (ref 5–7)
PHOSPHATE SERPL-MCNC: 3.7 MG/DL (ref 2.5–4.5)
PLATELET # BLD AUTO: 269 10E3/UL (ref 150–450)
PLATELET # BLD AUTO: 340 10E3/UL (ref 150–450)
POTASSIUM BLD-SCNC: 3.3 MMOL/L (ref 3.4–5.3)
POTASSIUM BLD-SCNC: 3.4 MMOL/L (ref 3.4–5.3)
RBC # BLD AUTO: 3.47 10E6/UL (ref 3.8–5.2)
RBC # BLD AUTO: 4.01 10E6/UL (ref 3.8–5.2)
RBC URINE: 2 /HPF
SARS-COV-2 RNA RESP QL NAA+PROBE: NEGATIVE
SODIUM SERPL-SCNC: 139 MMOL/L (ref 133–144)
SODIUM SERPL-SCNC: 140 MMOL/L (ref 133–144)
SP GR UR STRIP: 1.01 (ref 1–1.03)
SPECIMEN EXPIRATION DATE: NORMAL
SQUAMOUS EPITHELIAL: 3 /HPF
UFH PPP CHRO-ACNC: <0.1 IU/ML
UFH PPP CHRO-ACNC: <0.1 IU/ML
UROBILINOGEN UR STRIP-MCNC: NORMAL MG/DL
WBC # BLD AUTO: 15.9 10E3/UL (ref 4–11)
WBC # BLD AUTO: 16.5 10E3/UL (ref 4–11)
WBC URINE: 4 /HPF

## 2021-09-15 PROCEDURE — 97161 PT EVAL LOW COMPLEX 20 MIN: CPT | Mod: GP

## 2021-09-15 PROCEDURE — 214N000001 HC R&B CCU UMMC

## 2021-09-15 PROCEDURE — 36415 COLL VENOUS BLD VENIPUNCTURE: CPT | Performed by: THORACIC SURGERY (CARDIOTHORACIC VASCULAR SURGERY)

## 2021-09-15 PROCEDURE — 71045 X-RAY EXAM CHEST 1 VIEW: CPT | Mod: 77

## 2021-09-15 PROCEDURE — 71045 X-RAY EXAM CHEST 1 VIEW: CPT | Mod: 26 | Performed by: STUDENT IN AN ORGANIZED HEALTH CARE EDUCATION/TRAINING PROGRAM

## 2021-09-15 PROCEDURE — 80048 BASIC METABOLIC PNL TOTAL CA: CPT | Performed by: NURSE PRACTITIONER

## 2021-09-15 PROCEDURE — 999N000127 HC STATISTIC PERIPHERAL IV START W US GUIDANCE

## 2021-09-15 PROCEDURE — 250N000013 HC RX MED GY IP 250 OP 250 PS 637

## 2021-09-15 PROCEDURE — 80048 BASIC METABOLIC PNL TOTAL CA: CPT | Performed by: PHYSICIAN ASSISTANT

## 2021-09-15 PROCEDURE — 250N000013 HC RX MED GY IP 250 OP 250 PS 637: Performed by: THORACIC SURGERY (CARDIOTHORACIC VASCULAR SURGERY)

## 2021-09-15 PROCEDURE — 250N000011 HC RX IP 250 OP 636: Performed by: PHYSICIAN ASSISTANT

## 2021-09-15 PROCEDURE — 250N000011 HC RX IP 250 OP 636: Performed by: STUDENT IN AN ORGANIZED HEALTH CARE EDUCATION/TRAINING PROGRAM

## 2021-09-15 PROCEDURE — 83735 ASSAY OF MAGNESIUM: CPT | Performed by: NURSE PRACTITIONER

## 2021-09-15 PROCEDURE — 93005 ELECTROCARDIOGRAM TRACING: CPT

## 2021-09-15 PROCEDURE — 85520 HEPARIN ASSAY: CPT | Performed by: STUDENT IN AN ORGANIZED HEALTH CARE EDUCATION/TRAINING PROGRAM

## 2021-09-15 PROCEDURE — 250N000013 HC RX MED GY IP 250 OP 250 PS 637: Performed by: NURSE PRACTITIONER

## 2021-09-15 PROCEDURE — 250N000013 HC RX MED GY IP 250 OP 250 PS 637: Performed by: SURGERY

## 2021-09-15 PROCEDURE — 85014 HEMATOCRIT: CPT | Performed by: STUDENT IN AN ORGANIZED HEALTH CARE EDUCATION/TRAINING PROGRAM

## 2021-09-15 PROCEDURE — 86900 BLOOD TYPING SEROLOGIC ABO: CPT | Performed by: STUDENT IN AN ORGANIZED HEALTH CARE EDUCATION/TRAINING PROGRAM

## 2021-09-15 PROCEDURE — 71045 X-RAY EXAM CHEST 1 VIEW: CPT

## 2021-09-15 PROCEDURE — 81001 URINALYSIS AUTO W/SCOPE: CPT | Performed by: PHYSICIAN ASSISTANT

## 2021-09-15 PROCEDURE — 36415 COLL VENOUS BLD VENIPUNCTURE: CPT | Performed by: PHYSICIAN ASSISTANT

## 2021-09-15 PROCEDURE — 250N000013 HC RX MED GY IP 250 OP 250 PS 637: Performed by: STUDENT IN AN ORGANIZED HEALTH CARE EDUCATION/TRAINING PROGRAM

## 2021-09-15 PROCEDURE — 71045 X-RAY EXAM CHEST 1 VIEW: CPT | Mod: 26 | Performed by: RADIOLOGY

## 2021-09-15 PROCEDURE — 87086 URINE CULTURE/COLONY COUNT: CPT | Performed by: PHYSICIAN ASSISTANT

## 2021-09-15 PROCEDURE — 87040 BLOOD CULTURE FOR BACTERIA: CPT | Performed by: PHYSICIAN ASSISTANT

## 2021-09-15 PROCEDURE — U0003 INFECTIOUS AGENT DETECTION BY NUCLEIC ACID (DNA OR RNA); SEVERE ACUTE RESPIRATORY SYNDROME CORONAVIRUS 2 (SARS-COV-2) (CORONAVIRUS DISEASE [COVID-19]), AMPLIFIED PROBE TECHNIQUE, MAKING USE OF HIGH THROUGHPUT TECHNOLOGIES AS DESCRIBED BY CMS-2020-01-R: HCPCS | Performed by: NURSE PRACTITIONER

## 2021-09-15 PROCEDURE — 97530 THERAPEUTIC ACTIVITIES: CPT | Mod: GP

## 2021-09-15 PROCEDURE — 85027 COMPLETE CBC AUTOMATED: CPT | Performed by: NURSE PRACTITIONER

## 2021-09-15 PROCEDURE — 85610 PROTHROMBIN TIME: CPT | Performed by: THORACIC SURGERY (CARDIOTHORACIC VASCULAR SURGERY)

## 2021-09-15 PROCEDURE — 83605 ASSAY OF LACTIC ACID: CPT | Performed by: PHYSICIAN ASSISTANT

## 2021-09-15 PROCEDURE — 85520 HEPARIN ASSAY: CPT | Performed by: THORACIC SURGERY (CARDIOTHORACIC VASCULAR SURGERY)

## 2021-09-15 PROCEDURE — 258N000003 HC RX IP 258 OP 636: Performed by: PHYSICIAN ASSISTANT

## 2021-09-15 PROCEDURE — 93010 ELECTROCARDIOGRAM REPORT: CPT | Performed by: INTERNAL MEDICINE

## 2021-09-15 PROCEDURE — 83605 ASSAY OF LACTIC ACID: CPT | Performed by: THORACIC SURGERY (CARDIOTHORACIC VASCULAR SURGERY)

## 2021-09-15 PROCEDURE — 83735 ASSAY OF MAGNESIUM: CPT | Performed by: PHYSICIAN ASSISTANT

## 2021-09-15 PROCEDURE — 250N000013 HC RX MED GY IP 250 OP 250 PS 637: Performed by: PHYSICIAN ASSISTANT

## 2021-09-15 PROCEDURE — 84100 ASSAY OF PHOSPHORUS: CPT | Performed by: NURSE PRACTITIONER

## 2021-09-15 PROCEDURE — 36415 COLL VENOUS BLD VENIPUNCTURE: CPT | Performed by: NURSE PRACTITIONER

## 2021-09-15 RX ORDER — AMIODARONE HYDROCHLORIDE 200 MG/1
200 TABLET ORAL ONCE
Status: COMPLETED | OUTPATIENT
Start: 2021-09-15 | End: 2021-09-15

## 2021-09-15 RX ORDER — POTASSIUM CHLORIDE 750 MG/1
20 TABLET, EXTENDED RELEASE ORAL 2 TIMES DAILY
Status: DISCONTINUED | OUTPATIENT
Start: 2021-09-15 | End: 2021-09-19

## 2021-09-15 RX ORDER — LIDOCAINE HYDROCHLORIDE 20 MG/ML
JELLY TOPICAL
Status: DISCONTINUED | OUTPATIENT
Start: 2021-09-15 | End: 2021-09-22 | Stop reason: HOSPADM

## 2021-09-15 RX ORDER — AMIODARONE HYDROCHLORIDE 200 MG/1
400 TABLET ORAL DAILY
Status: DISCONTINUED | OUTPATIENT
Start: 2021-09-16 | End: 2021-09-19

## 2021-09-15 RX ORDER — POTASSIUM CHLORIDE 20MEQ/15ML
40 LIQUID (ML) ORAL ONCE
Status: COMPLETED | OUTPATIENT
Start: 2021-09-15 | End: 2021-09-15

## 2021-09-15 RX ORDER — POTASSIUM CHLORIDE 1.5 G/1.58G
40 POWDER, FOR SOLUTION ORAL ONCE
Status: COMPLETED | OUTPATIENT
Start: 2021-09-15 | End: 2021-09-15

## 2021-09-15 RX ORDER — POTASSIUM CHLORIDE 7.45 MG/ML
10 INJECTION INTRAVENOUS
Status: COMPLETED | OUTPATIENT
Start: 2021-09-15 | End: 2021-09-16

## 2021-09-15 RX ORDER — BUMETANIDE 0.25 MG/ML
2 INJECTION INTRAMUSCULAR; INTRAVENOUS 2 TIMES DAILY
Status: DISCONTINUED | OUTPATIENT
Start: 2021-09-15 | End: 2021-09-16

## 2021-09-15 RX ORDER — MULTIPLE VITAMINS W/ MINERALS TAB 9MG-400MCG
1 TAB ORAL DAILY
Status: DISCONTINUED | OUTPATIENT
Start: 2021-09-16 | End: 2021-09-22 | Stop reason: HOSPADM

## 2021-09-15 RX ORDER — LOPERAMIDE HCL 2 MG
2 CAPSULE ORAL 4 TIMES DAILY PRN
Status: DISCONTINUED | OUTPATIENT
Start: 2021-09-15 | End: 2021-09-22 | Stop reason: HOSPADM

## 2021-09-15 RX ORDER — WARFARIN SODIUM 5 MG/1
5 TABLET ORAL
Status: COMPLETED | OUTPATIENT
Start: 2021-09-15 | End: 2021-09-15

## 2021-09-15 RX ORDER — HEPARIN SODIUM 10000 [USP'U]/100ML
0-5000 INJECTION, SOLUTION INTRAVENOUS CONTINUOUS
Status: DISCONTINUED | OUTPATIENT
Start: 2021-09-15 | End: 2021-09-20

## 2021-09-15 RX ORDER — MAGNESIUM SULFATE HEPTAHYDRATE 40 MG/ML
2 INJECTION, SOLUTION INTRAVENOUS ONCE
Status: COMPLETED | OUTPATIENT
Start: 2021-09-15 | End: 2021-09-15

## 2021-09-15 RX ADMIN — GABAPENTIN 600 MG: 300 CAPSULE ORAL at 16:53

## 2021-09-15 RX ADMIN — BUMETANIDE 2 MG: 0.25 INJECTION, SOLUTION INTRAMUSCULAR; INTRAVENOUS at 20:15

## 2021-09-15 RX ADMIN — POTASSIUM CHLORIDE 10 MEQ: 7.46 INJECTION, SOLUTION INTRAVENOUS at 23:59

## 2021-09-15 RX ADMIN — AMIODARONE HYDROCHLORIDE 1 MG/MIN: 50 INJECTION, SOLUTION INTRAVENOUS at 22:15

## 2021-09-15 RX ADMIN — AMIODARONE HYDROCHLORIDE 200 MG: 200 TABLET ORAL at 08:05

## 2021-09-15 RX ADMIN — POTASSIUM CHLORIDE 10 MEQ: 7.46 INJECTION, SOLUTION INTRAVENOUS at 21:43

## 2021-09-15 RX ADMIN — BUMETANIDE 2 MG: 0.25 INJECTION, SOLUTION INTRAMUSCULAR; INTRAVENOUS at 11:17

## 2021-09-15 RX ADMIN — GABAPENTIN 600 MG: 300 CAPSULE ORAL at 08:05

## 2021-09-15 RX ADMIN — LOPERAMIDE HYDROCHLORIDE 2 MG: 2 CAPSULE ORAL at 16:53

## 2021-09-15 RX ADMIN — HEPARIN SODIUM 1150 UNITS/HR: 10000 INJECTION, SOLUTION INTRAVENOUS at 16:54

## 2021-09-15 RX ADMIN — HEPARIN SODIUM 5000 UNITS: 10000 INJECTION, SOLUTION INTRAVENOUS; SUBCUTANEOUS at 01:51

## 2021-09-15 RX ADMIN — AMIODARONE HYDROCHLORIDE 200 MG: 200 TABLET ORAL at 11:17

## 2021-09-15 RX ADMIN — POTASSIUM CHLORIDE 20 MEQ: 40 SOLUTION ORAL at 11:18

## 2021-09-15 RX ADMIN — PANTOPRAZOLE SODIUM 40 MG: 40 TABLET, DELAYED RELEASE ORAL at 08:05

## 2021-09-15 RX ADMIN — ASPIRIN 81 MG: 81 TABLET, CHEWABLE ORAL at 08:05

## 2021-09-15 RX ADMIN — ERTAPENEM SODIUM 1 G: 1 INJECTION, POWDER, LYOPHILIZED, FOR SOLUTION INTRAMUSCULAR; INTRAVENOUS at 16:53

## 2021-09-15 RX ADMIN — WARFARIN SODIUM 5 MG: 5 TABLET ORAL at 20:14

## 2021-09-15 RX ADMIN — QUETIAPINE FUMARATE 25 MG: 25 TABLET ORAL at 08:05

## 2021-09-15 RX ADMIN — MAGNESIUM SULFATE HEPTAHYDRATE 2 G: 40 INJECTION, SOLUTION INTRAVENOUS at 06:33

## 2021-09-15 RX ADMIN — DULOXETINE 30 MG: 30 CAPSULE, DELAYED RELEASE ORAL at 21:43

## 2021-09-15 RX ADMIN — QUETIAPINE FUMARATE 25 MG: 25 TABLET ORAL at 21:43

## 2021-09-15 RX ADMIN — POTASSIUM CHLORIDE 20 MEQ: 750 TABLET, EXTENDED RELEASE ORAL at 20:48

## 2021-09-15 RX ADMIN — HEPARIN SODIUM 5000 UNITS: 10000 INJECTION, SOLUTION INTRAVENOUS; SUBCUTANEOUS at 08:05

## 2021-09-15 RX ADMIN — GABAPENTIN 1200 MG: 300 CAPSULE ORAL at 21:43

## 2021-09-15 RX ADMIN — POTASSIUM CHLORIDE 40 MEQ: 1.5 POWDER, FOR SOLUTION ORAL at 08:04

## 2021-09-15 RX ADMIN — ATORVASTATIN CALCIUM 40 MG: 40 TABLET, FILM COATED ORAL at 20:14

## 2021-09-15 RX ADMIN — NICOTINE 1 PATCH: 21 PATCH, EXTENDED RELEASE TRANSDERMAL at 08:24

## 2021-09-15 RX ADMIN — POTASSIUM CHLORIDE 40 MEQ: 20 SOLUTION ORAL at 06:39

## 2021-09-15 ASSESSMENT — ACTIVITIES OF DAILY LIVING (ADL)
ADLS_ACUITY_SCORE: 20
ADLS_ACUITY_SCORE: 21

## 2021-09-15 ASSESSMENT — MIFFLIN-ST. JEOR: SCORE: 1510.62

## 2021-09-15 NOTE — PLAN OF CARE
Major Shift Events:  no major event today. Patient has been hemodynamically stable all day despite her HR of 140's afib RVR. Team is aware.       Plan: Transfer to     All questions answered and safety checks complete.  See chart for all other details.       For vital signs and complete assessments, please see documentation flowsheets.

## 2021-09-15 NOTE — PLAN OF CARE
"Major Shift Events:     Plan:      Continues in A-Fib 110's to 140's with no ectopy.  At start of shift Pt asked if I was  seeing the \"children playing out in the diego \"  ( Visual hallucination ) but quickly realized this was a hallucination. Pt denied seeing any more hallucinations after half way into the shift.  Pt slept well and Pt's delirium much improved compared to previous night shift.  Voided urine at start of shift ( first void since Iniguez D/C'd on previous shift.  Frequent diarrhea ( X 6 ) this shift.   Potassium and Magnesium replaced per ordered.  Chest Tubes had only 30 ml output for the 12 hour shift. Nursing anticipated chest tubes may be ready to be d/c'd today.  Nursing anticipates Pt may be ready to transfer out of ICU to telemetry or step down unit today or tomorrow.    For vital signs and complete assessments, please see documentation flowsheets.    "

## 2021-09-15 NOTE — PROGRESS NOTES
[facility] ICU PROGRESS NOTE  September 15, 2021      CO-MORBIDITIES:   S/P MVR (mitral valve replacement)  (primary encounter diagnosis)  Mitral stenosis  Nonrheumatic mitral valve stenosis    ASSESSMENT: Corina Martinez is a 66 year old female with PMH of arthritis, SHAILESH, HTN, CHF and mitral valve stenosis, now s/p MVR w/ left atrial appendage ligation on 9/7 with Dr. Acuna.     OVERNIGHT EVENTS:   A fib 110s to 140s overnight and into the morning  Brief periods of visual hallucination, which subsided by morning  Frequent voiding and diarrhea this shift  Potassium and Magnesium repleted    TODAY'S CHANGES:   - Increase Amiodarone to 400 mg daily  - Increase Bumex 2 mg daily to 2 mg BID   - Transition to floor today     PLAN:  Neuro/ pain/ sedation:  # Acute post-operative pain  # Delirium  - Monitor neurological status. Notify the MD for any acute changes in exam.  - acetaminophen allergy   - Scheduled gabapentin (PTA dose), PRN oxy, robaxin and dilaudid   - NIKKI catheters B/l  (9/7-9/14) removed yesterday (9/14)  - Continue PTA cymbalta 30 mg daily  - PTA atarax, still holding  - Continue Seroquel to 25 mg BID    Pulmonary care:   # Current smoker   # COPD sat's upper 80's at baseline   # SHAILESH  # HAP  - Extubated in OR, reintubated POD1  - Extubated 9/13  - Saturating well on room air  - Nicotine patch   - ID as below    Cardiovascular:    # HTN  # CHF, EF 55-60%   # Chronic congestive heart failure, unspecified  # Paroxysmal Afib, warfarin PTA  # Sinus bradycardia  # MV stenosis, s/p MVR 9/7  # Left atrial appendage thrombus s/p atrial appendage clip 9/7  Echocardiogram 9/8/2021 demonstrates LVEF 60-65% and inability to assess RV fxn  Had been bradycardic, now back in afib, occasional JAXON  - Monitor hemodynamic status.   - MAP goal > 65  - ASA, atorvastatin, amiodarone (PTA dose)    - Continue to hold PTA torsemide, metoprolol, diltiazem  - Increase amiodarone to 400 mg daily for a fib  - TTE today to assess left atrial  stretch   - Heparin drip, low intensity, no bolus and start warfarin tonight 9/15    CTs- b/l pleurals removed 9/14; mediastinal removed 9/15  TPW- removed 9/15    GI /Nutrition:   # GERD  # Primary biliary cirrhosis   - ADAT  - Passed swallow evaluation 9/13  - Holding miralax and senna-docusate BID after frequent loose stools last night  - PRN MoM and supp     Fluids/ Electrolytes/ Renal:   # RADHA, resolved  BL creat ~0.8-1, peaked at 1.72, now normal  - Good UOP; net even yesterday and negative 1 L since midnight  - Increase bumex 2 mg BID today, goal negative 500-1000 ml in 24h  - Scheduled K 20mg BID    - PTA torsemide on hold (10 mg daily)    Endocrine:    # Stress induced hyperglycemia  - Continue medium resistance SSI   - Goal BG <180 for optimal healing     ID/ Antibiotics:  # HAP  Completed perioperative antibiotics with cefazolin, vanc   Afebrile, WBC 14.9 (15.5).    Sputum cx 9/11, positive for 2+ strep constallatus and 1+ enterobacter cloacae  - Zosyn 9/11-9/13;  switched to ertapenem 9/13 for strep and enterobacter coverage - to complete course through 9/19    Heme:     # Acute blood loss anemia  - Hgb stable (9.2)  -Transfuse for hgb <7     Prophylaxis:    - SCDs  - PPI  - SQH     Lines/ tubes/ drains:  - Mediastinal tube x2 and TPWs - remove today     Disposition:  - Transition to floor today       Gen Tamara, MS4  Patient seen, findings and plan discussed with [facility] staff.      Resident/Fellow Attestation   I, Amrita Noel, was present with the medical/CAR student who participated in the service and in the documentation of the note.  I have verified the history and personally performed the physical exam and medical decision making.  I agree with the assessment and plan of care as documented in the note.        Amrita Noel NP  Date of Service (when I saw the patient): 09/15/21  ====================================    SUBJECTIVE:   Talkative and moving between bed and chair. Atrial  fibrillation with rates of 110s to 140s. Brief periods of visual hallucination overnight, which subsided by morning. Frequent voiding and diarrhea overnight. Minimal output from remaining chest tubes; pacer wires capped. Pain well controlled.    OBJECTIVE:   1. VITAL SIGNS:   Temp:  [98.2  F (36.8  C)-98.6  F (37  C)] 98.2  F (36.8  C)  Pulse:  [] 122  Resp:  [8-28] 12  BP: ()/(51-86) 84/64  SpO2:  [90 %-98 %] 93 %  Resp: 12      2. INTAKE/ OUTPUT:   I/O last 3 completed shifts:  In: 1359.33 [P.O.:1200; I.V.:159.33]  Out: 2975 [Urine:765; Other:2000; Stool:100; Chest Tube:110]    3. PHYSICAL EXAMINATION:   General: tired, NAD  Neuro: A&O x4, talkative  Resp: equal lung sounds bilaterally   CV: RRR  Abdomen: Soft, non distended, CTs with minimal output  Incisions: c/d/i  Extremities: warm and well perfused, 1+ bilateral LE pitting edema to mid tibias    4. INVESTIGATIONS:   Arterial Blood Gases   Recent Labs   Lab 09/14/21  0441 09/13/21  1100 09/13/21  0959 09/13/21  0727   PH 7.44 7.42 7.44 7.47*   PCO2 43 49* 47* 44   PO2 88 69* 78* 77*   HCO3 29* 32* 32* 32*     Complete Blood Count   Recent Labs   Lab 09/15/21  0441 09/14/21  0502 09/13/21  0409 09/12/21  0818 09/12/21  0330 09/12/21  0330   WBC 15.9* 19.0* 22.0*  --   --  15.5*   HGB 9.2* 8.6* 9.8* 8.9*   < > 7.5*    217 234  --   --  148*    < > = values in this interval not displayed.     Basic Metabolic Panel  Recent Labs   Lab 09/15/21  0809 09/15/21  0441 09/15/21  0335 09/15/21  0029 09/14/21  2150 09/14/21 2001 09/14/21  0845 09/14/21  0502 09/13/21  2331 09/13/21  2326 09/13/21  1647 09/13/21  1641 09/13/21  0734 09/13/21  0409   NA  --  140  --   --   --   --   --  144  144  --   --   --  151*  --  145*  145*   POTASSIUM  --  3.3*  --   --  4.0  --   --  3.7  3.7  --  3.8  --  3.3*   < > 3.6  3.6   CHLORIDE  --  107  --   --   --   --   --  112*  112*  --   --   --  117*  --  110*  110*   CO2  --  29  --   --   --   --   --   28  28  --   --   --  32  --  29  29   BUN  --  22  --   --   --   --   --  30  30  --   --   --  36*  --  43*  43*   CR  --  0.77  --   --   --   --   --  0.80  0.80  --   --   --  0.97  --  1.08*  1.08*   GLC 90 88 92 81  --    < >   < > 98  98   < >  --    < > 118*   < > 143*  143*    < > = values in this interval not displayed.     Liver Function Tests  Recent Labs   Lab 09/14/21  0502 09/13/21  0409 09/12/21  0330 09/11/21  0407   AST 39 34 39 44   ALT 36 29 23 20   ALKPHOS 229* 263* 229* 212*   BILITOTAL 0.8 0.5 0.4 0.3   ALBUMIN 2.4* 2.6* 2.2* 2.3*     Coagulation Profile  No lab results found in last 7 days.    5. RADIOLOGY:   CXR reviewed and compared with previous  =========================================

## 2021-09-15 NOTE — PROGRESS NOTES
Corina Martinez is a 66 year old female with PMH of mitral stenosis who is now POD #7 s/p mitral valve replacement on 9/7/21. Prior to surgery uncomplicated placement of bilateral T5-6 erector spinae (ES) catheter by RAPS for analgesia. Remains intubated, appears comfortable. No evidence of adverse side effects related to local anesthetic continuous infusion    Subjective and Interval History: Extubated yesterday afternoon. CVTS possibly will remove chest tubes today. Bedside RN reports patient denies pain with current nerve catheter infusion, oral and IV opioid analagesia PRN.  Denies weakness, paresthesias, circumoral numbness, metallic taste or tinnitus    Antithrombotic/Thrombolytic Therapy: Last dose Heparin SQ administered today at 0107     Medications related to Pain Management (From now, onward)    Start     Dose/Rate Route Frequency Ordered Stop    09/12/21 0900  dexmedetomidine (PRECEDEX) 400 mcg in 0.9% sodium chloride 100 mL      0.2-1.2 mcg/kg/hr × 93.9 kg (Dosing Weight)  4.7-28.2 mL/hr  Intravenous CONTINUOUS 09/12/21 0845      09/10/21 0800  gabapentin (NEURONTIN) solution 600 mg      600 mg Oral or Feeding Tube 2 TIMES DAILY 09/09/21 1717 09/09/21 2200  gabapentin (NEURONTIN) solution 1,200 mg      1,200 mg Oral or Feeding Tube AT BEDTIME 09/09/21 1715 09/08/21 2200  [Held by provider]  DULoxetine (CYMBALTA) DR capsule 30 mg     (Held by provider since Thu 9/9/2021 at 0746 by Sven Acuna MD.Hold Reason: Other.Hold Comments: Hold while intubated)    30 mg Oral AT BEDTIME 09/08/21 1105      09/08/21 0800  polyethylene glycol (MIRALAX) Packet 17 g      17 g Oral or Feeding Tube DAILY 09/07/21 1652      09/08/21 0800  aspirin (ASA) chewable tablet 81 mg      81 mg Oral or NG Tube DAILY 09/07/21 1652 09/07/21 2000  senna-docusate (SENOKOT-S/PERICOLACE) 8.6-50 MG per tablet 1 tablet      1 tablet Oral or Feeding Tube 2 TIMES DAILY 09/07/21 1652 09/07/21 1652  HYDROmorphone  "(DILAUDID) injection 0.2 mg      0.2 mg Intravenous EVERY 2 HOURS PRN 09/07/21 1652 09/07/21 1652  HYDROmorphone (DILAUDID) injection 0.4 mg      0.4 mg Intravenous EVERY 2 HOURS PRN 09/07/21 1652 09/07/21 1652  oxyCODONE (ROXICODONE) tablet 5 mg      5 mg Oral or Feeding Tube EVERY 4 HOURS PRN 09/07/21 1652 09/07/21 1652  oxyCODONE IR (ROXICODONE) tablet 10 mg      10 mg Oral or Feeding Tube EVERY 4 HOURS PRN 09/07/21 1652 09/07/21 1652  lidocaine 1 % 0.1-1 mL      0.1-1 mL Other EVERY 1 HOUR PRN 09/07/21 1652 09/07/21 1652  lidocaine (LMX4) cream       Topical EVERY 1 HOUR PRN 09/07/21 1652 09/07/21 1652  methocarbamol (ROBAXIN) tablet 500 mg      500 mg Oral or Feeding Tube EVERY 6 HOURS PRN 09/07/21 1652 09/07/21 1652  magnesium hydroxide (MILK OF MAGNESIA) suspension 30 mL      30 mL Oral DAILY PRN 09/07/21 1652 09/07/21 1652  bisacodyl (DULCOLAX) Suppository 10 mg      10 mg Rectal DAILY PRN 09/07/21 1652 09/07/21 0830  ropivacaine 0.2% in NS perineural infusion simple       Perineural Continuous Nerve Block 09/07/21 0821      09/07/21 0830  ropivacaine 0.2% in NS perineural infusion simple       Perineural Continuous Nerve Block 09/07/21 0821              Objective  Lab Results     Recent Labs   Lab Test 09/14/21  0502   WBC 19.0*   RBC 3.21*   HGB 8.6*   HCT 28.5*   MCV 89   MCH 26.8   MCHC 30.2*   RDW 23.8*        Lab Results   Component Value Date    INR 1.37 09/07/2021    INR 1.79 09/07/2021    INR 2.04 08/25/2021    INR 1.3 08/05/2021    INR 2.11 08/03/2021    INR 2.6 07/28/2021    INR 2.8 07/15/2021    INR 2.9 07/07/2021    INR 2.0 06/30/2021    INR 3.65 06/08/2021    INR 1.99 05/29/2021    INR 2.15 05/28/2021    INR 2.20 05/27/2021       BP (!) 82/71   Pulse (!) 128   Temp 36.4  C (97.5  F)   Resp 8   Ht 1.689 m (5' 6.5\")   Wt 94.6 kg (208 lb 8.9 oz)   LMP  (LMP Unknown)   SpO2 (!) 87%   BMI 33.16 kg/m       Exam:  Constitutional: awake and no " distress, spO2 89% on room air  Neuro/MSK:  Strength BLE 5/5  and overall symmetric  Skin: erector spinae (ES) catheter sites c/d/i, no tenderness, erythema, heme, edema.      Assessment  Patient currently achieving adequate pain control, POD #7 bilateral erector spinae (ES) catheters/infusion and multimodal analgesia.  Patient meeting activity goals. No weakness or paresthesias. No evidence of adverse side effects associated with local anesthetic. Pleural chest tubes removed this AM    Plan    1100. Bilateral ES catheters removed without complication, dark tips intact.  Insertion sites c/d/i. Small bandage applied    Wait one hour to administer Heparin SQ as ordered   o Primary service and Bedside RN aware of plans.    RAPS will sign off    Thank you for allowing us the opportunity to participate in the care of [facility]   Regional Anesthesia Pain Service    RAPS Contact Info (for in-house use only):  Job code ID: [id number]   [facility] [place] [id number]  Peds [phone]  [email or web address] phone: dial * * * [phone], enter jobcode ID, then enter call-back number.    Text: Use [employer] on the Intranet <Paging/Directory> tab and enter Jobcode ID.   If no call back at any time, contact the hospital  and ask for RAPS attending or backup

## 2021-09-15 NOTE — PLAN OF CARE
Patient transferred to  from 4E with RN and NST on monitor. Report from Abraham LERNER on 4E.     A/Ox4. Patient vital signs stable on room air - remains in afib/flutter 110-140's. Denies pain. 2 person skin assessment completed (TOMMIE, MP), skin intact other than surgical incision sites (sternal incision, old CT sites, temp. Pacer wire site). Oriented to room/unit, educated on use of call light, call don't fall. Bed alarm on.

## 2021-09-15 NOTE — PHARMACY-ANTICOAGULATION SERVICE
Clinical Pharmacy - Warfarin Dosing Consult     Pharmacy has been consulted to manage this patient s warfarin therapy.  Indication: Atrial Fibrillation;Bioprosthetic Heart Valve (bioprosthetic MVR)  Therapy Goal: INR 2-3  Provider/Team: CVTS  [facility]: [facility] [place]  Warfarin Prior to Admission: Yes  Warfarin PTA Regimen: Take 2.5 mg on Mon/Wed/Fri and take 5 mg on all other days of the week  Significant drug interactions: No new drug-drug interactions (PTA amiodarone). Heparin infusion & aspirin = increased risk of bleeding    INR   Date Value Ref Range Status   09/15/2021 1.26 (H) 0.85 - 1.15 Final     Comment:     Effective 7/11/2021, the reference range for this assay has changed.   09/07/2021 1.37 (H) 0.85 - 1.15 Final     Comment:     Effective 7/11/2021, the reference range for this assay has changed.       Recommend warfarin 5 mg today.  Pharmacy will monitor Corina Martinez daily and order warfarin doses to achieve specified goal.      Please contact pharmacy as soon as possible if the warfarin needs to be held for a procedure or if the warfarin goals change.      Jana Curry, PharmD, BCPS  Pager: [phone]

## 2021-09-15 NOTE — PROGRESS NOTES
09/15/21 1700   Quick Adds   Type of Visit Initial PT Evaluation   Living Environment   People in home spouse   Current Living Arrangements mobile home   Home Accessibility stairs to enter home   Number of Stairs, Main Entrance 3   Stair Railings, Main Entrance railings on both sides of stairs   Transportation Anticipated car, drives self;family or friend will provide   Living Environment Comments Spouse is on disability but able to assist with cleaning, grocery shopping ect but not cooking per pt.    Self-Care   Usual Activity Tolerance moderate   Current Activity Tolerance poor   Regular Exercise Yes   Activity/Exercise Type walking   Exercise Amount/Frequency 30 mins;3-5 times/wk   Equipment Currently Used at Home none   Activity/Exercise/Self-Care Comment Pt was previously IND but does have walker from previous knee/hip replacements   Disability/Function   Hearing Difficulty or Deaf no   Wear Glasses or Blind yes   Vision Management glasses   Concentrating, Remembering or Making Decisions Difficulty no   Difficulty Communicating no   Difficulty Eating/Swallowing no   Walking or Climbing Stairs Difficulty no   Dressing/Bathing Difficulty no   Toileting issues no   Doing Errands Independently Difficulty (such as shopping) no   Fall history within last six months no   Change in Functional Status Since Onset of Current Illness/Injury yes   General Information   Onset of Illness/Injury or Date of Surgery 09/07/21   Referring Physician Vinayak Zamudio PA-   Patient/Family Therapy Goals Statement (PT) to get home   Pertinent History of Current Problem (include personal factors and/or comorbidities that impact the POC) 66 year old female with PMH of arthritis, SHAILESH, HTN, CHF and mitral valve stenosis, now s/p MVR w/ left atrial appendage ligation on 9/7 with Dr. Acuna.    Existing Precautions/Restrictions fall;sternal   Heart Disease Risk Factors High blood pressure;Overweight;Medical history;Age   Cognition    Orientation Status (Cognition) oriented x 3   Affect/Mental Status (Cognition)   (processing appears slowed at times)   Follows Commands (Cognition) over 90% accuracy   Pain Assessment   Patient Currently in Pain Yes, see Vital Sign flowsheet   Posture    Posture Forward head position;Protracted shoulders   Range of Motion (ROM)   ROM Comment BUE limited by pain and precautions, LE appear grossly WFL   Strength   Manual Muscle Testing Quick Adds Deficits observed during functional mobility   Bed Mobility   Comment (Bed Mobility) Supine > sit with mod A, HOB raised   Transfers   Transfer Safety Comments Sit <> stand with mod A x 2   Gait/Stairs (Locomotion)   Comment (Gait/Stairs) Bed > chair with min-CGA x 2   Balance   Balance Comments Impaired balance, min A, use of FWW   Clinical Impression   Criteria for Skilled Therapeutic Intervention yes, treatment indicated   PT Diagnosis (PT) impaired functional mobility   Influenced by the following impairments strength, cardiac status, fatigue, activity tolerance, precautions, pain   Functional limitations due to impairments gait, stairs, transfers, bed mobility, functional endurance   Clinical Presentation Evolving/Changing   Clinical Presentation Rationale clinical reasoning, afib, medical status   Clinical Decision Making (Complexity) low complexity   Therapy Frequency (PT) 5x/week   Predicted Duration of Therapy Intervention (days/wks) 2 weeks   Planned Therapy Interventions (PT) balance training;bed mobility training;gait training;home exercise program;neuromuscular re-education;patient/family education;ROM (range of motion);stair training;strengthening;transfer training;home program guidelines;risk factor education;progressive activity/exercise   Risk & Benefits of therapy have been explained evaluation/treatment results reviewed;patient   PT Discharge Planning    PT Discharge Recommendation (DC Rec) Transitional Care Facility   PT Rationale for DC Rec Below  basline, requiring increased Ax2 and FWW, precautions   PT Brief overview of current status  Ax2 with FWW   Total Evaluation Time   Total Evaluation Time (Minutes) 9

## 2021-09-16 ENCOUNTER — APPOINTMENT (OUTPATIENT)
Dept: CARDIOLOGY | Facility: CLINIC | Age: 66
DRG: 219 | End: 2021-09-16
Attending: NURSE PRACTITIONER
Payer: MEDICARE

## 2021-09-16 ENCOUNTER — APPOINTMENT (OUTPATIENT)
Dept: OCCUPATIONAL THERAPY | Facility: CLINIC | Age: 66
DRG: 219 | End: 2021-09-16
Attending: THORACIC SURGERY (CARDIOTHORACIC VASCULAR SURGERY)
Payer: MEDICARE

## 2021-09-16 ENCOUNTER — APPOINTMENT (OUTPATIENT)
Dept: GENERAL RADIOLOGY | Facility: CLINIC | Age: 66
DRG: 219 | End: 2021-09-16
Attending: NURSE PRACTITIONER
Payer: MEDICARE

## 2021-09-16 ENCOUNTER — APPOINTMENT (OUTPATIENT)
Dept: PHYSICAL THERAPY | Facility: CLINIC | Age: 66
DRG: 219 | End: 2021-09-16
Attending: THORACIC SURGERY (CARDIOTHORACIC VASCULAR SURGERY)
Payer: MEDICARE

## 2021-09-16 LAB
ANION GAP SERPL CALCULATED.3IONS-SCNC: 9 MMOL/L (ref 3–14)
APTT PPP: 35 SECONDS (ref 22–38)
BACTERIA UR CULT: NORMAL
BUN SERPL-MCNC: 21 MG/DL (ref 7–30)
C DIFF TOX B STL QL: NEGATIVE
CALCIUM SERPL-MCNC: 9.1 MG/DL (ref 8.5–10.1)
CHLORIDE BLD-SCNC: 104 MMOL/L (ref 94–109)
CO2 SERPL-SCNC: 26 MMOL/L (ref 20–32)
CREAT SERPL-MCNC: 0.83 MG/DL (ref 0.52–1.04)
ERYTHROCYTE [DISTWIDTH] IN BLOOD BY AUTOMATED COUNT: 22.7 % (ref 10–15)
GFR SERPL CREATININE-BSD FRML MDRD: 74 ML/MIN/1.73M2
GLUCOSE BLD-MCNC: 104 MG/DL (ref 70–99)
GLUCOSE BLDC GLUCOMTR-MCNC: 101 MG/DL (ref 70–99)
GLUCOSE BLDC GLUCOMTR-MCNC: 90 MG/DL (ref 70–99)
GLUCOSE BLDC GLUCOMTR-MCNC: 97 MG/DL (ref 70–99)
HCT VFR BLD AUTO: 36.6 % (ref 35–47)
HGB BLD-MCNC: 11.2 G/DL (ref 11.7–15.7)
INR PPP: 1.24 (ref 0.85–1.15)
LACTATE SERPL-SCNC: 2.4 MMOL/L (ref 0.7–2)
LACTATE SERPL-SCNC: 2.5 MMOL/L (ref 0.7–2)
LVEF ECHO: NORMAL
MAGNESIUM SERPL-MCNC: 2.2 MG/DL (ref 1.6–2.3)
MCH RBC QN AUTO: 27.1 PG (ref 26.5–33)
MCHC RBC AUTO-ENTMCNC: 30.6 G/DL (ref 31.5–36.5)
MCV RBC AUTO: 88 FL (ref 78–100)
PHOSPHATE SERPL-MCNC: 3.5 MG/DL (ref 2.5–4.5)
PLATELET # BLD AUTO: 340 10E3/UL (ref 150–450)
POTASSIUM BLD-SCNC: 3.7 MMOL/L (ref 3.4–5.3)
RBC # BLD AUTO: 4.14 10E6/UL (ref 3.8–5.2)
SODIUM SERPL-SCNC: 139 MMOL/L (ref 133–144)
UFH PPP CHRO-ACNC: <0.1 IU/ML
WBC # BLD AUTO: 15.2 10E3/UL (ref 4–11)

## 2021-09-16 PROCEDURE — 93321 DOPPLER ECHO F-UP/LMTD STD: CPT | Mod: 26 | Performed by: INTERNAL MEDICINE

## 2021-09-16 PROCEDURE — 80048 BASIC METABOLIC PNL TOTAL CA: CPT | Performed by: NURSE PRACTITIONER

## 2021-09-16 PROCEDURE — 84100 ASSAY OF PHOSPHORUS: CPT | Performed by: NURSE PRACTITIONER

## 2021-09-16 PROCEDURE — 250N000013 HC RX MED GY IP 250 OP 250 PS 637: Performed by: NURSE PRACTITIONER

## 2021-09-16 PROCEDURE — 83605 ASSAY OF LACTIC ACID: CPT | Performed by: NURSE PRACTITIONER

## 2021-09-16 PROCEDURE — 93308 TTE F-UP OR LMTD: CPT

## 2021-09-16 PROCEDURE — 93308 TTE F-UP OR LMTD: CPT | Mod: 26 | Performed by: INTERNAL MEDICINE

## 2021-09-16 PROCEDURE — 87493 C DIFF AMPLIFIED PROBE: CPT | Performed by: PHYSICIAN ASSISTANT

## 2021-09-16 PROCEDURE — 250N000011 HC RX IP 250 OP 636: Performed by: STUDENT IN AN ORGANIZED HEALTH CARE EDUCATION/TRAINING PROGRAM

## 2021-09-16 PROCEDURE — 71045 X-RAY EXAM CHEST 1 VIEW: CPT

## 2021-09-16 PROCEDURE — 97535 SELF CARE MNGMENT TRAINING: CPT | Mod: GO | Performed by: OCCUPATIONAL THERAPIST

## 2021-09-16 PROCEDURE — 36415 COLL VENOUS BLD VENIPUNCTURE: CPT

## 2021-09-16 PROCEDURE — 214N000001 HC R&B CCU UMMC

## 2021-09-16 PROCEDURE — 250N000013 HC RX MED GY IP 250 OP 250 PS 637: Performed by: THORACIC SURGERY (CARDIOTHORACIC VASCULAR SURGERY)

## 2021-09-16 PROCEDURE — 85520 HEPARIN ASSAY: CPT | Performed by: THORACIC SURGERY (CARDIOTHORACIC VASCULAR SURGERY)

## 2021-09-16 PROCEDURE — 85610 PROTHROMBIN TIME: CPT | Performed by: STUDENT IN AN ORGANIZED HEALTH CARE EDUCATION/TRAINING PROGRAM

## 2021-09-16 PROCEDURE — 85730 THROMBOPLASTIN TIME PARTIAL: CPT

## 2021-09-16 PROCEDURE — 36415 COLL VENOUS BLD VENIPUNCTURE: CPT | Performed by: NURSE PRACTITIONER

## 2021-09-16 PROCEDURE — 83735 ASSAY OF MAGNESIUM: CPT | Performed by: NURSE PRACTITIONER

## 2021-09-16 PROCEDURE — 36415 COLL VENOUS BLD VENIPUNCTURE: CPT | Performed by: THORACIC SURGERY (CARDIOTHORACIC VASCULAR SURGERY)

## 2021-09-16 PROCEDURE — 250N000011 HC RX IP 250 OP 636: Performed by: PHYSICIAN ASSISTANT

## 2021-09-16 PROCEDURE — 258N000003 HC RX IP 258 OP 636: Performed by: PHYSICIAN ASSISTANT

## 2021-09-16 PROCEDURE — 71045 X-RAY EXAM CHEST 1 VIEW: CPT | Mod: 26 | Performed by: STUDENT IN AN ORGANIZED HEALTH CARE EDUCATION/TRAINING PROGRAM

## 2021-09-16 PROCEDURE — 97116 GAIT TRAINING THERAPY: CPT | Mod: GP

## 2021-09-16 PROCEDURE — 83605 ASSAY OF LACTIC ACID: CPT

## 2021-09-16 PROCEDURE — 93325 DOPPLER ECHO COLOR FLOW MAPG: CPT | Mod: 26 | Performed by: INTERNAL MEDICINE

## 2021-09-16 PROCEDURE — 85027 COMPLETE CBC AUTOMATED: CPT | Performed by: NURSE PRACTITIONER

## 2021-09-16 PROCEDURE — 250N000011 HC RX IP 250 OP 636: Performed by: NURSE PRACTITIONER

## 2021-09-16 PROCEDURE — 97530 THERAPEUTIC ACTIVITIES: CPT | Mod: GP

## 2021-09-16 PROCEDURE — 250N000013 HC RX MED GY IP 250 OP 250 PS 637: Performed by: STUDENT IN AN ORGANIZED HEALTH CARE EDUCATION/TRAINING PROGRAM

## 2021-09-16 RX ORDER — METOPROLOL TARTRATE 25 MG/1
25 TABLET, FILM COATED ORAL 3 TIMES DAILY
Status: DISCONTINUED | OUTPATIENT
Start: 2021-09-16 | End: 2021-09-18

## 2021-09-16 RX ORDER — WARFARIN SODIUM 5 MG/1
5 TABLET ORAL
Status: COMPLETED | OUTPATIENT
Start: 2021-09-16 | End: 2021-09-16

## 2021-09-16 RX ORDER — METOPROLOL TARTRATE 25 MG/1
25 TABLET, FILM COATED ORAL 2 TIMES DAILY
Status: DISCONTINUED | OUTPATIENT
Start: 2021-09-16 | End: 2021-09-16

## 2021-09-16 RX ORDER — QUETIAPINE FUMARATE 25 MG/1
25 TABLET, FILM COATED ORAL DAILY PRN
Status: DISCONTINUED | OUTPATIENT
Start: 2021-09-16 | End: 2021-09-22 | Stop reason: HOSPADM

## 2021-09-16 RX ORDER — QUETIAPINE FUMARATE 25 MG/1
25 TABLET, FILM COATED ORAL AT BEDTIME
Status: DISCONTINUED | OUTPATIENT
Start: 2021-09-16 | End: 2021-09-18

## 2021-09-16 RX ORDER — POTASSIUM CHLORIDE 750 MG/1
20 TABLET, EXTENDED RELEASE ORAL ONCE
Status: COMPLETED | OUTPATIENT
Start: 2021-09-16 | End: 2021-09-16

## 2021-09-16 RX ORDER — TORSEMIDE 20 MG/1
20 TABLET ORAL DAILY
Status: DISCONTINUED | OUTPATIENT
Start: 2021-09-17 | End: 2021-09-22

## 2021-09-16 RX ADMIN — HEPARIN SODIUM 1750 UNITS/HR: 10000 INJECTION, SOLUTION INTRAVENOUS at 09:52

## 2021-09-16 RX ADMIN — METOPROLOL TARTRATE 25 MG: 25 TABLET, FILM COATED ORAL at 19:46

## 2021-09-16 RX ADMIN — ERTAPENEM SODIUM 1 G: 1 INJECTION, POWDER, LYOPHILIZED, FOR SOLUTION INTRAMUSCULAR; INTRAVENOUS at 16:00

## 2021-09-16 RX ADMIN — LOPERAMIDE HYDROCHLORIDE 2 MG: 2 CAPSULE ORAL at 16:00

## 2021-09-16 RX ADMIN — METOPROLOL TARTRATE 25 MG: 25 TABLET, FILM COATED ORAL at 09:35

## 2021-09-16 RX ADMIN — ATORVASTATIN CALCIUM 40 MG: 40 TABLET, FILM COATED ORAL at 19:45

## 2021-09-16 RX ADMIN — Medication 1 TABLET: at 09:34

## 2021-09-16 RX ADMIN — AMIODARONE HYDROCHLORIDE 0.5 MG/MIN: 50 INJECTION, SOLUTION INTRAVENOUS at 23:31

## 2021-09-16 RX ADMIN — POTASSIUM CHLORIDE 20 MEQ: 750 TABLET, EXTENDED RELEASE ORAL at 09:34

## 2021-09-16 RX ADMIN — HEPARIN SODIUM 2350 UNITS/HR: 10000 INJECTION, SOLUTION INTRAVENOUS at 23:34

## 2021-09-16 RX ADMIN — PANTOPRAZOLE SODIUM 40 MG: 40 TABLET, DELAYED RELEASE ORAL at 09:34

## 2021-09-16 RX ADMIN — POTASSIUM CHLORIDE 20 MEQ: 750 TABLET, EXTENDED RELEASE ORAL at 09:35

## 2021-09-16 RX ADMIN — POTASSIUM CHLORIDE 20 MEQ: 750 TABLET, EXTENDED RELEASE ORAL at 19:45

## 2021-09-16 RX ADMIN — WARFARIN SODIUM 5 MG: 5 TABLET ORAL at 17:47

## 2021-09-16 RX ADMIN — QUETIAPINE FUMARATE 25 MG: 25 TABLET ORAL at 09:35

## 2021-09-16 RX ADMIN — GABAPENTIN 600 MG: 300 CAPSULE ORAL at 16:00

## 2021-09-16 RX ADMIN — LOPERAMIDE HYDROCHLORIDE 2 MG: 2 CAPSULE ORAL at 19:45

## 2021-09-16 RX ADMIN — METOPROLOL TARTRATE 25 MG: 25 TABLET, FILM COATED ORAL at 16:00

## 2021-09-16 RX ADMIN — GABAPENTIN 1200 MG: 300 CAPSULE ORAL at 21:33

## 2021-09-16 RX ADMIN — GABAPENTIN 600 MG: 300 CAPSULE ORAL at 09:34

## 2021-09-16 RX ADMIN — ASPIRIN 81 MG: 81 TABLET, CHEWABLE ORAL at 09:34

## 2021-09-16 RX ADMIN — BUMETANIDE 2 MG: 0.25 INJECTION, SOLUTION INTRAMUSCULAR; INTRAVENOUS at 09:35

## 2021-09-16 RX ADMIN — QUETIAPINE FUMARATE 25 MG: 25 TABLET ORAL at 21:33

## 2021-09-16 RX ADMIN — DULOXETINE 30 MG: 30 CAPSULE, DELAYED RELEASE ORAL at 21:33

## 2021-09-16 ASSESSMENT — ACTIVITIES OF DAILY LIVING (ADL)
ADLS_ACUITY_SCORE: 10
ADLS_ACUITY_SCORE: 15
ADLS_ACUITY_SCORE: 19
DEPENDENT_IADLS:: INDEPENDENT
ADLS_ACUITY_SCORE: 10

## 2021-09-16 ASSESSMENT — MIFFLIN-ST. JEOR: SCORE: 1483.61

## 2021-09-16 NOTE — PROGRESS NOTES
Cardiovascular Surgery Progress Note  09/16/2021         Assessment and Plan:     Corina Martinez is a 66 year old female with PMH of arthritis, SHAILESH, HTN, CHF and mitral valve stenosis, now s/p MVR w/ left atrial appendage ligation on 9/7 with Dr. Acuna.     Cardiovascular:   # HTN  # CHF, EF 55-60%   # Chronic congestive heart failure, unspecified  # Paroxysmal Afib, warfarin PTA  # Sinus bradycardia  # MV stenosis, s/p MVR 9/7  # Left atrial appendage thrombus s/p atrial appendage clip 9/7  Echocardiogram 9/8/2021 demonstrates LVEF 60-65% and inability to assess RV fxn  Had been bradycardic, now back in afib, occasional JAXON  - Monitor hemodynamic status.   - MAP goal > 65  - ASA, atorvastatin, amiodarone (PTA dose)    - Continue to hold PTA torsemide, metoprolol, diltiazem  - Increased amiodarone to 400 mg daily for a fib 9/15, then had more Afib with RVR overnight and she was switched to IV. .   - IV amiodarone load started 9/15 pm, currently on 0.5 mg/min, still tachycadic. Plan to continue IV amio until rates better control, then switch to oral loading dose   - Add metoprolol 25 mg BID for rate control, watch rhythm closely with initial HB post-op  - Heparin drip, low intensity, no bolus and started warfarin 9/15  - Echocardiogram today with recurrent, persistent atrial fib      CTs- b/l pleurals removed 9/14; mediastinal removed 9/15  TPW- removed 9/15     Pulmonary:  # Current smoker   # COPD sat's upper 80's at baseline   # SHAILESH  # HAP with respiratory failure  - Extubated in OR, reintubated POD1  - Extubated again 9/13  - Saturating well on room air  - Nicotine patch   - ID as below  - AM CXR pending     Neurology / MSK:  # Acute post-operative pain  # Delirium with hallucinations   - Monitor neurological status. Notify the MD for any acute changes in exam.  - acetaminophen allergy   - Scheduled gabapentin (PTA dose), PRN oxy, robaxin and dilaudid   - NIKKI catheters B/l  (9/7-9/14) removed yesterday  (9/14)  - Continue PTA cymbalta 30 mg daily  - PTA atarax, still holding  - change Seroquel to 25 mg at bedtime and prn X1 during the day, plan to wean off prior to discharge     / Renal:  # RADHA, resolved  BL creat ~0.8-1, peaked at 1.72, now normal at 0.83  - Good UOP; net negative 1.6 L in 24 hours   - Increased bumex 2 mg IV  BID 9/15  - Weight down 2.7 kg today, down 2 kg from admission   - Scheduled K 20mg BID 9/15, switch to daily potassium 20 mEq in am   - PTA torsemide on hold (10 mg daily)  - Edema improved, on room air with good sats, diminished breath sounds, CXR pending   - discontinue IV Bumex and start oral Torsemide 20 mg in am    GI / FEN:   # GERD  # Primary biliary cirrhosis   - ADAT  - Passed swallow evaluation 9/13  - Holding miralax and senna-docusate BID with frequent loose stools last night  - C-diff negative 9/16   - Use imodium PRN        Endocrine:  # Stress induced hyperglycemia  - medium resistance SSI   - Goal BG <180 for optimal healing  - BG , no insulin use   - discontinue sliding scale insulin     Infectious Disease:  # HAP  Completed perioperative antibiotics with cefazolin, vanc   Afebrile, WBC 15.2 (16.5).  Afebrile   Sputum cx 9/11, positive for 2+ strep constallatus and 1+ enterobacter cloacae  - Zosyn 9/11-9/13;  switched to ertapenem 9/13 for strep and enterobacter coverage - to complete course through 9/19  - Lactate 2.6->2.5->2.5 today. Appears well, WBC drifting down. Afebrile. Recheck lactate this afternoon.     Hematology:   Acute blood loss anemia and thrombocytopenia  Hgb 11.2; Plt wnl, no signs or symptoms of active bleeding  - transfuse for hgb <7.0    Anticoagulation:   - ASA 81 mg daily   - Coumadin for PAF, INR goal 2-3. Most recent INR 1.24.    - Low intensity heparin gtt bridging to therapeutic INR.    Prophylaxis:   - Stress ulcer prophylaxis: Pantoprazole 40 mg daily for 30 days  - DVT prophylaxis: Subcutaneous heparin, LIH    Disposition:   -  "Transferred to 6C on POD#8  - Therapies recommending discharge to TCU    Discussed with CVTS Fellow as needed.  Discussed with Dr Acuna through both verbal and written communication.      Kimberli Padilla, WILLIE, CNP  [facility] Cardiothoracic Surgery  O: [phone]  Pgr [phone]        Interval History:     No overnight events. First night of being able to sleep through the night and no hallucinations per patient.   States pain is well managed on current regimen.   Tolerating diet, is passing flatus, +BM. No nausea or vomiting.  Breathing well without complaints.   Working with therapies.   Denies chest pain, palpitations, dizziness, syncopal symptoms, fevers, chills, myalgias, or sternal popping/clicking.         Physical Exam:   Blood pressure 108/68, pulse 111, temperature 97.6  F (36.4  C), temperature source Oral, resp. rate 16, height 1.689 m (5' 6.5\"), weight 91.9 kg (202 lb 9.6 oz), SpO2 94 %, not currently breastfeeding.  Vitals:    09/14/21 0200 09/15/21 0200 09/16/21 0328   Weight: 93.4 kg (205 lb 14.6 oz) 94.6 kg (208 lb 8.9 oz) 91.9 kg (202 lb 9.6 oz)      Weight; - 2 kg since admit and trending down.   24 hr Fluid status; net loss 1.6 L.   MAPs: 68-80    Gen: A&Ox4, NAD. Sitting on the edge of the bed eating.   Neuro: no focal deficits, general weakness.    CV: tachy, irregular, distant HT's, no murmurs, rubs or gallops. Difficult to determine JVD with sitting upright   Pulm: CTA, but very diminished. No wheezing or rhonchi, normal breathing on RA  Abd: nondistended, normal BS, soft, nontender  Ext:  Trace peripheral edema, non-pitting  Incision: clean, dry, intact, no erythema, sternum stable  Tubes/drain sites: dressing clean and dry.            Data:    Imaging:  reviewed recent imaging, no acute concerns    CXR 9/16/2021: pending     Labs:  [facility]  Recent Labs   Lab 09/16/21  0759 09/16/21  0620 09/16/21  0346 09/15/21  2356 09/15/21  2017 09/15/21  1941 09/15/21  0809 09/15/21  0441 09/15/21  0029 " 09/14/21  2150 09/14/21  0845 09/14/21  0502   NA  --  139  --   --  139  --   --  140  --   --   --  144  144   POTASSIUM  --  3.7  --   --  3.4  --   --  3.3*  --  4.0   < > 3.7  3.7   CHLORIDE  --  104  --   --  105  --   --  107  --   --   --  112*  112*   LOC  --  9.1  --   --  8.7  --   --  8.6  --   --   --  8.5  8.5   CO2  --  26  --   --  27  --   --  29  --   --   --  28  28   BUN  --  21  --   --  22  --   --  22  --   --   --  30  30   CR  --  0.83  --   --  0.75  --   --  0.77  --   --   --  0.80  0.80   * 104* 90 97 132*   < >   < > 88   < >  --    < > 98  98    < > = values in this interval not displayed.     CBC  Recent Labs   Lab 09/16/21  0620 09/15/21  1703 09/15/21  0441 09/14/21  0502   WBC 15.2* 16.5* 15.9* 19.0*   RBC 4.14 4.01 3.47* 3.21*   HGB 11.2* 10.8* 9.2* 8.6*   HCT 36.6 34.8* 30.6* 28.5*   MCV 88 87 88 89   MCH 27.1 26.9 26.5 26.8   MCHC 30.6* 31.0* 30.1* 30.2*   RDW 22.7* 22.7* 22.7* 23.8*    340 269 217     INR  Recent Labs   Lab 09/16/21  0620 09/15/21  1703   INR 1.24* 1.26*      Hepatic Panel  Recent Labs   Lab 09/14/21  0502 09/13/21  0409 09/12/21  0330 09/11/21  0407   AST 39 34 39 44   ALT 36 29 23 20   ALKPHOS 229* 263* 229* 212*   BILITOTAL 0.8 0.5 0.4 0.3   ALBUMIN 2.4* 2.6* 2.2* 2.3*     GLUCOSE:   Recent Labs   Lab 09/16/21  0759 09/16/21  0620 09/16/21  0346 09/15/21  2356 09/15/21  2017 09/15/21  1941   * 104* 90 97 132* 102*

## 2021-09-16 NOTE — PROVIDER NOTIFICATION
09/15/21 2000   Call Information   Date of Call 09/15/21   Time of Call 1935   Name of person requesting the team Laura   Title of person requesting team RN   RRT Arrival time 1946   Reason for call   Type of RRT Adult   Primary reason for call Sepsis suspected   Sepsis Suspected Elevated Lactate level;Heart Rate > 100;WBC <4 or >12   Was patient transferred from the ED, ICU, or PACU within last 24 hours prior to RRT call? No   SBAR   Situation LA 2.6   Background Corina Martinez is a 66 year old female with PMH of arthritis, SHAILESH, HTN, CHF and mitral valve stenosis, now s/p MVR w/ left atrial appendage ligation on 9/7 with Dr. Acuna.    Notable History/Conditions Congestive heart failure;Cardiac;Hypertension   Assessment AFib 's. Alert and answering questions ok.    Interventions ECG;Labs   Patient Outcome   Patient Outcome Stabilized on unit   RRT Team   Attending/Primary/Covering Physician CVTS   Date Attending Physician notified 09/15/21   Time Attending Physician notified 1937  (awaiting response @~1937; unclear actual time of not.)   Physician(s) Wendi Urbano PA   Lead RN Ashlee Cloud   Post RRT Intervention Assessment   Post RRT Assessment Stable/Improved   Date Follow Up Done 09/15/21   Time Follow Up Done 2220   Comments HR up to 150; Amio started. Vital signs otherwise stable; no complaints from patient; RN reports that per CVTS, low threshold for requiring higher level of care.

## 2021-09-16 NOTE — PROGRESS NOTES
CLINICAL NUTRITION SERVICES - REASSESSMENT NOTE     Nutrition Prescription    RECOMMENDATIONS FOR MDs/PROVIDERS TO ORDER:  None today    Malnutrition Status:    Severe malnutrition in the context of acute illness     Recommendations already ordered by Registered Dietitian (RD):  Nutrition education   Vanilla ensure BID     Future/Additional Recommendations:  Monitor intakes and wt trends  Adjust supplements pending pt preference  Consider need for calorie count if no improvement in PO   Room service with assist as needed/accepted by pt     EVALUATION OF THE PROGRESS TOWARD GOALS   Diet: Regular  Nutrition Support: OGT removed when extubated 9/13  Intake: 100% of clear liquid dinner last night. Received an average of 416ml (832kcal) per day over the past 5 days. Minimal calories/protein PO d/t CLD     NEW FINDINGS   Pt was upgraded from CLD to regular 9/15. She forgot to order breakfast this morning but was excited for the late lunch she ordered. Stated she has been hungry and was looking forward to solids. Offered room service with assist for ordering however she declined. Reported difficulty with dexterity while in ICU but stated this had improved. Declined needing assistance with meals. Offered supplements, pt stated she has had vanilla atkins shakes in the past and enjoyed them. Believes she could drink 2 vanilla ensure/day. Pt reported 1 episode of emesis yesterday after taking liquid potassium supplement. Continues to have losoe stools (C.Diff+). Denied difficulty chewing/swallowing.       18#  (8.2%) wt loss in 1 week, overall down 5# (2.4%) since admit. Unable to rule out wt changes d/t fluid shifts. 27# (11.7%) wt loss in 3 months   09/16/21 91.9 kg (202 lb 9.6 oz)   09/09/21 99.9 kg (220 lb 3.8 oz)   09/07/21 93.9 kg (207 lb 0.2 oz)-admit wt   08/25/21 93.4 kg (206 lb)   08/05/21 93.4 kg (206 lb)   08/03/21 93.4 kg (206 lb)   07/15/21 94.2 kg (207 lb 9.6 oz)   07/15/21 94.2 kg (207 lb 9.6 oz)   07/07/21 96  kg (211 lb 9.6 oz)   06/30/21 98.3 kg (216 lb 12.8 oz)   06/23/21 104.2 kg (229 lb 12.8 oz)   06/08/21 103.9 kg (229 lb)     MALNUTRITION  % Intake: </= 50% for >/= 5 days (severe)  % Weight Loss: > 7.5% in 3 months (severe)  Subcutaneous Fat Loss: None observed  Muscle Loss: Thoracic region (clavicle, acromium bone, deltoid, trapezius, pectoral): Mild, Upper arm (bicep, tricep): Mild and Posterior calf: Mild-may be age related   Fluid Accumulation/Edema: generalized 1+  Malnutrition Diagnosis: Severe malnutrition in the context of acute illness     Previous Goals   Total avg nutritional intake to meet a minimum of 25 kcal/kg and 1.2 g PRO/kg daily (per dosing wt 73 kg).  Evaluation: Not met    Previous Nutrition Diagnosis  Inadequate protein-energy intake related to NPO (intubated) without nutrition needs as evidenced by currently meeting 0% estimated nutrition needs.    Evaluation: Improving    CURRENT NUTRITION DIAGNOSIS  Inadequate oral intake related to NPO and clear liquid diets as evidenced by pt received an average of 416ml (832kcal) of TF per day over the past 5 days while NPO or on CLD.    INTERVENTIONS  Implementation  Nutrition education: Discussed role of RD, menu ordering and available snacks/supplements. Discussed adequate PO for post op healing. Encouraged small/frequent meals and high calorie/high protein foods until appetite improves.   Vanilla ensure BID     Goals  Patient to consume % of nutritionally adequate meal trays TID, or the equivalent with supplements/snacks.    Monitoring/Evaluation  Progress toward goals will be monitored and evaluated per protocol.    Laura Stewart MS, RD, LDN  [phone]

## 2021-09-16 NOTE — PLAN OF CARE
Pt VSS this shift, denies pain, afib on telemetry HR 110s-130s, scheduled metoprolol has been added. Amiodarone drip at 0.5mg/min and heparin drip at 2050units/hr. On RA, dyspnea on exertion, productive cough. IV ertapenem for PNA. Voiding without issues in commode. Watery BM x3 this shift, PRN imodium given x1. Up with assist 1-2, gait belt and walker, ambulated in [place] with PT. Will continue to monitor.     PT recommends TCU, pt is declining at this point.           Problem: Activity Intolerance (Cardiovascular Surgery)  Goal: Improved Activity Tolerance  Outcome: Improving     Problem: Adjustment to Surgery (Cardiovascular Surgery)  Goal: Optimal Coping with Heart Surgery  Outcome: Improving     Problem: Bowel Elimination Impaired (Cardiovascular Surgery)  Goal: Effective Bowel Elimination  Outcome: Completed

## 2021-09-16 NOTE — PROGRESS NOTES
Care Management Initial Consult    General Information  Assessment completed with: Patient    Type of CM/SW Visit: Initial Assessment  Primary Care Provider verified and updated as needed: No   Readmission within the last 30 days: no previous admission in last 30 days   Reason for Consult: discharge planning  Advance Care Planning: Advance Care Planning Reviewed: none on file          Communication Assessment  Patient's communication style: spoken language (English or Bilingual)    Hearing Difficulty or Deaf: no   Wear Glasses or Blind: yes    Cognitive  Cognitive/Neuro/Behavioral: WDL  Level of Consciousness: alert  Arousal Level: opens eyes spontaneously  Orientation: oriented x 4  Mood/Behavior: calm, cooperative  Best Language: 0 - No aphasia  Speech: clear, spontaneous, logical    Living Environment:   People in home: spouse     Current living Arrangements: mobile home      Able to return to prior arrangements: TCU recommended, pt declining       Family/Social Support:  Care provided by: self  Provides care for: no one  Marital Status:   Support system: , friends         Description of Support System: Supportive, Involved    Support Assessment: Adequate family and caregiver support, Adequate social supports    Current Resources:   Patient receiving home care services: No  Community Resources: None  Equipment currently used at home: none  Supplies currently used at home: None    Employment/Financial:  Employment Status: retired     Employment/ Comments: retired   Financial Concerns: No concerns identified     Functional Status:  Prior to admission patient needed assistance:   Dependent ADLs: Independent  Dependent IADLs: Independent  Assesssment of Functional Status: Needs placement in a SNF/TCF for rehabilitation      Values/Beliefs:  Spiritual, Cultural Beliefs, Congregation Practices, Values that affect care:  (not discussed)               Additional Information:  Pt is a  66-year-old female admitted to [facility] 9/7/21 now s/p MVR w/ left atrial appendage ligation with Dr. Acuna. TCU is recommended at discharge. Per primary team pt may be stable for discharge Monday, 9/20.     Met with pt at bedside to discuss TCU recommendation. Pt adamantly declining TCU placement. Pt states she lives in a mobile home with her  and that he can assist her with everything. She states they are both retired and he can provide 24/7 assistance. Pt states she has friends who are also able to assist as needed. Pt states she had oxygen at home prior to admission but no other services. Pt states she and her  winter in [place] and plan to go to [place] around mid-October.     NAVJOT Abdi, [facility] HospitalSW  6C   [facility]  Pager [phone]  Phone [phone]

## 2021-09-16 NOTE — CODE/RAPID RESPONSE
Rapid Response Team Note    Assessment   In assessment a rapid response was called on Corina Martinez due to lactic acidosis. This presentation is likely due to afib with RVR, recent MVR w/ left atrial appendage ligation on 9/7 and worsened by Hypertension  diuresis, recent RADHA, infection  Atrial Fibrillation/Flutter  COPD.     Plan   -  Will discuss with CVTS  - blood cultures x 2  - UA and UC  - BMP and Mg and replete lytes as needed  - C. Diff panel  -  Continue Invanz   -  The CVTS primary team was paged and currently awaiting a response.  -  Disposition: The patient will remain on the current unit. We will continue to monitor this patient closely.  -  Reassessment and plan follow-up will be performed by the rapid response team      Addendum:   - appreciate CVTS input  - start amio drip without bolus  - repeat lactate  - low threshold to return to ICU, heads up call made to ICU in case of uptrending lactate    Wendi Urbano PA-C  [facility] RRT AMCOM Job Code Contact #[phone]    Hospital Course   Brief Summary of events leading to rapid response:   RRT called for soft bp and tachycardia, initially pulse ox showing low reading but normalized with repositioning sensor. -140s and afib on monitor    RN notes 6 x watery stools today. Continues to have stable SOB that started prior to admission with stats stable on RA.     Denies pain including in the abdomen.   Denies dizziness/lightheadedness, fevers, chills sweats,  Denies bleeding, including epistaxis, hematuria, melena/hematochezia.    Denies chest pain, cough  Denies  new rashes, lumps lesions  Denies other complaints       Admission Diagnosis:   Mitral stenosis [I05.0]  Nonrheumatic mitral valve stenosis [I34.2] *    Physical Exam   Temp: 98.6  F (37  C) Temp  Min: 97.5  F (36.4  C)  Max: 98.6  F (37  C)  Resp: 12 Resp  Min: 8  Max: 28  SpO2: 94 % SpO2  Min: 87 %  Max: 94 %  Pulse: (!) 137 Pulse  Min: 114  Max: 142    No data recorded  BP: 110/86  Systolic (24hrs), Av , Min:82 , Max:125   Diastolic (24hrs), Av, Min:55, Max:86     I/Os: I/O last 3 completed shifts:  In: 985 [P.O.:920; I.V.:65]  Out: 3480 [Urine:450; Other:2850; Stool:100; Chest Tube:80]     Exam:   General: in no acute distress  Mental Status: baseline mental status.  CV: irregularly irregular and tachy, no m/r/g  PULM: scant bibasilar crackles, no wheezing/rhonchi, nonlabored on RA  HEENT: NC AT,  Extremities: feet cool, 2+ DP and PT pulses, no edema    Significant Results and Procedures   Lactic Acid:   Recent Labs   Lab Test 09/15/21  1922 21  0441 21  0410   LACT 2.6* 0.8 1.2     CBC:   Recent Labs   Lab Test 09/15/21  1703 09/15/21  0441 21  0502   WBC 16.5* 15.9* 19.0*   HGB 10.8* 9.2* 8.6*   HCT 34.8* 30.6* 28.5*    269 217        Sepsis Evaluation   The patient is known to have an infection.  NO EVIDENCE OF SEPSIS at this time.  Vital sign, physical exam, and lab findings are due to afib with RVR.

## 2021-09-16 NOTE — PLAN OF CARE
D: s/p MVR w/ left atrial appendage ligation on 9/7.  Hx: arthritis, SHAILESH, HTN, CHF and mitral valve stenosis.      I: Monitored vitals and assessed pt status. Encouraged activity.   Changed:   - stool sample came back negative for C diff. Consider enteric panel?  - changed to regular diet  - lactic acid unchanged this AM at 2.5  Running:   - heparin gtt 1750 units/hr; next 10A at 1300  - Amiodarone 0.5 mg/min (16.67mL/hr)   - NS TKO 5 mL/hr      A: A&Ox4. Denies hallucinations. VSS ex HR/rhythm. Tele shows atrial fibrillation, rate 110-140 and up to 170 with activity. Sating 89-95% on RA. Pt did drop down to 81% SpO2 while sleeping and reported she does not use a CPAP at home.  Afebrile. Denied pain, but reported stiffness in arms. Sternal incision YURI. Old CT sites with dressing CDI. BG checked Q4hr, no insulin needed. Urinating adequately. Watery BMx3 overnight. Up with Ax2, GB, walker. Appeared to rest comfortably between cares.      P: Continue to monitor pt status and report changes to CVTS.      [id number]  Laura Foss RN on 9/16/2021 at 6:54 AM

## 2021-09-16 NOTE — PLAN OF CARE
D: s/p MVR w/ left atrial appendage ligation on 9/7.  Hx: arthritis, SHAILESH, HTN, CHF and mitral valve stenosis.     I: Monitored vitals and assessed pt status. Encouraged activity.   Changed:   - see RRT note   - started amio gtt  - UA sent, UC in process   - BC in process  - K+ changed from solution to tablets   - placed on enteric precautions   Running:   - heparin gtt 1150 units/hr; next 10A at 2300  - K replacement   - Amiodarone 1 mg/min (33.33 mL/hr)      A: A&Ox4. Denies hallucinations. VSS on RA, ex HR/rhythm. Tele shows atrial fibrillation, rate 120-160. Afebrile. Denied pain. Clear liquid diet, advance as tolerated. Urinating adequately. Needs stool sample. Up with Ax2, GB, walker.      P: Continue to monitor pt status and report changes to CVTS.     [id number]  Laura Foss RN on 9/15/2021 at 10:41 PM

## 2021-09-17 ENCOUNTER — APPOINTMENT (OUTPATIENT)
Dept: PHYSICAL THERAPY | Facility: CLINIC | Age: 66
DRG: 219 | End: 2021-09-17
Attending: THORACIC SURGERY (CARDIOTHORACIC VASCULAR SURGERY)
Payer: MEDICARE

## 2021-09-17 ENCOUNTER — APPOINTMENT (OUTPATIENT)
Dept: OCCUPATIONAL THERAPY | Facility: CLINIC | Age: 66
DRG: 219 | End: 2021-09-17
Attending: THORACIC SURGERY (CARDIOTHORACIC VASCULAR SURGERY)
Payer: MEDICARE

## 2021-09-17 LAB
ANION GAP SERPL CALCULATED.3IONS-SCNC: 9 MMOL/L (ref 3–14)
ATRIAL RATE - MUSE: 133 BPM
BUN SERPL-MCNC: 27 MG/DL (ref 7–30)
CALCIUM SERPL-MCNC: 8.3 MG/DL (ref 8.5–10.1)
CHLORIDE BLD-SCNC: 107 MMOL/L (ref 94–109)
CO2 SERPL-SCNC: 23 MMOL/L (ref 20–32)
CREAT SERPL-MCNC: 1.03 MG/DL (ref 0.52–1.04)
DIASTOLIC BLOOD PRESSURE - MUSE: NORMAL MMHG
ERYTHROCYTE [DISTWIDTH] IN BLOOD BY AUTOMATED COUNT: 22.6 % (ref 10–15)
GFR SERPL CREATININE-BSD FRML MDRD: 57 ML/MIN/1.73M2
GLUCOSE BLD-MCNC: 103 MG/DL (ref 70–99)
HCT VFR BLD AUTO: 29.5 % (ref 35–47)
HGB BLD-MCNC: 9.1 G/DL (ref 11.7–15.7)
HOLD SPECIMEN: NORMAL
INR PPP: 1.37 (ref 0.85–1.15)
INTERPRETATION ECG - MUSE: NORMAL
LACTATE SERPL-SCNC: 1.2 MMOL/L (ref 0.7–2)
MAGNESIUM SERPL-MCNC: 1.9 MG/DL (ref 1.6–2.3)
MCH RBC QN AUTO: 27.1 PG (ref 26.5–33)
MCHC RBC AUTO-ENTMCNC: 30.8 G/DL (ref 31.5–36.5)
MCV RBC AUTO: 88 FL (ref 78–100)
P AXIS - MUSE: NORMAL DEGREES
PHOSPHATE SERPL-MCNC: 3.9 MG/DL (ref 2.5–4.5)
PLATELET # BLD AUTO: 357 10E3/UL (ref 150–450)
POTASSIUM BLD-SCNC: 3.8 MMOL/L (ref 3.4–5.3)
POTASSIUM BLD-SCNC: 4 MMOL/L (ref 3.4–5.3)
PR INTERVAL - MUSE: NORMAL MS
QRS DURATION - MUSE: 76 MS
QT - MUSE: 336 MS
QTC - MUSE: 511 MS
R AXIS - MUSE: 6 DEGREES
RBC # BLD AUTO: 3.36 10E6/UL (ref 3.8–5.2)
SODIUM SERPL-SCNC: 139 MMOL/L (ref 133–144)
SYSTOLIC BLOOD PRESSURE - MUSE: NORMAL MMHG
T AXIS - MUSE: 127 DEGREES
UFH PPP CHRO-ACNC: 0.23 IU/ML
UFH PPP CHRO-ACNC: 0.69 IU/ML
UFH PPP CHRO-ACNC: >1.1 IU/ML
UFH PPP CHRO-ACNC: >1.1 IU/ML
VENTRICULAR RATE- MUSE: 139 BPM
WBC # BLD AUTO: 18 10E3/UL (ref 4–11)

## 2021-09-17 PROCEDURE — 250N000013 HC RX MED GY IP 250 OP 250 PS 637: Performed by: NURSE PRACTITIONER

## 2021-09-17 PROCEDURE — 85520 HEPARIN ASSAY: CPT | Performed by: THORACIC SURGERY (CARDIOTHORACIC VASCULAR SURGERY)

## 2021-09-17 PROCEDURE — 214N000001 HC R&B CCU UMMC

## 2021-09-17 PROCEDURE — 99222 1ST HOSP IP/OBS MODERATE 55: CPT | Mod: 25 | Performed by: INTERNAL MEDICINE

## 2021-09-17 PROCEDURE — 250N000013 HC RX MED GY IP 250 OP 250 PS 637: Performed by: THORACIC SURGERY (CARDIOTHORACIC VASCULAR SURGERY)

## 2021-09-17 PROCEDURE — 80048 BASIC METABOLIC PNL TOTAL CA: CPT | Performed by: NURSE PRACTITIONER

## 2021-09-17 PROCEDURE — 97116 GAIT TRAINING THERAPY: CPT | Mod: GP

## 2021-09-17 PROCEDURE — 83735 ASSAY OF MAGNESIUM: CPT | Performed by: NURSE PRACTITIONER

## 2021-09-17 PROCEDURE — 83605 ASSAY OF LACTIC ACID: CPT | Performed by: THORACIC SURGERY (CARDIOTHORACIC VASCULAR SURGERY)

## 2021-09-17 PROCEDURE — 250N000011 HC RX IP 250 OP 636: Performed by: STUDENT IN AN ORGANIZED HEALTH CARE EDUCATION/TRAINING PROGRAM

## 2021-09-17 PROCEDURE — 97535 SELF CARE MNGMENT TRAINING: CPT | Mod: GO

## 2021-09-17 PROCEDURE — 999N000128 HC STATISTIC PERIPHERAL IV START W/O US GUIDANCE

## 2021-09-17 PROCEDURE — 84132 ASSAY OF SERUM POTASSIUM: CPT | Performed by: THORACIC SURGERY (CARDIOTHORACIC VASCULAR SURGERY)

## 2021-09-17 PROCEDURE — 250N000011 HC RX IP 250 OP 636: Performed by: THORACIC SURGERY (CARDIOTHORACIC VASCULAR SURGERY)

## 2021-09-17 PROCEDURE — 85610 PROTHROMBIN TIME: CPT | Performed by: STUDENT IN AN ORGANIZED HEALTH CARE EDUCATION/TRAINING PROGRAM

## 2021-09-17 PROCEDURE — 97530 THERAPEUTIC ACTIVITIES: CPT | Mod: GO

## 2021-09-17 PROCEDURE — 84100 ASSAY OF PHOSPHORUS: CPT | Performed by: NURSE PRACTITIONER

## 2021-09-17 PROCEDURE — 250N000011 HC RX IP 250 OP 636: Performed by: NURSE PRACTITIONER

## 2021-09-17 PROCEDURE — 36415 COLL VENOUS BLD VENIPUNCTURE: CPT | Performed by: THORACIC SURGERY (CARDIOTHORACIC VASCULAR SURGERY)

## 2021-09-17 PROCEDURE — 97530 THERAPEUTIC ACTIVITIES: CPT | Mod: GP

## 2021-09-17 PROCEDURE — 85027 COMPLETE CBC AUTOMATED: CPT | Performed by: NURSE PRACTITIONER

## 2021-09-17 RX ORDER — MAGNESIUM SULFATE HEPTAHYDRATE 40 MG/ML
2 INJECTION, SOLUTION INTRAVENOUS ONCE
Status: COMPLETED | OUTPATIENT
Start: 2021-09-17 | End: 2021-09-17

## 2021-09-17 RX ORDER — POTASSIUM CHLORIDE 750 MG/1
20 TABLET, EXTENDED RELEASE ORAL ONCE
Status: COMPLETED | OUTPATIENT
Start: 2021-09-17 | End: 2021-09-17

## 2021-09-17 RX ORDER — WARFARIN SODIUM 3 MG/1
6 TABLET ORAL
Status: COMPLETED | OUTPATIENT
Start: 2021-09-17 | End: 2021-09-17

## 2021-09-17 RX ADMIN — POTASSIUM CHLORIDE 20 MEQ: 750 TABLET, EXTENDED RELEASE ORAL at 08:45

## 2021-09-17 RX ADMIN — QUETIAPINE FUMARATE 25 MG: 25 TABLET ORAL at 21:05

## 2021-09-17 RX ADMIN — DULOXETINE 30 MG: 30 CAPSULE, DELAYED RELEASE ORAL at 21:05

## 2021-09-17 RX ADMIN — TORSEMIDE 20 MG: 20 TABLET ORAL at 08:45

## 2021-09-17 RX ADMIN — METOPROLOL TARTRATE 25 MG: 25 TABLET, FILM COATED ORAL at 14:27

## 2021-09-17 RX ADMIN — WARFARIN SODIUM 6 MG: 3 TABLET ORAL at 17:22

## 2021-09-17 RX ADMIN — GABAPENTIN 1200 MG: 300 CAPSULE ORAL at 21:05

## 2021-09-17 RX ADMIN — ATORVASTATIN CALCIUM 40 MG: 40 TABLET, FILM COATED ORAL at 20:04

## 2021-09-17 RX ADMIN — GABAPENTIN 600 MG: 300 CAPSULE ORAL at 17:28

## 2021-09-17 RX ADMIN — GABAPENTIN 600 MG: 300 CAPSULE ORAL at 08:45

## 2021-09-17 RX ADMIN — PANTOPRAZOLE SODIUM 40 MG: 40 TABLET, DELAYED RELEASE ORAL at 08:45

## 2021-09-17 RX ADMIN — MAGNESIUM SULFATE IN WATER 2 G: 40 INJECTION, SOLUTION INTRAVENOUS at 08:44

## 2021-09-17 RX ADMIN — ASPIRIN 81 MG: 81 TABLET, CHEWABLE ORAL at 08:45

## 2021-09-17 RX ADMIN — HEPARIN SODIUM 2500 UNITS/HR: 10000 INJECTION, SOLUTION INTRAVENOUS at 10:26

## 2021-09-17 RX ADMIN — NICOTINE 1 PATCH: 21 PATCH, EXTENDED RELEASE TRANSDERMAL at 20:03

## 2021-09-17 RX ADMIN — Medication 1 TABLET: at 08:45

## 2021-09-17 RX ADMIN — Medication 5 MG: at 20:04

## 2021-09-17 RX ADMIN — POTASSIUM CHLORIDE 20 MEQ: 750 TABLET, EXTENDED RELEASE ORAL at 20:04

## 2021-09-17 RX ADMIN — POTASSIUM CHLORIDE 20 MEQ: 750 TABLET, EXTENDED RELEASE ORAL at 08:44

## 2021-09-17 RX ADMIN — METOPROLOL TARTRATE 25 MG: 25 TABLET, FILM COATED ORAL at 08:45

## 2021-09-17 RX ADMIN — AMIODARONE HYDROCHLORIDE 400 MG: 200 TABLET ORAL at 09:25

## 2021-09-17 RX ADMIN — ERTAPENEM SODIUM 1 G: 1 INJECTION, POWDER, LYOPHILIZED, FOR SOLUTION INTRAMUSCULAR; INTRAVENOUS at 17:28

## 2021-09-17 ASSESSMENT — ACTIVITIES OF DAILY LIVING (ADL)
ADLS_ACUITY_SCORE: 11
ADLS_ACUITY_SCORE: 15
ADLS_ACUITY_SCORE: 11
ADLS_ACUITY_SCORE: 15
ADLS_ACUITY_SCORE: 11
ADLS_ACUITY_SCORE: 15

## 2021-09-17 ASSESSMENT — MIFFLIN-ST. JEOR: SCORE: 1491.77

## 2021-09-17 NOTE — PROGRESS NOTES
Cardiovascular Surgery Progress Note  09/17/2021         Assessment and Plan:     Corina Martinez is a 66 year old female with PMH of arthritis, SHAILESH, HTN, atrial fib, CHF and mitral valve stenosis, now s/p MVR w/ left atrial appendage ligation on 9/7 with Dr. Acuna.     Cardiovascular:   # HTN  # CHF, EF 55-60%   # Chronic congestive heart failure, unspecified  # Paroxysmal Afib, now persistent atrial fib/flutter, on warfarin PTA  # Attempted cardioversion prior to admission, had early recurrence of afib  # Sinus bradycardia  # MV stenosis, s/p MVR 9/7  # Left atrial appendage thrombus, s/p atrial appendage clip 9/7  Echocardiogram 9/8/2021 demonstrates LVEF 60-65% and inability to assess RV fxn  Had been bradycardic post-op, now back in afib, intermittent RVR  - Monitor hemodynamic status.   - MAP goal > 65  - ASA, atorvastatin, amiodarone (PTA dose)    - Continue to hold PTA torsemide, metoprolol, diltiazem  - Increased amiodarone to 400 mg daily for a fib 9/15, then had more Afib with RVR overnight and she was switched to IV. .   - IV amiodarone load started 9/15 pm. Stop IV amio this am and restart PO Amiodarone load, 400 mg BID.   - Increased metoprolol to 25 mg TID 9/16 for rate control, watch rhythm closely with initial HB post-op. HR's now 's   - Heparin drip, low intensity, no bolus and started warfarin 9/15. Requiring high doses of IV heparin to reach therapeutic goal. - - recheck Xa at noon if still < .25, will ask hematology to see for possible heparin resistance.   - Echocardiogram 9/16:  LVEF 65-70%, normal RV function, no pericardial effusion.   - consult EP for possible cardioversion Monday with persistent atrial fib, now atrial flutter.       CTs- b/l pleurals removed 9/14; mediastinal removed 9/15  TPW- removed 9/15     Pulmonary:  # Current smoker   # COPD sat's upper 80's at baseline   # SHAILESH  # HAP with respiratory failure  - Extubated in OR, reintubated POD1  - Extubated again 9/13  -  Saturating well on room air  - Nicotine patch   - ID as below     Neurology / MSK:  # Acute post-operative pain  # Delirium with hallucinations   - Monitor neurological status. Notify the MD for any acute changes in exam.  - acetaminophen allergy   - Scheduled gabapentin (PTA dose), PRN oxy, robaxin and dilaudid   - NIKKI catheters B/l  (9/7-9/14) removed yesterday (9/14)  - Continue PTA cymbalta 30 mg daily  - PTA atarax, still holding  - Decreased Seroquel to 25 mg at bedtime 9/16, had a few hallucinations last night, but slept good.      / Renal:  # RADHA, resolved  BL creat ~0.8-1, peaked at 1.72, creat 1.03 today   - 24 hour I/O net +1.6 L  - IV bumex stopped 9/16 and started oral torsemide 20 mg daily (was on 10 mg PTA)  - Weight up 0.8 kg today, but down 0.8 kg from admission   - Decreased potassium to 20 mEq daily 9/17, K+ 4.0 today   - Edema improved, on room air with good sats  - BMP in am      GI / FEN:   # GERD  # Primary biliary cirrhosis   - ADAT  - Passed swallow evaluation 9/13  - Holding miralax and senna-docusate BID with frequent loose stools   - C-diff negative 9/16   - Use imodium PRN        Endocrine:  # Stress induced hyperglycemia  - medium resistance SSI   - Goal BG <180 for optimal healing  - BG , no insulin use   - discontinued sliding scale insulin 9/16    Infectious Disease:  # HAP  Completed perioperative antibiotics with cefazolin, vanc   Afebrile, WBC 18 (15.2<-16.5).  Afebrile   Sputum cx 9/11, positive for 2+ strep constallatus and 1+ enterobacter cloacae  - Zosyn 9/11-9/13;  switched to ertapenem 9/13 for strep and enterobacter coverage - to complete course through 9/19  - Lactate 2.6->2.5->2.5-> 1.2. Appears well, lactate normal, but WBC starting to trend up today.   - Monitor WBC/fever curve another day, notify ID if trending up     Hematology:   Acute blood loss anemia and thrombocytopenia  Hgb 9.1; Plt wnl, no signs or symptoms of active bleeding  - transfuse for hgb  "<7.0    Anticoagulation:   - ASA 81 mg daily   - Coumadin for PAF, INR goal 2-3. Most recent INR 1.37.    - Low intensity heparin gtt bridging to therapeutic INR.  - Requiring high amounts of heparin to obtain an therapeutic Xa. If not therapeutic with next Xa check, will consult Hematology for possible Heparin resistance.     Prophylaxis:   - Stress ulcer prophylaxis: Pantoprazole 40 mg daily for 30 days  - DVT prophylaxis: Subcutaneous heparin, LIH    Disposition:   - Transferred to  on POD#8  - Therapies recommending discharge to TCU    Discussed with CVTS Fellow as needed.  Discussed with Dr Acuna through both verbal and written communication.      Kimberli Padilla, DNP, CNP  [facility] Cardiothoracic Surgery  O: [phone]  Pgr [phone]        Interval History:     No overnight events. Slept good last night, only a few hallucinations.    States pain is well managed on current regimen.   Tolerating diet, is passing flatus, +BM, loose stools. Using imodium. No nausea or vomiting.  Breathing well without complaints.   Working with therapies, up independently in the room.   Denies chest pain, palpitations, dizziness, syncopal symptoms, fevers, chills, myalgias, or sternal popping/clicking.         Physical Exam:   Blood pressure 97/67, pulse 89, temperature 98  F (36.7  C), temperature source Oral, resp. rate 16, height 1.689 m (5' 6.5\"), weight 92.7 kg (204 lb 6.4 oz), SpO2 94 %, not currently breastfeeding.  Vitals:    09/15/21 0200 09/16/21 0328 09/17/21 0201   Weight: 94.6 kg (208 lb 8.9 oz) 91.9 kg (202 lb 9.6 oz) 92.7 kg (204 lb 6.4 oz)      Weight; + 0.8 kg since admit.   24 hr Fluid status; net loss +1.6 L, but likely not accurate with frequent loose stools.   MAPs: 70-80    Gen: A&Ox4, NAD. Up in the bathroom washing up with Therapy.   Neuro: no focal deficits, general weakness.    CV: tachy, irregular, distant HT's, no murmurs, rubs or gallops. No JVD.   Pulm: CTA, but very diminished. No wheezing or " rhonchi, normal breathing on RA  Abd: nondistended, normal BS, soft, nontender  Ext:  Trace peripheral edema, non-pitting  Incision: clean, dry, intact, no erythema, sternum stable  Tubes/drain sites: dressing clean and dry.            Data:    Imaging:  reviewed recent imaging, no acute concerns    CXR 9/16/2021: Impression:    1. Persistent bibasilar opacities, left more than right.  2. Stable postsurgical changes.    Echocardiogram 9/16/2021: Interpretation Summary  Technically difficult study.  Global and regional left ventricular function is hyperkinetic with an EF of  65-70%.  Global right ventricular function is normal.  No pericardial effusion is present.  IVC diameter and respiratory changes fall into an intermediate range  suggesting an RA pressure of 8 mmHg.  There has been no change.    Labs:  BMP  Recent Labs   Lab 09/17/21  0539 09/16/21  0759 09/16/21  0620 09/16/21  0346 09/15/21  2356 09/15/21  2017 09/15/21  1941 09/15/21  0809 09/15/21  0441 09/14/21  0845 09/14/21  0502   NA  --   --  139  --   --  139  --   --  140  --  144  144   POTASSIUM 3.8  --  3.7  --   --  3.4  --   --  3.3*   < > 3.7  3.7   CHLORIDE  --   --  104  --   --  105  --   --  107  --  112*  112*   LOC  --   --  9.1  --   --  8.7  --   --  8.6  --  8.5  8.5   CO2  --   --  26  --   --  27  --   --  29  --  28  28   BUN  --   --  21  --   --  22  --   --  22  --  30  30   CR  --   --  0.83  --   --  0.75  --   --  0.77  --  0.80  0.80   GLC  --  101* 104* 90 97 132*   < >   < > 88   < > 98  98    < > = values in this interval not displayed.     CBC  Recent Labs   Lab 09/17/21  0539 09/16/21  0620 09/15/21  1703 09/15/21  0441   WBC 18.0* 15.2* 16.5* 15.9*   RBC 3.36* 4.14 4.01 3.47*   HGB 9.1* 11.2* 10.8* 9.2*   HCT 29.5* 36.6 34.8* 30.6*   MCV 88 88 87 88   MCH 27.1 27.1 26.9 26.5   MCHC 30.8* 30.6* 31.0* 30.1*   RDW 22.6* 22.7* 22.7* 22.7*    340 340 269     INR  Recent Labs   Lab 09/17/21  0539 09/16/21  0620  09/15/21  1703   INR 1.37* 1.24* 1.26*      Hepatic Panel  Recent Labs   Lab 09/14/21  0502 09/13/21  0409 09/12/21  0330 09/11/21  0407   AST 39 34 39 44   ALT 36 29 23 20   ALKPHOS 229* 263* 229* 212*   BILITOTAL 0.8 0.5 0.4 0.3   ALBUMIN 2.4* 2.6* 2.2* 2.3*     GLUCOSE:   Recent Labs   Lab 09/16/21  0759 09/16/21  0620 09/16/21  0346 09/15/21  2356 09/15/21  2017 09/15/21  1941   * 104* 90 97 132* 102*

## 2021-09-17 NOTE — PROGRESS NOTES
After 1st critical anti-Xa, recheck done per Kimberli BRYSON Critical result received again, crosscover paged. Awaiting orders. Will continue to monitor.

## 2021-09-17 NOTE — PLAN OF CARE
D: s/p MVR w/ left atrial appendage ligation on 9/7.  Hx: arthritis, SHAILESH, HTN, CHF and mitral valve stenosis.      I: Monitored vitals and assessed pt status. Encouraged activity.   Changed:   - pt triggered sepsis protocol this AM. VSS q30min x2. Lactic acid 1.2  Running:   - heparin gtt 2500 units/hr; next 10A at 1245  - Amiodarone 0.5 mg/min (16.67mL/hr)   - NS TKO 5 mL/hr   PRN: imodium x1     A: A&Ox4. Pt having visual, auditory, and olfactory hallucinations overnight. VSS ex HR/rhythm. Tele shows atrial fibrillation, rate . Sating 88-94% on RA. Afebrile. Denied pain, but reported stiffness in arms. Sternal incision YURI. Old CT sites with dressing CDI. Urinating adequately. Watery/loose BMx2 overnight. Up with Ax1-2, GB, walker. Appeared to rest comfortably between cares.      P: Continue to monitor pt status and report changes to CVTS.      [id number]  Laura Foss RN on 9/17/2021 at 7:02 AM

## 2021-09-17 NOTE — CONSULTS
Electrophysiology Consultation Note   EP Attending: .   Reason for consultation: AFL.   Provider requesting consultation: Ms. Randy, WILLIE CVTS Service.  Date of Service: 9/17/2021      HPI:   Ms. Martinez is a 66 year old male who has a past medical history significant for MS/MR, CHF, PAF (CHADSVASC 3 on Warfarin), HTN, SHAILESH (does not use CPAP), primary biliary cirrhosis, s/p cholecystectomy, chronic back pain s/p back surgeries, s/p MARIYA, s/p TKA, obesity, and tobacco abuse.   She presented to [facility] ER in 5/2021 with chest tightness, SOB, and palpitations. She would found to be in AF with RVR. She was started on amiodarone gtt and converted to oral dosing. She was also bridged to therapeutic warfarin. She had DCCV but only lasted 2 hours in sinus and then reverted back to AF. She had another DCCV which only lasted 24 hours and then she reverted back to AF. An echo during that admission showed LVEF 50-55%, moderate MS, moderate LA enlargement.  She was started on metoprolol for rate control.  She was discharged but returned 4 days later to [facility] ER with AF with RVR.  Heart rate was controlled with IV diltiazem and she was discharged on oral diltiazem, beta-blocker, and amiodarone.  She then presented to ER in [facility] on 6/8/2021 with increased S OB and tachycardia.  Again found to be in AF with RVR imaging showed a new large pericardial effusion with increased small pleural effusions.  As there were no beds available in the [facility], she was transferred to [facility] in [place] for further management.  Echo during admission at [facility] showed LVEF 50-55%, no WMA large circumferential pericardial effusion without evidence of tamponade.  She has followed with Ms. Bill SALINAS for Cardiology. She had ANTONIO here that showed degenerative MS with significant calcification and mean gradient of 10 mmHg (with HR of 100 bpm suggestive of high flow state).  Coronary angiogram/RHC showed mild  nonobstructive CAD, mildly elevated right-sided filling pressure, moderately elevated left-sided filling pressure, mild pulmonary hypertension, and normal cardiac output.  She was also found that the pericardial effusion had improved and was not requiring pericardiocentesis. She is now s/p MVR and KENZIE ligation on 9/7/21.  She was initially bradycardic postoperatively now back in AF with intermittent RVR.  Renal function and electrolytes stable.  Current cardiac medications include amiodarone, ASA, Lipitor, metoprolol, torsemide, heparin gtt., and warfarin.     Past Medical History:   Past Medical History:   Diagnosis Date    Arthritis     Congestive heart failure (H)     Hypertension     Nonrheumatic mitral valve stenosis, severe     Obese     SHAILESH (obstructive sleep apnea)     Sleep apnea      Past Surgical History:   Past Surgical History:   Procedure Laterality Date    APPENDECTOMY      CV CORONARY ANGIOGRAM N/A 8/5/2021    Procedure: CV CORONARY ANGIOGRAM;  Surgeon: Juan Vance MD;  Location:  HEART CARDIAC CATH LAB    CV RIGHT HEART CATH MEASUREMENTS RECORDED N/A 8/5/2021    Procedure: CV RIGHT HEART CATH;  Surgeon: Juan Vance MD;  Location:  HEART CARDIAC CATH LAB    KNEE DEBRIDEMENT  05/07/2019    LAPAROSCOPIC CHOLECYSTECTOMY      LUMBAR FUSION      OTHER SURGICAL HISTORY      hip replacement    RELEASE CARPAL TUNNEL      REPLACE VALVE MITRAL N/A 9/7/2021    Procedure: Median Sternotomy, Mitral Valve Replacement with a 29 mm St. Andrey Epic Valve, Bilateral Pulmonary Vein Isolation, Left Atrial Appendage Ligation, On Cardiopulmonary Bypass, Transesophageal Echocardiogram by Anesthesia;  Surgeon: Sven Acuna MD;  Location:  OR    TONSILLECTOMY      TOTAL KNEE ARTHROPLASTY Right 01/30/2019    TUBAL LIGATION       Allergies: Per MAR     Allergies   Allergen Reactions    Codeine Sulfate Nausea and Vomiting    Acetaminophen Other (See Comments)     Was told to  avoid due to primary biliary cirrhosis     Medications:   Per MAR current outpatient cardiovascular medications include:   Medications Prior to Admission   Medication Sig Dispense Refill Last Dose    amiodarone (PACERONE) 200 MG tablet Take 200 mg by mouth daily (with lunch)    9/6/2021 at 1200    diclofenac (VOLTAREN) 1 % topical gel Apply 2 g topically 2 times daily as needed To right shoulder   Past Week at Unknown time    diltiazem ER COATED BEADS (CARDIZEM CD/CARTIA XT) 240 MG 24 hr capsule Take 1 capsule (240 mg) by mouth daily (Patient taking differently: Take 240 mg by mouth daily (with lunch) ) 90 capsule 1 9/6/2021 at 1200    diphenhydrAMINE (BENADRYL) 25 MG tablet Take 50 mg by mouth At Bedtime   9/6/2021 at Unknown time    DULoxetine (CYMBALTA) 30 MG capsule Take 30 mg by mouth At Bedtime   9/6/2021 at Unknown time    fluticasone (FLONASE) 50 MCG/ACT nasal spray Spray 2 sprays into both nostrils 2 times daily as needed for allergies  1 Package 12 Past Month at Unknown time    gabapentin (NEURONTIN) 600 MG tablet Take 600 mg in the morning  Take 600 mg in the afternoon   Take 1200 mg (2 tablets) at bedtime.   9/7/2021 at Unknown time    hydrOXYzine (ATARAX) 25 MG tablet Take 25 mg by mouth At Bedtime   9/6/2021 at Unknown time    magnesium 250 MG tablet Take 1 tablet (250 mg) by mouth 2 times daily   9/6/2021 at Unknown time    Melatonin 10 MG TABS tablet Take 10 mg by mouth At Bedtime   9/6/2021 at Unknown time    metoprolol tartrate (LOPRESSOR) 50 MG tablet Take 1 tablet (50 mg) by mouth 2 times daily 180 tablet 0 9/7/2021 at Unknown time    Multiple Vitamins-Minerals (MULTIVITAMIN ADULTS 50+) TABS Take 1 tablet by mouth every morning   Past Week at Unknown time    omeprazole (PRILOSEC) 20 MG CR capsule Take 20 mg by mouth daily    9/7/2021 at Unknown time    potassium chloride ER (KLOR-CON M) 20 MEQ CR tablet Take 20 mEq by mouth 2 times daily   9/6/2021 at Unknown time    torsemide (DEMADEX) 10 MG  tablet Take 1 tablet (10 mg) by mouth daily (Patient taking differently: Take 20 mg by mouth daily ) 90 tablet 0 9/6/2021 at 0800    Ursodiol 500 MG TABS Take 500 mg by mouth 3 times daily    More than a month at Unknown time    vitamin B complex with vitamin C (VITAMIN  B COMPLEX) tablet Take 1 tablet by mouth daily [employer]   Past Week at Unknown time    vitamin D3 (CHOLECALCIFEROL) 50 mcg (2000 units) tablet Take 1 tablet by mouth every morning   Past Week at Unknown time    warfarin ANTICOAGULANT (COUMADIN) 5 MG tablet Take 2.5 mg x 4 days and 5 mg x 3 days/week or as directed by protime clinic (Patient taking differently: Take 2.5 mg on Mon/Wed/Fri and take 5 mg on all other days of the week.  Takes in the evening.) 90 tablet 0 8/30/2021     No current outpatient medications on file.     Current Facility-Administered Medications   Medication Dose Route Frequency    amiodarone  400 mg Oral or Feeding Tube Daily    aspirin  81 mg Oral or NG Tube Daily    atorvastatin  40 mg Oral or Feeding Tube QPM    DULoxetine  30 mg Oral At Bedtime    ertapenem (INVanz) IV  1 g Intravenous Q24H    gabapentin  1,200 mg Oral At Bedtime    gabapentin  600 mg Oral BID    metoprolol tartrate  25 mg Oral TID    multivitamin w/minerals  1 tablet Oral Daily    nicotine  1 patch Transdermal Daily    nicotine   Transdermal Q8H    pantoprazole  40 mg Oral QAM AC    polyethylene glycol  17 g Oral or Feeding Tube BID    potassium chloride ER  20 mEq Oral BID    protein modular  1 packet Per Feeding Tube BID 09 12    QUEtiapine  25 mg Oral At Bedtime    senna-docusate  2 tablet Oral or Feeding Tube BID    sodium chloride (PF)  3 mL Intracatheter Q8H    sodium chloride (PF)  3 mL Intracatheter Q8H    torsemide  20 mg Oral Daily    warfarin ANTICOAGULANT  6 mg Oral ONCE at 18:00     Family History:   Family History   Problem Relation Age of Onset    Depression Mother     Respiratory Mother     Emphysema Mother     Cancer Father      "Lymphoma Father     Cardiovascular No family hx of     Anesthesia Reaction No family hx of     Deep Vein Thrombosis (DVT) No family hx of      Social History:   Social History     Tobacco Use    Smoking status: Current Some Day Smoker     Packs/day: 1.00     Years: 54.00     Pack years: 54.00     Types: Cigarettes    Smokeless tobacco: Never Used    Tobacco comment: pt has been working hard on quiting; smoking 3/4 ppd (8/25/21)   Substance Use Topics    Alcohol use: Yes     Comment: social       ROS:   A comprehensive 10 point ROS was negative other than as mentioned in HPI.    Physical Examination:   VITALS: BP 95/77 (BP Location: Left arm)   Pulse 96   Temp 98  F (36.7  C) (Oral)   Resp 16   Ht 1.689 m (5' 6.5\")   Wt 92.7 kg (204 lb 6.4 oz)   LMP  (LMP Unknown)   SpO2 95%   BMI 32.50 kg/m    GENERAL APPEARANCE: AxO, NAD   HEENT: PERRLA., MMM.   NECK: Supple.   CHEST: CTAB   CARDIOVASCULAR: Irregular.   ABDOMEN: BS+, soft, NT,ND.   EXTREMITIES:1+ pedal edema. Distal pulses intact.   NEURO: Grossly nonfocal.   PSYCH: Normal affect.  SKIN: Warm and dry.   Data:   Labs:  BMP  Recent Labs   Lab 09/17/21  0539 09/16/21  0759 09/16/21  0620 09/16/21  0346 09/15/21  2356 09/15/21  2017 09/15/21  0809 09/15/21  0441     --  139  --   --  139  --  140   POTASSIUM 4.0  3.8  --  3.7  --   --  3.4  --  3.3*   CHLORIDE 107  --  104  --   --  105  --  107   LOC 8.3*  --  9.1  --   --  8.7  --  8.6   CO2 23  --  26  --   --  27  --  29   BUN 27  --  21  --   --  22  --  22   CR 1.03  --  0.83  --   --  0.75  --  0.77   * 101* 104* 90   < > 132*   < > 88    < > = values in this interval not displayed.     CBC  Recent Labs   Lab 09/17/21  0539 09/16/21  0620 09/15/21  1703 09/15/21  0441   WBC 18.0* 15.2* 16.5* 15.9*   RBC 3.36* 4.14 4.01 3.47*   HGB 9.1* 11.2* 10.8* 9.2*   HCT 29.5* 36.6 34.8* 30.6*   MCV 88 88 87 88   MCH 27.1 27.1 26.9 26.5   MCHC 30.8* 30.6* 31.0* 30.1*   RDW 22.6* 22.7* 22.7* 22.7*   PLT " 357 340 340 269     INR  Recent Labs   Lab 21  0539 21  0620 09/15/21  1703   INR 1.37* 1.24* 1.26*   EK/17/21 ECHO:   Interpretation Summary  Technically difficult study.  Global and regional left ventricular function is hyperkinetic with an EF of  65-70%.  Global right ventricular function is normal.  No pericardial effusion is present.  IVC diameter and respiratory changes fall into an intermediate range  suggesting an RA pressure of 8 mmHg.  There has been no change.    Assessment:   Ms. Martinez is a 66 year old male who has a past medical history significant for MS/MR, CHF, PAF (CHADSVASC 3 on Warfarin), HTN, SHAILESH (does not use CPAP), primary biliary cirrhosis, s/p cholecystectomy, chronic back pain s/p back surgeries, s/p MARIYA, s/p TKA, obesity, and tobacco abuse.   She presented to [facility] ER in 2021 with chest tightness, SOB, and palpitations. She would found to be in AF with RVR. She was started on amiodarone gtt and converted to oral dosing. She was also bridged to therapeutic warfarin. She had DCCV but only lasted 2 hours in sinus and then reverted back to AF. She had another DCCV which only lasted 24 hours and then she reverted back to AF. An echo during that admission showed LVEF 50-55%, moderate MS, moderate LA enlargement.  She was started on metoprolol for rate control.  She was discharged but returned 4 days later to [facility] ER with AF with RVR.  Heart rate was controlled with IV diltiazem and she was discharged on oral diltiazem, beta-blocker, and amiodarone.  She then presented to ER in [facility] on 2021 with increased S OB and tachycardia.  Again found to be in AF with RVR imaging showed a new large pericardial effusion with increased small pleural effusions.  As there were no beds available in the [facility] area, she was transferred to [facility] in [place] for further management.  Echo during admission at [facility] showed LVEF 50-55%, no WMA large  circumferential pericardial effusion without evidence of tamponade.  She has followed with Ms. Bill SALINAS for Cardiology. She had ANTONIO here that showed degenerative MS with significant calcification and mean gradient of 10 mmHg (with HR of 100 bpm suggestive of high flow state).  Coronary angiogram/RHC showed mild nonobstructive CAD, mildly elevated right-sided filling pressure, moderately elevated left-sided filling pressure, mild pulmonary hypertension, and normal cardiac output.  She was also found that the pericardial effusion had improved and was not requiring pericardiocentesis. She is now s/p MVR and KENZIE ligation on 21.  She was initially bradycardic postoperatively now back in AF with intermittent RVR.  Renal function and electrolytes stable.  Current cardiac medications include amiodarone, ASA, Lipitor, metoprolol, torsemide, heparin gtt., and warfarin.     EP Recommendations:  Valvular Atrial Fibrillation:   We discussed in detail with the patient management/treatment options for Faustina includin. Stroke Prophylaxis:  CHADSVASC=+age, +gender, +HTN  3, corresponding to a 3.2% annual stroke / systemic emolism event rate. indicating need for long term oral anticoagulation.  She is appropriately on warfarin which is also required due to valvular AF  2. Rate Control: Continue metoprolol and uptitrate as able. Can also consider resuming PTA diltiazem   3. Rhythm Control: Cardioversion, Antiarrhythmics and/or ablation are options for rhythm control.  She was started on amiodarone in 2021.  She had DCCV attempt x2 with early recurrence of AF.  She was briefly in sinus here after surgery but then converted back to AF.  She is already on amiodarone.  She was well loaded preoperatively and is continuing on amiodarone postoperatively.  Can consider one other attempt at DCCV prior to discharge.   4. Risk Factor Management: Statin, BP control, and SHAILESH treatment.      The patient states understanding and is  agreeable with plan.   Thank you for allowing us to participate in the care of this patient.     The patient was discussed w/ Dr. Harris.  The above note reflects our joint plan.    SUZE Lang CNP  Electrophysiology Consult Service  Pager: [phone]    EP STAFF NOTE  I have seen and examined the patient as part of a shared visit with BENJAMIN Lang NP.  I agree with the note above. I reviewed today's vital signs and medications. I have reviewed and discussed with the advanced practice provider their physical examination, assessment, and plan   Briefly, postop AF, amio recently started  My key history/exam findings are: AF.   The key management decisions made by me: repeat DCCV on amio.    Ezra Harris MD [facility]  Cardiology - Electrophysiology

## 2021-09-18 ENCOUNTER — APPOINTMENT (OUTPATIENT)
Dept: GENERAL RADIOLOGY | Facility: CLINIC | Age: 66
DRG: 219 | End: 2021-09-18
Attending: NURSE PRACTITIONER
Payer: MEDICARE

## 2021-09-18 LAB
ALBUMIN UR-MCNC: NEGATIVE MG/DL
ANION GAP SERPL CALCULATED.3IONS-SCNC: 9 MMOL/L (ref 3–14)
APPEARANCE UR: CLEAR
BASOPHILS # BLD MANUAL: 0.2 10E3/UL (ref 0–0.2)
BASOPHILS NFR BLD MANUAL: 1 %
BILIRUB UR QL STRIP: NEGATIVE
BUN SERPL-MCNC: 23 MG/DL (ref 7–30)
BURR CELLS BLD QL SMEAR: ABNORMAL
CALCIUM SERPL-MCNC: 8.4 MG/DL (ref 8.5–10.1)
CHLORIDE BLD-SCNC: 106 MMOL/L (ref 94–109)
CO2 SERPL-SCNC: 23 MMOL/L (ref 20–32)
COLOR UR AUTO: ABNORMAL
CREAT SERPL-MCNC: 1.02 MG/DL (ref 0.52–1.04)
CRP SERPL-MCNC: 91 MG/L (ref 0–8)
ELLIPTOCYTES BLD QL SMEAR: SLIGHT
EOSINOPHIL # BLD MANUAL: 0 10E3/UL (ref 0–0.7)
EOSINOPHIL NFR BLD MANUAL: 0 %
ERYTHROCYTE [DISTWIDTH] IN BLOOD BY AUTOMATED COUNT: 23.1 % (ref 10–15)
GFR SERPL CREATININE-BSD FRML MDRD: 57 ML/MIN/1.73M2
GLUCOSE BLD-MCNC: 105 MG/DL (ref 70–99)
GLUCOSE UR STRIP-MCNC: NEGATIVE MG/DL
HCT VFR BLD AUTO: 28.2 % (ref 35–47)
HGB BLD-MCNC: 8.6 G/DL (ref 11.7–15.7)
HGB UR QL STRIP: NEGATIVE
HYALINE CASTS: 3 /LPF
INR PPP: 1.56 (ref 0.85–1.15)
KETONES UR STRIP-MCNC: NEGATIVE MG/DL
LACTATE SERPL-SCNC: 1.7 MMOL/L (ref 0.7–2)
LEUKOCYTE ESTERASE UR QL STRIP: ABNORMAL
LYMPHOCYTES # BLD MANUAL: 1.6 10E3/UL (ref 0.8–5.3)
LYMPHOCYTES NFR BLD MANUAL: 8 %
MAGNESIUM SERPL-MCNC: 2 MG/DL (ref 1.6–2.3)
MCH RBC QN AUTO: 27.4 PG (ref 26.5–33)
MCHC RBC AUTO-ENTMCNC: 30.5 G/DL (ref 31.5–36.5)
MCV RBC AUTO: 90 FL (ref 78–100)
MONOCYTES # BLD MANUAL: 1.8 10E3/UL (ref 0–1.3)
MONOCYTES NFR BLD MANUAL: 9 %
NEUTROPHILS # BLD MANUAL: 16.1 10E3/UL (ref 1.6–8.3)
NEUTROPHILS NFR BLD MANUAL: 82 %
NITRATE UR QL: NEGATIVE
PH UR STRIP: 6.5 [PH] (ref 5–7)
PHOSPHATE SERPL-MCNC: 4.3 MG/DL (ref 2.5–4.5)
PLAT MORPH BLD: ABNORMAL
PLATELET # BLD AUTO: 356 10E3/UL (ref 150–450)
POLYCHROMASIA BLD QL SMEAR: ABNORMAL
POTASSIUM BLD-SCNC: 3.9 MMOL/L (ref 3.4–5.3)
POTASSIUM BLD-SCNC: 4 MMOL/L (ref 3.4–5.3)
RBC # BLD AUTO: 3.14 10E6/UL (ref 3.8–5.2)
RBC MORPH BLD: ABNORMAL
RBC URINE: 1 /HPF
SODIUM SERPL-SCNC: 138 MMOL/L (ref 133–144)
SP GR UR STRIP: 1.01 (ref 1–1.03)
SQUAMOUS EPITHELIAL: 3 /HPF
UFH PPP CHRO-ACNC: 0.48 IU/ML
UFH PPP CHRO-ACNC: >1.1 IU/ML
UFH PPP CHRO-ACNC: >1.1 IU/ML
UROBILINOGEN UR STRIP-MCNC: NORMAL MG/DL
WBC # BLD AUTO: 19.6 10E3/UL (ref 4–11)
WBC URINE: 3 /HPF

## 2021-09-18 PROCEDURE — 99223 1ST HOSP IP/OBS HIGH 75: CPT | Mod: 24 | Performed by: INTERNAL MEDICINE

## 2021-09-18 PROCEDURE — 250N000011 HC RX IP 250 OP 636: Performed by: NURSE PRACTITIONER

## 2021-09-18 PROCEDURE — 86140 C-REACTIVE PROTEIN: CPT | Performed by: INTERNAL MEDICINE

## 2021-09-18 PROCEDURE — 71046 X-RAY EXAM CHEST 2 VIEWS: CPT | Mod: 26 | Performed by: RADIOLOGY

## 2021-09-18 PROCEDURE — 84100 ASSAY OF PHOSPHORUS: CPT | Performed by: NURSE PRACTITIONER

## 2021-09-18 PROCEDURE — 83605 ASSAY OF LACTIC ACID: CPT | Performed by: THORACIC SURGERY (CARDIOTHORACIC VASCULAR SURGERY)

## 2021-09-18 PROCEDURE — 250N000011 HC RX IP 250 OP 636: Performed by: THORACIC SURGERY (CARDIOTHORACIC VASCULAR SURGERY)

## 2021-09-18 PROCEDURE — 85520 HEPARIN ASSAY: CPT | Performed by: THORACIC SURGERY (CARDIOTHORACIC VASCULAR SURGERY)

## 2021-09-18 PROCEDURE — 250N000013 HC RX MED GY IP 250 OP 250 PS 637: Performed by: NURSE PRACTITIONER

## 2021-09-18 PROCEDURE — 80048 BASIC METABOLIC PNL TOTAL CA: CPT | Performed by: THORACIC SURGERY (CARDIOTHORACIC VASCULAR SURGERY)

## 2021-09-18 PROCEDURE — 250N000011 HC RX IP 250 OP 636: Performed by: STUDENT IN AN ORGANIZED HEALTH CARE EDUCATION/TRAINING PROGRAM

## 2021-09-18 PROCEDURE — 85014 HEMATOCRIT: CPT | Performed by: NURSE PRACTITIONER

## 2021-09-18 PROCEDURE — 83735 ASSAY OF MAGNESIUM: CPT | Performed by: NURSE PRACTITIONER

## 2021-09-18 PROCEDURE — 81001 URINALYSIS AUTO W/SCOPE: CPT | Performed by: NURSE PRACTITIONER

## 2021-09-18 PROCEDURE — 250N000013 HC RX MED GY IP 250 OP 250 PS 637: Performed by: THORACIC SURGERY (CARDIOTHORACIC VASCULAR SURGERY)

## 2021-09-18 PROCEDURE — 85610 PROTHROMBIN TIME: CPT | Performed by: STUDENT IN AN ORGANIZED HEALTH CARE EDUCATION/TRAINING PROGRAM

## 2021-09-18 PROCEDURE — 214N000001 HC R&B CCU UMMC

## 2021-09-18 PROCEDURE — 80048 BASIC METABOLIC PNL TOTAL CA: CPT | Performed by: NURSE PRACTITIONER

## 2021-09-18 PROCEDURE — 71046 X-RAY EXAM CHEST 2 VIEWS: CPT

## 2021-09-18 PROCEDURE — 36415 COLL VENOUS BLD VENIPUNCTURE: CPT | Performed by: THORACIC SURGERY (CARDIOTHORACIC VASCULAR SURGERY)

## 2021-09-18 PROCEDURE — 93010 ELECTROCARDIOGRAM REPORT: CPT | Performed by: INTERNAL MEDICINE

## 2021-09-18 PROCEDURE — 99207 PR NO CHARGE LOS: CPT | Performed by: INTERNAL MEDICINE

## 2021-09-18 PROCEDURE — 93005 ELECTROCARDIOGRAM TRACING: CPT

## 2021-09-18 RX ORDER — MAGNESIUM SULFATE HEPTAHYDRATE 40 MG/ML
2 INJECTION, SOLUTION INTRAVENOUS ONCE
Status: COMPLETED | OUTPATIENT
Start: 2021-09-18 | End: 2021-09-18

## 2021-09-18 RX ORDER — METOPROLOL TARTRATE 25 MG/1
25 TABLET, FILM COATED ORAL ONCE
Status: DISCONTINUED | OUTPATIENT
Start: 2021-09-18 | End: 2021-09-19

## 2021-09-18 RX ORDER — LIDOCAINE 4 G/G
2 PATCH TOPICAL
Status: DISCONTINUED | OUTPATIENT
Start: 2021-09-18 | End: 2021-09-22 | Stop reason: HOSPADM

## 2021-09-18 RX ORDER — WARFARIN SODIUM 7.5 MG/1
7.5 TABLET ORAL
Status: COMPLETED | OUTPATIENT
Start: 2021-09-18 | End: 2021-09-18

## 2021-09-18 RX ORDER — METOPROLOL TARTRATE 50 MG
50 TABLET ORAL 2 TIMES DAILY
Status: DISCONTINUED | OUTPATIENT
Start: 2021-09-18 | End: 2021-09-19

## 2021-09-18 RX ORDER — METHOCARBAMOL 500 MG/1
500 TABLET, FILM COATED ORAL 3 TIMES DAILY
Status: DISCONTINUED | OUTPATIENT
Start: 2021-09-18 | End: 2021-09-19

## 2021-09-18 RX ORDER — BUMETANIDE 0.25 MG/ML
2 INJECTION INTRAMUSCULAR; INTRAVENOUS ONCE
Status: COMPLETED | OUTPATIENT
Start: 2021-09-18 | End: 2021-09-18

## 2021-09-18 RX ORDER — MAGNESIUM SULFATE HEPTAHYDRATE 40 MG/ML
2 INJECTION, SOLUTION INTRAVENOUS ONCE
Status: DISCONTINUED | OUTPATIENT
Start: 2021-09-18 | End: 2021-09-18

## 2021-09-18 RX ORDER — QUETIAPINE FUMARATE 25 MG/1
25 TABLET, FILM COATED ORAL
Status: DISCONTINUED | OUTPATIENT
Start: 2021-09-18 | End: 2021-09-22 | Stop reason: HOSPADM

## 2021-09-18 RX ADMIN — GABAPENTIN 1200 MG: 300 CAPSULE ORAL at 22:26

## 2021-09-18 RX ADMIN — NICOTINE 1 PATCH: 21 PATCH, EXTENDED RELEASE TRANSDERMAL at 09:20

## 2021-09-18 RX ADMIN — METHOCARBAMOL 500 MG: 500 TABLET ORAL at 04:22

## 2021-09-18 RX ADMIN — ERTAPENEM SODIUM 1 G: 1 INJECTION, POWDER, LYOPHILIZED, FOR SOLUTION INTRAMUSCULAR; INTRAVENOUS at 15:00

## 2021-09-18 RX ADMIN — GABAPENTIN 600 MG: 300 CAPSULE ORAL at 08:00

## 2021-09-18 RX ADMIN — METHOCARBAMOL 500 MG: 500 TABLET ORAL at 20:04

## 2021-09-18 RX ADMIN — POLYETHYLENE GLYCOL 3350 17 G: 17 POWDER, FOR SOLUTION ORAL at 08:00

## 2021-09-18 RX ADMIN — LIDOCAINE 2 PATCH: 560 PATCH PERCUTANEOUS; TOPICAL; TRANSDERMAL at 20:08

## 2021-09-18 RX ADMIN — OXYCODONE HYDROCHLORIDE 5 MG: 5 TABLET ORAL at 04:21

## 2021-09-18 RX ADMIN — METOPROLOL TARTRATE 25 MG: 25 TABLET, FILM COATED ORAL at 08:01

## 2021-09-18 RX ADMIN — ASPIRIN 81 MG: 81 TABLET, CHEWABLE ORAL at 08:01

## 2021-09-18 RX ADMIN — POTASSIUM CHLORIDE 20 MEQ: 750 TABLET, EXTENDED RELEASE ORAL at 20:04

## 2021-09-18 RX ADMIN — HEPARIN SODIUM 2000 UNITS/HR: 10000 INJECTION, SOLUTION INTRAVENOUS at 04:22

## 2021-09-18 RX ADMIN — Medication 1 TABLET: at 08:00

## 2021-09-18 RX ADMIN — MAGNESIUM SULFATE HEPTAHYDRATE 2 G: 40 INJECTION, SOLUTION INTRAVENOUS at 08:03

## 2021-09-18 RX ADMIN — TORSEMIDE 20 MG: 20 TABLET ORAL at 08:01

## 2021-09-18 RX ADMIN — GABAPENTIN 600 MG: 300 CAPSULE ORAL at 15:00

## 2021-09-18 RX ADMIN — WARFARIN SODIUM 7.5 MG: 7.5 TABLET ORAL at 17:26

## 2021-09-18 RX ADMIN — DOCUSATE SODIUM 50 MG AND SENNOSIDES 8.6 MG 2 TABLET: 8.6; 5 TABLET, FILM COATED ORAL at 20:06

## 2021-09-18 RX ADMIN — PANTOPRAZOLE SODIUM 40 MG: 40 TABLET, DELAYED RELEASE ORAL at 08:01

## 2021-09-18 RX ADMIN — AMIODARONE HYDROCHLORIDE 400 MG: 200 TABLET ORAL at 08:00

## 2021-09-18 RX ADMIN — POTASSIUM CHLORIDE 20 MEQ: 750 TABLET, EXTENDED RELEASE ORAL at 08:00

## 2021-09-18 RX ADMIN — METOPROLOL TARTRATE 50 MG: 50 TABLET, FILM COATED ORAL at 20:04

## 2021-09-18 RX ADMIN — BUMETANIDE 2 MG: 0.25 INJECTION, SOLUTION INTRAMUSCULAR; INTRAVENOUS at 09:19

## 2021-09-18 RX ADMIN — ATORVASTATIN CALCIUM 40 MG: 40 TABLET, FILM COATED ORAL at 20:05

## 2021-09-18 RX ADMIN — METHOCARBAMOL 500 MG: 500 TABLET ORAL at 12:04

## 2021-09-18 RX ADMIN — DULOXETINE 30 MG: 30 CAPSULE, DELAYED RELEASE ORAL at 22:26

## 2021-09-18 RX ADMIN — OXYCODONE HYDROCHLORIDE 5 MG: 5 TABLET ORAL at 15:00

## 2021-09-18 ASSESSMENT — MIFFLIN-ST. JEOR: SCORE: 1505.38

## 2021-09-18 ASSESSMENT — ACTIVITIES OF DAILY LIVING (ADL)
ADLS_ACUITY_SCORE: 8
ADLS_ACUITY_SCORE: 8
ADLS_ACUITY_SCORE: 10
ADLS_ACUITY_SCORE: 8
ADLS_ACUITY_SCORE: 10
ADLS_ACUITY_SCORE: 8

## 2021-09-18 NOTE — PLAN OF CARE
Admitted 9/7 for mitral valve stenosis. S/p MVR and left atrial appendage ligation 9/7. Course complicated by afib, cardiogenic shock, delirium, hypoxia, and pneumonia. History of arthritis, SHAILESH, and mitral valve stenosis.    Neuro: A&Ox4. Visual hallucinations overnight. Bed alarm on for safety.  Cardiac: Aflutter. VSS.   Respiratory: Sating 90s on RA.  GI/: Adequate urine output. BM X1  Diet/appetite: Tolerating regular diet. Eating well.  Activity:  Up with assist of 1 with gait belt and walker  Pain: Robaxin and Oxycodone given x1 for back pain.  Skin: Sternal incision and old CT sites YURI. Nicotine patch on left deltoid  LDA's: PIVs infusing Heparin at 2000 units/hr and TKO with abx.    Plan: Plan for cardioversion on Monday. Continue with POC. Notify primary team with changes.

## 2021-09-18 NOTE — PROVIDER NOTIFICATION
Paged surgery cross cover Nidhi Navarro MD regarding BP 93/55 with MAP 63. Pt complaining of dizziness. Per provider, hold evening dose of Metoprolol.

## 2021-09-18 NOTE — CONSULTS
General Infectious Disease Service Consultation - Green Team  Patient:  Corina Martinez, Date of birth 1955, Medical record number [id number]  Date of Admission: 9/7/2021  Date of Visit:  9/18/2021  Requesting Provider: Sven Acuna         Assessment and Recommendations:   Problem List:    # Leukocytosis without other clear signs of new infection    # HCAP with respiratory culture positive for Streptococcus constelatus and Enterobacter cloacae - on ertapenem    # Severe mitral valve stenosis with extensive mitral annular calcification, who was admitted on 9/7/21 to the cardiovascular surgery team for now s/p mitral valve replacement (29mm [facility] Andrey Epic mitral valve) and left atrial appendage ligation on 9/7/21    Discussion:    Corina Martinez is a 66 year old female with PMHx of arthritis, SHAILESH, HTN, CHF and severe mitral valve stenosis with extensive mitral annular calcification, who was admitted on 9/7/21 to the cardiovascular surgery team for now s/p MVR (Dr. Acuna). We are consulted today because persistent leukocytosis. Patient underwent mitral valve replacement (29mm [facility] Andrey Epic mitral valve) and left atrial appendage ligation on 9/7/21. Patient was transferred to surgical ICU after the surgery. She was extubated in the OR (orior to going to the ICU) and placed on Bipap, however she was extubated on the same day and placed on MV. She had a post surgical CT which showed mild pneumopericardium and trace left sided pneumothorax and bibasilar atelectasis. She also complicated with cardiogenic shock and was placed on dobutamine. In addition, she complicated with acute on chronic renal failure. On 9/11, the patient had increased respiratory secretions, white count was 19.4. CXR showed bilateral yousif-hilar opacities, small left pleural effusion and trace right apical pneumothorax. Patient was started on Iv zosyn empirically for HCAP on 9/11. Endotracheal culture (9/11) was positive for Streptococcus  constellatus and Enterobacter cloacae. MRSA swab was positive. Bronchoscopy was performed on 9/12 and BAL culture was positive for Streptococcus constellatus and Candida albicans. Zosyn was switched to ertapenem on 9/13 basd on cultures. White count went down to 15.2 on 9/16, however over the last 2 days it is trending up again and today (9/18) the white count is 19.6. COVID test is negative. Blood cultures are negative to date. Urine culture has <10,000 of urogenital meagan. C diff from stool was negative. CXR today showed improved lung volumes with decreased basilar opacities. Patient was extubated on 9/13 and she is on room air since 9/15. She is afebrile. She was transferred from the ICU to the 6th floor (step down unit) on 9/15/21.     On my visit with the patient today she tells me that the cough and sputum production are improved. She had loose stools afew days ago, but this is improved (C diff negative). She denies urinary symptoms. Surgical site on the sternum seems to be healing well and I did not appreciate swelling , erythema or drainage. She denies skin breakdown in other areas. It is unclear to me the reason for the isolated persistent leukocytosis. Patient is adequately covered for HCAP (based on respiratory cultures cultures) and her CXR and respiratory symptoms are improved. Additional micro tests have been unrevealing. She received methylprednisolone on 9/8 and 9/9 but she is off steroids for over a week. A few possibilities that I can consider as differential diagnosis are: 1. Maybe post surgical chest collections? (although surgical site does not look grossly infected), 2. Drug reaction? (I added differential to CBC to see if there is increase in eosinophils) however leukocytosis is less common with ertapenem. She does not have history of myeloproliferative disease and it seems less likely since leikocytosis developed in this hospitalization but it is reasonable to obtain a peripheral smear.      Recommendations:    1. Please continue ertapenem at this moment       - Since there is no evidence of new infection, I don't see a reason to broaden antibiotic spectrum     2. I would favor obtaining CT chest for completeness to rule out post surgical collections. I also favor obtaining a CT abdomen and pelvis to rule out intra abdominal process/collection    3. I would recommend to obtain a peripheral smear     4. If all tests above are unrevealing and the patient continues to be clinically stable over the best 48h, I might consider a trial of stopping ertapenem on Monday (9/20/21) to rule out drug reaction. If we decide to do that on Monday and there is no improvement of leukocytosis, the next step would be to consult hematology        Thank you for this consult. The General ID team will continue to follow this patient. Please feel free to call with any questions.     Megan Benitez MD  Date of Service: 09/18/21  Pager: [phone]       History of Present Illness:   Corina Martinez is a 66 year old female with PMHx of arthritis, SHAILESH, HTN, CHF and severe mitral valve stenosis with extensive mitral annular calcification, who was admitted on 9/7/21 to the cardiovascular surgery team for now s/p MVR (Dr. Acuna). We are consulted today because of persistent leukocytosis.     Patient underwent mitral valve replacement (29mm St Andrey Epic mitral valve) and left atrial appendage ligation on 9/7/21. Patient was transferred to surgical ICU after the surgery. She was extubated in the OR (orior to going to the ICU) and placed on Bipap, however she was extubated on the same day and placed on MV. She had a post surgical CT which showed mild pneumopericardium and trace left sided pneumothorax and bibasilar atelectasis. She also complicated with cardiogenic shock and was placed on dobutamine. In addition, she complicated with acute on chronic renal failure. On 9/11, the patient had increased respiratory secretions, white count  was 19.4. CXR showed bilateral yousif-hilar opacities, small left pleural effusion and trace right apical pneumothorax. Patient was started on Iv zosyn empirically for HCAP on 9/11. Endotracheal culture (9/11) was positive for Streptococcus constellatus and Enterobacter cloacae. MRSA swab was positive. Bronchoscopy was performed on 9/12 and BAL culture was positive for Streptococcus constellatus and Candida albicans. Zosyn was switched to ertapenem on 9/13 basd on cultures. White count went down to 15.2 on 9/16, however over the last 2 days it is trending up again and today (9/18) the white count is 19.6. COVID test is negative. Blood cultures are negative to date. Urine culture has <10,000 of urogenital meagan. C diff from stool was negative. CXR today showed improved lung volumes with decreased basilar opacities. Patient was extubated on 9/13 and she is on room air since 9/15. She is afebrile. She was transferred from the ICU to the 6th floor (step down unit) on 9/15/21.     On my visit with the patient today she tells me that the cough and sputum production are improved. She had loose stools afew days ago, but this is improved (C diff negative). She denies urinary symptoms. Surgical site on the sternum seems to be healing well and I did not appreciate swelling , erythema or drainage. She denies skin breakdown in other areas.            Review of Systems:     CONSTITUTIONAL:  No fevers or chills  INTEGUMENTARY/SKIN: See HPI  EYES: Negative for icterus, vision changes or irritation  ENT/MOUTH:  Negative for oral lesions and sore throat  RESPIRATORY:  Cough improved  CARDIOVASCULAR:  Negative for chest pain, palpitations and  shortness of breath  GASTROINTESTINAL:  Negative for abdominal pain, nausea, vomiting, diarrhea and constipation  GENITOURINARY:  Negative for dysuria, hematuria, frequency and urgency  MUSCULOSKELETAL: Negative for joint pain, swelling, motion restriction, negative for musculoskeletal  pain  NEURO:  Negative for headache, altered mental status, numbness or weakness  PSYCHIATRIC: Negative for changes in mood or affect  HEMATOLOGIC/LYMPHATIC: negative for lymphadenopathy or bleeding  ALLERGIC/IMMUNOLOGIC: Negative for allergic reaction   ENDOCRINE: Negative for temperature intolerance, skin/hair changes       Past Medical History:     Past Medical History:   Diagnosis Date     Arthritis      Congestive heart failure (H)      Hypertension      Nonrheumatic mitral valve stenosis, severe      Obese      SHAILESH (obstructive sleep apnea)      Sleep apnea          Allergies:      Allergies   Allergen Reactions     Codeine Sulfate Nausea and Vomiting     Acetaminophen Other (See Comments)     Was told to avoid due to primary biliary cirrhosis          Family History:     Family History   Problem Relation Age of Onset     Depression Mother      Respiratory Mother      Emphysema Mother      Cancer Father      Lymphoma Father      Cardiovascular No family hx of      Anesthesia Reaction No family hx of      Deep Vein Thrombosis (DVT) No family hx of             Social History:     Social History     Socioeconomic History     Marital status:      Spouse name: Not on file     Number of children: Not on file     Years of education: Not on file     Highest education level: Not on file   Occupational History     Not on file   Tobacco Use     Smoking status: Current Some Day Smoker     Packs/day: 1.00     Years: 54.00     Pack years: 54.00     Types: Cigarettes     Smokeless tobacco: Never Used     Tobacco comment: pt has been working hard on quiting; smoking 3/4 ppd (8/25/21)   Substance and Sexual Activity     Alcohol use: Yes     Comment: social     Drug use: No     Sexual activity: Not on file   Other Topics Concern     Parent/sibling w/ CABG, MI or angioplasty before 65F 55M? Not Asked   Social History Narrative     Not on file     Social Determinants of Health     Financial Resource Strain:      Difficulty  "of Paying Living Expenses:    Food Insecurity:      Worried About Running Out of Food in the Last Year:      Ran Out of Food in the Last Year:    Transportation Needs:      Lack of Transportation (Medical):      Lack of Transportation (Non-Medical):    Physical Activity:      Days of Exercise per Week:      Minutes of Exercise per Session:    Stress:      Feeling of Stress :    Social Connections:      Frequency of Communication with Friends and Family:      Frequency of Social Gatherings with Friends and Family:      Attends Hindu Services:      Active Member of Clubs or Organizations:      Attends Club or Organization Meetings:      Marital Status:    Intimate Partner Violence:      Fear of Current or Ex-Partner:      Emotionally Abused:      Physically Abused:      Sexually Abused:               Physical Exam:   BP (!) 86/70 (BP Location: Left arm)   Pulse 103   Temp 97.7  F (36.5  C) (Oral)   Resp 18   Ht 1.689 m (5' 6.5\")   Wt 94.1 kg (207 lb 6.4 oz)   LMP  (LMP Unknown)   SpO2 95%   BMI 32.98 kg/m         Exam:  GENERAL:  Not in acute distress.   HEAD: Normocephalic and atraumatic  ENT:  No hearing impairment, oral mucous membranes moist  EYES:  Eyes grossly normal to inspection, PERRL and conjunctivae and sclerae normal   LUNGS:  Mild Crackles in both bases  CARDIOVASCULAR:  Regular rate and rhythm, normal S1 S2  ABDOMEN:  Soft, nontender, no hepatosplenomegaly, no masses and bowel sounds normal  SKIN:  Surgical site on the sternum seems to be healing well and I did not appreciate swelling, erythema or drainage  NEUROLOGIC:  Grossly nonfocal. Normal strength and tone, mentation intact and speech normal  PSYCHIATRIC: Mood stable, mentation appears normal, affect normal         Laboratory Data:     Creatinine   Date Value Ref Range Status   09/18/2021 1.02 0.52 - 1.04 mg/dL Final   09/17/2021 1.03 0.52 - 1.04 mg/dL Final   09/16/2021 0.83 0.52 - 1.04 mg/dL Final   09/15/2021 0.75 0.52 - 1.04 mg/dL " Final   09/15/2021 0.77 0.52 - 1.04 mg/dL Final   07/07/2021 1.03 0.60 - 1.20 mg/dL Final   06/30/2021 0.94 0.60 - 1.20 mg/dL Final   06/23/2021 0.73 0.60 - 1.20 mg/dL Final   06/08/2021 0.88 0.52 - 1.04 mg/dL Final   05/27/2021 0.76 0.60 - 1.20 mg/dL Final     WBC   Date Value Ref Range Status   06/23/2021 9.7 4.0 - 11.0 10e9/L Final   06/08/2021 13.0 (H) 4.0 - 11.0 10e9/L Final   05/27/2021 10.3 4.0 - 11.0 10e9/L Final   05/26/2021 12.1 (H) 4.0 - 11.0 10e9/L Final   05/17/2021 13.9 (H) 4.0 - 11.0 10e9/L Final     WBC Count   Date Value Ref Range Status   09/18/2021 19.6 (H) 4.0 - 11.0 10e3/uL Final   09/17/2021 18.0 (H) 4.0 - 11.0 10e3/uL Final   09/16/2021 15.2 (H) 4.0 - 11.0 10e3/uL Final   09/15/2021 16.5 (H) 4.0 - 11.0 10e3/uL Final   09/15/2021 15.9 (H) 4.0 - 11.0 10e3/uL Final     Hemoglobin   Date Value Ref Range Status   09/18/2021 8.6 (L) 11.7 - 15.7 g/dL Final   06/23/2021 12.2 11.7 - 15.7 g/dL Final     Platelet Count   Date Value Ref Range Status   09/18/2021 356 150 - 450 10e3/uL Final   06/23/2021 379 150 - 450 10e9/L Final     Lab Results   Component Value Date     09/18/2021    BUN 23 09/18/2021    CO2 23 09/18/2021     CRP Inflammation   Date Value Ref Range Status   06/23/2021 117.0 (H) <10.0 mg/L Final

## 2021-09-18 NOTE — PLAN OF CARE
Pt VSS this shift, hypotension noted this afternoon, denies pain, aflutter on telemetry HR 80s-110s, scheduled metoprolol TID. Amiodarone transitioned to PO, heparin drip subtherapeutic since yesterday up to 2500units/hr this AM, critical Xa x2 this shift. Heparin drip off since 1315, per surgery recheck Xa at 2015 and page with result for further orders. On RA, dyspnea on exertion, productive cough. IV ertapenem for PNA. Voiding without issues. Watery BM x2 this shift, PRN imodium available. Up with assist 1-2, gait belt and walker, ambulated in [place] with x2. Will continue to monitor.      RUE IVs infiltrated this AM.  EP consult ordered today, cardioversion possibly Monday. PT recommends TCU, pt is declining at this point.     Problem: Risk for Delirium  Goal: Optimal Coping  Outcome: Improving     Problem: Activity Intolerance (Cardiovascular Surgery)  Goal: Improved Activity Tolerance  Outcome: Improving     Problem: Cardiac Function Impaired (Cardiovascular Surgery)  Goal: Effective Cardiac Function  Outcome: Improving

## 2021-09-18 NOTE — PROGRESS NOTES
Cardiovascular Surgery Progress Note  09/18/2021         Assessment and Plan:     Corina Martinez is a 66 year old female with PMH of arthritis, SHAILESH, HTN, pafib (AC), CHF, smoker 1.0 PPD and mitral valve stenosis, now s/p MVR w/ left atrial appendage ligation, and bilateral pulmonary vein isolation;  on 9/7 with Dr. Acuna.     Cardiovascular:   # HTN  # CHF, EF 55-60%   # HFpEF  # Paroxysmal Afib, now persistent atrial fib/flutter, on warfarin PTA  # Attempted cardioversion prior to admission, had early recurrence of afib  # Sinus bradycardia  # MV stenosis, s/p MVR 9/7  # Left atrial appendage thrombus, s/p atrial appendage clip 9/7  Echocardiogram 9/8/2021 demonstrates LVEF 60-65% and inability to assess RV fxn  Pacing rhythm until 9/13; initially in sinus rhythm->afib w/ RVR 9/14->atrial flutter 9/17   - Monitor hemodynamic status. HRs  90-100s  - MAP goal > 65  - ASA, atorvastatin, amiodarone IV->PO 9/15 400mg  (PTA dose), & metoprolol increased on 9/16 25->50 mg BID 9/18  - Continue to hold PTA  diltiazem   -  Heparin drip, low intensity, no bolus and started warfarin 9/15 See below anticoagulation  - Echocardiogram 9/16:  LVEF70%, normal RV function, tricuspid insufficiency present, and no pericardial effusion.   - Consulted EP 9/17 for possible cardioversion Monday with persistent atrial fib, now atrial flutter. Appreciate their assistance. Will tentatively plan for cardioversion Monday, or when INR therapeutic if not converted before then. Will stay in touch with EP.     CTs- b/l pleurals removed 9/14; mediastinal removed 9/15  TPW- removed 9/15     Pulmonary:  # Current smoker   # COPD sat's upper 80's at baseline   # SHAILESH  # HAP with respiratory failure  - Extubated in OR, reintubated POD1  - Extubated again 9/13  - Saturating well on room air  - Nicotine patch   - ID as below     Neurology / MSK:  # Acute post-operative pain  # Delirium with hallucinations   - Monitor neurological status. Notify the MD  for any acute changes in exam. Currently, CAM negative and AO x 4.  - Complaining of intermittent L shoulder pain tender to palpation and is exacerbated w/ activity now on scheduled robaxin 500mg TID  - Scheduled gabapentin (PTA dose) , PRN oxy, robaxin and dilaudid   - NIKKI catheters B/l  (9/7-9/14) removed yesterday (9/14)  - Continue PTA cymbalta 30 mg daily  - PTA atarax, still holding  - Changed Seroquel to 25 mg prn HS.  - Continue prn melatonin 5 mg at HS   - Slept well last night, NAD, no hallucinations last night. Pleasant/conversatonal during assessment.  - acetaminophen allergy     / Renal:  # RADHA, resolved  BL creat ~0.8-1, peaked at 1.72, creat 1.03 today   - 9/17 net positive 861mL and overnight -330mL.  - IV bumex stopped 9/16 and started oral torsemide 20 mg daily (was on 10 mg PTA)  - Weight up 3 kg today since 9/16; currently 94.1kg and admission weight 93.9kg  - K+  20 mEq BID decreased potassium to 20 mEq daily 9/17, K+ 4.0 today   - Give Bumex 2 mg IV this am and hold Torsemide. Review in am.   - Edema improved, on room air with good sats  - BMP in am   - Currently on RN K+ replacement protocol     GI / FEN:   # GERD  # Primary biliary cirrhosis   - ADAT  - Passed swallow evaluation 9/13  - Holding miralax and senna-docusate BID with frequent loose stools 9/17  - C-diff negative 9/16   - Use imodium PRN     Endocrine:  # Stress induced hyperglycemia  - Insulin sliding scale discontinued 9/16   - Goal BG <180 for optimal healing  - 's    Infectious Disease:  # HAP  Completed perioperative antibiotics with cefazolin & vanc   Afebrile, WBC 9/16 was 15 now 19.6   Sputum cx 9/11, positive for 2+ strep constallatus and 1+ enterobacter cloacae  - Zosyn 9/11-9/13;  switched to ertapenem 9/13 for strep and enterobacter coverage - to complete course through 9/19 and may extend with rising WBC, await ID recs   - Lactate trending now 1.7.   - Consult ID for uptrending leukocytosis    Hematology:  "  Acute blood loss anemia and thrombocytopenia  Hgb 8.6; Plt wnl, no signs or symptoms of active bleeding  - transfuse for hgb <7.0    Anticoagulation:   - ASA 81 mg daily   - Heparin gtt low intensity, & no bolus; 9/16 IV infiltrated. Currently on 1500 U/hr pending hep10A level.   - Coumadin PTA for h/o PAF, INR goal 2-3. Most recent INR 1.56.      Prophylaxis:   - Stress ulcer prophylaxis: Pantoprazole 40 mg daily for 30 days  - DVT prophylaxis: LIH    Disposition:   - Transferred to  on POD#8  - Therapies recommending discharge to TCU, likely discharge Monday or Tuesday pending rhythm and INR     Discussed with CVTS Fellow as needed.  Discussed with Dr Acuna through both verbal and written communication.      NP Student Arnol Curry  CV Surgery    Student Attestation   I, Kimberli Padilla, was present with the medical/CAR student, Arnol Curry, S-NP who participated in the service and in the documentation of the note.  I have verified the history and personally performed the physical exam and medical decision making.  I agree with the assessment and plan of care as documented in the note.          Interval History:     Patient rested well overnight no complaints of hallucinations. Upright in chair eating breakfast during exam. Patient complained of new L shoulder pain overnight; improved with PRN oxycodone.    Tolerating diet, is passing flatus, last BM 9/17 was loose. No nausea or vomiting.  Breathing well without complaints.   Working with therapies, up independently in the room.   Denies chest pain, palpitations, dizziness, syncopal symptoms, fevers, chills, myalgias, or sternal popping/clicking.         Physical Exam:   Blood pressure (!) 86/70, pulse 103, temperature 97.7  F (36.5  C), temperature source Oral, resp. rate 18, height 1.689 m (5' 6.5\"), weight 94.1 kg (207 lb 6.4 oz), SpO2 95 %, not currently breastfeeding.  Vitals:    09/16/21 0328 09/17/21 0201 09/18/21 0630   Weight: 91.9 kg (202 lb 9.6 " oz) 92.7 kg (204 lb 6.4 oz) 94.1 kg (207 lb 6.4 oz)      Weight; + 0.2 kg since admit.   24 hr Fluid status; net loss +861mL, but likely not accurate with frequent loose stools.   MAPs: 70s    Gen: A&Ox4, NAD. CAM-. Up in the bathroom eating breakfast prior to exam.   Neuro: no focal deficits, general weakness.    CV: tachy, irregular, distant HT's, no murmurs, rubs or gallops. No JVD.   Pulm: CTA, but very diminished. No wheezing or rhonchi, normal breathing on RA  Abd: nondistended, normal BS, soft, nontender  Ext:  Trace peripheral edema, non-pitting  Incision: YURI clean, dry, intact, no erythema, sternum stable  Tubes/drain sites: YURI clean and dry.            Data:    Imaging:  reviewed recent imaging, no acute concerns    CXR 9/18/2021: pending     CXR 9/16/2021: Impression:    1. Persistent bibasilar opacities, left more than right.  2. Stable postsurgical changes.    Echocardiogram 9/16/2021: Interpretation Summary  Technically difficult study.  Global and regional left ventricular function is hyperkinetic with an EF of  65-70%.  Global right ventricular function is normal.  No pericardial effusion is present.  IVC diameter and respiratory changes fall into an intermediate range  suggesting an RA pressure of 8 mmHg.  There has been no change.    Labs:  BMP  Recent Labs   Lab 09/18/21 0613 09/17/21  0539 09/16/21  0759 09/16/21  0620 09/15/21  2356 09/15/21  2017    139  --  139  --  139   POTASSIUM 4.0  3.9 4.0  3.8  --  3.7  --  3.4   CHLORIDE 106 107  --  104  --  105   LOC 8.4* 8.3*  --  9.1  --  8.7   CO2 23 23  --  26  --  27   BUN 23 27  --  21  --  22   CR 1.02 1.03  --  0.83  --  0.75   * 103* 101* 104*   < > 132*    < > = values in this interval not displayed.     CBC  Recent Labs   Lab 09/18/21  0613 09/17/21  0539 09/16/21  0620 09/15/21  1703   WBC 19.6* 18.0* 15.2* 16.5*   RBC 3.14* 3.36* 4.14 4.01   HGB 8.6* 9.1* 11.2* 10.8*   HCT 28.2* 29.5* 36.6 34.8*   MCV 90 88 88 87   MCH  27.4 27.1 27.1 26.9   MCHC 30.5* 30.8* 30.6* 31.0*   RDW 23.1* 22.6* 22.7* 22.7*    357 340 340     INR  Recent Labs   Lab 09/18/21  0613 09/17/21  0539 09/16/21  0620 09/15/21  1703   INR 1.56* 1.37* 1.24* 1.26*      Hepatic Panel  Recent Labs   Lab 09/14/21  0502 09/13/21  0409 09/12/21  0330   AST 39 34 39   ALT 36 29 23   ALKPHOS 229* 263* 229*   BILITOTAL 0.8 0.5 0.4   ALBUMIN 2.4* 2.6* 2.2*     GLUCOSE:   Recent Labs   Lab 09/18/21  0613 09/17/21  0539 09/16/21  0759 09/16/21  0620 09/16/21  0346 09/15/21  2356   * 103* 101* 104* 90 97

## 2021-09-18 NOTE — PROGRESS NOTES
Admitted 9/7 for mitral valve stenosis. S/p MVR and left atrial appendage ligation 9/7. Course complicated by afib, cardiogenic shock, delirium, hypoxia, and pneumonia. History of arthritis, SHAILESH, and mitral valve stenosis.    -Heparin down to 1000 units/hr, 10a check next at 2300  -robaxin scheduled Q6H for back pains. C/o chest pain, EKG for f/u, result was a-flutter/fib. Ordered PRN lidocaine cream.   -starting on warfarin this evening.   -cardioversion Mon. 9/20 for current A-flutter  -AOx4.  -WBC up to 19.6, UA checked, ID consulted-see note

## 2021-09-19 ENCOUNTER — HEALTH MAINTENANCE LETTER (OUTPATIENT)
Age: 66
End: 2021-09-19

## 2021-09-19 ENCOUNTER — APPOINTMENT (OUTPATIENT)
Dept: OCCUPATIONAL THERAPY | Facility: CLINIC | Age: 66
DRG: 219 | End: 2021-09-19
Attending: THORACIC SURGERY (CARDIOTHORACIC VASCULAR SURGERY)
Payer: MEDICARE

## 2021-09-19 ENCOUNTER — APPOINTMENT (OUTPATIENT)
Dept: CT IMAGING | Facility: CLINIC | Age: 66
DRG: 219 | End: 2021-09-19
Attending: NURSE PRACTITIONER
Payer: MEDICARE

## 2021-09-19 LAB
BASOPHILS # BLD AUTO: 0 10E3/UL (ref 0–0.2)
BASOPHILS NFR BLD AUTO: 0 %
EOSINOPHIL # BLD AUTO: 0.6 10E3/UL (ref 0–0.7)
EOSINOPHIL NFR BLD AUTO: 3 %
ERYTHROCYTE [DISTWIDTH] IN BLOOD BY AUTOMATED COUNT: 23 % (ref 10–15)
ERYTHROCYTE [DISTWIDTH] IN BLOOD BY AUTOMATED COUNT: 23.1 % (ref 10–15)
HCT VFR BLD AUTO: 27.8 % (ref 35–47)
HCT VFR BLD AUTO: 28.5 % (ref 35–47)
HGB BLD-MCNC: 8.5 G/DL (ref 11.7–15.7)
HGB BLD-MCNC: 8.6 G/DL (ref 11.7–15.7)
IMM GRANULOCYTES # BLD: 0.2 10E3/UL
IMM GRANULOCYTES NFR BLD: 1 %
INR PPP: 1.67 (ref 0.85–1.15)
LACTATE SERPL-SCNC: 0.8 MMOL/L (ref 0.7–2)
LYMPHOCYTES # BLD AUTO: 2.3 10E3/UL (ref 0.8–5.3)
LYMPHOCYTES NFR BLD AUTO: 13 %
MAGNESIUM SERPL-MCNC: 2.1 MG/DL (ref 1.6–2.3)
MCH RBC QN AUTO: 26.9 PG (ref 26.5–33)
MCH RBC QN AUTO: 27.5 PG (ref 26.5–33)
MCHC RBC AUTO-ENTMCNC: 29.8 G/DL (ref 31.5–36.5)
MCHC RBC AUTO-ENTMCNC: 30.9 G/DL (ref 31.5–36.5)
MCV RBC AUTO: 89 FL (ref 78–100)
MCV RBC AUTO: 90 FL (ref 78–100)
MONOCYTES # BLD AUTO: 1.2 10E3/UL (ref 0–1.3)
MONOCYTES NFR BLD AUTO: 7 %
NEUTROPHILS # BLD AUTO: 12.9 10E3/UL (ref 1.6–8.3)
NEUTROPHILS NFR BLD AUTO: 76 %
NRBC # BLD AUTO: 0 10E3/UL
NRBC BLD AUTO-RTO: 0 /100
PHOSPHATE SERPL-MCNC: 4.7 MG/DL (ref 2.5–4.5)
PLATELET # BLD AUTO: 382 10E3/UL (ref 150–450)
PLATELET # BLD AUTO: 382 10E3/UL (ref 150–450)
POTASSIUM BLD-SCNC: 4.2 MMOL/L (ref 3.4–5.3)
RBC # BLD AUTO: 3.13 10E6/UL (ref 3.8–5.2)
RBC # BLD AUTO: 3.16 10E6/UL (ref 3.8–5.2)
RETICS # AUTO: 0.18 10E6/UL (ref 0.03–0.1)
RETICS/RBC NFR AUTO: 5.6 % (ref 0.5–2)
UFH PPP CHRO-ACNC: 0.17 IU/ML
UFH PPP CHRO-ACNC: 0.29 IU/ML
UFH PPP CHRO-ACNC: 0.39 IU/ML
WBC # BLD AUTO: 17.1 10E3/UL (ref 4–11)
WBC # BLD AUTO: 17.5 10E3/UL (ref 4–11)

## 2021-09-19 PROCEDURE — 250N000013 HC RX MED GY IP 250 OP 250 PS 637: Performed by: NURSE PRACTITIONER

## 2021-09-19 PROCEDURE — 83605 ASSAY OF LACTIC ACID: CPT | Performed by: THORACIC SURGERY (CARDIOTHORACIC VASCULAR SURGERY)

## 2021-09-19 PROCEDURE — 74176 CT ABD & PELVIS W/O CONTRAST: CPT | Mod: 26 | Performed by: STUDENT IN AN ORGANIZED HEALTH CARE EDUCATION/TRAINING PROGRAM

## 2021-09-19 PROCEDURE — 84132 ASSAY OF SERUM POTASSIUM: CPT | Performed by: THORACIC SURGERY (CARDIOTHORACIC VASCULAR SURGERY)

## 2021-09-19 PROCEDURE — 83735 ASSAY OF MAGNESIUM: CPT | Performed by: NURSE PRACTITIONER

## 2021-09-19 PROCEDURE — 214N000001 HC R&B CCU UMMC

## 2021-09-19 PROCEDURE — 85027 COMPLETE CBC AUTOMATED: CPT | Performed by: NURSE PRACTITIONER

## 2021-09-19 PROCEDURE — 97535 SELF CARE MNGMENT TRAINING: CPT | Mod: GO

## 2021-09-19 PROCEDURE — 85025 COMPLETE CBC W/AUTO DIFF WBC: CPT | Performed by: NURSE PRACTITIONER

## 2021-09-19 PROCEDURE — 250N000011 HC RX IP 250 OP 636: Performed by: NURSE PRACTITIONER

## 2021-09-19 PROCEDURE — 71250 CT THORAX DX C-: CPT | Mod: 26 | Performed by: STUDENT IN AN ORGANIZED HEALTH CARE EDUCATION/TRAINING PROGRAM

## 2021-09-19 PROCEDURE — 36415 COLL VENOUS BLD VENIPUNCTURE: CPT | Performed by: NURSE PRACTITIONER

## 2021-09-19 PROCEDURE — 85045 AUTOMATED RETICULOCYTE COUNT: CPT | Performed by: NURSE PRACTITIONER

## 2021-09-19 PROCEDURE — 250N000011 HC RX IP 250 OP 636: Performed by: STUDENT IN AN ORGANIZED HEALTH CARE EDUCATION/TRAINING PROGRAM

## 2021-09-19 PROCEDURE — 71250 CT THORAX DX C-: CPT

## 2021-09-19 PROCEDURE — 84100 ASSAY OF PHOSPHORUS: CPT | Performed by: NURSE PRACTITIONER

## 2021-09-19 PROCEDURE — 85520 HEPARIN ASSAY: CPT | Performed by: THORACIC SURGERY (CARDIOTHORACIC VASCULAR SURGERY)

## 2021-09-19 PROCEDURE — 36415 COLL VENOUS BLD VENIPUNCTURE: CPT | Performed by: THORACIC SURGERY (CARDIOTHORACIC VASCULAR SURGERY)

## 2021-09-19 PROCEDURE — 250N000013 HC RX MED GY IP 250 OP 250 PS 637: Performed by: THORACIC SURGERY (CARDIOTHORACIC VASCULAR SURGERY)

## 2021-09-19 PROCEDURE — 85610 PROTHROMBIN TIME: CPT | Performed by: STUDENT IN AN ORGANIZED HEALTH CARE EDUCATION/TRAINING PROGRAM

## 2021-09-19 PROCEDURE — 258N000003 HC RX IP 258 OP 636: Performed by: NURSE PRACTITIONER

## 2021-09-19 RX ORDER — WARFARIN SODIUM 10 MG/1
10 TABLET ORAL
Status: COMPLETED | OUTPATIENT
Start: 2021-09-19 | End: 2021-09-19

## 2021-09-19 RX ORDER — AMIODARONE HYDROCHLORIDE 200 MG/1
400 TABLET ORAL DAILY
Status: DISCONTINUED | OUTPATIENT
Start: 2021-09-20 | End: 2021-09-22 | Stop reason: HOSPADM

## 2021-09-19 RX ORDER — METOPROLOL TARTRATE 25 MG/1
25 TABLET, FILM COATED ORAL 3 TIMES DAILY
Status: DISCONTINUED | OUTPATIENT
Start: 2021-09-19 | End: 2021-09-20

## 2021-09-19 RX ORDER — METHOCARBAMOL 750 MG/1
750 TABLET, FILM COATED ORAL 3 TIMES DAILY
Status: DISCONTINUED | OUTPATIENT
Start: 2021-09-19 | End: 2021-09-22 | Stop reason: HOSPADM

## 2021-09-19 RX ADMIN — ATORVASTATIN CALCIUM 40 MG: 40 TABLET, FILM COATED ORAL at 20:38

## 2021-09-19 RX ADMIN — WARFARIN SODIUM 10 MG: 10 TABLET ORAL at 18:26

## 2021-09-19 RX ADMIN — HEPARIN SODIUM 1000 UNITS/HR: 10000 INJECTION, SOLUTION INTRAVENOUS at 03:54

## 2021-09-19 RX ADMIN — GABAPENTIN 600 MG: 300 CAPSULE ORAL at 07:58

## 2021-09-19 RX ADMIN — AMIODARONE HYDROCHLORIDE 400 MG: 200 TABLET ORAL at 07:58

## 2021-09-19 RX ADMIN — SODIUM CHLORIDE 500 ML: 9 INJECTION, SOLUTION INTRAVENOUS at 11:02

## 2021-09-19 RX ADMIN — GABAPENTIN 1200 MG: 300 CAPSULE ORAL at 21:20

## 2021-09-19 RX ADMIN — METHOCARBAMOL 500 MG: 500 TABLET ORAL at 07:59

## 2021-09-19 RX ADMIN — OXYCODONE HYDROCHLORIDE 5 MG: 5 TABLET ORAL at 21:19

## 2021-09-19 RX ADMIN — ASPIRIN 81 MG: 81 TABLET, CHEWABLE ORAL at 07:58

## 2021-09-19 RX ADMIN — METHOCARBAMOL 750 MG: 750 TABLET ORAL at 14:29

## 2021-09-19 RX ADMIN — LIDOCAINE 2 PATCH: 560 PATCH PERCUTANEOUS; TOPICAL; TRANSDERMAL at 20:39

## 2021-09-19 RX ADMIN — Medication 1 TABLET: at 07:58

## 2021-09-19 RX ADMIN — PANTOPRAZOLE SODIUM 40 MG: 40 TABLET, DELAYED RELEASE ORAL at 07:58

## 2021-09-19 RX ADMIN — METHOCARBAMOL 750 MG: 750 TABLET ORAL at 20:39

## 2021-09-19 RX ADMIN — ERTAPENEM SODIUM 1 G: 1 INJECTION, POWDER, LYOPHILIZED, FOR SOLUTION INTRAMUSCULAR; INTRAVENOUS at 16:30

## 2021-09-19 RX ADMIN — DULOXETINE 30 MG: 30 CAPSULE, DELAYED RELEASE ORAL at 21:19

## 2021-09-19 RX ADMIN — GABAPENTIN 600 MG: 300 CAPSULE ORAL at 16:29

## 2021-09-19 RX ADMIN — DOCUSATE SODIUM 50 MG AND SENNOSIDES 8.6 MG 2 TABLET: 8.6; 5 TABLET, FILM COATED ORAL at 20:39

## 2021-09-19 RX ADMIN — METOPROLOL TARTRATE 25 MG: 25 TABLET, FILM COATED ORAL at 20:39

## 2021-09-19 ASSESSMENT — ACTIVITIES OF DAILY LIVING (ADL)
ADLS_ACUITY_SCORE: 10

## 2021-09-19 ASSESSMENT — MIFFLIN-ST. JEOR: SCORE: 1502.66

## 2021-09-19 NOTE — PROGRESS NOTES
Cardiovascular Surgery Progress Note  09/19/2021         Assessment and Plan:     Corina Martinez is a 66 year old female with PMH of arthritis, SHAILESH, HTN, pafib (AC), CHF, smoker 1.0 PPD and mitral valve stenosis, now s/p MVR w/ left atrial appendage ligation, and bilateral pulmonary vein isolation;  on 9/7 with Dr. Acuna.     Cardiovascular:   # HTN  # CHF, EF 55-60%   # HFpEF  # Paroxysmal Afib, now persistent atrial fib/flutter, on warfarin PTA  # Attempted cardioversion prior to admission, had early recurrence of afib  # Sinus bradycardia  # MV stenosis, s/p MVR 9/7  # Left atrial appendage thrombus, s/p atrial appendage clip 9/7  Echocardiogram 9/8/2021 demonstrates LVEF 60-65% and inability to assess RV fxn  Pacing rhythm until 9/13; initially in sinus rhythm->afib w/ RVR 9/14->atrial flutter 9/17   - Monitor hemodynamic status. HRs  90-100s  - MAP goal > 65  - ASA, atorvastatin, amiodarone IV->PO 9/15 400mg  (PTA dose), & metoprolol increased on 9/16 25->50 mg BID 9/18  - was given diuretic yesterday, b/p's soft this am. Will hold am metoprolol and give IV saline 500 ml.   - Continue to hold PTA  diltiazem   -  Heparin drip, low intensity, no bolus and started warfarin 9/15 See below anticoagulation  - Echocardiogram 9/16:  LVEF70%, normal RV function, tricuspid insufficiency present, and no pericardial effusion.   - Consulted EP 9/17 for possible cardioversion Monday with persistent atrial fib, now atrial flutter. Appreciate their assistance. - NPO at midnight for possible cardioversion Monday    CTs- b/l pleurals removed 9/14; mediastinal removed 9/15  TPW- removed 9/15     Pulmonary:  # Current smoker   # COPD sat's upper 80's at baseline   # SHAILESH  # HAP with respiratory failure  - Extubated in OR, reintubated POD1  - Extubated again 9/13  - Saturating well on room air  - Nicotine patch   - ID as below       Neurology / MSK:  # Acute post-operative pain  # Delirium with hallucinations   - Monitor  neurological status. Notify the MD for any acute changes in exam. Currently, CAM negative and AO x 4.  - Complaining of intermittent L shoulder pain tender to palpation and is exacerbated w/ activity now on scheduled robaxin 500mg TID  - Scheduled gabapentin (PTA dose) , PRN oxy, robaxin and dilaudid   - NIKKI catheters B/l  (9/7-9/14) removed yesterday (9/14)  - Continue PTA cymbalta 30 mg daily  - PTA atarax, still holding  - discontinue prn Seroquel  - Continue prn melatonin 5 mg at HS   - acetaminophen allergy  - has ongoing musculoskeletal chest wall pain, doesn't want oxycodone.   - Lidocaine patches and increased Robaxin to 750 mg TID, scheduled     / Renal:  # RADHA, resolved  BL creat ~0.8-1, peaked at 1.72, creat 1.03 today   - 9/17 net -161 mL  - Weight down 1.3 kg today, at admission weight 93.9kg  - Stop scheduled potassium with rising K+ and stopping diuretic.    - Gave Bumex 2 mg IV 9/18 and held Torsemide. Good diuresis but b/p's now a little soft this am. Will hold diuretic today.    - give 500 ml bolus of NS this am   - Edema improved, on room air with good sats  - BMP in am       GI / FEN:   # GERD  # Primary biliary cirrhosis   - ADAT  - Passed swallow evaluation 9/13  - Holding miralax and senna-docusate BID with frequent loose stools 9/18  - C-diff negative 9/16   - Use imodium PRN     Endocrine:  # Stress induced hyperglycemia  - Insulin sliding scale discontinued 9/16   - Goal BG <180 for optimal healing  - 's    Infectious Disease:  # HAP  Completed perioperative antibiotics with cefazolin & vanc   Afebrile, WBC 9/16 15->   Sputum cx 9/11, positive for 2+ strep constallatus and 1+ enterobacter cloacae  - Zosyn 9/11-9/13;  switched to ertapenem 9/13 for strep and enterobacter coverage - to complete course through 9/19 and may extend with rising WBC, await ID recs   - Lactate trending now 1.7.   - Consulted ID 9/18 for uptrending leukocytosis despite IV abx. Appreciate their  "recommendations. Will obtain a Chest/Abd non-con CT today and peripheral smear. Continue Ertapenem for now.     Hematology:   Acute blood loss anemia and thrombocytopenia  Hgb 8.6; Plt wnl, no signs or symptoms of active bleeding  - transfuse for hgb <7.0    Anticoagulation:   - ASA 81 mg daily   - LIH bridge to therapeutic INR   - Coumadin PTA for h/o PAF, INR goal 2-3. Most recent INR 1.67.      Prophylaxis:   - Stress ulcer prophylaxis: Pantoprazole 40 mg daily for 30 days  - DVT prophylaxis: LIH    Disposition:   - Transferred to  on POD#8  - Therapies recommending discharge to TCU, possible discharge Tuesday pending rhythm and INR     Discussed with CVTS Fellow as needed.  Discussed with Dr Acuna through both verbal and written communication.      Kimberli Padilla, DNP, CNP  [facility] Cardiothoracic Surgery  O: [phone]  Pgr [phone]          Interval History:     Patient rested well overnight. Up and about in her room with walker.   Continues to have some laury upper chest/arm musculoskeletal pain. Better with Robaxin.     Tolerating diet, is passing flatus, last BM 9/18. No nausea or vomiting.  Breathing well without complaints.   Working with therapies, up independently in the room.   Denies chest pain, palpitations, dizziness, syncopal symptoms, fevers, chills, myalgias, or sternal popping/clicking.         Physical Exam:   Blood pressure 90/58, pulse 89, temperature 98.7  F (37.1  C), temperature source Oral, resp. rate 17, height 1.689 m (5' 6.5\"), weight 93.8 kg (206 lb 12.8 oz), SpO2 93 %, not currently breastfeeding.  Vitals:    09/17/21 0201 09/18/21 0630 09/19/21 0355   Weight: 92.7 kg (204 lb 6.4 oz) 94.1 kg (207 lb 6.4 oz) 93.8 kg (206 lb 12.8 oz)      Weight; + 0.2 kg since admit.   24 hr Fluid status; net loss +861mL, but likely not accurate with frequent loose stools.   MAPs: 70s    Gen: A&Ox4, NAD.   Neuro: no focal deficits, general weakness.    CV: tachy, irregular, distant HT's, no murmurs, " rubs or gallops. No JVD.   Pulm: CTA, but very diminished. No wheezing or rhonchi, normal breathing on RA  Abd: nondistended, normal BS, soft, nontender  Ext:  No peripheral edema, non-pitting  Incision: YURI clean, dry, intact, no erythema, sternum stable  Tubes/drain sites: YURI clean and dry.            Data:    Imaging:  reviewed recent imaging, no acute concerns    Chest/Abd non-con CT 9/19/2021: IMPRESSION:   1. Improved lung volumes with decreased basilar opacities.     CXR 9/18/2021: IMPRESSION:   1. Improved lung volumes with decreased basilar opacities.     Echocardiogram 9/16/2021: Interpretation Summary  Technically difficult study.  Global and regional left ventricular function is hyperkinetic with an EF of  65-70%.  Global right ventricular function is normal.  No pericardial effusion is present.  IVC diameter and respiratory changes fall into an intermediate range  suggesting an RA pressure of 8 mmHg.  There has been no change.    Labs:  BMP  Recent Labs   Lab 09/19/21  0545 09/18/21  0613 09/17/21  0539 09/16/21  0759 09/16/21  0620 09/15/21  2356 09/15/21  2017   NA  --  138 139  --  139  --  139   POTASSIUM 4.2 4.0  3.9 4.0  3.8  --  3.7   < > 3.4   CHLORIDE  --  106 107  --  104  --  105   LOC  --  8.4* 8.3*  --  9.1  --  8.7   CO2  --  23 23  --  26  --  27   BUN  --  23 27  --  21  --  22   CR  --  1.02 1.03  --  0.83  --  0.75   GLC  --  105* 103* 101* 104*   < > 132*    < > = values in this interval not displayed.     CBC  Recent Labs   Lab 09/19/21  0545 09/18/21  0613 09/17/21  0539 09/16/21  0620   WBC 17.5* 19.6* 18.0* 15.2*   RBC 3.16* 3.14* 3.36* 4.14   HGB 8.5* 8.6* 9.1* 11.2*   HCT 28.5* 28.2* 29.5* 36.6   MCV 90 90 88 88   MCH 26.9 27.4 27.1 27.1   MCHC 29.8* 30.5* 30.8* 30.6*   RDW 23.0* 23.1* 22.6* 22.7*    356 357 340     INR  Recent Labs   Lab 09/19/21  0545 09/18/21  0613 09/17/21  0539 09/16/21  0620   INR 1.67* 1.56* 1.37* 1.24*      Hepatic Panel  Recent Labs   Lab  09/14/21  0502 09/13/21  0409   AST 39 34   ALT 36 29   ALKPHOS 229* 263*   BILITOTAL 0.8 0.5   ALBUMIN 2.4* 2.6*     GLUCOSE:   Recent Labs   Lab 09/18/21  0613 09/17/21  0539 09/16/21  0759 09/16/21  0620 09/16/21  0346 09/15/21  2356   * 103* 101* 104* 90 97

## 2021-09-19 NOTE — PLAN OF CARE
D:  S/p MVR w/ left atrial appendage ligation, and bilateral pulmonary vein isolation on 9/7. Hx. arthritis, SHAILESH, HTN, pafib (AC), CHF, smoker 1.0 PPD and mitral valve stenosis.  I/A: A&Ox4. VSSA on RA. Afib/flutter 100s-130s. C/o incx and L upper back pain partially relieved by scheduled robaxin and lido patches. Heparin gtt therapeutic and infusing at 1000 units/hr, next 10a recheck w/AM labs. Adequate uop. 1-assist. Appeared to rest comfortably overnight.   P: Continue to monitor and notify CVTS with questions/concerns.

## 2021-09-20 ENCOUNTER — ANESTHESIA (OUTPATIENT)
Dept: SURGERY | Facility: CLINIC | Age: 66
DRG: 219 | End: 2021-09-20
Payer: MEDICARE

## 2021-09-20 ENCOUNTER — APPOINTMENT (OUTPATIENT)
Dept: CARDIOLOGY | Facility: CLINIC | Age: 66
DRG: 219 | End: 2021-09-20
Attending: NURSE PRACTITIONER
Payer: MEDICARE

## 2021-09-20 ENCOUNTER — ANESTHESIA EVENT (OUTPATIENT)
Dept: SURGERY | Facility: CLINIC | Age: 66
DRG: 219 | End: 2021-09-20
Payer: MEDICARE

## 2021-09-20 LAB
ANION GAP SERPL CALCULATED.3IONS-SCNC: 7 MMOL/L (ref 3–14)
ATRIAL RATE - MUSE: 258 BPM
BACTERIA BLD CULT: NO GROWTH
BACTERIA BLD CULT: NO GROWTH
BUN SERPL-MCNC: 17 MG/DL (ref 7–30)
CALCIUM SERPL-MCNC: 8.6 MG/DL (ref 8.5–10.1)
CHLORIDE BLD-SCNC: 106 MMOL/L (ref 94–109)
CO2 SERPL-SCNC: 23 MMOL/L (ref 20–32)
CREAT SERPL-MCNC: 0.81 MG/DL (ref 0.52–1.04)
DIASTOLIC BLOOD PRESSURE - MUSE: NORMAL MMHG
ERYTHROCYTE [DISTWIDTH] IN BLOOD BY AUTOMATED COUNT: 22.7 % (ref 10–15)
GFR SERPL CREATININE-BSD FRML MDRD: 76 ML/MIN/1.73M2
GLUCOSE BLD-MCNC: 96 MG/DL (ref 70–99)
HCT VFR BLD AUTO: 27.2 % (ref 35–47)
HGB BLD-MCNC: 8.4 G/DL (ref 11.7–15.7)
HOLD SPECIMEN: NORMAL
INR PPP: 2.17 (ref 0.85–1.15)
INTERPRETATION ECG - MUSE: NORMAL
LACTATE SERPL-SCNC: 1.1 MMOL/L (ref 0.7–2)
LVEF ECHO: NORMAL
MAGNESIUM SERPL-MCNC: 2 MG/DL (ref 1.6–2.3)
MCH RBC QN AUTO: 27.4 PG (ref 26.5–33)
MCHC RBC AUTO-ENTMCNC: 30.9 G/DL (ref 31.5–36.5)
MCV RBC AUTO: 89 FL (ref 78–100)
P AXIS - MUSE: 48 DEGREES
PHOSPHATE SERPL-MCNC: 4.4 MG/DL (ref 2.5–4.5)
PLATELET # BLD AUTO: 349 10E3/UL (ref 150–450)
POTASSIUM BLD-SCNC: 4.3 MMOL/L (ref 3.4–5.3)
POTASSIUM BLD-SCNC: 4.5 MMOL/L (ref 3.4–5.3)
PR INTERVAL - MUSE: NORMAL MS
QRS DURATION - MUSE: 78 MS
QT - MUSE: 168 MS
QTC - MUSE: 235 MS
R AXIS - MUSE: 42 DEGREES
RBC # BLD AUTO: 3.07 10E6/UL (ref 3.8–5.2)
SODIUM SERPL-SCNC: 136 MMOL/L (ref 133–144)
SYSTOLIC BLOOD PRESSURE - MUSE: NORMAL MMHG
T AXIS - MUSE: 105 DEGREES
UFH PPP CHRO-ACNC: 0.34 IU/ML
VENTRICULAR RATE- MUSE: 118 BPM
WBC # BLD AUTO: 15.6 10E3/UL (ref 4–11)

## 2021-09-20 PROCEDURE — 84132 ASSAY OF SERUM POTASSIUM: CPT | Performed by: THORACIC SURGERY (CARDIOTHORACIC VASCULAR SURGERY)

## 2021-09-20 PROCEDURE — 93010 ELECTROCARDIOGRAM REPORT: CPT | Mod: 59 | Performed by: INTERNAL MEDICINE

## 2021-09-20 PROCEDURE — 93005 ELECTROCARDIOGRAM TRACING: CPT

## 2021-09-20 PROCEDURE — 250N000013 HC RX MED GY IP 250 OP 250 PS 637: Performed by: THORACIC SURGERY (CARDIOTHORACIC VASCULAR SURGERY)

## 2021-09-20 PROCEDURE — 80048 BASIC METABOLIC PNL TOTAL CA: CPT | Performed by: PHYSICIAN ASSISTANT

## 2021-09-20 PROCEDURE — 250N000013 HC RX MED GY IP 250 OP 250 PS 637: Performed by: NURSE PRACTITIONER

## 2021-09-20 PROCEDURE — 93312 ECHO TRANSESOPHAGEAL: CPT | Mod: 26 | Performed by: INTERNAL MEDICINE

## 2021-09-20 PROCEDURE — 93325 DOPPLER ECHO COLOR FLOW MAPG: CPT

## 2021-09-20 PROCEDURE — 250N000009 HC RX 250: Performed by: NURSE ANESTHETIST, CERTIFIED REGISTERED

## 2021-09-20 PROCEDURE — 258N000003 HC RX IP 258 OP 636: Performed by: NURSE ANESTHETIST, CERTIFIED REGISTERED

## 2021-09-20 PROCEDURE — 85610 PROTHROMBIN TIME: CPT | Performed by: STUDENT IN AN ORGANIZED HEALTH CARE EDUCATION/TRAINING PROGRAM

## 2021-09-20 PROCEDURE — 92960 CARDIOVERSION ELECTRIC EXT: CPT | Performed by: NURSE PRACTITIONER

## 2021-09-20 PROCEDURE — 99233 SBSQ HOSP IP/OBS HIGH 50: CPT | Mod: 24 | Performed by: INTERNAL MEDICINE

## 2021-09-20 PROCEDURE — 83605 ASSAY OF LACTIC ACID: CPT | Performed by: THORACIC SURGERY (CARDIOTHORACIC VASCULAR SURGERY)

## 2021-09-20 PROCEDURE — 92960 CARDIOVERSION ELECTRIC EXT: CPT

## 2021-09-20 PROCEDURE — 214N000001 HC R&B CCU UMMC

## 2021-09-20 PROCEDURE — 250N000013 HC RX MED GY IP 250 OP 250 PS 637: Performed by: PHYSICIAN ASSISTANT

## 2021-09-20 PROCEDURE — 250N000009 HC RX 250: Performed by: INTERNAL MEDICINE

## 2021-09-20 PROCEDURE — 5A2204Z RESTORATION OF CARDIAC RHYTHM, SINGLE: ICD-10-PCS | Performed by: NURSE PRACTITIONER

## 2021-09-20 PROCEDURE — 36415 COLL VENOUS BLD VENIPUNCTURE: CPT | Performed by: THORACIC SURGERY (CARDIOTHORACIC VASCULAR SURGERY)

## 2021-09-20 PROCEDURE — 250N000011 HC RX IP 250 OP 636: Performed by: NURSE ANESTHETIST, CERTIFIED REGISTERED

## 2021-09-20 PROCEDURE — 84100 ASSAY OF PHOSPHORUS: CPT | Performed by: NURSE PRACTITIONER

## 2021-09-20 PROCEDURE — 85520 HEPARIN ASSAY: CPT | Performed by: THORACIC SURGERY (CARDIOTHORACIC VASCULAR SURGERY)

## 2021-09-20 PROCEDURE — 250N000011 HC RX IP 250 OP 636: Performed by: STUDENT IN AN ORGANIZED HEALTH CARE EDUCATION/TRAINING PROGRAM

## 2021-09-20 PROCEDURE — 93325 DOPPLER ECHO COLOR FLOW MAPG: CPT | Mod: 26 | Performed by: INTERNAL MEDICINE

## 2021-09-20 PROCEDURE — 83735 ASSAY OF MAGNESIUM: CPT | Performed by: NURSE PRACTITIONER

## 2021-09-20 PROCEDURE — 85027 COMPLETE CBC AUTOMATED: CPT | Performed by: NURSE PRACTITIONER

## 2021-09-20 PROCEDURE — 36415 COLL VENOUS BLD VENIPUNCTURE: CPT | Performed by: NURSE PRACTITIONER

## 2021-09-20 PROCEDURE — 250N000011 HC RX IP 250 OP 636: Performed by: PHYSICIAN ASSISTANT

## 2021-09-20 PROCEDURE — 250N000011 HC RX IP 250 OP 636: Performed by: INTERNAL MEDICINE

## 2021-09-20 PROCEDURE — 99207 PR NO CHARGE LOS: CPT | Performed by: INTERNAL MEDICINE

## 2021-09-20 PROCEDURE — 370N000017 HC ANESTHESIA TECHNICAL FEE, PER MIN

## 2021-09-20 PROCEDURE — 93320 DOPPLER ECHO COMPLETE: CPT | Mod: 26 | Performed by: INTERNAL MEDICINE

## 2021-09-20 RX ORDER — NALOXONE HYDROCHLORIDE 0.4 MG/ML
0.4 INJECTION, SOLUTION INTRAMUSCULAR; INTRAVENOUS; SUBCUTANEOUS
Status: ACTIVE | OUTPATIENT
Start: 2021-09-20 | End: 2021-09-22

## 2021-09-20 RX ORDER — METOPROLOL SUCCINATE 25 MG/1
25 TABLET, EXTENDED RELEASE ORAL DAILY
Status: DISCONTINUED | OUTPATIENT
Start: 2021-09-21 | End: 2021-09-22

## 2021-09-20 RX ORDER — LIDOCAINE HYDROCHLORIDE 20 MG/ML
15 SOLUTION OROPHARYNGEAL ONCE
Status: COMPLETED | OUTPATIENT
Start: 2021-09-20 | End: 2021-09-20

## 2021-09-20 RX ORDER — MAGNESIUM SULFATE HEPTAHYDRATE 40 MG/ML
2 INJECTION, SOLUTION INTRAVENOUS
Status: CANCELLED | OUTPATIENT
Start: 2021-09-20

## 2021-09-20 RX ORDER — ATROPINE SULFATE 0.1 MG/ML
.5-1 INJECTION INTRAVENOUS
Status: DISCONTINUED | OUTPATIENT
Start: 2021-09-20 | End: 2021-09-20 | Stop reason: CLARIF

## 2021-09-20 RX ORDER — NALOXONE HYDROCHLORIDE 0.4 MG/ML
0.2 INJECTION, SOLUTION INTRAMUSCULAR; INTRAVENOUS; SUBCUTANEOUS
Status: ACTIVE | OUTPATIENT
Start: 2021-09-20 | End: 2021-09-22

## 2021-09-20 RX ORDER — METOPROLOL TARTRATE 25 MG/1
25 TABLET, FILM COATED ORAL 3 TIMES DAILY
Status: COMPLETED | OUTPATIENT
Start: 2021-09-20 | End: 2021-09-20

## 2021-09-20 RX ORDER — WARFARIN SODIUM 5 MG/1
5 TABLET ORAL
Status: COMPLETED | OUTPATIENT
Start: 2021-09-20 | End: 2021-09-20

## 2021-09-20 RX ORDER — METOPROLOL TARTRATE 1 MG/ML
2.5-5 INJECTION, SOLUTION INTRAVENOUS
Status: DISCONTINUED | OUTPATIENT
Start: 2021-09-20 | End: 2021-09-22 | Stop reason: HOSPADM

## 2021-09-20 RX ORDER — POTASSIUM CHLORIDE 750 MG/1
20 TABLET, EXTENDED RELEASE ORAL
Status: CANCELLED | OUTPATIENT
Start: 2021-09-20

## 2021-09-20 RX ORDER — FENTANYL CITRATE 50 UG/ML
25 INJECTION, SOLUTION INTRAMUSCULAR; INTRAVENOUS
Status: DISCONTINUED | OUTPATIENT
Start: 2021-09-20 | End: 2021-09-22 | Stop reason: HOSPADM

## 2021-09-20 RX ORDER — POTASSIUM CHLORIDE 750 MG/1
40 TABLET, EXTENDED RELEASE ORAL
Status: CANCELLED | OUTPATIENT
Start: 2021-09-20

## 2021-09-20 RX ORDER — LIDOCAINE 40 MG/G
CREAM TOPICAL
Status: CANCELLED | OUTPATIENT
Start: 2021-09-20

## 2021-09-20 RX ORDER — POTASSIUM CHLORIDE 1500 MG/1
20 TABLET, EXTENDED RELEASE ORAL
Status: DISCONTINUED | OUTPATIENT
Start: 2021-09-20 | End: 2021-09-20 | Stop reason: CLARIF

## 2021-09-20 RX ORDER — POTASSIUM CHLORIDE 1500 MG/1
40 TABLET, EXTENDED RELEASE ORAL
Status: DISCONTINUED | OUTPATIENT
Start: 2021-09-20 | End: 2021-09-20 | Stop reason: CLARIF

## 2021-09-20 RX ORDER — SODIUM CHLORIDE 9 MG/ML
1000 INJECTION, SOLUTION INTRAVENOUS CONTINUOUS
Status: DISCONTINUED | OUTPATIENT
Start: 2021-09-20 | End: 2021-09-22 | Stop reason: HOSPADM

## 2021-09-20 RX ORDER — FLUMAZENIL 0.1 MG/ML
0.2 INJECTION, SOLUTION INTRAVENOUS
Status: ACTIVE | OUTPATIENT
Start: 2021-09-20 | End: 2021-09-22

## 2021-09-20 RX ORDER — ACYCLOVIR 200 MG/1
9.5 CAPSULE ORAL
Status: DISCONTINUED | OUTPATIENT
Start: 2021-09-20 | End: 2021-09-22 | Stop reason: HOSPADM

## 2021-09-20 RX ORDER — MAGNESIUM SULFATE HEPTAHYDRATE 40 MG/ML
2 INJECTION, SOLUTION INTRAVENOUS
Status: DISCONTINUED | OUTPATIENT
Start: 2021-09-20 | End: 2021-09-20 | Stop reason: CLARIF

## 2021-09-20 RX ORDER — MAGNESIUM OXIDE 400 MG/1
400 TABLET ORAL 2 TIMES DAILY
Status: COMPLETED | OUTPATIENT
Start: 2021-09-20 | End: 2021-09-21

## 2021-09-20 RX ADMIN — LIDOCAINE 2 PATCH: 560 PATCH PERCUTANEOUS; TOPICAL; TRANSDERMAL at 20:03

## 2021-09-20 RX ADMIN — METHOCARBAMOL 750 MG: 750 TABLET ORAL at 08:00

## 2021-09-20 RX ADMIN — ASPIRIN 81 MG: 81 TABLET, CHEWABLE ORAL at 08:01

## 2021-09-20 RX ADMIN — PANTOPRAZOLE SODIUM 40 MG: 40 TABLET, DELAYED RELEASE ORAL at 08:01

## 2021-09-20 RX ADMIN — MIDAZOLAM 1 MG: 1 INJECTION INTRAMUSCULAR; INTRAVENOUS at 10:17

## 2021-09-20 RX ADMIN — ERTAPENEM SODIUM 1 G: 1 INJECTION, POWDER, LYOPHILIZED, FOR SOLUTION INTRAMUSCULAR; INTRAVENOUS at 17:13

## 2021-09-20 RX ADMIN — HEPARIN SODIUM 1150 UNITS/HR: 10000 INJECTION, SOLUTION INTRAVENOUS at 00:40

## 2021-09-20 RX ADMIN — PHENYLEPHRINE HYDROCHLORIDE 100 MCG: 10 INJECTION INTRAVENOUS at 10:40

## 2021-09-20 RX ADMIN — OXYCODONE HYDROCHLORIDE 5 MG: 5 TABLET ORAL at 22:09

## 2021-09-20 RX ADMIN — DULOXETINE 30 MG: 30 CAPSULE, DELAYED RELEASE ORAL at 22:09

## 2021-09-20 RX ADMIN — WARFARIN SODIUM 5 MG: 5 TABLET ORAL at 17:31

## 2021-09-20 RX ADMIN — GABAPENTIN 1200 MG: 300 CAPSULE ORAL at 22:09

## 2021-09-20 RX ADMIN — GABAPENTIN 600 MG: 300 CAPSULE ORAL at 17:11

## 2021-09-20 RX ADMIN — AMIODARONE HYDROCHLORIDE 400 MG: 200 TABLET ORAL at 08:00

## 2021-09-20 RX ADMIN — TOPICAL ANESTHETIC 0.5 ML: 200 SPRAY DENTAL; PERIODONTAL at 10:11

## 2021-09-20 RX ADMIN — Medication 40 MG: at 10:37

## 2021-09-20 RX ADMIN — METOPROLOL TARTRATE 25 MG: 25 TABLET, FILM COATED ORAL at 20:02

## 2021-09-20 RX ADMIN — METHOCARBAMOL 750 MG: 750 TABLET ORAL at 14:47

## 2021-09-20 RX ADMIN — LIDOCAINE HYDROCHLORIDE 30 ML: 20 SOLUTION ORAL; TOPICAL at 10:11

## 2021-09-20 RX ADMIN — Medication 1 TABLET: at 08:00

## 2021-09-20 RX ADMIN — METOPROLOL TARTRATE 25 MG: 25 TABLET, FILM COATED ORAL at 14:47

## 2021-09-20 RX ADMIN — Medication 400 MG: at 08:12

## 2021-09-20 RX ADMIN — METOPROLOL TARTRATE 25 MG: 25 TABLET, FILM COATED ORAL at 08:01

## 2021-09-20 RX ADMIN — METHOCARBAMOL 750 MG: 750 TABLET ORAL at 20:02

## 2021-09-20 RX ADMIN — GABAPENTIN 600 MG: 300 CAPSULE ORAL at 08:00

## 2021-09-20 RX ADMIN — Medication 400 MG: at 20:02

## 2021-09-20 ASSESSMENT — ACTIVITIES OF DAILY LIVING (ADL)
ADLS_ACUITY_SCORE: 12

## 2021-09-20 ASSESSMENT — MIFFLIN-ST. JEOR: SCORE: 1515.81

## 2021-09-20 NOTE — ANESTHESIA CARE TRANSFER NOTE
Patient: Corina Martinez    Procedure(s):  ANESTHESIA, FOR CARDIOVERSION    Diagnosis: A-fib (H) [I48.91]  Diagnosis Additional Information: No value filed.    Anesthesia Type:   No value filed.     Note:    Oropharynx: spontaneously breathing  Level of Consciousness: awake  Oxygen Supplementation: nasal cannula  Level of Supplemental Oxygen (L/min / FiO2): 2  Independent Airway: airway patency satisfactory and stable  Dentition: dentition unchanged  Vital Signs Stable: post-procedure vital signs reviewed and stable  Report to RN Given: handoff report given  Destination: [facility].          Vitals:  Vitals Value Taken Time   BP 83/65 09/20/21 1045   Temp     Pulse 59 09/20/21 1045   Resp 18 09/20/21 1045   SpO2 100 % 09/20/21 1045       Electronically Signed By: SUZE Calero CRNA  September 20, 2021  10:51 AM

## 2021-09-20 NOTE — PROGRESS NOTES
"  Cardiovascular Surgery Progress Note  09/20/2021  Surgeon: Dr. Acuna           Assessment and Plan:     Corina Martinez is a 66 year old female with a PMH of arthritis, SHAILESH, HTN, pafib (AC), CHF, tobacco abuse (1.0 PPD), and mitral valve stenosis. Admitted 09/07 for elective bioprosthetic MVR (29 mm [facility] Andrey Epic) w/ left atrial appendage ligation,and bilateral pulmonary vein isolation with Dr. Acuna.     Cardiovascular:   Mitral stenosis s/p bioprosthetic MVR  HFpEF  - ANTONIO 09/20 with preserved biventricular function, MV replacement with mean gradient 7 mmHg at HR 87 BPM, no MR or PVL  - BP controlled, on Lopressor 25 TID, but not getting all doses due to hold parameters. Transition to Toprol 25 mg daily, titrate as BP allows  - Appears near euvolemic. Diuretic PRN  - Aspirin 81 mg daily                                                                                    - Chest tubes: bilat pleural out 09/14, meds 09/15  - TPW: removed 09/15  Paroxysmal atrial fibrillation RVR  Postop atrial flutter  - S/p PVI and KENZIE excision  - Small KENZIE thrombus at time of surgery  - Recurrent atrial fibrillation post-op  - Amiodarone 400 mg daily  - S/p DCCV 09/20  - Warfarin to INR goal 2-3, therapeutic, stop heparin bridge    Pulmonary:  Postop mechanical ventilation, resolved  COPD  - Extubated POD 0 in OR, reintubated POD1 09/08, extubated 09/13; Saturating well on RA-2L.   - Supplemental O2 PRN to keep sats > 92%. Wean off as tolerated.  - Pulm toilet, IS, activity and deep breathing   - Treating for VAP, abx as below  Tobacco abuse  - NRT  - Encourage cessation    Neurology / MSK:  Postop pain  - Scheduled gabapentin (PTA dose), lidocaine patch, Robaxin. PRN oxy, and dilaudid. Tylenol \"allergy\"  - NIKKI catheters B/l  (9/7-9/14)  Postop delirium with hallucinations, resolved  - Seroquel discontinued  - Melatonin 5 mg at bedtime  Anxiety  Depression  - Continue PTA cymbalta 30 mg daily  - PTA atarax, still holding     / " "Renal:  RADHA, resolved  - BL creat ~0.8-1, peaked at 1.72, creat 0.81 today   - Avoid nephrotoxins  - Trend BMP  - Diuresis as above    GI / FEN:   Nutrition  - Regular diet, continue bowel regimen    Endocrine:  Stress induced hyperglycemia, resolved  - Sliding scale insulin discontinued    Infectious Disease:  Stress induced leukocytosis  HAP  - WBC 15.6, afebrile  - Completed perioperative antibiotics  - CT C/A/P 09/19 with continued LLL consolidation/infiltrate. Extend ertapenem for 10 day course through 09/22      Hematology:   Acute blood loss anemia and thrombocytopenia  - Hgb 9.4; Plt 349, no signs or symptoms of active bleeding  - Trend CBC, transfuse PRN    Anticoagulation:   - Aspirin 81 mg daily  - Coumadin for bioprosthetic MV and warfarin with DCCV 09/20, INR therapeutic      Prophylaxis:   - Stress ulcer prophylaxis: Pantoprazole 40 mg daily (PTA omeprazole for GERD)  - DVT prophylaxis: Subcutaneous heparin, SCD    Disposition:   - Transferred to  on POD8  - Rehab recommending discharge to TCU, but patient continues to decline. Discharge pending improved leukocytosis and improved strength. Patient and  intending to return to their mobile home and drive to [place] as soon as she is medically able.       Discussed with Dr. Kalpana Bravo, PA-C  Cardiothoracic Surgery  Pager [phone]            Interval History:   DCCV successful. Feels a little bit sleepy. Denies SOB. Pain controlled         Physical Exam:   Blood pressure (!) 83/65, pulse 59, temperature 98.4  F (36.9  C), temperature source Oral, resp. rate 18, height 1.689 m (5' 6.5\"), weight 95.1 kg (209 lb 11.2 oz), SpO2 100 %, not currently breastfeeding.  Vitals:    09/18/21 0630 09/19/21 0355 09/20/21 0426   Weight: 94.1 kg (207 lb 6.4 oz) 93.8 kg (206 lb 12.8 oz) 95.1 kg (209 lb 11.2 oz)          Gen: NAD, resting in bed  CV: RRR, S1S2 normal, no murmurs, rubs, or gallops. JVP not elevated  Pulm: diminished bases  Abd: " soft, non-tender, no guarding, +BS  Ext: no lower extremity edema  Incision: clean, dry, intact, no erythema  Chest Tube sites: dressings clean and dry  Neuro: grossly normal  Psych: calm, cooperative            Data:    Imaging:  reviewed recent imaging  No results found for this or any previous visit (from the past 24 hour(s)).      Labs:  BMP  Recent Labs   Lab 09/20/21 0444 09/19/21 0545 09/18/21  0613 09/17/21  0539 09/16/21  0759 09/16/21  0620 09/16/21  0620 09/15/21  2356 09/15/21  2017   NA  --   --  138 139  --   --  139  --  139   POTASSIUM 4.3 4.2 4.0  3.9 4.0  3.8  --    < > 3.7   < > 3.4   CHLORIDE  --   --  106 107  --   --  104  --  105   LOC  --   --  8.4* 8.3*  --   --  9.1  --  8.7   CO2  --   --  23 23  --   --  26  --  27   BUN  --   --  23 27  --   --  21  --  22   CR  --   --  1.02 1.03  --   --  0.83  --  0.75   GLC  --   --  105* 103* 101*  --  104*   < > 132*    < > = values in this interval not displayed.     CBC  Recent Labs   Lab 09/20/21 0444 09/19/21  0942 09/19/21 0545 09/18/21 0613   WBC 15.6* 17.1* 17.5* 19.6*   RBC 3.07* 3.13* 3.16* 3.14*   HGB 8.4* 8.6* 8.5* 8.6*   HCT 27.2* 27.8* 28.5* 28.2*   MCV 89 89 90 90   MCH 27.4 27.5 26.9 27.4   MCHC 30.9* 30.9* 29.8* 30.5*   RDW 22.7* 23.1* 23.0* 23.1*    382 382 356     INR  Recent Labs   Lab 09/20/21 0444 09/19/21 0545 09/18/21 0613 09/17/21  0539   INR 2.17* 1.67* 1.56* 1.37*      Hepatic Panel  Recent Labs   Lab 09/14/21  0502   AST 39   ALT 36   ALKPHOS 229*   BILITOTAL 0.8   ALBUMIN 2.4*     GLUCOSE:   Recent Labs   Lab 09/18/21  0613 09/17/21  0539 09/16/21  0759 09/16/21  0620 09/16/21  0346 09/15/21  2356   * 103* 101* 104* 90 97

## 2021-09-20 NOTE — PRE-PROCEDURE
GENERAL PRE-PROCEDURE:   Procedure:  ANTONIO with DCCV  Date/Time:  9/20/2021 10:05 AM    Written consent obtained?: Yes    Risks and benefits: Risks, benefits and alternatives were discussed    Consent given by:  Patient  Patient states understanding of procedure being performed: Yes    Patient's understanding of procedure matches consent: Yes    Procedure consent matches procedure scheduled: Yes    Expected level of sedation:  Moderate  Appropriately NPO:  Yes  ASA Class:  2  Lungs:  Lungs clear with good breath sounds bilaterally  Heart:  Normal heart sounds and rate and a-fib  History & Physical reviewed:  History and physical reviewed and no updates needed  Statement of review:  I have reviewed the lab findings, diagnostic data, medications, and the plan for sedation

## 2021-09-20 NOTE — PROCEDURES
[facility]    Procedure: Cardioversion    Date/Time: 9/20/2021 11:09 AM  Performed by: Britany Rodriguez APRN CNP  Authorized by: Britany Rodriguez APRN CNP     UNIVERSAL PROTOCOL   Site Marked: NA  Prior Images Obtained and Reviewed:  NA  Required items: Required blood products, implants, devices and special equipment available    Patient identity confirmed:  Verbally with patient  Patient was reevaluated immediately before administering moderate or deep sedation or anesthesia  Confirmation Checklist:  Patient's identity using two indicators  Time out: Immediately prior to the procedure a time out was called    Universal Protocol: the Joint Commission Universal Protocol was followed           ANESTHESIA  Anesthesia was administered and monitored by anesthesiology.  See anesthesia documentation for details.    PROCEDURE DETAILS  Pre-procedure rhythm: atrial fibrillation  Patient position: patient was placed in a supine position  Chest area: chest area exposed  Electrodes: pads  Electrodes placed: anterior-posterior  Number of attempts: 1    Details of Attempts:  150J biphasic shock delivered    Post-procedure rhythm: normal sinus rhythm  Complications: no complications    PROCEDURE   Patient Tolerance:  Patient tolerated the procedure well with no immediate complications    Length of time physician/provider present for 1:1 monitoring during sedation: 0        SUZE Lang CNP  Electrophysiology Consult Service  Pager: [phone]

## 2021-09-20 NOTE — PLAN OF CARE
Patient admitted for mitral valve replacement and left atrial appendage ligation and bilateral pulmonary vein isolation.  PMH of hypertension, congestive heart failure, smoker, morbid obesity, atrial fibrillation, pericardial effusion, and obstructive sleep apnea.     Neuro: A&O x4, denied dizziness.  Respiratory:  Had dyspnea on exertion, fine crackles auscultated in lung bases.  Cardiac: No abnormal heart sounds noted.  Had 1+ edema in legs and feet.      GI: Denied nausea, bowel sounds normoactive.  Had two soft formed BMs during shift.  : Had adequate urine output, see I&O flowsheet.  Skin: See PCS for assessment and treatment of surgical incisions.   VS: In atrial fibrillation and atrial flutter with HR 80s-100s, SBP 80s-110s, SaO2 93-95% on room air.    Drips:  Heparin gtt continued at 1150 units/hr, 10a recheck due at 2030.    Electrolytes: None.  Pain: Reported left chest wall pain, was given scheduled methocarbamol. Also applied hot packs to area.  Patient reported a decrease in pain.  Tests/procedures: Chest/abdomen/pelvis CT performed during shift, see Chart Review for results.    Mobility: Ambulated in room with standby assist and use of walker, was steady on her feet.    Plan:  Continue to monitor pain, VS, heart rhythm, skin and surgical site integrity, fluid status, bowel status, cardiac and respiratory status.  Notify care team of changes in patient condition or other concerns.  Expected to have cardioversion in AM, will be NPO at midnight.

## 2021-09-20 NOTE — ANESTHESIA PREPROCEDURE EVALUATION
Anesthesia Pre-Procedure Evaluation    Patient: Corina Martinez   MRN: [id number] : 1955        Preoperative Diagnosis: A-fib (H) [I48.91]   Procedure : Procedure(s):  ANESTHESIA, FOR CARDIOVERSION     Past Medical History:   Diagnosis Date     Arthritis      Congestive heart failure (H)      Hypertension      Nonrheumatic mitral valve stenosis, severe      Obese      SHAILESH (obstructive sleep apnea)      Sleep apnea       Past Surgical History:   Procedure Laterality Date     APPENDECTOMY       CV CORONARY ANGIOGRAM N/A 2021    Procedure: CV CORONARY ANGIOGRAM;  Surgeon: Juan Vance MD;  Location:  [facility]     CV RIGHT HEART CATH MEASUREMENTS RECORDED N/A 2021    Procedure: CV RIGHT HEART CATH;  Surgeon: Juan Vance MD;  Location:  [facility]     KNEE DEBRIDEMENT  2019     LAPAROSCOPIC CHOLECYSTECTOMY       LUMBAR FUSION       OTHER SURGICAL HISTORY      hip replacement     RELEASE CARPAL TUNNEL       REPLACE VALVE MITRAL N/A 2021    Procedure: Median Sternotomy, Mitral Valve Replacement with a 29 mm St. Andrey Epic Valve, Bilateral Pulmonary Vein Isolation, Left Atrial Appendage Ligation, On Cardiopulmonary Bypass, Transesophageal Echocardiogram by Anesthesia;  Surgeon: Sven Acuna MD;  Location: [facility]     TONSILLECTOMY       TOTAL KNEE ARTHROPLASTY Right 2019     TUBAL LIGATION        Allergies   Allergen Reactions     Codeine Sulfate Nausea and Vomiting     Acetaminophen Other (See Comments)     Was told to avoid due to primary biliary cirrhosis      Social History     Tobacco Use     Smoking status: Current Some Day Smoker     Packs/day: 1.00     Years: 54.00     Pack years: 54.00     Types: Cigarettes     Smokeless tobacco: Never Used     Tobacco comment: pt has been working hard on quiting; smoking 3/4 ppd (21)   Substance Use Topics     Alcohol use: Yes     Comment: social      Wt Readings from  Last 1 Encounters:   09/20/21 95.1 kg (209 lb 11.2 oz)              OUTSIDE LABS:  CBC:   Lab Results   Component Value Date    WBC 15.6 (H) 09/20/2021    WBC 17.1 (H) 09/19/2021    HGB 8.4 (L) 09/20/2021    HGB 8.6 (L) 09/19/2021    HCT 27.2 (L) 09/20/2021    HCT 27.8 (L) 09/19/2021     09/20/2021     09/19/2021     BMP:   Lab Results   Component Value Date     09/20/2021     09/18/2021    POTASSIUM 4.3 09/20/2021    POTASSIUM 4.5 09/20/2021    CHLORIDE 106 09/20/2021    CHLORIDE 106 09/18/2021    CO2 23 09/20/2021    CO2 23 09/18/2021    BUN 17 09/20/2021    BUN 23 09/18/2021    CR 0.81 09/20/2021    CR 1.02 09/18/2021    GLC 96 09/20/2021     (H) 09/18/2021     COAGS:   Lab Results   Component Value Date    PTT 35 09/16/2021    INR 2.17 (H) 09/20/2021    FIBR 271 09/07/2021     POC: No results found for: BGM, HCG, HCGS  HEPATIC:   Lab Results   Component Value Date    ALBUMIN 2.4 (L) 09/14/2021    PROTTOTAL 6.2 (L) 09/14/2021    ALT 36 09/14/2021    AST 39 09/14/2021    ALKPHOS 229 (H) 09/14/2021    BILITOTAL 0.8 09/14/2021     OTHER:   Lab Results   Component Value Date    PH 7.44 09/14/2021    LACT 1.1 09/20/2021    LOC 8.6 09/20/2021    PHOS 4.4 09/20/2021    MAG 2.0 09/20/2021    T4 1.00 06/23/2021    CRP 91.0 (H) 09/18/2021    SED 57 (H) 06/23/2021       Anesthesia Plan    ASA Status:  3      Anesthesia Type: General.     - Airway: Native airway   Induction: Intravenous.   Maintenance: TIVA.        Consents    Anesthesia Plan(s) and associated risks, benefits, and realistic alternatives discussed. Questions answered and patient/representative(s) expressed understanding.     - Discussed with:  Patient         Postoperative Care            Comments:                Guero Hodges MD

## 2021-09-20 NOTE — PROGRESS NOTES
Care Management Discharge Note    Discharge Date: 09/20/2021       Discharge Disposition:  Home    Discharge Services:  None    Discharge DME:  None    Discharge Transportation: Spouse, Benton, will provide    Private pay costs discussed: Not applicable    PAS Confirmation Code: NA - pt is going home.    Patient/family educated on Medicare website which has current facility and service quality ratings:  No - pt declined TCU.    Education Provided on the Discharge Plan:  Yes  Persons Notified of Discharge Plans: Pt, spouse, RN CC, provider  Patient/Family in Agreement with the Plan:  Yes    Handoff Referral Completed: Yes    Additional Information:  Per huddle, pt will be medically ready to discharge tomorrow morning. Recommendation is for pt to go to TCU, but pt does not want to. MINNIE met w/ pt and spouse, Benton, in pt's room and introduced self/role. MINNIE reiterated recommendation for TCU/provided education, but pt declined. Pt is adamant that she is going home and Benton states he is able to care for pt at home. MINNIE asked if pt is open to skilled home care services, but pt declined. MINNIE informed pt that her anticipated discharge date is tomorrow morning and asked if she has a ride before 11 AM. Benton stated he will plan to pick pt up at the hospital at 10 AM tomorrow morning. Pt and Benton did not have any further questions/needs for SW. MINNIE updated RN CC.     Maya MORFIN, [facility]   Phone: [phone]  Pager: [phone]

## 2021-09-20 NOTE — ANESTHESIA POSTPROCEDURE EVALUATION
Patient: Corina Martinez    Procedure(s):  ANESTHESIA, FOR CARDIOVERSION    Diagnosis:A-fib (H) [I48.91]  Diagnosis Additional Information: No value filed.    Anesthesia Type:  No value filed.    Note:  Disposition: Outpatient   Postop Pain Control: Uneventful            Sign Out: Well controlled pain   PONV:    Neuro/Psych: Uneventful            Sign Out: Acceptable/Baseline neuro status   Airway/Respiratory: Uneventful            Sign Out: Acceptable/Baseline resp. status   CV/Hemodynamics: Uneventful            Sign Out: Acceptable CV status; No obvious hypovolemia; No obvious fluid overload   Other NRE:    DID A NON-ROUTINE EVENT OCCUR?            Last vitals:  Vitals Value Taken Time   BP     Temp     Pulse     Resp     SpO2         Electronically Signed By: Guero Hodges MD  September 20, 2021  3:00 PM

## 2021-09-20 NOTE — SEDATION DOCUMENTATION
Pt here for ANTONIO and Cardioversion. Procedures were explained to the pt and the consent was signed.Pt was given Versed 1 mg IV for the ANTONIO. Pt tolerated the ANTONIO without any adverse effects. Per Dr. Lindsey pt can proceed with the cardioversion. Pt was given Brevital 40 mg IV per anesthesia. Pt was shocked with 150 Joules back to NSR. Post EKG was done to verify the rhythm. Pt was given phenylephrine 100 mg for low BP per anesthesia. Pt denies any pain or discomfort post procedures. Report called to pt's nurse on 6C. Pt transported back to 6C on a stretcher and 2LNC.

## 2021-09-20 NOTE — PLAN OF CARE
Afebrile, vss. Patient stated pain ranging from 5-8 with oxy given x1, scheduled robaxin and lidocaine patches applied with some relief noted. Slept well between cares and assessments. Ambulated in hallway with SBA/minimal assist using the walker and gait belt. Room air continues, patient tolerating with no respiratory interventions needed. Rhythm Afib with rates in 's. Heparin gtt continues at 1150 units with recheck this a.m. Ate small amount of dinner and NPO at midnight with minimal amount of urine output using the bathroom, no bowel movement overnight. 2 PIV's in place. Vericose noted on thighs. Midline incision cdi. Updated on plan of care and encouraged any questions patient may have had. Will continue to monitor and assess.

## 2021-09-20 NOTE — PROGRESS NOTES
General Infectious Disease Service Consultation - Green Team  Patient:  Corina Martinez, Date of birth 1955, Medical record number [id number]  Date of Admission: 9/7/2021  Date of Visit:  9/20/2021         Assessment and Recommendations:   Problem List:    # Leukocytosis without other clear signs of new infection - now improving     # HCAP with respiratory culture positive for Streptococcus constelatus and Enterobacter cloacae - on ertapenem     # Severe mitral valve stenosis with extensive mitral annular calcification, who was admitted on 9/7/21 to the cardiovascular surgery team for now s/p mitral valve replacement (29mm St Andrey Epic mitral valve) and left atrial appendage ligation on 9/7/21     Discussion:     Corina Martinez is a 66 year old female with PMHx of arthritis, SHAILESH, HTN, CHF and severe mitral valve stenosis with extensive mitral annular calcification, who was admitted on 9/7/21 to the cardiovascular surgery team for now s/p MVR (Dr. Acuna). We are consulted today because persistent leukocytosis. Patient underwent mitral valve replacement (29mm St Andrey Epic mitral valve) and left atrial appendage ligation on 9/7/21. Patient was transferred to surgical ICU after the surgery. She was extubated in the OR (orior to going to the ICU) and placed on Bipap, however she was extubated on the same day and placed on MV. She had a post surgical CT which showed mild pneumopericardium and trace left sided pneumothorax and bibasilar atelectasis. She also complicated with cardiogenic shock and was placed on dobutamine. In addition, she complicated with acute on chronic renal failure. On 9/11, the patient had increased respiratory secretions, white count was 19.4. CXR showed bilateral yousif-hilar opacities, small left pleural effusion and trace right apical pneumothorax. Patient was started on Iv zosyn empirically for HCAP on 9/11. Endotracheal culture (9/11) was positive for Streptococcus constellatus and  "Enterobacter cloacae. MRSA swab was positive. Bronchoscopy was performed on 9/12 and BAL culture was positive for Streptococcus constellatus and Candida albicans. Zosyn was switched to ertapenem on 9/13 basd on cultures. White count went down to 15.2 on 9/16, however over the last 2 days it is trending up again and today (9/18) the white count is 19.6. COVID test is negative. Blood cultures are negative to date. Urine culture has <10,000 of urogenital meagan. C diff from stool was negative. CXR today showed improved lung volumes with decreased basilar opacities. Patient was extubated on 9/13 and she is on room air since 9/15. She is afebrile. She was transferred from the ICU to the 6th floor (step down unit) on 9/15/21.      Patient states that the cough and sputum production are improved. Her C diff was negative. She denies urinary symptoms. Surgical site on the sternum seems to be healing well and I did not appreciate swelling , erythema or drainage. She denies skin breakdown in other areas.Noted that her white count has been trending down over the last 2 days. Her CT chest from 9/19 showed small left pleural effusion with basilar-predominant opacities favoring atelectasis. The CT abdomen/pelvis from 9/19 showed small substernal inflammatory phlegmon without any drainable fluid collection. The sternal wound does not look infected and it is healing well. Now with the white count downtrend ing I am less suspicious of a new infectious process. I recommend to complete 10 days of ertapenem and then stop.     Recommendations:     1. Please continue ertapenem for 2 more days to complete 10 days. End date of treatment on 9/22 after the last dose of that day     2. With the wbc trending down, sternal surgical site without signs of infection and lack of collections on CT chest, I have low suspicion that the  \"small substernal inflammatory phlegmon\" described on CT chest report is infectious. However  I would appreciate if " "cardiothoracic surgery could review the image as well     3. Please continue to monitor white count to assure it continues to trend down     4. I will sign off at his moment, please continue to monitor white count, please call me back if white count rises again and/or if patient develops fever and/or or if any question/ need.         Megan Benitez MD  Date of Service: 09/20/21  Pager: [phone]         Physical Exam:   /70 (BP Location: Left arm, Cuff Size: Adult Regular)   Pulse 64   Temp 97.8  F (36.6  C) (Oral)   Resp 16   Ht 1.689 m (5' 6.5\")   Wt 95.1 kg (209 lb 11.2 oz)   LMP  (LMP Unknown)   SpO2 100%   BMI 33.34 kg/m         Exam:    GENERAL:  Not in acute distress.   HEAD: Normocephalic and atraumatic  ENT:  No hearing impairment, oral mucous membranes moist  EYES:  Eyes grossly normal to inspection, PERRL and conjunctivae and sclerae normal   LUNGS:  Mild Crackles in both bases  CARDIOVASCULAR:  Regular rate and rhythm, normal S1 S2  ABDOMEN:  Soft, nontender, no hepatosplenomegaly, no masses and bowel sounds normal  SKIN:  Surgical site on the sternum seems to be healing well and I did not appreciate swelling, erythema or drainage  NEUROLOGIC:  Grossly nonfocal. Normal strength and tone, mentation intact and speech normal  PSYCHIATRIC: Mood stable, mentation appears normal, affect normal           Laboratory Data:     Creatinine   Date Value Ref Range Status   09/20/2021 0.81 0.52 - 1.04 mg/dL Final   09/18/2021 1.02 0.52 - 1.04 mg/dL Final   09/17/2021 1.03 0.52 - 1.04 mg/dL Final   09/16/2021 0.83 0.52 - 1.04 mg/dL Final   09/15/2021 0.75 0.52 - 1.04 mg/dL Final   07/07/2021 1.03 0.60 - 1.20 mg/dL Final   06/30/2021 0.94 0.60 - 1.20 mg/dL Final   06/23/2021 0.73 0.60 - 1.20 mg/dL Final   06/08/2021 0.88 0.52 - 1.04 mg/dL Final   05/27/2021 0.76 0.60 - 1.20 mg/dL Final     WBC   Date Value Ref Range Status   06/23/2021 9.7 4.0 - 11.0 10e9/L Final   06/08/2021 13.0 (H) 4.0 - 11.0 10e9/L " Final   05/27/2021 10.3 4.0 - 11.0 10e9/L Final   05/26/2021 12.1 (H) 4.0 - 11.0 10e9/L Final   05/17/2021 13.9 (H) 4.0 - 11.0 10e9/L Final     WBC Count   Date Value Ref Range Status   09/20/2021 15.6 (H) 4.0 - 11.0 10e3/uL Final   09/19/2021 17.1 (H) 4.0 - 11.0 10e3/uL Final   09/19/2021 17.5 (H) 4.0 - 11.0 10e3/uL Final   09/18/2021 19.6 (H) 4.0 - 11.0 10e3/uL Final   09/17/2021 18.0 (H) 4.0 - 11.0 10e3/uL Final     Hemoglobin   Date Value Ref Range Status   09/20/2021 8.4 (L) 11.7 - 15.7 g/dL Final   06/23/2021 12.2 11.7 - 15.7 g/dL Final     Platelet Count   Date Value Ref Range Status   09/20/2021 349 150 - 450 10e3/uL Final   06/23/2021 379 150 - 450 10e9/L Final     Lab Results   Component Value Date     09/20/2021    BUN 17 09/20/2021    CO2 23 09/20/2021     CRP Inflammation   Date Value Ref Range Status   09/18/2021 91.0 (H) 0.0 - 8.0 mg/L Final   06/23/2021 117.0 (H) <10.0 mg/L Final           Pertinent Recent Microbiology Data:   No results for input(s): CULT, SDES in the last 168 hours.         Imaging:     Recent Results (from the past 48 hour(s))   CT Chest Abdomen Pelvis w/o Contrast    Narrative    CT of the Chest, Abdomen and Pelvis without contrast, 9/19/2021 8:57  AM.    Comparison: Radiograph 9/18/2021, CT 9/8/2021,.    History: Leukocytosis, status post mitral valve replacement.     Technique: Axial images of the chest, abdomen and pelvis were obtained  without contrast. Coronal reconstructions were provided. Images were  reviewed in bone, lung, and soft tissue windows.     Total DLP: 1462.1 mGy*cm.    Findings:  Left hip arthroplasty. Postsurgical changes of mitral valve  replacement. Atrial flutter.    Chest: Thyroid gland appears unremarkable. Tracheobronchial tree  appears patent. Esophagus appears unremarkable. Multiple perifissural  nodules. Left lower lobe streaky subsegmental consolidative opacities.  Heart size within normal limits.. A small pericardial effusion. Small  soft  tissue density suggesting phlegmon in the substernal anterior  mediastinum (series 8, image 108). Small left and trace right pleural  effusions. Dilation of the main pulmonary artery measuring 3.4 cm.  Prominent mediastinal lymph nodes, possibly atelectasis.     Abdomen and Pelvis: Normal noncontrast appearance of the liver.  Cholecystectomy. No intrahepatic or extrahepatic biliary dilatation.   Pancreas unremarkable. Spleen size within normal limits. No suspicious  adrenal mass lesions. No evidence of hydronephrosis. Vascular  calcifications versus punctate nephrolithiasis of the left kidney.  Visualized ureters and urinary bladder is unremarkable.  No suspicious  reproductive mass on this noncontrast exam. No free fluid. No  diverticulitis. Normal caliber large and small bowel.   Atheromatous  calcifications of the aortoiliac vasculature. No suspicious or  enlarged mesenteric, retroperitoneal and pelvic lymph nodes.     Bones and Soft Tissues: No suspicious osseous lesion. No suspicious  soft tissue mass. Soft tissue stranding anterior to the sternum  corresponding to postsurgical changes. Degenerative changes of the  spine.      Impression    Impression:   1. Postsurgical changes of mitral valve replacement. Small substernal  inflammatory phlegmon in the anterior mediastinum. No drainable  abscess.  2. Small pericardial effusion.  3. Small left pleural effusion with basilar-predominant opacities,  streaky appearance favors atelectasis but could represent developing  infection given leukocytosis.  4. Trace right pleural effusion with trace atelectasis.  5. Dilation of the main pulmonary artery which can be seen with  pulmonary hypertension.  6. No acute abnormality in the abdomen or pelvis.    I have personally reviewed the examination and initial interpretation  and I agree with the findings.    PATRICIA SMITH MD         SYSTEM ID:  [id number]   Transesophageal Echocardiogram   Result Value    LVEF  55-60%     [facility]    [id number]  [id number]  [id number]  [id number]^KENDALL DIAMOND^[place]^SHY     [facility],[place]  Echocardiography Laboratory  [address] [place] [id number]     Name: DARIEN TRIMBLE  MRN: [id number]  : 1955  Study Date: 2021 10:16 AM  Age: 66 yrs  Gender: Female  [place] [facility] [place]  Reason For Study: Afib  Ordering Physician: [place] HUGHES  Performed By: DAREK Lindsey MD     BSA: 2.0 m2  Height: 66 in  Weight: 202 lb  HR: 87  BP: 96/65 mmHg  ______________________________________________________________________________  Interpretation Summary  S/P 1) Mitral valve replacement - 29mm St Andrey Epic mitral valve  2) Bilateral pulmonary vein isolation  3) Left atrial appendage ligation on 2021.  S/P KENZIE ligation. No flow or thrombus noted.  A bioprosthetic mitral valve is present.  The mean gradient across the mitral valve is 7.5 mmHg.  ______________________________________________________________________________  Left Ventricle  S/P 1) Mitral valve replacement - 29mm St Andrey Epic mitral valve  2) Bilateral pulmonary vein isolation  3) Left atrial appendage ligation on 2021. Global and regional left  ventricular function is normal with an EF of 55-60%.     Right Ventricle  Right ventricular function, chamber size, wall motion, and thickness are  normal.     Atria  The atria cannot be assessed. S/P KENZIE ligation. No flow or thrombus noted. The  atrial septum is intact as assessed by color Doppler .     Mitral Valve  No MR. The mean gradient across the mitral valve is 7.5 mmHg. A bioprosthetic  mitral valve is present.     Aortic Valve  Trileaflet aortic sclerosis without stenosis.     Tricuspid Valve  The tricuspid valve is normal. Trace to mild tricuspid insufficiency is  present.     Pulmonic Valve  The pulmonic valve is normal.     Vessels  The thoracic aorta is normal. The pulmonary artery and bifurcation cannot be  assessed. The inferior vena cava  is normal.     Pericardium  No pericardial effusion is present.     Miscellaneous  Transgastric views not obtained due to patient discomfort.     ______________________________________________________________________________  Report approved by: Jennifer Swan 09/20/2021 10:54 AM     ______________________________________________________________________________      Cardioversion    Narrative    Britany Rodriguez APRN CNP     9/20/2021 11:10 AM  [facility]    Procedure: Cardioversion    Date/Time: 9/20/2021 11:09 AM  Performed by: Britany Rodriguez APRN CNP  Authorized by: Britany Rodriguez APRN CNP     UNIVERSAL PROTOCOL   Site Marked: NA  Prior Images Obtained and Reviewed:  JAYNA  Required items: Required blood products, implants, devices and special   equipment available    Patient identity confirmed:  Verbally with patient  Patient was reevaluated immediately before administering moderate or deep   sedation or anesthesia  Confirmation Checklist:  Patient's identity using two indicators  Time out: Immediately prior to the procedure a time out was called    Universal Protocol: the Joint Commission Universal Protocol was followed           ANESTHESIA  Anesthesia was administered and monitored by anesthesiology.  See   anesthesia documentation for details.    PROCEDURE DETAILS  Pre-procedure rhythm: atrial fibrillation  Patient position: patient was placed in a supine position  Chest area: chest area exposed  Electrodes: pads  Electrodes placed: anterior-posterior  Number of attempts: 1    Details of Attempts:  150J biphasic shock delivered    Post-procedure rhythm: normal sinus rhythm  Complications: no complications    PROCEDURE   Patient Tolerance:  Patient tolerated the procedure well with no immediate   complications    Length of time physician/provider present for 1:1 monitoring during   sedation: 0

## 2021-09-20 NOTE — PLAN OF CARE
D: pt admitted on 9/7 for mitral valve repair and left atrial appendage ligation.   PMH of Arthritis, sleep apnea, hypertension, CHF, and mitral valve stenosis.     I: Monitored vitals and assessed pt status.   Changes during this shift: Started day in a-fib and 1150units/hr Heparin drip. Had cardioversion in AM afterwhich she switched to SR and heparin drip was discontinued. Was ordered new prescription warfarin and metrolprolol.       A:   Neuro: Ax4 Denies Headache, dizziness,  Lightheadedness, numbness and tingling. calls appropriately   Cardiac: SR  Afebrile, VSS.  Denies chest pain.   Respiratory: sating >95 ON RA. Dyspnea on exertion. LS clear in upper lobes with fine crackles and diminished in bases.   Diet/appetite: regular Diet. Good appetite.   GI/:  2 Small and 2 medium BM this shift. Denies abdominal pain. Good urine output.   Activity:  +1 assist with walker, changes position in bed independently.  Pain: Muscle pain in right chest and shoulder area.  Skin: WNL, Warm, dry, normal color     Plan: To discharge once WBC reaches normal limits and breathing is ok.

## 2021-09-21 ENCOUNTER — APPOINTMENT (OUTPATIENT)
Dept: OCCUPATIONAL THERAPY | Facility: CLINIC | Age: 66
DRG: 219 | End: 2021-09-21
Attending: THORACIC SURGERY (CARDIOTHORACIC VASCULAR SURGERY)
Payer: MEDICARE

## 2021-09-21 ENCOUNTER — APPOINTMENT (OUTPATIENT)
Dept: PHYSICAL THERAPY | Facility: CLINIC | Age: 66
DRG: 219 | End: 2021-09-21
Attending: THORACIC SURGERY (CARDIOTHORACIC VASCULAR SURGERY)
Payer: MEDICARE

## 2021-09-21 LAB
ANION GAP SERPL CALCULATED.3IONS-SCNC: 7 MMOL/L (ref 3–14)
ATRIAL RATE - MUSE: 61 BPM
BUN SERPL-MCNC: 18 MG/DL (ref 7–30)
CALCIUM SERPL-MCNC: 8.6 MG/DL (ref 8.5–10.1)
CHLORIDE BLD-SCNC: 104 MMOL/L (ref 94–109)
CO2 SERPL-SCNC: 24 MMOL/L (ref 20–32)
CREAT SERPL-MCNC: 0.84 MG/DL (ref 0.52–1.04)
DIASTOLIC BLOOD PRESSURE - MUSE: NORMAL MMHG
ERYTHROCYTE [DISTWIDTH] IN BLOOD BY AUTOMATED COUNT: 21.8 % (ref 10–15)
GFR SERPL CREATININE-BSD FRML MDRD: 73 ML/MIN/1.73M2
GLUCOSE BLD-MCNC: 112 MG/DL (ref 70–99)
HCT VFR BLD AUTO: 27.4 % (ref 35–47)
HGB BLD-MCNC: 8.2 G/DL (ref 11.7–15.7)
INR PPP: 3.48 (ref 0.85–1.15)
INTERPRETATION ECG - MUSE: NORMAL
MAGNESIUM SERPL-MCNC: 2 MG/DL (ref 1.6–2.3)
MCH RBC QN AUTO: 27 PG (ref 26.5–33)
MCHC RBC AUTO-ENTMCNC: 29.9 G/DL (ref 31.5–36.5)
MCV RBC AUTO: 90 FL (ref 78–100)
P AXIS - MUSE: -50 DEGREES
PATH REPORT.COMMENTS IMP SPEC: NORMAL
PATH REPORT.COMMENTS IMP SPEC: NORMAL
PATH REPORT.FINAL DX SPEC: NORMAL
PATH REPORT.MICROSCOPIC SPEC OTHER STN: NORMAL
PATH REPORT.MICROSCOPIC SPEC OTHER STN: NORMAL
PATH REPORT.RELEVANT HX SPEC: NORMAL
PHOSPHATE SERPL-MCNC: 4.3 MG/DL (ref 2.5–4.5)
PLATELET # BLD AUTO: 381 10E3/UL (ref 150–450)
POTASSIUM BLD-SCNC: 4.4 MMOL/L (ref 3.4–5.3)
PR INTERVAL - MUSE: 174 MS
QRS DURATION - MUSE: 80 MS
QT - MUSE: 458 MS
QTC - MUSE: 461 MS
R AXIS - MUSE: 27 DEGREES
RBC # BLD AUTO: 3.04 10E6/UL (ref 3.8–5.2)
SODIUM SERPL-SCNC: 135 MMOL/L (ref 133–144)
SYSTOLIC BLOOD PRESSURE - MUSE: NORMAL MMHG
T AXIS - MUSE: 52 DEGREES
VENTRICULAR RATE- MUSE: 61 BPM
WBC # BLD AUTO: 11.5 10E3/UL (ref 4–11)

## 2021-09-21 PROCEDURE — 214N000001 HC R&B CCU UMMC

## 2021-09-21 PROCEDURE — 250N000013 HC RX MED GY IP 250 OP 250 PS 637: Performed by: NURSE PRACTITIONER

## 2021-09-21 PROCEDURE — 85014 HEMATOCRIT: CPT | Performed by: NURSE PRACTITIONER

## 2021-09-21 PROCEDURE — 97116 GAIT TRAINING THERAPY: CPT | Mod: GP | Performed by: REHABILITATION PRACTITIONER

## 2021-09-21 PROCEDURE — 97535 SELF CARE MNGMENT TRAINING: CPT | Mod: GO | Performed by: OCCUPATIONAL THERAPIST

## 2021-09-21 PROCEDURE — 97530 THERAPEUTIC ACTIVITIES: CPT | Mod: GP | Performed by: REHABILITATION PRACTITIONER

## 2021-09-21 PROCEDURE — 250N000013 HC RX MED GY IP 250 OP 250 PS 637: Performed by: PHYSICIAN ASSISTANT

## 2021-09-21 PROCEDURE — 36415 COLL VENOUS BLD VENIPUNCTURE: CPT | Performed by: PHYSICIAN ASSISTANT

## 2021-09-21 PROCEDURE — 80048 BASIC METABOLIC PNL TOTAL CA: CPT | Performed by: PHYSICIAN ASSISTANT

## 2021-09-21 PROCEDURE — 84100 ASSAY OF PHOSPHORUS: CPT | Performed by: THORACIC SURGERY (CARDIOTHORACIC VASCULAR SURGERY)

## 2021-09-21 PROCEDURE — 85060 BLOOD SMEAR INTERPRETATION: CPT | Performed by: STUDENT IN AN ORGANIZED HEALTH CARE EDUCATION/TRAINING PROGRAM

## 2021-09-21 PROCEDURE — 85610 PROTHROMBIN TIME: CPT | Performed by: STUDENT IN AN ORGANIZED HEALTH CARE EDUCATION/TRAINING PROGRAM

## 2021-09-21 PROCEDURE — 250N000011 HC RX IP 250 OP 636: Performed by: PHYSICIAN ASSISTANT

## 2021-09-21 PROCEDURE — 83735 ASSAY OF MAGNESIUM: CPT | Performed by: NURSE PRACTITIONER

## 2021-09-21 PROCEDURE — 250N000013 HC RX MED GY IP 250 OP 250 PS 637: Performed by: THORACIC SURGERY (CARDIOTHORACIC VASCULAR SURGERY)

## 2021-09-21 RX ORDER — TORSEMIDE 10 MG/1
10 TABLET ORAL DAILY
Status: DISCONTINUED | OUTPATIENT
Start: 2021-09-21 | End: 2021-09-22 | Stop reason: HOSPADM

## 2021-09-21 RX ORDER — HYDROXYZINE HYDROCHLORIDE 25 MG/1
25 TABLET, FILM COATED ORAL
Status: DISCONTINUED | OUTPATIENT
Start: 2021-09-21 | End: 2021-09-22 | Stop reason: HOSPADM

## 2021-09-21 RX ADMIN — GABAPENTIN 1200 MG: 300 CAPSULE ORAL at 21:18

## 2021-09-21 RX ADMIN — PANTOPRAZOLE SODIUM 40 MG: 40 TABLET, DELAYED RELEASE ORAL at 08:07

## 2021-09-21 RX ADMIN — ASPIRIN 81 MG: 81 TABLET, CHEWABLE ORAL at 08:07

## 2021-09-21 RX ADMIN — Medication 1 TABLET: at 08:07

## 2021-09-21 RX ADMIN — DULOXETINE 30 MG: 30 CAPSULE, DELAYED RELEASE ORAL at 21:18

## 2021-09-21 RX ADMIN — LIDOCAINE 2 PATCH: 560 PATCH PERCUTANEOUS; TOPICAL; TRANSDERMAL at 19:58

## 2021-09-21 RX ADMIN — GABAPENTIN 600 MG: 300 CAPSULE ORAL at 08:07

## 2021-09-21 RX ADMIN — METHOCARBAMOL 750 MG: 750 TABLET ORAL at 19:58

## 2021-09-21 RX ADMIN — Medication 400 MG: at 08:07

## 2021-09-21 RX ADMIN — Medication 400 MG: at 19:58

## 2021-09-21 RX ADMIN — METHOCARBAMOL 750 MG: 750 TABLET ORAL at 08:07

## 2021-09-21 RX ADMIN — OXYCODONE HYDROCHLORIDE 5 MG: 5 TABLET ORAL at 11:07

## 2021-09-21 RX ADMIN — AMIODARONE HYDROCHLORIDE 400 MG: 200 TABLET ORAL at 08:07

## 2021-09-21 RX ADMIN — DOCUSATE SODIUM 50 MG AND SENNOSIDES 8.6 MG 2 TABLET: 8.6; 5 TABLET, FILM COATED ORAL at 19:57

## 2021-09-21 RX ADMIN — TORSEMIDE 10 MG: 10 TABLET ORAL at 14:14

## 2021-09-21 RX ADMIN — METOPROLOL SUCCINATE 25 MG: 25 TABLET, EXTENDED RELEASE ORAL at 08:07

## 2021-09-21 RX ADMIN — METHOCARBAMOL 750 MG: 750 TABLET ORAL at 14:19

## 2021-09-21 RX ADMIN — GABAPENTIN 600 MG: 300 CAPSULE ORAL at 15:27

## 2021-09-21 RX ADMIN — ERTAPENEM SODIUM 1 G: 1 INJECTION, POWDER, LYOPHILIZED, FOR SOLUTION INTRAMUSCULAR; INTRAVENOUS at 14:14

## 2021-09-21 ASSESSMENT — ACTIVITIES OF DAILY LIVING (ADL)
ADLS_ACUITY_SCORE: 10
ADLS_ACUITY_SCORE: 11
ADLS_ACUITY_SCORE: 10
ADLS_ACUITY_SCORE: 8
ADLS_ACUITY_SCORE: 9
ADLS_ACUITY_SCORE: 9

## 2021-09-21 ASSESSMENT — MIFFLIN-ST. JEOR: SCORE: 1521.71

## 2021-09-21 NOTE — PLAN OF CARE
"/74 (BP Location: Right arm)   Pulse 61   Temp 98.1  F (36.7  C) (Oral)   Resp 18   Ht 1.689 m (5' 6.5\")   Wt 95.7 kg (211 lb)   LMP  (LMP Unknown)   SpO2 93%   BMI 33.55 kg/m    VSS on RA. A/O x4 and able to make needs known. No c/o of pain in chest or shoulder overnight after HS pain meds. Reports some UGARTE, crackles in lower lobes present. Acapella and IS encouraged.  SR on cardiac monitoring. L. PIV SL, R. PIV removed after leaking and not functioning. Voiding spontaneously and not saving. LBM 9/20. On regular diet and tolerating well. Sternal incision YURI with liquid bandage, no drainage noted. A1 with walker in room to bathroom. Lidocaine patch on chest, no nicotine patch in place. Continue IV abx and WBC monitoring per ID. Pt wanting potential discharge today. Continue POC.  "

## 2021-09-21 NOTE — PROGRESS NOTES
"  Cardiovascular Surgery Progress Note  09/21/2021  Surgeon: Dr. Acuna           Assessment and Plan:     Corina Martinez is a 66 year old female with a PMH of arthritis, SHAILESH, HTN, pafib (AC), CHF, tobacco abuse (1.0 PPD), and mitral valve stenosis. Admitted 09/07 for elective bioprosthetic MVR (29 mm [facility] Epic) w/ left atrial appendage ligation,and bilateral pulmonary vein isolation with Dr. Acuna.     Cardiovascular:   Mitral stenosis s/p bioprosthetic MVR  HFpEF  - ANTONIO 09/20 with preserved biventricular function, MV replacement with mean gradient 7 mmHg at HR 87 BPM, no MR or PVL  - BP controlled, on Lopressor 25 TID, but not getting all doses due to hold parameters. Transition to Toprol 25 mg daily, titrate as BP allows  - Appears near euvolemic. Diuretic PRN  - Aspirin 81 mg daily                                                                                    - Chest tubes: bilat pleural out 09/14, meds 09/15  - TPW: removed 09/15  Paroxysmal atrial fibrillation RVR  Postop atrial flutter  - S/p PVI and KENZIE excision  - Small KENZIE thrombus at time of surgery  - Recurrent atrial fibrillation post-op  - Amiodarone 400 mg daily  - S/p DCCV 09/20, remains in SR  - Warfarin to INR goal 2-3, therapeutic, stopped heparin bridge 9/20    Pulmonary:  Postop mechanical ventilation, resolved  COPD  - Extubated POD 0 in OR, reintubated POD1 09/08, extubated 09/13; Saturating well on RA-2L.   - Supplemental O2 PRN to keep sats > 92%. Wean off as tolerated.  - Pulm toilet, IS, activity and deep breathing   - Treating for VAP, abx as below  Tobacco abuse  - NRT  - Encourage cessation    Neurology / MSK:  Postop pain  - Scheduled gabapentin (PTA dose), lidocaine patch, Robaxin. PRN oxy, and dilaudid. Tylenol \"allergy\"  - NIKKI catheters B/l  (9/7-9/14)  Postop delirium with hallucinations, resolved  - Seroquel discontinued  - Melatonin 5 mg at bedtime  Anxiety  Depression  - Continue PTA cymbalta 30 mg daily  - resume PTA " "atarax 25 mg at HS prn resumed      / Renal:  RADHA, resolved  - BL creat ~0.8-1, peaked at 1.72, creat 0.84 today   - Avoid nephrotoxins  - Trend BMP  - Last dose of IV Bumex was 9/18.   - Weight up 0.6 kg today, up 2 kg from admission  - resume home dose of Torsemide at a lower dose of 10 mg daily     GI / FEN:   Nutrition  - Regular diet, continue bowel regimen    Endocrine:  Stress induced hyperglycemia, resolved  - Sliding scale insulin discontinued    Infectious Disease:  Stress induced leukocytosis  HAP  - WBC 11.5 (15.6), afebrile  - Completed perioperative antibiotics  - CT C/A/P 09/19 with continued LLL consolidation/infiltrate. Extend ertapenem for 10 day course through 09/22      Hematology:   Acute blood loss anemia and thrombocytopenia  - Hgb 8.2; Plt 349, no signs or symptoms of active bleeding  - Trend CBC, transfuse PRN    Anticoagulation:   - Aspirin 81 mg daily  - Coumadin for bioprosthetic MV and warfarin with DCCV 09/20, INR therapeutic      Prophylaxis:   - Stress ulcer prophylaxis: Pantoprazole 40 mg daily (PTA omeprazole for GERD)  - DVT prophylaxis: Subcutaneous heparin, SCD    Disposition:   - Transferred to  on POD8  -  Patient and  intending to return to their mobile home stay in the area for a week, then drive to [place].   As of today, they are agreeable to follow up with INR check with primary clinic in [place].   Sent in-basket to CVTS team to schedule a f/u with CVTS next week.        Discussed with Dr. Adan.     Kimberli Padilla, DNP, CNP  [facility] Cardiothoracic Surgery  O: [phone]  Pgr [phone]          Interval History:   Continues to be in SR. No complaints.   No sob.   Pain well controlled with some residual musculoskeletal          Physical Exam:   Blood pressure 132/80, pulse 61, temperature 98.3  F (36.8  C), temperature source Oral, resp. rate 18, height 1.689 m (5' 6.5\"), weight 95.7 kg (211 lb), SpO2 94 %, not currently breastfeeding.  Vitals:    09/19/21 0355 " 09/20/21 0426 09/21/21 0330   Weight: 93.8 kg (206 lb 12.8 oz) 95.1 kg (209 lb 11.2 oz) 95.7 kg (211 lb)          Gen: NAD, sitting up in bed eating breakfast.   CV: RRR, S1S2 normal, no murmurs, rubs, or gallops. JVP not elevated  Pulm: diminished bases  Abd: soft, non-tender, no guarding, +BS  Ext: no lower extremity edema  Incision: clean, dry, intact, no erythema  Chest Tube sites: dressings clean and dry  Neuro: grossly normal  Psych: calm, cooperative            Data:    Imaging:  reviewed recent imaging  No results found for this or any previous visit (from the past 24 hour(s)).      Labs:  BMP  Recent Labs   Lab 09/21/21  0630 09/20/21  0444 09/19/21  0545 09/18/21  0613 09/17/21  0539 09/17/21  0539    136  --  138  --  139   POTASSIUM 4.4 4.5  4.3 4.2 4.0  3.9   < > 4.0  3.8   CHLORIDE 104 106  --  106  --  107   LOC 8.6 8.6  --  8.4*  --  8.3*   CO2 24 23  --  23  --  23   BUN 18 17  --  23  --  27   CR 0.84 0.81  --  1.02  --  1.03   * 96  --  105*  --  103*    < > = values in this interval not displayed.     CBC  Recent Labs   Lab 09/21/21 0630 09/20/21  0444 09/19/21  0942 09/19/21  0545   WBC 11.5* 15.6* 17.1* 17.5*   RBC 3.04* 3.07* 3.13* 3.16*   HGB 8.2* 8.4* 8.6* 8.5*   HCT 27.4* 27.2* 27.8* 28.5*   MCV 90 89 89 90   MCH 27.0 27.4 27.5 26.9   MCHC 29.9* 30.9* 30.9* 29.8*   RDW 21.8* 22.7* 23.1* 23.0*    349 382 382     INR  Recent Labs   Lab 09/21/21  0630 09/20/21  0444 09/19/21  0545 09/18/21  0613   INR 3.48* 2.17* 1.67* 1.56*      Hepatic Panel  No lab results found in last 7 days.  GLUCOSE:   Recent Labs   Lab 09/21/21  0630 09/20/21  0444 09/18/21  0613 09/17/21  0539 09/16/21  0759 09/16/21  0620   * 96 105* 103* 101* 104*

## 2021-09-21 NOTE — PLAN OF CARE
Pt feeling well today; maintaining NSR since recent DCCV. Up in chair often and ambulating to bathroom with walker and SBA. Incisional pain controlled with scheduled robaxin and prn oxycodone.   Plan to given daily IV abx 2 hrs earlier today (ok by pharmacy) and advance med again by 2 more hrs tomorrow for her last dose before likely discharge to her mobile home afterwards. Pt plans on staying locally for a week before eventually driving with her spouse south.  INR 3.48-holding coumadin dose today. Plan recheck in a.m and care coordinator arranging outpatient INR monitoring.

## 2021-09-21 NOTE — PROGRESS NOTES
Care Management Follow Up    Length of Stay (days): 14    Expected Discharge Date: 9/22/2021     Concerns to be Addressed: Outpatient INR and warfarin monitoring  Patient plan of care discussed at interdisciplinary rounds: Yes    Anticipated Discharge Disposition: RV      Anticipated Discharge Services: Outpatient INR and warfarin monitoring    Anticipated Discharge DME: N/A    Private pay costs discussed: Not applicable    Additional Information:  This writer met with pt to follow up on discharge planning. Pt was on warfarin prior to admission managed by her PCP, Dr. Kika Tariq at [facility] (Ph: [phone], Fax: [phone].) Pt states she has decided to stay locally for at least another week and is agreeable to having her INR drawn this Friday, 9/24. This writer called the [facility] and requested they send a message to her primary team as they are out today.   The patient states she will leave for [place] late next week where she plans to stay next. Pt states she has called down and plans to establish care with her brother's PCP and cardiologist. Will print patient ROLY forms per her request.  Cardiologist: Dr. Benton Shafer, [facility], Phone: [phone]    CC will continue to monitor patient's medical condition and progress towards discharge.  Tiana Lamar RN BSN  6C Unit Care Coordinator  Phone number: [phone]  Pager: [phone]

## 2021-09-21 NOTE — PROGRESS NOTES
Neuro: A&Ox4.   Cardiac: SR in low 60s. Pt had ANTONIO with cardioversion this morning.  Respiratory: RA. LS with crackles in the lower lobes. Encouraged to use IS/Acapella every hour while awake.   GI/: Voiding spontaneously. Several bms today. Evening bowel meds held.  Diet/appetite: Tolerating reg diet. Denies nausea   Activity: Up with 1 assist.   Pain: C/O pain in the L chest/shoulder, unchanged. Scheduled lidocaine patch and robaxin given.   Skin: Sternal incision YURI, clean and dry.   Lines: PIV x 2.   Plan: On IV abx x 2 more days per ID note, pt wants to be discharged tomorrow. Monitor WBC.

## 2021-09-22 ENCOUNTER — APPOINTMENT (OUTPATIENT)
Dept: OCCUPATIONAL THERAPY | Facility: CLINIC | Age: 66
DRG: 219 | End: 2021-09-22
Attending: THORACIC SURGERY (CARDIOTHORACIC VASCULAR SURGERY)
Payer: MEDICARE

## 2021-09-22 VITALS
SYSTOLIC BLOOD PRESSURE: 99 MMHG | BODY MASS INDEX: 34.01 KG/M2 | OXYGEN SATURATION: 91 % | WEIGHT: 211.6 LBS | TEMPERATURE: 98 F | HEART RATE: 64 BPM | RESPIRATION RATE: 18 BRPM | HEIGHT: 66 IN | DIASTOLIC BLOOD PRESSURE: 64 MMHG

## 2021-09-22 LAB
ANION GAP SERPL CALCULATED.3IONS-SCNC: 4 MMOL/L (ref 3–14)
BUN SERPL-MCNC: 17 MG/DL (ref 7–30)
CALCIUM SERPL-MCNC: 8.8 MG/DL (ref 8.5–10.1)
CHLORIDE BLD-SCNC: 105 MMOL/L (ref 94–109)
CO2 SERPL-SCNC: 28 MMOL/L (ref 20–32)
CREAT SERPL-MCNC: 0.97 MG/DL (ref 0.52–1.04)
ERYTHROCYTE [DISTWIDTH] IN BLOOD BY AUTOMATED COUNT: 21.4 % (ref 10–15)
GFR SERPL CREATININE-BSD FRML MDRD: 61 ML/MIN/1.73M2
GLUCOSE BLD-MCNC: 103 MG/DL (ref 70–99)
HCT VFR BLD AUTO: 26.8 % (ref 35–47)
HGB BLD-MCNC: 8.1 G/DL (ref 11.7–15.7)
INR PPP: 3.34 (ref 0.85–1.15)
MAGNESIUM SERPL-MCNC: 1.9 MG/DL (ref 1.6–2.3)
MCH RBC QN AUTO: 26.9 PG (ref 26.5–33)
MCHC RBC AUTO-ENTMCNC: 30.2 G/DL (ref 31.5–36.5)
MCV RBC AUTO: 89 FL (ref 78–100)
PLATELET # BLD AUTO: 346 10E3/UL (ref 150–450)
POTASSIUM BLD-SCNC: 4.3 MMOL/L (ref 3.4–5.3)
RBC # BLD AUTO: 3.01 10E6/UL (ref 3.8–5.2)
SODIUM SERPL-SCNC: 137 MMOL/L (ref 133–144)
WBC # BLD AUTO: 10.1 10E3/UL (ref 4–11)

## 2021-09-22 PROCEDURE — 85027 COMPLETE CBC AUTOMATED: CPT | Performed by: NURSE PRACTITIONER

## 2021-09-22 PROCEDURE — 80048 BASIC METABOLIC PNL TOTAL CA: CPT | Performed by: PHYSICIAN ASSISTANT

## 2021-09-22 PROCEDURE — 250N000011 HC RX IP 250 OP 636: Performed by: PHYSICIAN ASSISTANT

## 2021-09-22 PROCEDURE — 250N000013 HC RX MED GY IP 250 OP 250 PS 637: Performed by: NURSE PRACTITIONER

## 2021-09-22 PROCEDURE — 97535 SELF CARE MNGMENT TRAINING: CPT | Mod: GO

## 2021-09-22 PROCEDURE — 36415 COLL VENOUS BLD VENIPUNCTURE: CPT | Performed by: PHYSICIAN ASSISTANT

## 2021-09-22 PROCEDURE — 83735 ASSAY OF MAGNESIUM: CPT | Performed by: NURSE PRACTITIONER

## 2021-09-22 PROCEDURE — 250N000013 HC RX MED GY IP 250 OP 250 PS 637: Performed by: THORACIC SURGERY (CARDIOTHORACIC VASCULAR SURGERY)

## 2021-09-22 PROCEDURE — 85610 PROTHROMBIN TIME: CPT | Performed by: STUDENT IN AN ORGANIZED HEALTH CARE EDUCATION/TRAINING PROGRAM

## 2021-09-22 RX ORDER — WARFARIN SODIUM 1 MG/1
1 TABLET ORAL
Status: DISCONTINUED | OUTPATIENT
Start: 2021-09-22 | End: 2021-09-22 | Stop reason: HOSPADM

## 2021-09-22 RX ORDER — TORSEMIDE 10 MG/1
10 TABLET ORAL DAILY
Qty: 90 TABLET | Refills: 0 | COMMUNITY
Start: 2021-09-22

## 2021-09-22 RX ORDER — AMOXICILLIN 250 MG
1 CAPSULE ORAL 2 TIMES DAILY PRN
Qty: 60 TABLET | Refills: 0 | Status: SHIPPED | OUTPATIENT
Start: 2021-09-22

## 2021-09-22 RX ORDER — AMIODARONE HYDROCHLORIDE 200 MG/1
TABLET ORAL
Qty: 45 TABLET | Refills: 1 | Status: SHIPPED | OUTPATIENT
Start: 2021-09-22 | End: 2021-09-28

## 2021-09-22 RX ORDER — POLYETHYLENE GLYCOL 3350 17 G/17G
17 POWDER, FOR SOLUTION ORAL DAILY PRN
Qty: 510 G | Refills: 0 | Status: SHIPPED | OUTPATIENT
Start: 2021-09-22

## 2021-09-22 RX ORDER — METOPROLOL SUCCINATE 25 MG/1
12.5 TABLET, EXTENDED RELEASE ORAL DAILY
Qty: 30 TABLET | Refills: 0 | Status: SHIPPED | OUTPATIENT
Start: 2021-09-23 | End: 2021-09-28

## 2021-09-22 RX ORDER — WARFARIN SODIUM 5 MG/1
TABLET ORAL
Qty: 90 TABLET | Refills: 0 | Status: SHIPPED | OUTPATIENT
Start: 2021-09-22

## 2021-09-22 RX ORDER — OXYCODONE HYDROCHLORIDE 5 MG/1
5 TABLET ORAL EVERY 6 HOURS PRN
Qty: 30 TABLET | Refills: 0 | Status: SHIPPED | OUTPATIENT
Start: 2021-09-22

## 2021-09-22 RX ORDER — METHOCARBAMOL 500 MG/1
500 TABLET, FILM COATED ORAL 4 TIMES DAILY PRN
Qty: 40 TABLET | Refills: 0 | Status: SHIPPED | OUTPATIENT
Start: 2021-09-22 | End: 2021-09-28

## 2021-09-22 RX ORDER — ASPIRIN 81 MG/1
81 TABLET, CHEWABLE ORAL DAILY
Qty: 30 TABLET | Refills: 1 | Status: SHIPPED | OUTPATIENT
Start: 2021-09-23

## 2021-09-22 RX ORDER — WARFARIN SODIUM 5 MG/1
TABLET ORAL
Qty: 90 TABLET | Refills: 0 | COMMUNITY
Start: 2021-09-22 | End: 2021-09-22

## 2021-09-22 RX ORDER — NICOTINE 21 MG/24HR
1 PATCH, TRANSDERMAL 24 HOURS TRANSDERMAL DAILY
Qty: 14 PATCH | Refills: 0 | Status: SHIPPED | OUTPATIENT
Start: 2021-09-23

## 2021-09-22 RX ORDER — POTASSIUM CHLORIDE 1500 MG/1
20 TABLET, EXTENDED RELEASE ORAL DAILY
COMMUNITY
Start: 2021-09-22

## 2021-09-22 RX ORDER — MAGNESIUM OXIDE 400 MG/1
400 TABLET ORAL 2 TIMES DAILY
Status: DISCONTINUED | OUTPATIENT
Start: 2021-09-22 | End: 2021-09-22 | Stop reason: HOSPADM

## 2021-09-22 RX ORDER — PANTOPRAZOLE SODIUM 40 MG/1
40 TABLET, DELAYED RELEASE ORAL
Qty: 30 TABLET | Refills: 0 | Status: SHIPPED | OUTPATIENT
Start: 2021-09-23

## 2021-09-22 RX ADMIN — Medication 12.5 MG: at 08:08

## 2021-09-22 RX ADMIN — TORSEMIDE 10 MG: 10 TABLET ORAL at 08:09

## 2021-09-22 RX ADMIN — Medication 400 MG: at 08:09

## 2021-09-22 RX ADMIN — Medication 1 TABLET: at 08:08

## 2021-09-22 RX ADMIN — ASPIRIN 81 MG: 81 TABLET, CHEWABLE ORAL at 08:08

## 2021-09-22 RX ADMIN — ERTAPENEM SODIUM 1 G: 1 INJECTION, POWDER, LYOPHILIZED, FOR SOLUTION INTRAMUSCULAR; INTRAVENOUS at 11:51

## 2021-09-22 RX ADMIN — AMIODARONE HYDROCHLORIDE 400 MG: 200 TABLET ORAL at 08:09

## 2021-09-22 RX ADMIN — GABAPENTIN 600 MG: 300 CAPSULE ORAL at 08:09

## 2021-09-22 RX ADMIN — METHOCARBAMOL 750 MG: 750 TABLET ORAL at 08:09

## 2021-09-22 RX ADMIN — PANTOPRAZOLE SODIUM 40 MG: 40 TABLET, DELAYED RELEASE ORAL at 08:08

## 2021-09-22 ASSESSMENT — ACTIVITIES OF DAILY LIVING (ADL)
ADLS_ACUITY_SCORE: 10
ADLS_ACUITY_SCORE: 8
ADLS_ACUITY_SCORE: 10
ADLS_ACUITY_SCORE: 10

## 2021-09-22 ASSESSMENT — MIFFLIN-ST. JEOR: SCORE: 1524.43

## 2021-09-22 NOTE — PLAN OF CARE
Physical Therapy Discharge Summary    Reason for therapy discharge:    Discharged to home.    Progress towards therapy goal(s). See goals on Care Plan in [facility] electronic health record for goal details.  Goals not met.  Barriers to achieving goals:   discharge from facility.    Therapy recommendation(s):    Continued therapy is recommended.  Rationale/Recommendations:  TCU was recommended however pt discharged home. OP Cardiac rehab recommended; note per SW pt plans to leave for [place] next week & stay there.  Continue home exercise program.  Ambulation program

## 2021-09-22 NOTE — PLAN OF CARE
Occupational Therapy Discharge Summary    Reason for therapy discharge:    Discharged to home with outpatient therapy.    Progress towards therapy goal(s). See goals on Care Plan in [facility] electronic health record for goal details.  Goals partially met.  Barriers to achieving goals:   discharge from facility.    Therapy recommendation(s):    Continued therapy is recommended.  Rationale/Recommendations:  Rec OP CR. Pt will benefit from OP CR for progressive monitored physical activity for overall cardiac health.

## 2021-09-22 NOTE — DISCHARGE SUMMARY
[facility], [place]   [facility] Discharge Summary     Corina Martinez MRN# [id number]   Age: 66 year old YOB: 1955     Admitting Physician:  Sven Acuna MD  Discharge Physician:  SUZE Fernandes CNP  Primary Care Physician:        Kika Tariq     DATE OF ADMISSION: 9/7/2021      DATE OF DISCHARGE: September 22, 2021     Admit Wt: 93.9 kg   Discharge Wt: 96 kg          Primary Diagnoses:   1. Mitral Stenosis   2. S/P Mitral valve replacement with a 29 mm St. Andrey Epic bioprosthetic valve, left atrial appendage ligation and bilateral pulmonary vein isolation 9/7/2021  3. Paroxysmal atrial fibrillation/flutter    4. S/p Direct current cardioversion 9/20/2021  5. Anticoagulation with coumadin, INR goal 2-3  6. COPD   7. Tobacco abuse   8. Post-op delirium   9. Chronic back pain   10. RADHA   11. Hospital acquired pneumonia               Secondary Diagnoses:   1. Anxiety   2. Depression       PROCEDURES PERFORMED:   Date: 9/7/2021.  Surgeon: Dr. Sven Acuna  PROCEDURE:  1) Mitral valve replacement - 29mm St Andrey Epic mitral valve  2) Bilateral pulmonary vein isolation  3) Left atrial appendage ligation  4) Transesophageal echocardiogram    OPERATIVE FINDINGS:  1) EF 60%  2) Severe mitral stenosis with extensive mitral annular calcification  3) Dense pericardial adhesions    INTRAOPERATIVE COMPLICATIONS:  none    PATHOLOGY RESULTS:    Peripheral blood smear 9/19/2021:   Final Diagnosis   Peripheral Blood Smear:  -Moderate normochromic, normocytic anemia; increased erythrocyte regeneration  -Slight leukocytosis; neutrophilia with reactive morphology  -See comment      Electronically signed by Fiona Monreal MD on 9/21/2021 at  2:16 PM   Comment    The patient's leukocytosis consists predominantly of mature neutrophils, some of which show reactive changes. Neutrophilia is a nonspecific finding; some possible etiologies include  "infection, hemolysis, inflammation, tissue necrosis, medications, stress, and malignancy.   The red blood cell morphology may not be representative for this patient due to recent red blood cell transfusion on 9/12/2021.   Clinical Information    From Epic electronic medical record; 66-year-old female with a history of arthritis, obstructive sleep apnea, hypertension, congestive heart failure and mitral valve stenosis, status post mitral valve replacement on 9/7/2021. Peripheral smear review requested for \"known pneumonia on antibiotics with ongoing leukocytosis.\"   Peripheral Smear    The red blood cells appear overall normochromic.  Poikilocytosis includes rare elliptocytes.  Polychromasia is increased.  Rouleaux formation is not increased.  The morphology of the platelets is normal.  Lymphocytes are small and mature.  Neutrophils show increased cytoplasmic granulation and normal nuclear morphology.       CULTURE RESULTS:    9/11/2021: Sputum culture per ET: Streptococcus constellatus; Enterobacter cloacae complex   9/12/2021: Nasal swab: MRSA negative, SA positive   9/15/2021: Blood cultures: negative   9/15/2021: Urine Culture: negative   9/16/2021: Stool: C-diff negative     CONSULTS:    1. PT/OT  2. Electrophysiology   3. Infectious Disease     BRIEF HISTORY OF ILLNESS:  Corina Martinez is a 66 year old female with a PMH of arthritis, SHAILESH, HTN, recent onset of paroxymal atrial fibrillation with RVR on amiodarone and coumadin PTA, HFpEF, tobacco abuse (1.0 PPD), chronic back pain with previous back surgeries and mitral valve stenosis. She was admitted 09/07/2021 and underwent an elective bioprosthetic MVR (29 mm St Andrey Epic) w/ left atrial appendage ligation,and bilateral pulmonary vein isolation with Dr. Acuna.     HOSPITAL COURSE:   Ms. Martinez underwent the above-named procedures.  She tolerated the operation well and postoperatively was admitted to the CVICU. She was extubated on POD # 0 to 2 lpm via NC " with neuro status intact.  Her ICU stay was complicated by HAP with hx of COPD, tobacco use and delirium . She was treated with antibiotics and nebulizer's for the pneumonia and respiratory insufficiency. Her delirium was treated with reorientation and Seroquel.  She also had paroxysmal atrial fibrillation with RVR treated with IV amiodarone and metoprolol.     She was transferred to the post-surgical telemetry unit on POD # 8 with ongoing IV antibiotics and IV amiodarone that was transitioned to po loading dose. She was also on IV heparin that was transitioned to coumadin with INR goal 2-3. She had some trouble with low b/p's preventing up titration of her beta-blocker for rate control and ongoing diuresis. She ultimately underwent a ANTONIO guided cardioversion 9/20/2021. No evidence of a thrombus and normal LVEF. She has remained in SR. Her INR went up to 3.48 yesterday and her coumadin was held. Today her INR is 3.34. Will have her hold her coumadin today and resume her PTA dose of 2.5 mg tomorrow, 9/22 with an INR check on Friday, 9/24. She should have close monitoring of her INR with stopping Antibiotics and ongoing amiodarone dosing.     Regarding her PNA, she has been afebrile with decreasing WBC for several day. Her sputum culture was positive for Streptococcus constellatus; Enterobacter cloacae complex. She was initially treated with Zosyn 9/11-9/13, when she was switched to Ertapenem 1 gm daily for ten days. She had post-op RADHA with peak creat of 1.72, which has fully recovered with creat 0.84. She has required intermittent IV diuresis with acute heart failure secondary to her afib with RVR, she has preserved LVEF. Her diuretic was held for a couple days to allow titration of BB, but she will now resume a reduced dose of her PTA Torsemide. She has been instructed to monitor her weight daily.     Corina and her  are planning to stay in the their motor home in the area for the next week or so, then head  "to [place] for the winter. She is aware of the importance of setting up monitoring of her INR, establishing a primary care provider and following up with Cardiology in the area she will be staying. She should also get set up with outpatient cardiac rehab. In the meantime, we have set her up with and INR in [place], [place] on Friday and plan to have her seen in the CVTS clinic next week before she leaves [facility].     Prior to discharge, her pain was controlled with Robaxin, Gabapentin, and prn oxycodone. She was able to perform most ADLs and ambulate without difficulty, and had full return of bowel and bladder function.  On September 22, 2021, she was discharged to home in stable condition.    Patient discharged on aspirin:  Yes 81 mg  Patient discharged on beta blocker: yes Metoprolol    Patient discharged on ACE Inhibitor/ARB: no             Discharge Disposition:     Discharged to home            Condition on Discharge:     Discharge condition: Stable   Discharge vitals: Blood pressure 108/61, pulse 65, temperature 98.9  F (37.2  C), temperature source Oral, resp. rate 18, height 1.689 m (5' 6.5\"), weight 96 kg (211 lb 9.6 oz), SpO2 92 %, not currently breastfeeding.     Code status on discharge: Full Code     Vitals:    09/20/21 0426 09/21/21 0330 09/22/21 0100   Weight: 95.1 kg (209 lb 11.2 oz) 95.7 kg (211 lb) 96 kg (211 lb 9.6 oz)       DAY OF DISCHARGE PHYSICAL EXAM:    Blood pressure 108/61, pulse 65, temperature 98.9  F (37.2  C), temperature source Oral, resp. rate 18, height 1.689 m (5' 6.5\"), weight 96 kg (211 lb 9.6 oz), SpO2 92 %, not currently breastfeeding.  Vitals:    09/20/21 0426 09/21/21 0330 09/22/21 0100   Weight: 95.1 kg (209 lb 11.2 oz) 95.7 kg (211 lb) 96 kg (211 lb 9.6 oz)      Weight; + 2.1 kg since admit and trending up.   24 hr Fluid status; net gain +1.1 L.   MAPs: 72-91    Gen: A&Ox4, NAD  Neuro: no focal deficits   CV: RRR, normal S1 S2, no murmurs, rubs or gallops. no JVD  Pulm: CTA, no " wheezing or rhonchi, normal breathing on RA  Abd: nondistended, normal BS, soft, nontender  Ext: no peripheral edema  Incision: clean, dry, intact, no erythema, sternum stable  Tubes/drain sites: dressing clean and dry       BMP  Recent Labs   Lab 09/22/21  0608 09/21/21  0630 09/20/21  0444 09/19/21  0545 09/18/21  0613 09/18/21  0613    135 136  --   --  138   POTASSIUM 4.3 4.4 4.5  4.3 4.2   < > 4.0  3.9   CHLORIDE 105 104 106  --   --  106   LOC 8.8 8.6 8.6  --   --  8.4*   CO2 28 24 23  --   --  23   BUN 17 18 17  --   --  23   CR 0.97 0.84 0.81  --   --  1.02   * 112* 96  --   --  105*    < > = values in this interval not displayed.     CBC  Recent Labs   Lab 09/22/21  0608 09/21/21  0630 09/20/21  0444 09/19/21  0942   WBC 10.1 11.5* 15.6* 17.1*   RBC 3.01* 3.04* 3.07* 3.13*   HGB 8.1* 8.2* 8.4* 8.6*   HCT 26.8* 27.4* 27.2* 27.8*   MCV 89 90 89 89   MCH 26.9 27.0 27.4 27.5   MCHC 30.2* 29.9* 30.9* 30.9*   RDW 21.4* 21.8* 22.7* 23.1*    381 349 382     INR  Recent Labs   Lab 09/22/21  0608 09/21/21  0630 09/20/21  0444 09/19/21  0545   INR 3.34* 3.48* 2.17* 1.67*      Hepatic Panel  No lab results found in last 7 days.  Recent Labs   Lab 09/22/21  0608 09/21/21  0630 09/20/21  0444 09/18/21  0613 09/17/21  0539 09/16/21  0759   * 112* 96 105* 103* 101*       TRANSESOPHAGEAL ECHOCARDIOGRAM, 9/20/2021- Interpretation Summary  S/P 1) Mitral valve replacement - 29mm St Andrey Epic mitral valve  2) Bilateral pulmonary vein isolation  3) Left atrial appendage ligation on 9/7/2021.  S/P KENZIE ligation. No flow or thrombus noted.  A bioprosthetic mitral valve is present.  The mean gradient across the mitral valve is 7.5 mmHg.    ECHOCARDIOGRAM 9/16/2021: Interpretation Summary  Technically difficult study.  Global and regional left ventricular function is hyperkinetic with an EF of 65-70%.  Global right ventricular function is normal.  No pericardial effusion is present.  IVC diameter and  respiratory changes fall into an intermediate range  suggesting an RA pressure of 8 mmHg.  There has been no change.    CXR 9/19/2021- Impression:   1. Postsurgical changes of mitral valve replacement. Small substernal  inflammatory phlegmon in the anterior mediastinum. No drainable  abscess.  2. Small pericardial effusion.  3. Small left pleural effusion with basilar-predominant opacities,  streaky appearance favors atelectasis but could represent developing  infection given leukocytosis.  4. Trace right pleural effusion with trace atelectasis.  5. Dilation of the main pulmonary artery which can be seen with  pulmonary hypertension.  6. No acute abnormality in the abdomen or pelvis.      DISCHARGE INSTRUCTIONS:  Avoid lifting anything greater than ten pounds for 6 weeks after surgery and then less than 20 pounds for an additional 6 weeks. Do not reach backwards or use arms to push out of chair. Do not let people pull on your arms to assist with standing. Avoid twisting or reaching too far across your body.  Avoid strenuous activities such as bowling, vacuuming, raking, shoveling, golf or tennis for 12 weeks after your surgery. It is okay to resume sex if you feel comfortable in doing so. You may have to try different positions with your partner.  Splint your chest incision by hugging a pillow or bringing your arms across your chest when coughing or sneezing. Please try to sleep on your back for the first 4-6 weeks to avoid extra stress on your sternum (breastbone) while it is healing.     No driving for 4 weeks after surgery or while on pain medication.    Shower or wash your incisions twice daily with soap and water (or as instructed), pat dry. Keep wound clean and dry, showers are okay after discharge, but don't let spray hit directly on incision. No baths or swimming for 1 month. Cover chest tube sites with gauze until they stop draining, then leave open to air. It is not abnormal for chest tube sites to drain  yellowish/clear fluid for up to 2-3 weeks after surgery.   Watch for signs of infection: increased redness, tenderness, warmth or any drainage from sternum incision.  Also a temperature > 100.5 F or chills. Call your surgeon or primary care provider's office immediately. Remove any skin glue left on incisions after 10-14 days. This will not affect your incision and can speed up healing.    Exercise is very important in your recovery. Please follow the guidelines set up for you in your cardiac rehab classes at the hospital. If outpatient cardiac rehab was ordered for you, we highly recommend you participate. If you have problems arranging your cardiac rehab, please call [phone] for all locations, with the exception of [place], please call [phone] and [place], please call [phone].    Avoid sitting for prolonged periods of time, try to walk every hour during the day. If you have a leg incision, elevate your leg often when you are not walking.    Check your weight when you get home from the hospital and continue to check it daily through your recovery for at least a month. If you notice a weight gain of 2-3 pounds in a week, notify your primary care physician, cardiologist or surgeon.    Bowel activity may be slow after surgery. If necessary, you may take an over the counter laxative such as Milk of Magnesia or Miralax. You may have stool softeners prescribed (docusate sodium, Senokot). We recommend using stool softeners while using narcotics for pain (oxycodone/percocet, hydrocodone/vicodin, hydromorphone/dilaudid).      Wean OFF of narcotics (oxycodone, dilaudid, hydrocodone) as soon as possible. You should continue taking acetaminophen as long as you have any surgical pain as the first choice for pain control and add narcotics as necessary for pain to be tolerable.      DENTAL VISITS AFTER SURGERY  If you have had your heart valve repaired or replaced, we do not recommend having any dental work done  for 6 months and you will need to take an antibiotic prior to dental visits from now on.  Please notify your dentist before any procedure for the proper treatment needed. The antibiotic is taken by mouth one hour prior to visit. This includes routine cleanings.    DO NOT SMOKE.  IF YOU NEED HELP QUITTING, PLEASE TALK WITH YOUR CARDIOLOGIST OR PRIMARY DOCTOR.    You are on a blood thinner, follow the instructions you were given in the hospital and DO NOT SKIP this medication. Try and take it the same time everyday. Your primary care physician or coumadin clinic will manage the dosing. INR goal is 2-3.    REGARDING PRESCRIPTION REFILLS.  If you need a refill on your pain medication contact us to discuss your pain and a possible one time refill.   All other medications will be adjusted, discontinued and re-filled by your primary care physician and/or your cardiologist as they were prior to your surgery. We have given you enough for one to three month with possibly one refill.    POST-OPERATIVE CLINIC VISITS  -Follow up with CVTS Surgery Advance Care Practitioners on Tuesday, September 28, 2021, at 1:00 pm at the [facility].  Office will contact you with an appointment.   Return to the care of your primary provider and your cardiologist. Future medication refills should come from your PCP or Cardiologist.   -Schedule an INR check on Friday, 9/24/2021 at the [facility] (Ph: [phone], Fax: [phone].)  -Establish a primary care provider in 2-4 weeks after discharge.   -It is important to see a cardiologist about 4-6 weeks after discharge.    Patient to call Cardiologist: Dr. Benton Shafer, at [facility], Phone: [phone] to schedule an appointment.   If planning to stay in [place] for a month and need to schedule an appointment with a [facility] cardiologist, please call please call [phone] (choose option 1) and request to be seen with a general cardiologist or  someone that you have seen in the past.   If there is a need to return to see CT Surgery please call our  at [phone].      PRE-ADMISSION MEDICATIONS:  No current facility-administered medications on file prior to encounter.  amiodarone (PACERONE) 200 MG tablet, Take 200 mg by mouth daily (with lunch)   diclofenac (VOLTAREN) 1 % topical gel, Apply 2 g topically 2 times daily as needed To right shoulder  diltiazem ER COATED BEADS (CARDIZEM CD/CARTIA XT) 240 MG 24 hr capsule, Take 1 capsule (240 mg) by mouth daily (Patient taking differently: Take 240 mg by mouth daily (with lunch) )  diphenhydrAMINE (BENADRYL) 25 MG tablet, Take 50 mg by mouth At Bedtime  DULoxetine (CYMBALTA) 30 MG capsule, Take 30 mg by mouth At Bedtime  fluticasone (FLONASE) 50 MCG/ACT nasal spray, Spray 2 sprays into both nostrils 2 times daily as needed for allergies   gabapentin (NEURONTIN) 600 MG tablet, Take 600 mg in the morning  Take 600 mg in the afternoon   Take 1200 mg (2 tablets) at bedtime.  hydrOXYzine (ATARAX) 25 MG tablet, Take 25 mg by mouth At Bedtime  magnesium 250 MG tablet, Take 1 tablet (250 mg) by mouth 2 times daily  Melatonin 10 MG TABS tablet, Take 10 mg by mouth At Bedtime  metoprolol tartrate (LOPRESSOR) 50 MG tablet, Take 1 tablet (50 mg) by mouth 2 times daily  Multiple Vitamins-Minerals (MULTIVITAMIN ADULTS 50+) TABS, Take 1 tablet by mouth every morning  omeprazole (PRILOSEC) 20 MG CR capsule, Take 20 mg by mouth daily   torsemide (DEMADEX) 10 MG tablet, Take 1 tablet (10 mg) by mouth daily (Patient taking differently: Take 20 mg by mouth daily )  Ursodiol 500 MG TABS, Take 500 mg by mouth 3 times daily   vitamin B complex with vitamin C (VITAMIN  B COMPLEX) tablet, Take 1 tablet by mouth daily [employer]  vitamin D3 (CHOLECALCIFEROL) 50 mcg (2000 units) tablet, Take 1 tablet by mouth every morning  warfarin ANTICOAGULANT (COUMADIN) 5 MG tablet, Take 2.5 mg x 4 days and 5 mg x 3 days/week or as  directed by [facility] clinic (Patient taking differently: Take 2.5 mg on Mon/Wed/Fri and take 5 mg on all other days of the week.  Takes in the evening.)         DISCHARGE MEDICATIONS:    Corina Martinez JOHN   Home Medication Instructions BREN:[id number]    Printed on:09/22/21 1003   Medication Information                      amiodarone (PACERONE) 200 MG tablet  Take 400 mg (2 tablets) daily for one week, then 200 mg daily.             aspirin (ASA) 81 MG chewable tablet  1 tablet (81 mg) by Oral or NG Tube route daily             diclofenac (VOLTAREN) 1 % topical gel  Apply 2 g topically 2 times daily as needed To right shoulder             diphenhydrAMINE (BENADRYL) 25 MG tablet  Take 50 mg by mouth At Bedtime             DULoxetine (CYMBALTA) 30 MG capsule  Take 30 mg by mouth At Bedtime             fluticasone (FLONASE) 50 MCG/ACT nasal spray  Spray 2 sprays into both nostrils 2 times daily as needed for allergies              gabapentin (NEURONTIN) 600 MG tablet  Take 600 mg in the morning  Take 600 mg in the afternoon   Take 1200 mg (2 tablets) at bedtime.             hydrOXYzine (ATARAX) 25 MG tablet  Take 25 mg by mouth At Bedtime             magnesium 250 MG tablet  Take 1 tablet (250 mg) by mouth 2 times daily             Melatonin 10 MG TABS tablet  Take 10 mg by mouth At Bedtime             methocarbamol (ROBAXIN) 500 MG tablet  Take 1 tablet (500 mg) by mouth 4 times daily as needed for muscle spasms             metoprolol succinate ER (TOPROL-XL) 25 MG 24 hr tablet  Take 0.5 tablets (12.5 mg) by mouth daily Or as directed.             Multiple Vitamins-Minerals (MULTIVITAMIN ADULTS 50+) TABS  Take 1 tablet by mouth every morning             nicotine (NICODERM CQ) 21 MG/24HR 24 hr patch  Place 1 patch onto the skin daily             oxyCODONE (ROXICODONE) 5 MG tablet  Take 1 tablet (5 mg) by mouth every 6 hours as needed for moderate to severe pain             pantoprazole (PROTONIX) 40 MG EC tablet  Take 1  tablet (40 mg) by mouth every morning (before breakfast)             polyethylene glycol (MIRALAX) 17 GM/Dose powder  Take 17 g by mouth daily as needed             potassium chloride ER (KLOR-CON M) 20 MEQ CR tablet  Take 1 tablet (20 mEq) by mouth daily Take while on Torsemide.             senna-docusate (SENOKOT-S/PERICOLACE) 8.6-50 MG tablet  1 tablet by Oral or Feeding Tube route 2 times daily as needed for constipation             torsemide (DEMADEX) 10 MG tablet  Take 1 tablet (10 mg) by mouth daily             vitamin B complex with vitamin C (VITAMIN  B COMPLEX) tablet  Take 1 tablet by mouth daily [employer]             vitamin D3 (CHOLECALCIFEROL) 50 mcg (2000 units) tablet  Take 1 tablet by mouth every morning             warfarin ANTICOAGULANT (COUMADIN) 5 MG tablet  Take 2.5 mg daily 9/22 & 9/23, then as directed for INR goal 2-3.                   CC:Kika Purcell      [facility]   Cardiothoracic Surgery  Office phone: [phone]  Office fax: [phone]

## 2021-09-22 NOTE — DISCHARGE INSTRUCTIONS
AFTER YOU GO HOME FROM YOUR HEART SURGERY  (Mitral valve replacement, laury pulmonary vein isolation, and left atrial appendage ligation/9/7/2021)    You had a sternotomy, avoid lifting anything greater than ten pounds for 6 weeks after surgery and then less than 20 pounds for an additional 6 weeks. Do not reach backwards or use arms to push out of chair. Do not let people pull on your arms to assist with standing. Avoid twisting or reaching too far across your body.  Avoid strenuous activities such as bowling, vacuuming, raking, shoveling, golf or tennis for 12 weeks after your surgery. It is okay to resume sex if you feel comfortable in doing so. You may have to try different positions with your partner.  Splint your chest incision by hugging a pillow or bringing your arms across your chest when coughing or sneezing. Please try to sleep on your back for the first 4-6 weeks to avoid extra stress on your sternum (breastbone) while it is healing.     No driving for 4 weeks after surgery or while on pain medication.    Shower or wash your incisions twice daily with soap and water (or as instructed), pat dry. Keep wound clean and dry, showers are okay after discharge, but don't let spray hit directly on incision. No baths or swimming for 1 month. Cover chest tube sites with gauze until they stop draining, then leave open to air. It is not abnormal for chest tube sites to drain yellowish/clear fluid for up to 2-3 weeks after surgery.   Watch for signs of infection: increased redness, tenderness, warmth or any drainage from sternum incision.  Also a temperature > 100.5 F or chills. Call your surgeon or primary care provider's office immediately. Remove any skin glue left on incisions after 10-14 days. This will not affect your incision and can speed up healing.    Exercise is very important in your recovery. Please follow the guidelines set up for you in your cardiac rehab classes at the hospital. If outpatient cardiac  rehab was ordered for you, we highly recommend you participate. If you have problems arranging your cardiac rehab, please call [phone] for all locations, with the exception of [place], please call [phone] and [facility], please call [phone].    Avoid sitting for prolonged periods of time, try to walk every hour during the day. If you have a leg incision, elevate your leg often when you are not walking.    Check your weight when you get home from the hospital and continue to check it daily through your recovery for at least a month. If you notice a weight gain of 2-3 pounds in a week, notify your primary care physician, cardiologist or surgeon.    Bowel activity may be slow after surgery. If necessary, you may take an over the counter laxative such as Milk of Magnesia or Miralax. You may have stool softeners prescribed (docusate sodium, Senokot). We recommend using stool softeners while using narcotics for pain (oxycodone/percocet, hydrocodone/vicodin, hydromorphone/dilaudid).      Wean OFF of narcotics (oxycodone, dilaudid, hydrocodone) as soon as possible. You should continue taking acetaminophen as long as you have any surgical pain as the first choice for pain control and add narcotics as necessary for pain to be tolerable.      DENTAL VISITS AFTER SURGERY  If you have had your heart valve repaired or replaced, we do not recommend having any dental work done for 6 months and you will need to take an antibiotic prior to dental visits from now on.  Please notify your dentist before any procedure for the proper treatment needed. The antibiotic is taken by mouth one hour prior to visit. This includes routine cleanings.    DO NOT SMOKE.  IF YOU NEED HELP QUITTING, PLEASE TALK WITH YOUR CARDIOLOGIST OR PRIMARY DOCTOR.    You are on a blood thinner, follow the instructions you were given in the hospital and DO NOT SKIP this medication. Try and take it the same time everyday. Your primary care physician or  coumadin clinic will manage the dosing. INR goal is 2-3.    REGARDING PRESCRIPTION REFILLS.  If you need a refill on your pain medication contact us to discuss your pain and a possible one time refill.   All other medications will be adjusted, discontinued and re-filled by your primary care physician and/or your cardiologist as they were prior to your surgery. We have given you enough for one to three month with possibly one refill.    POST-OPERATIVE CLINIC VISITS  -You have a follow up visit with [facility] Advance Care Practitioners Tuesday, September 28, 2021 at 1:00 pm at the [facility].  Office will contact you with an appointment.   You will then return to the care of your primary provider and your cardiologist. Future medication refills should come from your PCP or Cardiologist.   -Schedule an INR check on Friday, 9/24/2021 at the [facility] (Ph: [phone], Fax: [phone].)  -You should establish a primary care provider in 2-4 weeks after discharge.   -It is important to see a cardiologist about 4-6 weeks after discharge.    Please call Cardiologist: Dr. Benton Shafer, at [facility], Phone: [phone] to schedule an appointment.   If you plan to stay in [place] for a month and need to schedule an appointment with a [facility] cardiologist, please call please call [phone] (choose option 1) and request to be seen with a general cardiologist or someone that you have seen in the past.   If there is a need to return to see CT Surgery please call our  at [phone].    SURGICAL QUESTIONS  Please call Dawn Siegel or April Chavez with surgical recovery and medication questions, their phone numbers are listed below.  They will assist you with your needs and contact other surgery care team members as indicated.    On weekends or after hours, please call [phone] and ask the  to   page the Cardiothoracic Surgery fellow on call.       Thank you,    Your Cardiothoracic Surgery Team  Dawn Siegel RN Care Coordinator-  [phone]   April Chavez RN Care Coordinator-  [phone]

## 2021-09-22 NOTE — PROGRESS NOTES
Neuro: A&Ox4.   Cardiac: SR in low 60s.  VSS.  Respiratory: RA. LS with crackles in the lower lobes. Encouraged to use IS/Acapella every hour while awake.   GI/: Voiding spontaneously. No BM today  Diet/appetite: Tolerating reg diet. Denies nausea   Activity: Up with 1 assist.   Pain: C/O pain in the L chest/shoulder, unchanged. Scheduled lidocaine patch and robaxin given.   Skin: Sternal incision YURI, clean and dry.   Lines: PIV x 2.   Plan: On IV abx x 1 more days per ID team. Awaiting discharge plan. Will continue to monitor.

## 2021-09-24 ENCOUNTER — CARE COORDINATION (OUTPATIENT)
Dept: CARDIOLOGY | Facility: CLINIC | Age: 66
End: 2021-09-24

## 2021-09-24 NOTE — PROGRESS NOTES
Patient called and states she went to the clinic in [facility] in [place] ([facility]) and had her INR checked today and the result was 3.5.  She was instructed to take 2.5 mg of coumadin today, Saturday and Sunday and have INR rechecked on Monday.  Patient asked if this was a good plan.  Instructed patient that the INR clinics have written guild lines for managing coumadin dosing and she should follow the instructions she was given.  Note forwarded to Kimberli Padilla NP for update.

## 2021-09-26 LAB — BACTERIA BRONCH: NORMAL

## 2021-09-27 ENCOUNTER — APPOINTMENT (OUTPATIENT)
Dept: LAB | Facility: CLINIC | Age: 66
End: 2021-09-27
Payer: COMMERCIAL

## 2021-09-28 ENCOUNTER — OFFICE VISIT (OUTPATIENT)
Dept: CARDIOLOGY | Facility: CLINIC | Age: 66
End: 2021-09-28
Attending: THORACIC SURGERY (CARDIOTHORACIC VASCULAR SURGERY)
Payer: MEDICARE

## 2021-09-28 VITALS
DIASTOLIC BLOOD PRESSURE: 88 MMHG | HEART RATE: 126 BPM | OXYGEN SATURATION: 93 % | BODY MASS INDEX: 33.7 KG/M2 | WEIGHT: 209.7 LBS | SYSTOLIC BLOOD PRESSURE: 123 MMHG | HEIGHT: 66 IN

## 2021-09-28 DIAGNOSIS — I48.0 PAROXYSMAL ATRIAL FIBRILLATION (H): ICD-10-CM

## 2021-09-28 DIAGNOSIS — Z95.3 S/P MITRAL VALVE REPLACEMENT WITH TISSUE VALVE: Primary | ICD-10-CM

## 2021-09-28 DIAGNOSIS — Z79.01 ANTICOAGULATED ON COUMADIN: ICD-10-CM

## 2021-09-28 DIAGNOSIS — Z95.2 S/P MVR (MITRAL VALVE REPLACEMENT): ICD-10-CM

## 2021-09-28 LAB — INR PPP: 2.24 (ref 0.85–1.15)

## 2021-09-28 PROCEDURE — 99024 POSTOP FOLLOW-UP VISIT: CPT | Performed by: NURSE PRACTITIONER

## 2021-09-28 PROCEDURE — 36415 COLL VENOUS BLD VENIPUNCTURE: CPT | Performed by: NURSE PRACTITIONER

## 2021-09-28 PROCEDURE — G0463 HOSPITAL OUTPT CLINIC VISIT: HCPCS

## 2021-09-28 PROCEDURE — 85610 PROTHROMBIN TIME: CPT | Performed by: NURSE PRACTITIONER

## 2021-09-28 RX ORDER — METOPROLOL SUCCINATE 25 MG/1
25 TABLET, EXTENDED RELEASE ORAL DAILY
Qty: 30 TABLET | Refills: 1 | Status: SHIPPED | OUTPATIENT
Start: 2021-09-28

## 2021-09-28 RX ORDER — AMIODARONE HYDROCHLORIDE 200 MG/1
TABLET ORAL
Qty: 45 TABLET | Refills: 1 | Status: SHIPPED | OUTPATIENT
Start: 2021-09-28

## 2021-09-28 RX ORDER — METHOCARBAMOL 500 MG/1
500 TABLET, FILM COATED ORAL 3 TIMES DAILY PRN
Qty: 30 TABLET | Refills: 0 | Status: SHIPPED | OUTPATIENT
Start: 2021-09-28

## 2021-09-28 ASSESSMENT — PAIN SCALES - GENERAL: PAINLEVEL: NO PAIN (0)

## 2021-09-28 ASSESSMENT — MIFFLIN-ST. JEOR: SCORE: 1507.94

## 2021-09-28 NOTE — PATIENT INSTRUCTIONS
Plan:   1. Continue Amiodarone 400 mg daily for another week before decreasing to 200 mg daily.   2. Increase Metoprolol XL to 25 mg daily, may need to increase further when decreasing the Amiodarone.    3. Renewed Robaxin prescription   4. Continue to monitor weight daily, take an additional Torsemide if greater than 3 lbs in a day, 5 lbs in a week.   5. Check INR today, will call with instructions.   6. Follow up with your primary provider later this week or early next week with a repeat INR, CBC and BMP.   7. Follow up with cardiology at [place] in [place], [place] in 3-4 weeks.   8. Start Cardiac Rehab when settled in [place] or [place] in 2-3 weeks.   9. Continue sternal precautions for 12 weeks from surgery date.

## 2021-09-28 NOTE — LETTER
9/28/2021      RE: Corina Martinez  [address]  [facility] [address]       Dear Colleague,    Thank you for the opportunity to participate in the care of your patient, Corina Martinez, at the [facility] [place] at [facility]. Please see a copy of my visit note below.    //CARDIOTHORACIC SURGERY FOLLOW-UP VISIT  9/28/2021     Corina Martinez   1955   [id number]      Reason for visit: Post-Op Mitral valve replacement, left atrial appendage ligation and bilateral pulmonary vein isolation with Dr. Acuna on 9/1/2021.    HPI: Corina Martinez is a 66 year old year old female seen in clinic for a routine follow-up appointment after surgery. Hospital course was remarkable for hospital acquired pneumonia treated with IV abx, RADHA with peak creat of 1.72 which fully recovered to normal creatinine. She also had paroxysmal atrial fibrillation with RVR and was treated with a loading dose of IV amiodarone followed by oral tapering dose and anticoagulation. She was on a heparin bridge until her INR was therapeutic. Her INR was supra-therapeutic at 3.34 at discharge and she was to follow up with her primary anticoagulation clinic within 2 days after discharged. She underwent a ANTONIO guided cardioversion on 9/20/2021 with successful conversion to sinus rhythm.     She also had some heart failure due to her atrial fibrillation with RVR and required diuresis. She was up 2.1 kg from her admit weight at discharge. She was instructed to monitor her weight daily and resume her home dose of Torsemide at discharge.     She reports that she had an INR on Friday and it was 3.4, she has continued to be on coumadin 2.5 mg daily. No signs of bleeding. No palpitations, light headedness, or shortness of breath. Her weight is down 1 kg from discharge. No HOLLAND.     Patient has been doing well since discharge. Patient reports incisions are healing well, pain is well controlled on current  medications. Patient does complain of some ongoing musculoskeletal discomfort across her chest, which improves with Robaxin. Requesting more Robaxin. Patient otherwise denies any fever, chills, chest pain, nausea and vomiting.  she is having Normal bowel movements and voiding without problems.  Has not been attending cardiac rehabilitation, as she is in transition and plans to start once she is settled in [place].    PAST MEDICAL HISTORY:  Past Medical History:   Diagnosis Date     Arthritis      Congestive heart failure (H)      Hypertension      Nonrheumatic mitral valve stenosis, severe      Obese      SHAILESH (obstructive sleep apnea)      Sleep apnea        PAST SURGICAL HISTORY:  Past Surgical History:   Procedure Laterality Date     ANESTHESIA CARDIOVERSION N/A 9/20/2021    Procedure: ANESTHESIA, FOR CARDIOVERSION;  Surgeon: GENERIC ANESTHESIA PROVIDER;  Location: [facility] OR     APPENDECTOMY       CV CORONARY ANGIOGRAM N/A 8/5/2021    Procedure: CV CORONARY ANGIOGRAM;  Surgeon: Juan Vance MD;  [facility]     CV RIGHT HEART CATH MEASUREMENTS RECORDED N/A 8/5/2021    Procedure: CV RIGHT HEART CATH;  Surgeon: Juan aVnce MD;  Location:  HEART CARDIAC CATH LAB     KNEE DEBRIDEMENT  05/07/2019     LAPAROSCOPIC CHOLECYSTECTOMY       LUMBAR FUSION       OTHER SURGICAL HISTORY      hip replacement     RELEASE CARPAL TUNNEL       REPLACE VALVE MITRAL N/A 9/7/2021    Procedure: Median Sternotomy, Mitral Valve Replacement with a 29 mm St. Andrey Epic Valve, Bilateral Pulmonary Vein Isolation, Left Atrial Appendage Ligation, On Cardiopulmonary Bypass, Transesophageal Echocardiogram by Anesthesia;  Surgeon: Sven Acuna MD;  Location: [facility] OR     TONSILLECTOMY       TOTAL KNEE ARTHROPLASTY Right 01/30/2019     TUBAL LIGATION         CURRENT MEDICATIONS:   Current Outpatient Medications   Medication     amiodarone (PACERONE) 200 MG tablet     aspirin (ASA) 81  "MG chewable tablet     diclofenac (VOLTAREN) 1 % topical gel     diphenhydrAMINE (BENADRYL) 25 MG tablet     DULoxetine (CYMBALTA) 30 MG capsule     fluticasone (FLONASE) 50 MCG/ACT nasal spray     gabapentin (NEURONTIN) 600 MG tablet     hydrOXYzine (ATARAX) 25 MG tablet     magnesium 250 MG tablet     Melatonin 10 MG TABS tablet     methocarbamol (ROBAXIN) 500 MG tablet     metoprolol succinate ER (TOPROL-XL) 25 MG 24 hr tablet     Multiple Vitamins-Minerals (MULTIVITAMIN ADULTS 50+) TABS     nicotine (NICODERM CQ) 21 MG/24HR 24 hr patch     oxyCODONE (ROXICODONE) 5 MG tablet     pantoprazole (PROTONIX) 40 MG EC tablet     polyethylene glycol (MIRALAX) 17 GM/Dose powder     potassium chloride ER (KLOR-CON M) 20 MEQ CR tablet     senna-docusate (SENOKOT-S/PERICOLACE) 8.6-50 MG tablet     torsemide (DEMADEX) 10 MG tablet     vitamin B complex with vitamin C (VITAMIN  B COMPLEX) tablet     vitamin D3 (CHOLECALCIFEROL) 50 mcg (2000 units) tablet     warfarin ANTICOAGULANT (COUMADIN) 5 MG tablet     No current facility-administered medications for this visit.       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    PHYSICAL EXAM:   /88 (BP Location: Left arm, Patient Position: Chair, Cuff Size: Adult Regular)   Pulse (!) 126   Ht 1.676 m (5' 6\")   Wt 95.1 kg (209 lb 11.2 oz)   LMP  (LMP Unknown)   SpO2 93%   BMI 33.85 kg/m    General: alert and oriented x 3, pleasant, no acute distress, normal mood and affect  CV: S1 S2, no murmurs, rubs or gallops, irregular, tachycardic in 110-120's. Trace to no peripheral edema  Pulm: bilateral breath sounds, clear to auscultation, easy work of breathing  GI: (+) bowel sounds, soft non-tender and non-distended  Incision: incisions clean dry and intact without " erythema, swelling or drainage, sternum stable  Neuro: nonfocal    LABS: INR pending     IMAGING: None    PROCEDURES: None         ASSESSMENT/PLAN:  Corina Martinez is a 66 year old year old female with a past medical history of Mitrial Valve Stenosis who is now s/p Mitral valve replacement, left atrial appendage ligation and bilateral pulmonary vein isolation with Dr. Acuna on 9/1/2021.  who returns to clinic for postop visit.    1. Mitral Stenosis - s/p Mitral valve replacement 9/7/2021.    Doing well surgically, pain is well controlled, incision is healing well without sign of infection.   2. Paroxysmal atrial fibrillation - S/P PVI and KENZIE ligation 9/7/2021   s/p ANTONIO guided cardioversion 9/20/2021   Seems to be back in atrial fib today with -120's, she is completely asymptomatic. Is on Amiodarone 400 mg daily and Metoprolol XL 12.5 mg daily. Has been in touch with a cardiologist in [place] and is planning to see them in 3-4 weeks. Have asked her to see her primary provider in 3-5 days for follow up of her INR, BMP, CBC and atrial fib with possible further titration of her metoprolol and coumadin as needed.   3. PNA - completed IV antibiotics in the hospital. Is afebrile, no SOB or cough.   4. Anticoagulation - INR goal 2-3. Check INR today. Will call with results.     Plan:   1. Continue Amiodarone 400 mg daily for another week before decreasing to 200 mg daily.   2. Increase Metoprolol XL to 25 mg daily, may need to increase further when decreasing the Amiodarone.    3. Renewed Robaxin prescription   4. Continue to monitor weight daily, take an additional Torsemide if greater than 3 lbs in a day, 5 lbs in a week.   5. Check INR today, will call with instructions.   6. Follow up with your primary provider later this week or early next week with a repeat INR, CBC and BMP.   7. Follow up with cardiology at [facility] in [place], [place] in 3-4 weeks.   8. Start Cardiac Rehab when settled in [place] or  [place] in 2-3 weeks.   9. Continue sternal precautions for 12 weeks from surgery date.          The total time spent with the patient was 30 minutes, > 50% of which was spent in counseling.    CC  Kika Tariq       Please do not hesitate to contact me if you have any questions/concerns.     Sincerely,     Cardiovascular Thoracic Surgery

## 2021-09-28 NOTE — NURSING NOTE
Chief Complaint   Patient presents with     Follow Up     post op     Vitals were taken and medications were reconciled.   KRISTEN Fam  1:20 PM

## 2021-09-28 NOTE — PROGRESS NOTES
//CARDIOTHORACIC SURGERY FOLLOW-UP VISIT  9/28/2021     Corina Martinez   1955   [id number]      Reason for visit: Post-Op Mitral valve replacement, left atrial appendage ligation and bilateral pulmonary vein isolation with Dr. Acuna on 9/1/2021.    HPI: Corina Martinez is a 66 year old year old female seen in clinic for a routine follow-up appointment after surgery. Hospital course was remarkable for hospital acquired pneumonia treated with IV abx, RADHA with peak creat of 1.72 which fully recovered to normal creatinine. She also had paroxysmal atrial fibrillation with RVR and was treated with a loading dose of IV amiodarone followed by oral tapering dose and anticoagulation. She was on a heparin bridge until her INR was therapeutic. Her INR was supra-therapeutic at 3.34 at discharge and she was to follow up with her primary anticoagulation clinic within 2 days after discharged. She underwent a ANTONIO guided cardioversion on 9/20/2021 with successful conversion to sinus rhythm.     She also had some heart failure due to her atrial fibrillation with RVR and required diuresis. She was up 2.1 kg from her admit weight at discharge. She was instructed to monitor her weight daily and resume her home dose of Torsemide at discharge.     She reports that she had an INR on Friday and it was 3.4, she has continued to be on coumadin 2.5 mg daily. No signs of bleeding. No palpitations, light headedness, or shortness of breath. Her weight is down 1 kg from discharge. No HOLLAND.     Patient has been doing well since discharge. Patient reports incisions are healing well, pain is well controlled on current medications. Patient does complain of some ongoing musculoskeletal discomfort across her chest, which improves with Robaxin. Requesting more Robaxin. Patient otherwise denies any fever, chills, chest pain, nausea and vomiting.  she is having Normal bowel movements and voiding without problems.  Has not been attending cardiac  rehabilitation, as she is in transition and plans to start once she is settled in [place].    PAST MEDICAL HISTORY:  Past Medical History:   Diagnosis Date     Arthritis      Congestive heart failure (H)      Hypertension      Nonrheumatic mitral valve stenosis, severe      Obese      SHAILEHS (obstructive sleep apnea)      Sleep apnea        PAST SURGICAL HISTORY:  Past Surgical History:   Procedure Laterality Date     ANESTHESIA CARDIOVERSION N/A 9/20/2021    Procedure: ANESTHESIA, FOR CARDIOVERSION;  Surgeon: GENERIC ANESTHESIA PROVIDER;  Location: [facility] OR     APPENDECTOMY       CV CORONARY ANGIOGRAM N/A 8/5/2021    Procedure: CV CORONARY ANGIOGRAM;  Surgeon: Juan Vance MD;  Location:  HEART CARDIAC CATH LAB     CV RIGHT HEART CATH MEASUREMENTS RECORDED N/A 8/5/2021    Procedure: CV RIGHT HEART CATH;  Surgeon: Juan Vance MD;  Location:  HEART CARDIAC CATH LAB     KNEE DEBRIDEMENT  05/07/2019     LAPAROSCOPIC CHOLECYSTECTOMY       LUMBAR FUSION       OTHER SURGICAL HISTORY      hip replacement     RELEASE CARPAL TUNNEL       REPLACE VALVE MITRAL N/A 9/7/2021    Procedure: Median Sternotomy, Mitral Valve Replacement with a 29 mm St. Andrey Epic Valve, Bilateral Pulmonary Vein Isolation, Left Atrial Appendage Ligation, On Cardiopulmonary Bypass, Transesophageal Echocardiogram by Anesthesia;  Surgeon: Sven Acuna MD;  Location: [facility] OR     TONSILLECTOMY       TOTAL KNEE ARTHROPLASTY Right 01/30/2019     TUBAL LIGATION         CURRENT MEDICATIONS:   Current Outpatient Medications   Medication     amiodarone (PACERONE) 200 MG tablet     aspirin (ASA) 81 MG chewable tablet     diclofenac (VOLTAREN) 1 % topical gel     diphenhydrAMINE (BENADRYL) 25 MG tablet     DULoxetine (CYMBALTA) 30 MG capsule     fluticasone (FLONASE) 50 MCG/ACT nasal spray     gabapentin (NEURONTIN) 600 MG tablet     hydrOXYzine (ATARAX) 25 MG tablet     magnesium 250 MG tablet     Melatonin 10 MG TABS  "tablet     methocarbamol (ROBAXIN) 500 MG tablet     metoprolol succinate ER (TOPROL-XL) 25 MG 24 hr tablet     Multiple Vitamins-Minerals (MULTIVITAMIN ADULTS 50+) TABS     nicotine (NICODERM CQ) 21 MG/24HR 24 hr patch     oxyCODONE (ROXICODONE) 5 MG tablet     pantoprazole (PROTONIX) 40 MG EC tablet     polyethylene glycol (MIRALAX) 17 GM/Dose powder     potassium chloride ER (KLOR-CON M) 20 MEQ CR tablet     senna-docusate (SENOKOT-S/PERICOLACE) 8.6-50 MG tablet     torsemide (DEMADEX) 10 MG tablet     vitamin B complex with vitamin C (VITAMIN  B COMPLEX) tablet     vitamin D3 (CHOLECALCIFEROL) 50 mcg (2000 units) tablet     warfarin ANTICOAGULANT (COUMADIN) 5 MG tablet     No current facility-administered medications for this visit.       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    PHYSICAL EXAM:   /88 (BP Location: Left arm, Patient Position: Chair, Cuff Size: Adult Regular)   Pulse (!) 126   Ht 1.676 m (5' 6\")   Wt 95.1 kg (209 lb 11.2 oz)   LMP  (LMP Unknown)   SpO2 93%   BMI 33.85 kg/m    General: alert and oriented x 3, pleasant, no acute distress, normal mood and affect  CV: S1 S2, no murmurs, rubs or gallops, irregular, tachycardic in 110-120's. Trace to no peripheral edema  Pulm: bilateral breath sounds, clear to auscultation, easy work of breathing  GI: (+) bowel sounds, soft non-tender and non-distended  Incision: incisions clean dry and intact without erythema, swelling or drainage, sternum stable  Neuro: nonfocal    LABS: INR pending     IMAGING: None    PROCEDURES: None         ASSESSMENT/PLAN:  Corina Martinez is a 66 year old year old female with a past medical history of Mitrial Valve Stenosis who is now s/p Mitral valve replacement, left atrial appendage ligation and " bilateral pulmonary vein isolation with Dr. Acuna on 9/1/2021.  who returns to clinic for postop visit.    1. Mitral Stenosis - s/p Mitral valve replacement 9/7/2021.    Doing well surgically, pain is well controlled, incision is healing well without sign of infection.   2. Paroxysmal atrial fibrillation - S/P PVI and KENZIE ligation 9/7/2021   s/p ANTONIO guided cardioversion 9/20/2021   Seems to be back in atrial fib today with -120's, she is completely asymptomatic. Is on Amiodarone 400 mg daily and Metoprolol XL 12.5 mg daily. Has been in touch with a cardiologist in [place] and is planning to see them in 3-4 weeks. Have asked her to see her primary provider in 3-5 days for follow up of her INR, BMP, CBC and atrial fib with possible further titration of her metoprolol and coumadin as needed.   3. PNA - completed IV antibiotics in the hospital. Is afebrile, no SOB or cough.   4. Anticoagulation - INR goal 2-3. Check INR today. Will call with results.     Plan:   1. Continue Amiodarone 400 mg daily for another week before decreasing to 200 mg daily.   2. Increase Metoprolol XL to 25 mg daily, may need to increase further when decreasing the Amiodarone.    3. Renewed Robaxin prescription   4. Continue to monitor weight daily, take an additional Torsemide if greater than 3 lbs in a day, 5 lbs in a week.   5. Check INR today, will call with instructions.   6. Follow up with your primary provider later this week or early next week with a repeat INR, CBC and BMP.   7. Follow up with cardiology at [place] in [place], [place] in 3-4 weeks.   8. Start Cardiac Rehab when settled in [place] or [place] in 2-3 weeks.   9. Continue sternal precautions for 12 weeks from surgery date.          The total time spent with the patient was 30 minutes, > 50% of which was spent in counseling.    KEVIN Tariq

## 2021-09-29 ENCOUNTER — TELEPHONE (OUTPATIENT)
Dept: CARDIOLOGY | Facility: CLINIC | Age: 66
End: 2021-09-29

## 2021-09-29 DIAGNOSIS — Z53.9 ERRONEOUS ENCOUNTER--DISREGARD: Primary | ICD-10-CM

## 2021-10-04 ENCOUNTER — CARE COORDINATION (OUTPATIENT)
Dept: CARDIOLOGY | Facility: CLINIC | Age: 66
End: 2021-10-04

## 2021-10-11 LAB — BACTERIA BRONCH: ABNORMAL

## 2021-11-05 ENCOUNTER — ANTICOAGULATION THERAPY VISIT (OUTPATIENT)
Dept: CARDIOLOGY | Facility: OTHER | Age: 66
End: 2021-11-05
Payer: COMMERCIAL

## 2021-11-05 DIAGNOSIS — Z79.01 ANTICOAGULATION MONITORING, INR RANGE 2-3: ICD-10-CM

## 2021-11-05 DIAGNOSIS — I48.91 ATRIAL FIBRILLATION WITH RVR (H): ICD-10-CM

## 2021-11-05 DIAGNOSIS — I48.91 ATRIAL FIBRILLATION (H): Primary | ICD-10-CM

## 2021-11-05 NOTE — PROGRESS NOTES
ANTICOAGULATION FOLLOW-UP CLINIC VISIT    Patient Name:  Corina Martinez  Date:  11/5/2021  Contact Type:      SUBJECTIVE:  Patient Findings     Comments:  Patient is back in [place] INR is being managed there        Clinical Outcomes     Comments:  Patient is back in [place] INR is being managed there           OBJECTIVE    No results for input(s): INR, [id number], [id number], 2AGN, F2 in the last 168 hours.    ASSESSMENT / PLAN  No question data found.  Anticoagulation Summary  As of 11/5/2021    INR goal:  2.0-3.0   TTR:  63.7 % (2.6 mo)   INR used for dosing:     Warfarin maintenance plan:  5 mg (5 mg x 1) every Mon, Wed, Fri; 2.5 mg (5 mg x 0.5) all other days   Full warfarin instructions:  5 mg every Mon, Wed, Fri; 2.5 mg all other days   Weekly warfarin total:  25 mg   Plan last modified:  Zunilda Jimenez, RN (6/30/2021)   Next INR check:     Target end date:  Indefinite    Indications    Atrial fibrillation (H) [I48.91]  Anticoagulation monitoring  INR range 2-3 [Z79.01]  Atrial fibrillation with RVR (H) [I48.91]             Anticoagulation Episode Summary     INR check location:      Preferred lab:      Resolved date:  11/5/2021    Resolved reason:  Patient moved    Send INR reminders to:  [facility]    Comments:        Anticoagulation Care Providers     Provider Role Specialty Phone number    Bijal Torres APRN CNP Responsible Cardiovascular Disease [phone]            See the Encounter Report to view Anticoagulation Flowsheet and Dosing Calendar (Go to Encounters tab in chart review, and find the Anticoagulation Therapy Visit)        Zunilda Jimenez, RN

## 2021-11-13 ENCOUNTER — TELEPHONE (OUTPATIENT)
Dept: CARDIOLOGY | Facility: CLINIC | Age: 66
End: 2021-11-13
Payer: COMMERCIAL

## 2021-11-13 NOTE — TELEPHONE ENCOUNTER
Called patient to schedule appointment and patient states she moved to [place] for the winter and states she will call back when back in [place]. Patient also states that she is seeing heart doctors in [place] for care

## 2021-11-22 NOTE — ADDENDUM NOTE
Addendum  created 11/22/21 0008 by Leila Doll MD    Attestation recorded in Intraprocedure, Intraprocedure Attestations filed

## 2022-06-29 NOTE — LETTER
July 16, 2021      TO: Corina Martinez  [address] [place] [address]         Dear Corina,    Here is your schedule for your visits on August 3rd and August 5th and the preparation instructions for your transesophageal echocardiogram and coronary angiogram.  The preparation for these test will also cover the preparation for the pericardiocentesis.    Tuesday August 3rd     10:00 am labs  10:30 am clinic appointment with Dr Real    These appointments are located at the [facility], [address], [place] [address].  See enclosed map.     Thursday August 5th    10:30 am echocardiogram  12:00 pm angiogram with pericardiocentesis    These appointments well be at the [facility], [address], [place] [address] (This is the main hospital)              PREPARATION INSTRUCTIONS     Please check in at the [facility] Waiting Room down the diego from the hospital lobby.     ECHO ANTONIO  1.  Please bring or wear a comfortable two-piece outfit.  2.  Arrival time:  -   [facility]:   arrive 15 minutes prior to examination time.  3.  Plan to have a responsible adult take you home after the test. After the exam you may not      drive, take a bus or taxi by yourself.  -   Someone should stay with you for 6 hours after your test.  4.  No food or drink:  -   8 hours before the test  5.  If you take antacids or water pills (diuretics): Do not take them until after your test. You may      take blood pressure medicine with a few sips of water.  6.  If you have diabetes:  -   Morning slots preferred  -   If you take insulin, call your diabetes care team. Ask if you should take a   dose the morning     of your test.  -   If you take diabetes medicine by mouth, don't take it on the morning of your test. Bring it with      you to take after the test. (If you have questions, call your diabetes care team.)  7.  Bring a list of any medicines you are taking.  8.  Do not drive for 24 hours after the  test.    CORONARY ANGIOGRAM  1. Please make arrangements to have a , your will be given mild sedation and will not be allowed to drive for 24 hours.   2. Do not eat or drink 6 hours prior to your procedure.  Sips of water are allowed up to 2 hours prior to the procedure.  3. Take your usual morning medications with sips of water as you normally would.   4. Take your last dose of Warfarin (Coumadin) on Sunday August 1st.  5. Take a 325 mg aspirin the day before and the morning of your procedure, unless you are allergic to aspirin. If you take 81mg of aspirin daily take 4 tablets to make 325 mg.    6. Please note that you will be discharged several hours after the procedure. This is an outpatient procedure.  7. If your angiogram is in preparation for another heart surgery, and you are found to have blockages in your arteries your surgeon will most likely bypass these blockages at the same time he performs your other open heart surgery.      Please feel free to call me with any questions or concerns.    Dawn Siegel RN  [facility]  Cardiothoracic Surgery Care Coordinator  [phone]              0 = swallows foods/liquids without difficulty

## 2022-06-30 NOTE — PROGRESS NOTES
"HOW ARE YOU DOING  Tell me how you have been doing since you were discharged from the hospital?  Patient states she feels \"pretty good\", she states that she is moving slow. She ended up walking more than she should have in the first week and felt out of breath.     ACTIVITY  How is your activity tolerance? Patient able to walk 50 ft per day, unable to do more exercise due traveling via mobile home at this time. Patient is on her way to [place] where she will stay until May.     POST OP MONITORING  How is your pain on a 0-10 scale, how are you managing your pain? Pain reported at 5 due to a cough; patient has oxycodone at home. She takes oxycodone only when she has to, she uses her pillows to help support her sternum.     Are you still doing sternal precautions? Yes, patient uses pillows and is limiting the weight she lifts. Her  is not letting her drive the RV.   Do you hear any clicking when you are moving or taking a deep breath?  No    Are you weighing yourself daily?  Yes, 204 10/3, 206 10/2. Patient did not check her weight today.    Are you using the inspirometer? She uses incentive spirometer every morning and night, sometimes during the day. Patient reports chronic cough with minimal sputum. She reports that she has chronic sinus allergies and that most of the sputum is due to the allergies.      SIGNS AND SYMPTOMS OF INFECTION  1. INCREASE IN PAIN None   2. FEVER None  3. DRAINAGE None    If Yes, color:                 5. REDNESS None    6. SWELLING None    ASSISTANCE  Do you have someone at home to assist you with your daily activities?   is available to assist at all times.       MEDICATIONS  Is someone helping you to set up your medications?  Able take medications on time    Do you have any questions about your medications?  Patient is able to take all of her medications on time and states she has no questions at this time.      Are you on a blood thinner?  Yes  Who is managing your INRs? " Reji Greenfield,    Intenta de usar Eucerin cream que he enviado a [facility] [place]  Si siempre se continua picar, compra [place] botella de champu Nizoral o champu para hongos que se puede encontrar over the counter en [place] pharmacia y [employer] [place] con wero  por [place] semana 1 vez al kevin  Nos vemos en 6 meses  Wellness Visit for Adults   AMBULATORY CARE:   A wellness visit  is when you see your healthcare provider to get screened for health problems  Your healthcare provider will also give you advice on how to stay healthy  Write down your questions so you remember to ask them  Ask your healthcare provider how often you should have a wellness visit  What happens at a wellness visit:  Your healthcare provider will ask about your health, and your family history of health problems  This includes high blood pressure, heart disease, and cancer  He or she will ask if you have symptoms that concern you, if you smoke, and about your mood  You may also be asked about your intake of medicines, supplements, food, and alcohol  Any of the following may be done: Your weight  will be checked  Your height may also be checked so your body mass index (BMI) can be calculated  Your BMI shows if you are at a healthy weight  Your blood pressure  and heart rate will be checked  Your temperature may also be checked  Blood and urine tests  may be done  Blood tests may be done to check your cholesterol levels  Abnormal cholesterol levels increase your risk for heart disease and stroke  You may also need a blood or urine test to check for diabetes if you are at increased risk  Urine tests may be done to look for signs of an infection or kidney disease  A physical exam  includes checking your heartbeat and lungs with a stethoscope  Your healthcare provider may also check your skin to look for sun damage  Screening tests  may be recommended   A screening test is done to check for diseases that may not cause Patient checks her INRs at the physician's office, last INR was 9/30/21 Thursday and it was 2.2.Patient does not have a PCP appointment set up in [place] where she is traveling and staying until May. Unable confirm when she will have her INR checked next; she is planning to visit a cardiologist on 10/27/21 in [facility]. Patient was instructed to set up care with a primary care physician and to follow up with cardiology sooner than 10/27/21 if possible. Patient understood that she needs to establish care with PCP and cardiology, and that she needs to have her INRs checked at a clinic when she arrives in [place], patient verbalized that she will set up the mentioned appointments.     FOLLOW UP  Are you scheduled for cardiac rehab? Patient will find out when she gets to [place], she plans to call [facility] to set up cardiac rehab.     You are scheduled to see our surgery advanced practice provider for post operative follow up on? Patient visited surgery advanced practice provider  You are scheduled to see your cardiologist on 10/27/21  You are scheduled to see your primary care physician on: Primary care visit done on Thursday 9/30/21; patient will follow PCP in [place] until she establishes care in [place].        CONTACT INFORMATION  Please feel free to call us with any other questions or symptoms that are concerning for you at : [phone], if it is after 4:30 in the afternoon, or a weekend please call [phone] and ask for the on call specialist.  We want to do everything we can to help prevent you needing to return to the ED, so please do not hesitate to call us.           symptoms  The screening tests you may need depend on your age, gender, family history, and lifestyle habits  For example, colorectal screening may be recommended if you are 48years old or older  Screening tests you need if you are a woman:   A Pap smear  is used to screen for cervical cancer  Pap smears are usually done every 3 to 5 years depending on your age  You may need them more often if you have had abnormal Pap smear test results in the past  Ask your healthcare provider how often you should have a Pap smear  A mammogram  is an x-ray of your breasts to screen for breast cancer  Experts recommend mammograms every 2 years starting at age 48 years  You may need a mammogram at age 52 years or younger if you have an increased risk for breast cancer  Talk to your healthcare provider about when you should start having mammograms and how often you need them  Vaccines you may need:   Get an influenza vaccine  every year  The influenza vaccine protects you from the flu  Several types of viruses cause the flu  The viruses change over time, so new vaccines are made each year  Get a tetanus-diphtheria (Td) booster vaccine  every 10 years  This vaccine protects you against tetanus and diphtheria  Tetanus is a severe infection that may cause painful muscle spasms and lockjaw  Diphtheria is a severe bacterial infection that causes a thick covering in the back of your mouth and throat  Get a human papillomavirus (HPV) vaccine  if you are female and aged 23 to 32 or male 23 to 24 and never received it  This vaccine protects you from HPV infection  HPV is the most common infection spread by sexual contact  HPV may also cause vaginal, penile, and anal cancers  Get a pneumococcal vaccine  if you are aged 72 years or older  The pneumococcal vaccine is an injection given to protect you from pneumococcal disease  Pneumococcal disease is an infection caused by pneumococcal bacteria   The infection may cause pneumonia, meningitis, or an ear infection  Get a shingles vaccine  if you are 60 or older, even if you have had shingles before  The shingles vaccine is an injection to protect you from the varicella-zoster virus  This is the same virus that causes chickenpox  Shingles is a painful rash that develops in people who had chickenpox or have been exposed to the virus  How to eat healthy:  My Plate is a model for planning healthy meals  It shows the types and amounts of foods that should go on your plate  Fruits and vegetables make up about half of your plate, and grains and protein make up the other half  A serving of dairy is included on the side of your plate  The amount of calories and serving sizes you need depends on your age, gender, weight, and height  Examples of healthy foods are listed below:  Eat a variety of vegetables  such as dark green, red, and orange vegetables  You can also include canned vegetables low in sodium (salt) and frozen vegetables without added butter or sauces  Eat a variety of fresh fruits , canned fruit in 100% juice, frozen fruit, and dried fruit  Include whole grains  At least half of the grains you eat should be whole grains  Examples include whole-wheat bread, wheat pasta, brown rice, and whole-grain cereals such as oatmeal     Eat a variety of protein foods such as seafood (fish and shellfish), lean meat, and poultry without skin (turkey and chicken)  Examples of lean meats include pork leg, shoulder, or tenderloin, and beef round, sirloin, tenderloin, and extra lean ground beef  Other protein foods include eggs and egg substitutes, beans, peas, soy products, nuts, and seeds  Choose low-fat dairy products such as skim or 1% milk or low-fat yogurt, cheese, and cottage cheese  Limit unhealthy fats  such as butter, hard margarine, and shortening  Exercise:  Exercise at least 30 minutes per day on most days of the week   Some examples of exercise include walking, biking, dancing, and swimming  You can also fit in more physical activity by taking the stairs instead of the elevator or parking farther away from stores  Include muscle strengthening activities 2 days each week  Regular exercise provides many health benefits  It helps you manage your weight, and decreases your risk for type 2 diabetes, heart disease, stroke, and high blood pressure  Exercise can also help improve your mood  Ask your healthcare provider about the best exercise plan for you  General health and safety guidelines:   Do not smoke  Nicotine and other chemicals in cigarettes and cigars can cause lung damage  Ask your healthcare provider for information if you currently smoke and need help to quit  E-cigarettes or smokeless tobacco still contain nicotine  Talk to your healthcare provider before you use these products  Limit alcohol  A drink of alcohol is 12 ounces of beer, 5 ounces of wine, or 1½ ounces of liquor  Lose weight, if needed  Being overweight increases your risk of certain health conditions  These include heart disease, high blood pressure, type 2 diabetes, and certain types of cancer  Protect your skin  Do not sunbathe or use tanning beds  Use sunscreen with a SPF 15 or higher  Apply sunscreen at least 15 minutes before you go outside  Reapply sunscreen every 2 hours  Wear protective clothing, hats, and sunglasses when you are outside  Drive safely  Always wear your seatbelt  Make sure everyone in your car wears a seatbelt  A seatbelt can save your life if you are in an accident  Do not use your cell phone when you are driving  This could distract you and cause an accident  Pull over if you need to make a call or send a text message  Practice safe sex  Use latex condoms if are sexually active and have more than one partner  Your healthcare provider may recommend screening tests for sexually transmitted infections (STIs)      Wear helmets, lifejackets, and protective gear   Always wear a helmet when you ride a bike or motorcycle, go skiing, or play sports that could cause a head injury  Wear protective equipment when you play sports  Wear a lifejacket when you are on a boat or doing water sports  © Copyright [employer] 2022 Information is for End User's use only and may not be sold, redistributed or otherwise used for commercial purposes  All illustrations and images included in CareNotes® are the copyrighted property of A D A M , Inc  or Vineet Edwards  The above information is an  only  It is not intended as medical advice for individual conditions or treatments  Talk to your doctor, nurse or pharmacist before following any medical regimen to see if it is safe and effective for you

## 2022-08-21 ENCOUNTER — HEALTH MAINTENANCE LETTER (OUTPATIENT)
Age: 67
End: 2022-08-21

## 2022-11-21 ENCOUNTER — HEALTH MAINTENANCE LETTER (OUTPATIENT)
Age: 67
End: 2022-11-21

## 2023-03-14 NOTE — Clinical Note
Patient education provided.    Adbry Pregnancy And Lactation Text: It is unknown if this medication will adversely affect pregnancy or breast feeding.

## 2023-09-17 ENCOUNTER — HEALTH MAINTENANCE LETTER (OUTPATIENT)
Age: 68
End: 2023-09-17

## 2025-02-17 NOTE — TELEPHONE ENCOUNTER
Left voicemail for patient to complete Travel Screen for Cardiac Cath Lab appointment on 8/5 and inform patient of updated Visitor Policy. During VM- got disconnected.  Per chart review, no covid test. Sent message to clinic.     
0 (no pain/absence of nonverbal indicators of pain)

## (undated) DEVICE — Device

## (undated) DEVICE — SUCTION SLEEVE NEPTUNE 2 165MM 0703-005-165

## (undated) DEVICE — PREP CHLORAPREP 26ML TINTED ORANGE  260815

## (undated) DEVICE — SU SILK 0 TIE 6X30" A306H

## (undated) DEVICE — LEAD PACER MYOCARDIAL BIPOLAR TEMPORARY 53CM 6495F

## (undated) DEVICE — INTRO GLIDESHEATH SLENDER 6FR 10X45CM 60-1060

## (undated) DEVICE — SPONGE RAY-TEC 4X8" 7318

## (undated) DEVICE — SOL NACL 0.9% IRRIG 3000ML BAG 2B7477

## (undated) DEVICE — GLOVE SENSICARE 7.5 MSG1075 LATEX FREE

## (undated) DEVICE — CANISTER WOUND VAC W/GEL 1000ML M8275093/5

## (undated) DEVICE — SU STEEL 6 CCS 4X18" M654G

## (undated) DEVICE — DEFIB PRO-PADZ LVP LQD GEL ADULT 8900-2105-01

## (undated) DEVICE — LEAD ELEC MYOCARDIO PACING TEMPORARY MEDTRONIC

## (undated) DEVICE — DRSG TEGADERM 8X12" 1629

## (undated) DEVICE — DRAPE IOBAN INCISE 23X17" 6650EZ

## (undated) DEVICE — SU ETHIBOND 3-0 BBDA 36" X588H

## (undated) DEVICE — SU PLEDGET SOFT TFE 3/8"X3/26"X1/16" PCP40

## (undated) DEVICE — WIPES FOLEY CARE SURESTEP PROVON DFC100

## (undated) DEVICE — SU VICRYL 3-0 FS-1 27" J442H

## (undated) DEVICE — SU VICRYL 0 CTX 36" J370H

## (undated) DEVICE — LINEN GOWN XLG 5407

## (undated) DEVICE — SU PROLENE 4-0 SHDA 36" 8521H

## (undated) DEVICE — SU PROLENE 3-0 SHDA 36" 8522H

## (undated) DEVICE — ESU ELEC BLADE 2.75" COATED/INSULATED E1455

## (undated) DEVICE — SOL NACL 0.9% 10ML VIAL 0409-4888-02

## (undated) DEVICE — SU ETHIBOND 2-0 SHDA 30" X563H

## (undated) DEVICE — LINEN TOWEL PACK X6 WHITE 5487

## (undated) DEVICE — SU ETHIBOND 2-0 V-7 DA 10X30" PXX77

## (undated) DEVICE — SUCTION CATH AIRLIFE TRI-FLO W/CONTROL PORT 14FR  T60C

## (undated) DEVICE — TAPE MEDIPORE 4"X2YD 2864

## (undated) DEVICE — TUBING INSUFFLATION W/FILTER CPC TO LUER 620-030-301

## (undated) DEVICE — DRSG ADAPTIC 3X16"  6114

## (undated) DEVICE — SU PROLENE 5-0 RB-2DA 30" 8710H

## (undated) DEVICE — KIT HAND CONTROL ACIST 014644 AR-P54

## (undated) DEVICE — SU ETHIBOND 2-0 V-5DA 10X30" PXX52

## (undated) DEVICE — SU STEEL MYO/WIRE II STERNOTOMY 8 BE-1 3X14" 048-217

## (undated) DEVICE — PACK ADULT HEART UMMC PV15CG92D

## (undated) DEVICE — TUBING PRESSURE 30"

## (undated) DEVICE — NDL COUNTER 40CT  31142311

## (undated) DEVICE — SU PROLENE 4-0 RB-1DA 36" 8557H

## (undated) DEVICE — SURGICEL HEMOSTAT 4X8" 1952

## (undated) DEVICE — DRAPE SLUSH/WARMER 66X44" ORS-320

## (undated) DEVICE — BONE WAX 2.5GM W31G

## (undated) DEVICE — SU ETHIBOND 0 CT-1 CR 8X18" CX21D

## (undated) DEVICE — SOL NACL 0.9% IRRIG 1000ML BOTTLE 2F7124

## (undated) DEVICE — TUBING SUCTION DRAINAGE PLEURAL DUAL 8884714200

## (undated) DEVICE — SU VICRYL 2-0 CT-1 27" UND J259H

## (undated) DEVICE — DRSG DRAIN 4X4" 7086

## (undated) DEVICE — ATRICURE SELECTION GUIDE

## (undated) DEVICE — ADH SKIN CLOSURE PREMIERPRO EXOFIN 1.0ML 3470

## (undated) DEVICE — VESSEL LOOPS BLUE SUPERMAXI 011022PBX

## (undated) DEVICE — INTRO SHEATH 7FRX10CM PINNACLE RSS702

## (undated) DEVICE — SLEEVE TR BAND RADIAL COMPRESSION DEVICE 24CM TRB24-REG

## (undated) DEVICE — PACK HEART LEFT CUSTOM

## (undated) DEVICE — DRAIN CHEST TUBE RIGHT ANGLED 28FR 8128

## (undated) DEVICE — DRAIN CHEST TUBE 32FR STR 8032

## (undated) DEVICE — TIES BANDING T50R

## (undated) DEVICE — PROTECTOR ARM ONE-STEP TRENDELENBURG 40418

## (undated) DEVICE — ESU ELEC BLADE 6" COATED/INSULATED E1455-6

## (undated) DEVICE — PREP DURAPREP 26ML APL 8630

## (undated) DEVICE — MANIFOLD KIT ANGIO AUTOMATED 014613

## (undated) DEVICE — SUCTION MANIFOLD NEPTUNE 2 SYS 4 PORT 0702-020-000

## (undated) DEVICE — HANDPIECE ABLATION OPEN LONG JAW LT CURVE  OLL2

## (undated) DEVICE — DRSG ABDOMINAL 07 1/2X8" 7197D

## (undated) DEVICE — ESU PENCIL SMOKE EVAC W/ROCKER SWITCH 0703-047-000

## (undated) DEVICE — LINEN TOWEL PACK X30 5481

## (undated) DEVICE — NDL COUNTER 20CT 31142493

## (undated) DEVICE — STATLOCK PSI DEVICE

## (undated) DEVICE — SUCTION DRY CHEST DRAIN OASIS 3600-100

## (undated) RX ORDER — HEPARIN SODIUM 1000 [USP'U]/ML
INJECTION, SOLUTION INTRAVENOUS; SUBCUTANEOUS
Status: DISPENSED
Start: 2021-09-07

## (undated) RX ORDER — FENTANYL CITRATE 50 UG/ML
INJECTION, SOLUTION INTRAMUSCULAR; INTRAVENOUS
Status: DISPENSED
Start: 2021-09-07

## (undated) RX ORDER — SODIUM CHLORIDE 9 MG/ML
INJECTION, SOLUTION INTRAVENOUS
Status: DISPENSED
Start: 2021-05-17

## (undated) RX ORDER — DILTIAZEM HCL IN NACL,ISO-OSM 125 MG/125
PLASTIC BAG, INJECTION (ML) INTRAVENOUS
Status: DISPENSED
Start: 2021-05-26

## (undated) RX ORDER — NICARDIPINE HCL-0.9% SOD CHLOR 1 MG/10 ML
SYRINGE (ML) INTRAVENOUS
Status: DISPENSED
Start: 2021-08-05

## (undated) RX ORDER — HEPARIN SODIUM 1000 [USP'U]/ML
INJECTION, SOLUTION INTRAVENOUS; SUBCUTANEOUS
Status: DISPENSED
Start: 2021-08-05

## (undated) RX ORDER — PROPOFOL 10 MG/ML
INJECTION, EMULSION INTRAVENOUS
Status: DISPENSED
Start: 2021-09-07

## (undated) RX ORDER — GABAPENTIN 100 MG/1
CAPSULE ORAL
Status: DISPENSED
Start: 2021-09-07

## (undated) RX ORDER — FENTANYL CITRATE-0.9 % NACL/PF 10 MCG/ML
PLASTIC BAG, INJECTION (ML) INTRAVENOUS
Status: DISPENSED
Start: 2021-09-07

## (undated) RX ORDER — NITROGLYCERIN 5 MG/ML
VIAL (ML) INTRAVENOUS
Status: DISPENSED
Start: 2021-08-05

## (undated) RX ORDER — METOPROLOL TARTRATE 1 MG/ML
INJECTION, SOLUTION INTRAVENOUS
Status: DISPENSED
Start: 2021-05-26

## (undated) RX ORDER — FENTANYL CITRATE 50 UG/ML
INJECTION, SOLUTION INTRAMUSCULAR; INTRAVENOUS
Status: DISPENSED
Start: 2021-08-05

## (undated) RX ORDER — LIDOCAINE HYDROCHLORIDE 20 MG/ML
INJECTION, SOLUTION EPIDURAL; INFILTRATION; INTRACAUDAL; PERINEURAL
Status: DISPENSED
Start: 2021-09-07

## (undated) RX ORDER — HEPARIN SODIUM 5000 [USP'U]/.5ML
INJECTION, SOLUTION INTRAVENOUS; SUBCUTANEOUS
Status: DISPENSED
Start: 2021-05-17

## (undated) RX ORDER — CEFAZOLIN SODIUM 2 G/100ML
INJECTION, SOLUTION INTRAVENOUS
Status: DISPENSED
Start: 2021-09-07

## (undated) RX ORDER — HEPARIN SODIUM 10000 [USP'U]/100ML
INJECTION, SOLUTION INTRAVENOUS
Status: DISPENSED
Start: 2021-05-17

## (undated) RX ORDER — FENTANYL CITRATE 50 UG/ML
INJECTION, SOLUTION INTRAMUSCULAR; INTRAVENOUS
Status: DISPENSED
Start: 2021-09-20

## (undated) RX ORDER — CEFAZOLIN SODIUM 1 G/3ML
INJECTION, POWDER, FOR SOLUTION INTRAMUSCULAR; INTRAVENOUS
Status: DISPENSED
Start: 2021-09-07

## (undated) RX ORDER — SODIUM CHLORIDE 9 MG/ML
INJECTION, SOLUTION INTRAVENOUS
Status: DISPENSED
Start: 2021-05-26

## (undated) RX ORDER — HYDROMORPHONE HYDROCHLORIDE 1 MG/ML
INJECTION, SOLUTION INTRAMUSCULAR; INTRAVENOUS; SUBCUTANEOUS
Status: DISPENSED
Start: 2021-05-26

## (undated) RX ORDER — EPHEDRINE SULFATE 50 MG/ML
INJECTION, SOLUTION INTRAMUSCULAR; INTRAVENOUS; SUBCUTANEOUS
Status: DISPENSED
Start: 2021-09-07

## (undated) RX ORDER — FUROSEMIDE 10 MG/ML
INJECTION INTRAMUSCULAR; INTRAVENOUS
Status: DISPENSED
Start: 2021-05-26

## (undated) RX ORDER — ESMOLOL HYDROCHLORIDE 10 MG/ML
INJECTION INTRAVENOUS
Status: DISPENSED
Start: 2021-09-07

## (undated) RX ORDER — LIDOCAINE HYDROCHLORIDE 20 MG/ML
SOLUTION OROPHARYNGEAL
Status: DISPENSED
Start: 2021-09-20

## (undated) RX ORDER — LIDOCAINE HYDROCHLORIDE 20 MG/ML
SOLUTION OROPHARYNGEAL
Status: DISPENSED
Start: 2021-08-05

## (undated) RX ORDER — LIDOCAINE HYDROCHLORIDE 10 MG/ML
INJECTION, SOLUTION EPIDURAL; INFILTRATION; INTRACAUDAL; PERINEURAL
Status: DISPENSED
Start: 2021-08-05

## (undated) RX ORDER — METOPROLOL TARTRATE 50 MG
TABLET ORAL
Status: DISPENSED
Start: 2021-05-26